# Patient Record
Sex: FEMALE | Race: WHITE | NOT HISPANIC OR LATINO | Employment: OTHER | URBAN - METROPOLITAN AREA
[De-identification: names, ages, dates, MRNs, and addresses within clinical notes are randomized per-mention and may not be internally consistent; named-entity substitution may affect disease eponyms.]

---

## 2017-01-25 DIAGNOSIS — I10 ESSENTIAL HYPERTENSION: ICD-10-CM

## 2017-01-27 ENCOUNTER — APPOINTMENT (OUTPATIENT)
Dept: VASCULAR LAB | Facility: MEDICAL CENTER | Age: 82
End: 2017-01-27
Payer: MEDICARE

## 2017-01-28 ENCOUNTER — PATIENT OUTREACH (OUTPATIENT)
Dept: HEALTH INFORMATION MANAGEMENT | Facility: OTHER | Age: 82
End: 2017-01-28

## 2017-01-28 NOTE — PROGRESS NOTES
1/28/17  -  Outcome: LVM  CONFIRMED WEB IZ.    Attempt # 1    Care Gap Scheduling (Attempt to Schedule EACH Overdue Care Gap!)  Health Maintenance Due   Topic Date Due   • IMM DTaP/Tdap/Td Vaccine (1 - Tdap) 02/01/1952   • IMM PNEUMOCOCCAL 65+ (ADULT) LOW/MEDIUM RISK SERIES (2 of 2 - PCV13) 06/05/2013   • IMM INFLUENZA (1) 09/01/2016         MyChart Activation:  Declined

## 2017-02-11 NOTE — PROGRESS NOTES
2/11/17 -    Annual Wellness Visit Scheduling  Scheduling Status:  Not Scheduled    If Not Scheduled, Choose Reason Why:  PT DECLINED AWV SCP, BECAUSE SHE SEES DR. AUSTIN VERY OFTEN.      Shareight Activation:  COMPLETED NEW MEMBER, DOESN'T HAVE AN E-MAIL.

## 2017-02-24 ENCOUNTER — APPOINTMENT (OUTPATIENT)
Dept: VASCULAR LAB | Facility: MEDICAL CENTER | Age: 82
End: 2017-02-24
Payer: MEDICARE

## 2017-04-13 ENCOUNTER — HOSPITAL ENCOUNTER (OUTPATIENT)
Facility: MEDICAL CENTER | Age: 82
End: 2017-04-13
Attending: OPHTHALMOLOGY | Admitting: OPHTHALMOLOGY
Payer: MEDICARE

## 2017-04-13 VITALS
BODY MASS INDEX: 25.39 KG/M2 | WEIGHT: 143.3 LBS | OXYGEN SATURATION: 95 % | HEIGHT: 63 IN | SYSTOLIC BLOOD PRESSURE: 152 MMHG | DIASTOLIC BLOOD PRESSURE: 71 MMHG | TEMPERATURE: 97.8 F | HEART RATE: 63 BPM | RESPIRATION RATE: 16 BRPM

## 2017-04-13 PROBLEM — H43.10 VITREOUS HEMORRHAGE (HCC): Status: ACTIVE | Noted: 2017-04-13

## 2017-04-13 LAB
ANION GAP SERPL CALC-SCNC: 7 MMOL/L (ref 0–11.9)
BUN SERPL-MCNC: 23 MG/DL (ref 8–22)
CALCIUM SERPL-MCNC: 9.9 MG/DL (ref 8.5–10.5)
CHLORIDE SERPL-SCNC: 110 MMOL/L (ref 96–112)
CO2 SERPL-SCNC: 26 MMOL/L (ref 20–33)
CREAT SERPL-MCNC: 1.39 MG/DL (ref 0.5–1.4)
EKG IMPRESSION: NORMAL
EKG IMPRESSION: NORMAL
GFR SERPL CREATININE-BSD FRML MDRD: 36 ML/MIN/1.73 M 2
GLUCOSE SERPL-MCNC: 105 MG/DL (ref 65–99)
POTASSIUM SERPL-SCNC: 4.4 MMOL/L (ref 3.6–5.5)
SODIUM SERPL-SCNC: 143 MMOL/L (ref 135–145)

## 2017-04-13 PROCEDURE — 110371 HCHG SHELL REV 272: Performed by: OPHTHALMOLOGY

## 2017-04-13 PROCEDURE — A9270 NON-COVERED ITEM OR SERVICE: HCPCS

## 2017-04-13 PROCEDURE — 700111 HCHG RX REV CODE 636 W/ 250 OVERRIDE (IP)

## 2017-04-13 PROCEDURE — 160048 HCHG OR STATISTICAL LEVEL 1-5: Performed by: OPHTHALMOLOGY

## 2017-04-13 PROCEDURE — 160009 HCHG ANES TIME/MIN: Performed by: OPHTHALMOLOGY

## 2017-04-13 PROCEDURE — 160035 HCHG PACU - 1ST 60 MINS PHASE I: Performed by: OPHTHALMOLOGY

## 2017-04-13 PROCEDURE — 502240 HCHG MISC OR SUPPLY RC 0272: Performed by: OPHTHALMOLOGY

## 2017-04-13 PROCEDURE — 501836 HCHG SUTURE EYE: Performed by: OPHTHALMOLOGY

## 2017-04-13 PROCEDURE — 501154 HCHG PROBE, ENDO OTO HGM LASER: Performed by: OPHTHALMOLOGY

## 2017-04-13 PROCEDURE — 93005 ELECTROCARDIOGRAM TRACING: CPT | Performed by: OPHTHALMOLOGY

## 2017-04-13 PROCEDURE — 503198 HCHG BACKFLUSH, SOFT TIP: Performed by: OPHTHALMOLOGY

## 2017-04-13 PROCEDURE — 80048 BASIC METABOLIC PNL TOTAL CA: CPT

## 2017-04-13 PROCEDURE — 160029 HCHG SURGERY MINUTES - 1ST 30 MINS LEVEL 4: Performed by: OPHTHALMOLOGY

## 2017-04-13 PROCEDURE — 160041 HCHG SURGERY MINUTES - EA ADDL 1 MIN LEVEL 4: Performed by: OPHTHALMOLOGY

## 2017-04-13 PROCEDURE — 93010 ELECTROCARDIOGRAM REPORT: CPT | Mod: 77 | Performed by: INTERNAL MEDICINE

## 2017-04-13 PROCEDURE — A9270 NON-COVERED ITEM OR SERVICE: HCPCS | Performed by: ANESTHESIOLOGY

## 2017-04-13 PROCEDURE — 160036 HCHG PACU - EA ADDL 30 MINS PHASE I: Performed by: OPHTHALMOLOGY

## 2017-04-13 PROCEDURE — 110382 HCHG SHELL REV 271: Performed by: OPHTHALMOLOGY

## 2017-04-13 PROCEDURE — 700102 HCHG RX REV CODE 250 W/ 637 OVERRIDE(OP): Performed by: ANESTHESIOLOGY

## 2017-04-13 PROCEDURE — 93005 ELECTROCARDIOGRAM TRACING: CPT | Performed by: ANESTHESIOLOGY

## 2017-04-13 PROCEDURE — A6410 STERILE EYE PAD: HCPCS | Performed by: OPHTHALMOLOGY

## 2017-04-13 PROCEDURE — 93010 ELECTROCARDIOGRAM REPORT: CPT | Mod: 76 | Performed by: INTERNAL MEDICINE

## 2017-04-13 PROCEDURE — 700101 HCHG RX REV CODE 250

## 2017-04-13 PROCEDURE — 160002 HCHG RECOVERY MINUTES (STAT): Performed by: OPHTHALMOLOGY

## 2017-04-13 PROCEDURE — 500558 HCHG EYE SHIELD W/GARTER (FOX): Performed by: OPHTHALMOLOGY

## 2017-04-13 PROCEDURE — A4606 OXYGEN PROBE USED W OXIMETER: HCPCS | Performed by: OPHTHALMOLOGY

## 2017-04-13 PROCEDURE — 700102 HCHG RX REV CODE 250 W/ 637 OVERRIDE(OP)

## 2017-04-13 RX ORDER — MIDAZOLAM HYDROCHLORIDE 1 MG/ML
INJECTION INTRAMUSCULAR; INTRAVENOUS
Status: DISCONTINUED
Start: 2017-04-13 | End: 2017-04-13 | Stop reason: HOSPADM

## 2017-04-13 RX ORDER — BALANCED SALT SOLUTION 6.4; .75; .48; .3; 3.9; 1.7 MG/ML; MG/ML; MG/ML; MG/ML; MG/ML; MG/ML
SOLUTION OPHTHALMIC
Status: DISCONTINUED | OUTPATIENT
Start: 2017-04-13 | End: 2017-04-13 | Stop reason: HOSPADM

## 2017-04-13 RX ORDER — TROPICAMIDE 10 MG/ML
SOLUTION/ DROPS OPHTHALMIC
Status: COMPLETED
Start: 2017-04-13 | End: 2017-04-13

## 2017-04-13 RX ORDER — BALANCED SALT SOLUTION ENRICHED WITH BICARBONATE, DEXTROSE, AND GLUTATHIONE
KIT INTRAOCULAR
Status: DISCONTINUED | OUTPATIENT
Start: 2017-04-13 | End: 2017-04-13 | Stop reason: HOSPADM

## 2017-04-13 RX ORDER — SODIUM CHLORIDE, SODIUM LACTATE, POTASSIUM CHLORIDE, CALCIUM CHLORIDE 600; 310; 30; 20 MG/100ML; MG/100ML; MG/100ML; MG/100ML
1000 INJECTION, SOLUTION INTRAVENOUS
Status: COMPLETED | OUTPATIENT
Start: 2017-04-13 | End: 2017-04-13

## 2017-04-13 RX ORDER — DEXAMETHASONE SODIUM PHOSPHATE 4 MG/ML
INJECTION, SOLUTION INTRA-ARTICULAR; INTRALESIONAL; INTRAMUSCULAR; INTRAVENOUS; SOFT TISSUE
Status: DISCONTINUED | OUTPATIENT
Start: 2017-04-13 | End: 2017-04-13 | Stop reason: HOSPADM

## 2017-04-13 RX ORDER — CEFAZOLIN SODIUM 1 G/3ML
INJECTION, POWDER, FOR SOLUTION INTRAMUSCULAR; INTRAVENOUS
Status: DISCONTINUED | OUTPATIENT
Start: 2017-04-13 | End: 2017-04-13 | Stop reason: HOSPADM

## 2017-04-13 RX ORDER — PHENYLEPHRINE HYDROCHLORIDE 25 MG/ML
SOLUTION/ DROPS OPHTHALMIC
Status: COMPLETED
Start: 2017-04-13 | End: 2017-04-13

## 2017-04-13 RX ORDER — NEOMYCIN SULFATE, POLYMYXIN B SULFATE, AND DEXAMETHASONE 3.5; 10000; 1 MG/G; [USP'U]/G; MG/G
OINTMENT OPHTHALMIC
Status: DISCONTINUED | OUTPATIENT
Start: 2017-04-13 | End: 2017-04-13 | Stop reason: HOSPADM

## 2017-04-13 RX ORDER — DIPHENHYDRAMINE HYDROCHLORIDE 50 MG/ML
INJECTION INTRAMUSCULAR; INTRAVENOUS
Status: COMPLETED
Start: 2017-04-13 | End: 2017-04-13

## 2017-04-13 RX ORDER — FLURBIPROFEN SODIUM 0.3 MG/ML
SOLUTION/ DROPS OPHTHALMIC
Status: COMPLETED
Start: 2017-04-13 | End: 2017-04-13

## 2017-04-13 RX ORDER — METOPROLOL TARTRATE 50 MG/1
25 TABLET, FILM COATED ORAL ONCE
Status: DISCONTINUED | OUTPATIENT
Start: 2017-04-13 | End: 2017-04-13 | Stop reason: HOSPADM

## 2017-04-13 RX ORDER — METOPROLOL TARTRATE 50 MG/1
25 TABLET, FILM COATED ORAL ONCE
Status: DISCONTINUED | OUTPATIENT
Start: 2017-04-13 | End: 2017-04-13

## 2017-04-13 RX ORDER — BALANCED SALT SOLUTION ENRICHED WITH BICARBONATE, DEXTROSE, AND GLUTATHIONE
KIT INTRAOCULAR
Status: DISCONTINUED
Start: 2017-04-13 | End: 2017-04-13 | Stop reason: HOSPADM

## 2017-04-13 RX ORDER — METOPROLOL TARTRATE 50 MG/1
TABLET, FILM COATED ORAL
Status: COMPLETED
Start: 2017-04-13 | End: 2017-04-13

## 2017-04-13 RX ORDER — CYCLOPENTOLATE HYDROCHLORIDE 10 MG/ML
SOLUTION/ DROPS OPHTHALMIC
Status: COMPLETED
Start: 2017-04-13 | End: 2017-04-13

## 2017-04-13 RX ORDER — MOXIFLOXACIN 5 MG/ML
SOLUTION/ DROPS OPHTHALMIC
Status: COMPLETED
Start: 2017-04-13 | End: 2017-04-13

## 2017-04-13 RX ORDER — HALOPERIDOL 5 MG/ML
INJECTION INTRAMUSCULAR
Status: COMPLETED
Start: 2017-04-13 | End: 2017-04-13

## 2017-04-13 RX ADMIN — METOPROLOL TARTRATE 25 MG: 50 TABLET, FILM COATED ORAL at 10:00

## 2017-04-13 RX ADMIN — MOXIFLOXACIN HYDROCHLORIDE 1 DROP: 5 SOLUTION/ DROPS OPHTHALMIC at 06:10

## 2017-04-13 RX ADMIN — SODIUM CHLORIDE, SODIUM LACTATE, POTASSIUM CHLORIDE, CALCIUM CHLORIDE 1000 ML: 600; 310; 30; 20 INJECTION, SOLUTION INTRAVENOUS at 06:15

## 2017-04-13 RX ADMIN — TROPICAMIDE 1 DROP: 10 SOLUTION/ DROPS OPHTHALMIC at 06:10

## 2017-04-13 RX ADMIN — PHENYLEPHRINE HYDROCHLORIDE 1 DROP: 2.5 SOLUTION/ DROPS OPHTHALMIC at 06:10

## 2017-04-13 RX ADMIN — HALOPERIDOL LACTATE 1 MG: 5 INJECTION, SOLUTION INTRAMUSCULAR at 08:01

## 2017-04-13 RX ADMIN — CYCLOPENTOLATE HYDROCHLORIDE 1 DROP: 10 SOLUTION/ DROPS OPHTHALMIC at 06:10

## 2017-04-13 RX ADMIN — DIPHENHYDRAMINE HYDROCHLORIDE 12.5 MG: 50 INJECTION INTRAMUSCULAR; INTRAVENOUS at 09:00

## 2017-04-13 RX ADMIN — FLURBIPROFEN SODIUM 1 DROP: 0.3 SOLUTION/ DROPS OPHTHALMIC at 06:10

## 2017-04-13 ASSESSMENT — PAIN SCALES - GENERAL
PAINLEVEL_OUTOF10: 0

## 2017-04-13 NOTE — OR NURSING
1151 Report from Karishma GRECO.    1225 Call placed to pt son in law to notify that pt will go home today.    1239 Pt assisted to change and up to recliner.  Tolerated well with assistance. Report back to Karishma GRECO.

## 2017-04-13 NOTE — OR NURSING
1230 Pt dressed sitting in recliner chair.  No c/o pain or n/v.  Is teaching completed with PT and strong effort noted. Pt was able to get IS to 1000.      1252 Pt is drinking coffee.  Pt declines food at this time    1315 Dc instructions completed with PT and her son.      1339 Dc to car via wheel chair.  Pt has all belongings.

## 2017-04-13 NOTE — OR NURSING
0747 Received from OR, report from Dr. Singletary.  Pt is having apneic breathing pattern, RN positioned PT with Jaw lift and eqaul breath sounds noted.  Right eye patch in place CDI.      0748 Pt heart rate rhythm is fast see charting.  Dr. Arita updated and EKG ordered.  Pt is very confused, and states that she is going to throwing up, see mar.  See charting BP hypotensive.      0811 Dr. Singletary at bedside.      0813 Esmolol given 10 mg Iv per Dr. Singletary.     0816 Esmolol given 20 mg per Dr. Singletary.      0820 Esmolol given 30 mg per Dr. Singletary.      0819 Metoprolol  1 mg given  per Dr. Singletary    0820 Paged on call cardiology.     0826 On call cardiologist talking to Dr. Singletary. Cardiologist to see patient in recovery.      0836 Metoprolol 1 mg given IV per Dr Singletary    0900 Pt states I am going to trow up, medication given see mar.      0904 Placed warm blanket on PT for comfort.     0905 Pt moaning, but cant state what is wrong.  When asked by RN she states that she doesn't now and that she just doesn't feel well.      0933 Pt daughter updated.     0934 Sign off to Cale GRECO

## 2017-04-13 NOTE — CONSULTS
DATE OF SERVICE:  04/13/2017    PREOPERATIVE DIAGNOSIS:  Nonclearing vitreous hemorrhage, right eye.    POSTOPERATIVE DIAGNOSES:  Nonclearing vitreous hemorrhage, right eye.  Lattice   degeneration, right eye.    PROCEDURE:  A 23-gauge pars plana vitrectomy, Endolaser, 20% SF6, right eye.    SURGEON:  Jessica Arita MD    ASSISTANT:  None.    ANESTHESIOLOGIST:  Guille Singletary MD    FLUIDS:  See anesthesia note.    ANESTHESIA:  General endotracheal.    COMPLICATIONS:  None.    INDICATIONS:  The patient with longstanding vitreous hemorrhage and   non-diabetic.  There was a concern for retinal tear.  Risks, benefits, and   alternatives of surgery were discussed with the patient.  Patient consented   for the procedure.    PROCEDURE IN DETAIL:  The correct eye was marked in the preop area.  Patient   was taken to the operating room.  General anesthesia induced by   anesthesiologist.  Patient was prepped and draped in the usual sterile   ophthalmic fashion.  Site was marked 3 mm from the limbus in the   inferotemporal quadrant.  A 23-gauge trocar was used in a beveled fashion to   enter the vitreous cavity.  Infusion was placed in the eye, visualized with   the light pipe and then turned on.  One site superonasally and another site   superotemporally were then marked.  A 23-gauge trocar was used in a beveled   fashion to enter the vitreous cavity.  Light pipe and vitrector were inserted   inside the eye.  We performed good core and peripheral vitrectomy to remove   the vitreous.  We noticed that the area of subretinal hemorrhage superior to   the optic nerve not involving the macula.  We also noticed severe lattice   degeneration.  Endolaser was performed around the retinal lattice   degeneration.  Scleral depression was performed.  There were no iatrogenic   breaks or tears.  Air-fluid exchange was then performed, 20% SF6 was placed in   the eye.  Trocars were then removed.  Postop Ancef and dexamethasone were   injected  subconjunctivally.  The drapes were then removed.  The patient's face   was cleaned, ointment applied to the eye.  Eye was patched and shielded.  The   patient was taken to recovery room in good condition.  There were no   complications.       ____________________________________     MD LEXI CROCKETT / ISAIAS    DD:  04/13/2017 08:05:05  DT:  04/13/2017 09:06:39    D#:  [id number]  Job#:  [id number]

## 2017-04-13 NOTE — DISCHARGE INSTRUCTIONS
ACTIVITY: Rest and take it easy for the first 24 hours.  A responsible adult is recommended to remain with you during that time.  It is normal to feel sleepy.  We encourage you to not do anything that requires balance, judgment or coordination.    MILD FLU-LIKE SYMPTOMS ARE NORMAL. YOU MAY EXPERIENCE GENERALIZED MUSCLE ACHES, THROAT IRRITATION, HEADACHE AND/OR SOME NAUSEA.    FOR 24 HOURS DO NOT:  Drive, operate machinery or run household appliances.  Drink beer or alcoholic beverages.   Make important decisions or sign legal documents.    SPECIAL INSTRUCTIONS: Keep Eye patch in place until follow up visit     DIET: To avoid nausea, slowly advance diet as tolerated, avoiding spicy or greasy foods for the first day.  Add more substantial food to your diet according to your physician's instructions.  Babies can be fed formula or breast milk as soon as they are hungry.  INCREASE FLUIDS AND FIBER TO AVOID CONSTIPATION.    SURGICAL DRESSING/BATHING: May shower tomorrow after follow up visit with Dr. Arita    FOLLOW-UP APPOINTMENT:  A follow-up appointment should be arranged with your doctor,  call to schedule.  Follow up with Dr. Arita tomorrow at 11:45 AM    You should CALL YOUR PHYSICIAN if you develop:  Fever greater than 101 degrees F.  Pain not relieved by medication, or persistent nausea or vomiting.  Excessive bleeding (blood soaking through dressing) or unexpected drainage from the wound.  Extreme redness or swelling around the incision site, drainage of pus or foul smelling drainage.  Inability to urinate or empty your bladder within 8 hours.  Problems with breathing or chest pain.    You should call 911 if you develop problems with breathing or chest pain.  If you are unable to contact your doctor or surgical center, you should go to the nearest emergency room or urgent care center.  Physician's telephone #: *Dr. Arita [phone]    If any questions arise, call your doctor.  If your doctor is not  available, please feel free to call the Surgical Center at [phone].  The Center is open Monday through Friday from 7AM to 7PM.  You can also call the HEALTH HOTLINE open 24 hours/day, 7 days/week and speak to a nurse at [phone], or toll free at [phone].    A registered nurse may call you a few days after your surgery to see how you are doing after your procedure.    MEDICATIONS: Resume taking daily medication.  Take prescribed pain medication with food.  If no medication is prescribed, you may take non-aspirin pain medication if needed.  PAIN MEDICATION CAN BE VERY CONSTIPATING.  Take a stool softener or laxative such as senokot, pericolace, or milk of magnesia if needed.    Prescription given for *NONE**.  Last pain medication given at *NONE**.    If your physician has prescribed pain medication that includes Acetaminophen (Tylenol), do not take additional Acetaminophen (Tylenol) while taking the prescribed medication.    Depression / Suicide Risk    As you are discharged from this [facility] facility, it is important to learn how to keep safe from harming yourself.    Recognize the warning signs:  · Abrupt changes in personality, positive or negative- including increase in energy   · Giving away possessions  · Change in eating patterns- significant weight changes-  positive or negative  · Change in sleeping patterns- unable to sleep or sleeping all the time   · Unwillingness or inability to communicate  · Depression  · Unusual sadness, discouragement and loneliness  · Talk of wanting to die  · Neglect of personal appearance   · Rebelliousness- reckless behavior  · Withdrawal from people/activities they love  · Confusion- inability to concentrate     If you or a loved one observes any of these behaviors or has concerns about self-harm, here's what you can do:  · Talk about it- your feelings and reasons for harming yourself  · Remove any means that you might use to hurt yourself (examples:  pills, rope, extension cords, firearm)  · Get professional help from the community (Mental Health, Substance Abuse, psychological counseling)  · Do not be alone:Call your Safe Contact- someone whom you trust who will be there for you.  · Call your local CRISIS HOTLINE 622-2579 or 961-011-6570  · Call your local Children's Mobile Crisis Response Team [place] (104) 451-3645 or www.uSamp  · Call the toll free National Suicide Prevention Hotlines   · National Suicide Prevention Lifeline 848-886-HOUT (8251)  · National Hope Line Network 800-SUICIDE (333-6076)

## 2017-04-13 NOTE — IP AVS SNAPSHOT
" Home Care Instructions                                                                                                                Name:Madeleine Zambrano  Medical Record Number:[id number]  CSN: [id number]    YOB: 1933   Age: 84 y.o.  Sex: female  HT:1.6 m (5' 3\") WT: 65 kg (143 lb 4.8 oz)          Admit Date: 4/13/2017     Discharge Date:   Today's Date: 4/13/2017  Attending Doctor:  Jessica Arita M.D.                  Allergies:  Demerol and Ultram                Discharge Instructions         ACTIVITY: Rest and take it easy for the first 24 hours.  A responsible adult is recommended to remain with you during that time.  It is normal to feel sleepy.  We encourage you to not do anything that requires balance, judgment or coordination.    MILD FLU-LIKE SYMPTOMS ARE NORMAL. YOU MAY EXPERIENCE GENERALIZED MUSCLE ACHES, THROAT IRRITATION, HEADACHE AND/OR SOME NAUSEA.    FOR 24 HOURS DO NOT:  Drive, operate machinery or run household appliances.  Drink beer or alcoholic beverages.   Make important decisions or sign legal documents.    SPECIAL INSTRUCTIONS: Keep Eye patch in place until follow up visit     DIET: To avoid nausea, slowly advance diet as tolerated, avoiding spicy or greasy foods for the first day.  Add more substantial food to your diet according to your physician's instructions.  Babies can be fed formula or breast milk as soon as they are hungry.  INCREASE FLUIDS AND FIBER TO AVOID CONSTIPATION.    SURGICAL DRESSING/BATHING: May shower tomorrow after follow up visit with Dr. Arita    FOLLOW-UP APPOINTMENT:  A follow-up appointment should be arranged with your doctor,  call to schedule.  Follow up with Dr. Arita tomorrow at 11:45 AM    You should CALL YOUR PHYSICIAN if you develop:  Fever greater than 101 degrees F.  Pain not relieved by medication, or persistent nausea or vomiting.  Excessive bleeding (blood soaking through dressing) or unexpected drainage from the wound.  Extreme " redness or swelling around the incision site, drainage of pus or foul smelling drainage.  Inability to urinate or empty your bladder within 8 hours.  Problems with breathing or chest pain.    You should call 911 if you develop problems with breathing or chest pain.  If you are unable to contact your doctor or surgical center, you should go to the nearest emergency room or urgent care center.  Physician's telephone #: *Dr. Arita [phone]    If any questions arise, call your doctor.  If your doctor is not available, please feel free to call the Surgical Center at [phone].  The Center is open Monday through Friday from 7AM to 7PM.  You can also call the HEALTH HOTLINE open 24 hours/day, 7 days/week and speak to a nurse at [phone], or toll free at [phone].    A registered nurse may call you a few days after your surgery to see how you are doing after your procedure.    MEDICATIONS: Resume taking daily medication.  Take prescribed pain medication with food.  If no medication is prescribed, you may take non-aspirin pain medication if needed.  PAIN MEDICATION CAN BE VERY CONSTIPATING.  Take a stool softener or laxative such as senokot, pericolace, or milk of magnesia if needed.    Prescription given for *NONE**.  Last pain medication given at *NONE**.    If your physician has prescribed pain medication that includes Acetaminophen (Tylenol), do not take additional Acetaminophen (Tylenol) while taking the prescribed medication.    Depression / Suicide Risk    As you are discharged from this [facility] facility, it is important to learn how to keep safe from harming yourself.    Recognize the warning signs:  · Abrupt changes in personality, positive or negative- including increase in energy   · Giving away possessions  · Change in eating patterns- significant weight changes-  positive or negative  · Change in sleeping patterns- unable to sleep or sleeping all the time   · Unwillingness or inability to  communicate  · Depression  · Unusual sadness, discouragement and loneliness  · Talk of wanting to die  · Neglect of personal appearance   · Rebelliousness- reckless behavior  · Withdrawal from people/activities they love  · Confusion- inability to concentrate     If you or a loved one observes any of these behaviors or has concerns about self-harm, here's what you can do:  · Talk about it- your feelings and reasons for harming yourself  · Remove any means that you might use to hurt yourself (examples: pills, rope, extension cords, firearm)  · Get professional help from the community (Mental Health, Substance Abuse, psychological counseling)  · Do not be alone:Call your Safe Contact- someone whom you trust who will be there for you.  · Call your local CRISIS HOTLINE 535-9054 or 787-915-5728  · Call your local Children's Mobile Crisis Response Team [place] (901) 536-5156 or www.Social 2 Step  · Call the toll free National Suicide Prevention Hotlines   · National Suicide Prevention Lifeline 762-935-SNTP (1240)  · [employer] Line Network 800-SUICIDE (965-2195)       Medication List      ASK your doctor about these medications        Instructions    Morning Afternoon Evening Bedtime    amlodipine 5 MG Tabs   Commonly known as:  NORVASC        Doctor's comments:  Pt needs to make follow up appt   TAKE 1 TABLET BY MOUTH DAILY                        aspirin EC 81 MG Tbec   Commonly known as:  ECOTRIN        Take 81 mg by mouth every day.   Dose:  81 mg                        clopidogrel 75 MG Tabs   Commonly known as:  PLAVIX        Doctor's comments:  [facility]   Take 1 Tab by mouth every day.   Dose:  75 mg                        diazepam 2 MG Tabs   Commonly known as:  VALIUM        Take 2 mg by mouth at bedtime as needed. Indications: Feeling Anxious, Trouble Sleeping   Dose:  2 mg                        lisinopril 10 MG Tabs   Commonly known as:  PRINIVIL        Take 1 Tab by mouth every day.      Dose:  10 mg                        metoprolol 25 MG Tabs   Last time this was given:  25 mg on 4/13/2017 10:00 AM   Commonly known as:  LOPRESSOR        TAKE 1 TABLET BY MOUTH TWICE A DAY                        nitroglycerin 0.4 MG Subl   Commonly known as:  NITROSTAT        Place 1 Tab under tongue 1 time daily as needed for Chest Pain.   Dose:  0.4 mg                        VITAMIN C PO        Take 1 Tab by mouth every day.   Dose:  1 Tab                                Medication Information     Above and/or attached are the medications your physician expects you to take upon discharge. Review all of your home medications and newly ordered medications with your doctor and/or pharmacist. Follow medication instructions as directed by your doctor and/or pharmacist. Please keep your medication list with you and share with your physician. Update the information when medications are discontinued, doses are changed, or new medications (including over-the-counter products) are added; and carry medication information at all times in the event of emergency situations.        Resources     Quit Smoking / Tobacco Use:    I understand the use of any tobacco products increases my chance of suffering from future heart disease or stroke and could cause other illnesses which may shorten my life. Quitting the use of tobacco products is the single most important thing I can do to improve my health. For further information on smoking / tobacco cessation call a Toll Free Quit Line at 1-729.568.8131 (*National Cancer [place]) or 1-494.408.2036 (American Lung Association) or you can access the web based program at www.lungusa.org.    [place] Tobacco Users Help Line:  (295) 382-6009       Toll Free: 1-371.988.9179  Quit Tobacco Program [facility] (329)766-9869    Crisis Hotline:    [place] Crisis Hotline:  0-000-MQLJIRG or 1-822.415.6424    [place] Crisis Hotline:    1-764.889.8533 or 669-656-0038    Discharge  Survey:   Thank you for choosing [facility]. We hope we did everything we could to make your hospital stay a pleasant one. You may be receiving a survey and we would appreciate your time and participation in answering the questions. Your input is very valuable to us in our efforts to improve our service to our patients and their families.            Signatures     My signature on this form indicates that:    1. I acknowledge receipt and understanding of these Home Care Instruction.  2. My questions regarding this information have been answered to my satisfaction.  3. I have formulated a plan with my discharge nurse to obtain my prescribed medications for home.    __________________________________      __________________________________                   Patient Signature                                 Guardian/Responsible Adult Signature      __________________________________                 __________       ________                       Nurse Signature                                               Date                 Time

## 2017-04-13 NOTE — IP AVS SNAPSHOT
4/13/2017    Madeleine Zambrano  [address]  [place] [address]    Dear Madeleine:    [facility] wants to ensure your discharge home is safe and you or your loved ones have had all of your questions answered regarding your care after you leave the hospital.    Below is a list of resources and contact information should you have any questions regarding your hospital stay, follow-up instructions, or active medical symptoms.    Questions or Concerns Regarding… Contact   Medical Questions Related to Your Discharge  (7 days a week, 8am-5pm) Contact a Nurse Care Coordinator   [phone]   Medical Questions Not Related to Your Discharge  (24 hours a day / 7 days a week)  Contact the Nurse Health Line   [phone]    Medications or Discharge Instructions Refer to your discharge packet   or contact your [facility] Primary Care Provider   [phone]   Follow-up Appointment(s) Schedule your appointment via [employer]   or contact Scheduling [phone]   Billing Review your statement via [employer]  or contact Billing [phone]   Medical Records Review your records via [employer]   or contact Medical Records [phone]     You may receive a telephone call within two days of discharge. This call is to make certain you understand your discharge instructions and have the opportunity to have any questions answered. You can also easily access your medical information, test results and upcoming appointments via the [employer] free online health management tool. You can learn more and sign up at [employer]High[employer]s. For assistance setting up your [employer] account, please call [phone].    Once again, we want to ensure your discharge home is safe and that you have a clear understanding of any next steps in your care. If you have any questions or concerns, please do not hesitate to contact us, we are here for you. Thank you for choosing [facility] for your healthcare needs.    Sincerely,    Your [facility] Healthcare Team

## 2017-04-13 NOTE — CONSULTS
Reason of Consult: AFRVR    Consulting Physician: Dr. RUSHING    HPI:  84F with CAD and HTN on clopidogrel and lopressor presents for outpatient eye surgery. In PACU was noted to be in AFRVR, was given lopressor and now in SR. No complaints.    Denies any other cardiovascular symptoms including chest pain, shortness of breath, dyspnea on exertion, lightheadedness, syncope or presyncope, lower extremity edema, PND, orthopnea or palpitations.      Past Medical History   Diagnosis Date   • Arthritis    • Hyperlipidemia    • Hypertension    • CATARACT    • Pain      sacrum 1/10   • Glaucoma    • Back pain 3/28/2012   • MEDICAL HOME 11/07/12   • Myocardial infarct (CMS-HCC)        Past Surgical History   Procedure Laterality Date   • Athroplasty       broken hip 1990   • Other orthopedic surgery  1980     pins in left hip   • Cataract phaco with iol  4/13/2011     Performed by EVERETTE COOPER at SURGERY SAME DAY [facility] ORS   • Cataract phaco with iol  4/27/2011     Performed by EVERETTE COOPER at SURGERY SAME DAY [facility] ORS   • Rigo by laparoscopy  10/9/2015     Procedure: RIGO BY LAPAROSCOPY;  Surgeon: Ric Zambrano M.D.;  Location: SURGERY [facility];  Service:        No current facility-administered medications on file prior to encounter.     Current Outpatient Prescriptions on File Prior to Encounter   Medication Sig Dispense Refill   • metoprolol (LOPRESSOR) 25 MG Tab TAKE 1 TABLET BY MOUTH TWICE A  Tab 3   • clopidogrel (PLAVIX) 75 MG Tab Take 1 Tab by mouth every day. 90 Tab 2   • amlodipine (NORVASC) 5 MG Tab TAKE 1 TABLET BY MOUTH DAILY 90 Tab 0   • lisinopril (PRINIVIL) 10 MG Tab Take 1 Tab by mouth every day. 90 Tab 3   • nitroglycerin (NITROSTAT) 0.4 MG SUBL Place 1 Tab under tongue 1 time daily as needed for Chest Pain. 25 Tab 11   • Ascorbic Acid (VITAMIN C PO) Take 1 Tab by mouth every day.     • diazepam (VALIUM) 2 MG TABS Take 2 mg by mouth at bedtime as needed. Indications: Feeling  "Anxious, Trouble Sleeping         Current Facility-Administered Medications   Medication Dose Frequency Provider Last Rate Last Dose   • BSS PLUS IO SOLN           • FENTANYL CITRATE 0.05 MG/ML INJ SOLN           • MIDAZOLAM HCL 2 MG/2ML INJ SOLN           • METOPROLOL TARTRATE 1 MG/ML IV SOLN           • metoprolol (LOPRESSOR) tablet 25 mg  25 mg Once Guille Singletary M.D.         Last reviewed on 4/13/2017  6:58 AM by ANTELMO Anton and Ultr[employer]    Family History   Problem Relation Age of Onset   • Heart Disease Mother    • Cancer Father        ROS: As HPI other reviewed and negative     Physical Exam   Blood pressure 152/71, pulse 65, temperature 36.6 °C (97.8 °F), resp. rate 16, height 1.6 m (5' 3\"), weight 65 kg (143 lb 4.8 oz), SpO2 91 %.    Constitutional: Appears well-developed.   HENT: Normocephalic and atraumatic. No scleral icterus.   Neck: No JVD present.   Cardiovascular: Normal rate. Exam reveals no gallop and no friction rub. No murmur heard.   Pulmonary/Chest: CTAB    Abdominal: S/NT/ND BS+   Musculoskeletal:  Pulses present. No atrophy. Strength normal.  Extremities: Exhibits no edema. No clubbing or cyanosis.   Skin: Skin is warm and dry.   Neuro: Non-focal, CN 2-12 intact grossly, R eye patch limiting exam.      Intake/Output Summary (Last 24 hours) at 04/13/17 1225  Last data filed at 04/13/17 1054   Gross per 24 hour   Intake   1150 ml   Output      0 ml   Net   1150 ml           Recent Labs      04/13/17   0619   SODIUM  143   POTASSIUM  4.4   CHLORIDE  110   CO2  26   GLUCOSE  105*   BUN  23*   CREATININE  1.39   CALCIUM  9.9                       EKG (4/12/2017):  I have personally reviewed the EKG this visit and discussed with the patient. It shows AFRVR, lateral ST depression. Repeat shows SR no ST changes.    Imaging reviewed    Impressions:  1. PAF now SR  2. CAD  3. Normal LV function    Recommendations:  OK to DC home with restoration of outpatient medical regimen  Will need " to be assessed for OAC need as outpatient, not a candidate at this time due to surgery POD#0  Close FU cardiology

## 2017-04-13 NOTE — OR NURSING
1000 Oral metoprolol given per MD orders.      1015 Dr. Ilana West was update that PT is going to be seen by cardiology and may possible be admitted.     1039 Pt converted to sinus rhythm with 72 heart rate. Pt placed on room air.  See VS'S charting.       1054 Pt more awake no c/o of pain or n/v.  Drinking sips of apple juice.  Waiting for cardiology to see PT at bedside.      1100 When patient sleeps her oxygen drops to 89% on 2 liters nasal cannula.     1146 Dr. Ballesteros with cardiology at bedside and ok with PT ok to dc home once recovered.      1150 Dr. Singletary updated    1151 Report given to Stephanie GRECO

## 2017-04-14 LAB — EKG IMPRESSION: NORMAL

## 2017-04-14 NOTE — OP REPORT
DATE OF SERVICE:  04/13/2017    PREOPERATIVE DIAGNOSIS:  Nonclearing vitreous hemorrhage, right eye.    POSTOPERATIVE DIAGNOSES:  Nonclearing vitreous hemorrhage, right eye.  Lattice   degeneration, right eye.    PROCEDURE:  A 23-gauge pars plana vitrectomy, Endolaser, 20% SF6, right eye.    SURGEON:  Jessica Arita MD    ASSISTANT:  None.    ANESTHESIOLOGIST:  Guille Singletary MD    FLUIDS:  See anesthesia note.    ANESTHESIA:  General endotracheal.    COMPLICATIONS:  None.    INDICATIONS:  The patient with longstanding vitreous hemorrhage and   non-diabetic.  There was a concern for retinal tear.  Risks, benefits, and   alternatives of surgery were discussed with the patient.  Patient consented   for the procedure.    PROCEDURE IN DETAIL:  The correct eye was marked in the preop area.  Patient   was taken to the operating room.  General anesthesia induced by   anesthesiologist.  Patient was prepped and draped in the usual sterile   ophthalmic fashion.  Site was marked 3 mm from the limbus in the   inferotemporal quadrant.  A 23-gauge trocar was used in a beveled fashion to   enter the vitreous cavity.  Infusion was placed in the eye, visualized with   the light pipe and then turned on.  One site superonasally and another site   superotemporally were then marked.  A 23-gauge trocar was used in a beveled   fashion to enter the vitreous cavity.  Light pipe and vitrector were inserted   inside the eye.  We performed good core and peripheral vitrectomy to remove   the vitreous.  We noticed that the area of subretinal hemorrhage superior to   the optic nerve not involving the macula.  We also noticed severe lattice   degeneration.  Endolaser was performed around the retinal lattice   degeneration.  Scleral depression was performed.  There were no iatrogenic   breaks or tears.  Air-fluid exchange was then performed, 20% SF6 was placed in   the eye.  Trocars were then removed.  Postop Ancef and dexamethasone were   injected  subconjunctivally.  The drapes were then removed.  The patient's face   was cleaned, ointment applied to the eye.  Eye was patched and shielded.  The   patient was taken to recovery room in good condition.  There were no   complications.       ____________________________________     MD LEXI CROCKETT / ISAIAS    DD:  04/13/2017 08:05:05  DT:  04/13/2017 09:06:39    D#:  [id number]  Job#:  [id number]

## 2017-05-16 ENCOUNTER — TELEPHONE (OUTPATIENT)
Dept: MEDICAL GROUP | Facility: MEDICAL CENTER | Age: 82
End: 2017-05-16

## 2017-05-16 DIAGNOSIS — H53.9 VISION CHANGES: ICD-10-CM

## 2017-05-16 NOTE — TELEPHONE ENCOUNTER
1. Caller Name: Madeleine Zambrano                                            Call Back Number: [phone]         Patient approves a detailed voicemail message: N\A    2. SPECIFIC Action To Be Taken: Referral for Optometrist, patient wanted to know if you would place a referral for her to see Dr. Jessica Arita, Patient stated that her eye began to bleed about a month ago? Please advise      3. Diagnosis/Clinical Reason for Request: Rght eye began to bleed     4. Specialty & Provider Name/Lab/Imaging Location: [facility]    5. Is appointment scheduled for requested order/referral: NO

## 2017-08-10 ENCOUNTER — TELEPHONE (OUTPATIENT)
Dept: VASCULAR LAB | Facility: MEDICAL CENTER | Age: 82
End: 2017-08-10

## 2017-08-10 DIAGNOSIS — I65.23 CAROTID ATHEROSCLEROSIS, BILATERAL: ICD-10-CM

## 2017-09-10 ENCOUNTER — RESOLUTE PROFESSIONAL BILLING HOSPITAL PROF FEE (OUTPATIENT)
Dept: HOSPITALIST | Facility: MEDICAL CENTER | Age: 82
End: 2017-09-10
Payer: MEDICARE

## 2017-09-10 ENCOUNTER — APPOINTMENT (OUTPATIENT)
Dept: RADIOLOGY | Facility: MEDICAL CENTER | Age: 82
End: 2017-09-10
Attending: EMERGENCY MEDICINE
Payer: MEDICARE

## 2017-09-10 ENCOUNTER — HOSPITAL ENCOUNTER (OUTPATIENT)
Facility: MEDICAL CENTER | Age: 82
End: 2017-09-13
Attending: EMERGENCY MEDICINE | Admitting: INTERNAL MEDICINE
Payer: MEDICARE

## 2017-09-10 DIAGNOSIS — I63.9 CEREBROVASCULAR ACCIDENT (CVA), UNSPECIFIED MECHANISM (HCC): ICD-10-CM

## 2017-09-10 PROBLEM — J18.9 COMMUNITY ACQUIRED PNEUMONIA: Status: ACTIVE | Noted: 2017-09-10

## 2017-09-10 PROBLEM — N18.30 STAGE 3 CHRONIC KIDNEY DISEASE (HCC): Status: ACTIVE | Noted: 2017-09-10

## 2017-09-10 PROBLEM — D69.6 THROMBOCYTOPENIA (HCC): Status: ACTIVE | Noted: 2017-09-10

## 2017-09-10 LAB
ALBUMIN SERPL BCP-MCNC: 3.6 G/DL (ref 3.2–4.9)
ALBUMIN/GLOB SERPL: 1.5 G/DL
ALP SERPL-CCNC: 76 U/L (ref 30–99)
ALT SERPL-CCNC: 9 U/L (ref 2–50)
ANION GAP SERPL CALC-SCNC: 10 MMOL/L (ref 0–11.9)
APTT PPP: 29.4 SEC (ref 24.7–36)
AST SERPL-CCNC: 15 U/L (ref 12–45)
BASOPHILS # BLD AUTO: 1.7 % (ref 0–1.8)
BASOPHILS # BLD: 0.07 K/UL (ref 0–0.12)
BILIRUB SERPL-MCNC: 0.4 MG/DL (ref 0.1–1.5)
BUN SERPL-MCNC: 24 MG/DL (ref 8–22)
CALCIUM SERPL-MCNC: 9.1 MG/DL (ref 8.5–10.5)
CHLORIDE SERPL-SCNC: 111 MMOL/L (ref 96–112)
CO2 SERPL-SCNC: 20 MMOL/L (ref 20–33)
CREAT SERPL-MCNC: 1.39 MG/DL (ref 0.5–1.4)
EKG IMPRESSION: NORMAL
EOSINOPHIL # BLD AUTO: 0.26 K/UL (ref 0–0.51)
EOSINOPHIL NFR BLD: 6.4 % (ref 0–6.9)
ERYTHROCYTE [DISTWIDTH] IN BLOOD BY AUTOMATED COUNT: 54.4 FL (ref 35.9–50)
ETHANOL BLD-MCNC: 0.05 G/DL
GFR SERPL CREATININE-BSD FRML MDRD: 36 ML/MIN/1.73 M 2
GLOBULIN SER CALC-MCNC: 2.4 G/DL (ref 1.9–3.5)
GLUCOSE BLD-MCNC: 94 MG/DL (ref 65–99)
GLUCOSE SERPL-MCNC: 73 MG/DL (ref 65–99)
HCT VFR BLD AUTO: 40.8 % (ref 37–47)
HGB BLD-MCNC: 13.6 G/DL (ref 12–16)
IMM GRANULOCYTES # BLD AUTO: 0.01 K/UL (ref 0–0.11)
IMM GRANULOCYTES NFR BLD AUTO: 0.2 % (ref 0–0.9)
INR PPP: 0.97 (ref 0.87–1.13)
LYMPHOCYTES # BLD AUTO: 1.66 K/UL (ref 1–4.8)
LYMPHOCYTES NFR BLD: 40.7 % (ref 22–41)
MCH RBC QN AUTO: 35.1 PG (ref 27–33)
MCHC RBC AUTO-ENTMCNC: 33.3 G/DL (ref 33.6–35)
MCV RBC AUTO: 105.4 FL (ref 81.4–97.8)
MONOCYTES # BLD AUTO: 0.5 K/UL (ref 0–0.85)
MONOCYTES NFR BLD AUTO: 12.3 % (ref 0–13.4)
NEUTROPHILS # BLD AUTO: 1.58 K/UL (ref 2–7.15)
NEUTROPHILS NFR BLD: 38.7 % (ref 44–72)
NRBC # BLD AUTO: 0 K/UL
NRBC BLD AUTO-RTO: 0 /100 WBC
PLATELET # BLD AUTO: 246 K/UL (ref 164–446)
PMV BLD AUTO: 10.8 FL (ref 9–12.9)
POTASSIUM SERPL-SCNC: 3.9 MMOL/L (ref 3.6–5.5)
PROT SERPL-MCNC: 6 G/DL (ref 6–8.2)
PROTHROMBIN TIME: 13.2 SEC (ref 12–14.6)
RBC # BLD AUTO: 3.87 M/UL (ref 4.2–5.4)
SODIUM SERPL-SCNC: 141 MMOL/L (ref 135–145)
TROPONIN I SERPL-MCNC: <0.01 NG/ML (ref 0–0.04)
TSH SERPL DL<=0.005 MIU/L-ACNC: 2.07 UIU/ML (ref 0.3–3.7)
WBC # BLD AUTO: 4.1 K/UL (ref 4.8–10.8)

## 2017-09-10 PROCEDURE — 93005 ELECTROCARDIOGRAM TRACING: CPT

## 2017-09-10 PROCEDURE — 71010 DX-CHEST-PORTABLE (1 VIEW): CPT

## 2017-09-10 PROCEDURE — 85025 COMPLETE CBC W/AUTO DIFF WBC: CPT

## 2017-09-10 PROCEDURE — 96360 HYDRATION IV INFUSION INIT: CPT

## 2017-09-10 PROCEDURE — 70498 CT ANGIOGRAPHY NECK: CPT

## 2017-09-10 PROCEDURE — G0378 HOSPITAL OBSERVATION PER HR: HCPCS

## 2017-09-10 PROCEDURE — 70496 CT ANGIOGRAPHY HEAD: CPT

## 2017-09-10 PROCEDURE — A9270 NON-COVERED ITEM OR SERVICE: HCPCS | Performed by: INTERNAL MEDICINE

## 2017-09-10 PROCEDURE — 700101 HCHG RX REV CODE 250: Performed by: INTERNAL MEDICINE

## 2017-09-10 PROCEDURE — 93005 ELECTROCARDIOGRAM TRACING: CPT | Performed by: EMERGENCY MEDICINE

## 2017-09-10 PROCEDURE — 85730 THROMBOPLASTIN TIME PARTIAL: CPT

## 2017-09-10 PROCEDURE — 99285 EMERGENCY DEPT VISIT HI MDM: CPT

## 2017-09-10 PROCEDURE — 700102 HCHG RX REV CODE 250 W/ 637 OVERRIDE(OP): Performed by: INTERNAL MEDICINE

## 2017-09-10 PROCEDURE — 80307 DRUG TEST PRSMV CHEM ANLYZR: CPT

## 2017-09-10 PROCEDURE — 82962 GLUCOSE BLOOD TEST: CPT

## 2017-09-10 PROCEDURE — 85610 PROTHROMBIN TIME: CPT

## 2017-09-10 PROCEDURE — 700105 HCHG RX REV CODE 258: Performed by: INTERNAL MEDICINE

## 2017-09-10 PROCEDURE — 80053 COMPREHEN METABOLIC PANEL: CPT

## 2017-09-10 PROCEDURE — 83036 HEMOGLOBIN GLYCOSYLATED A1C: CPT

## 2017-09-10 PROCEDURE — 94760 N-INVAS EAR/PLS OXIMETRY 1: CPT

## 2017-09-10 PROCEDURE — 70450 CT HEAD/BRAIN W/O DYE: CPT

## 2017-09-10 PROCEDURE — 99220 PR INITIAL OBSERVATION CARE,LEVL III: CPT | Performed by: INTERNAL MEDICINE

## 2017-09-10 PROCEDURE — 700105 HCHG RX REV CODE 258: Performed by: EMERGENCY MEDICINE

## 2017-09-10 PROCEDURE — 84443 ASSAY THYROID STIM HORMONE: CPT

## 2017-09-10 PROCEDURE — 700117 HCHG RX CONTRAST REV CODE 255: Performed by: EMERGENCY MEDICINE

## 2017-09-10 PROCEDURE — 96361 HYDRATE IV INFUSION ADD-ON: CPT

## 2017-09-10 PROCEDURE — 84484 ASSAY OF TROPONIN QUANT: CPT

## 2017-09-10 PROCEDURE — 96374 THER/PROPH/DIAG INJ IV PUSH: CPT | Mod: XU

## 2017-09-10 RX ORDER — HYDRALAZINE HYDROCHLORIDE 20 MG/ML
20 INJECTION INTRAMUSCULAR; INTRAVENOUS EVERY 6 HOURS PRN
Status: DISCONTINUED | OUTPATIENT
Start: 2017-09-10 | End: 2017-09-11

## 2017-09-10 RX ORDER — AMOXICILLIN 250 MG
2 CAPSULE ORAL 2 TIMES DAILY
Status: DISCONTINUED | OUTPATIENT
Start: 2017-09-10 | End: 2017-09-13 | Stop reason: HOSPADM

## 2017-09-10 RX ORDER — ATORVASTATIN CALCIUM 10 MG/1
10 TABLET, FILM COATED ORAL
Status: DISCONTINUED | OUTPATIENT
Start: 2017-09-10 | End: 2017-09-12

## 2017-09-10 RX ORDER — ACETAMINOPHEN 325 MG/1
650 TABLET ORAL EVERY 6 HOURS PRN
Status: DISCONTINUED | OUTPATIENT
Start: 2017-09-10 | End: 2017-09-13 | Stop reason: HOSPADM

## 2017-09-10 RX ORDER — CLOPIDOGREL BISULFATE 75 MG/1
75 TABLET ORAL
Status: DISCONTINUED | OUTPATIENT
Start: 2017-09-11 | End: 2017-09-13 | Stop reason: HOSPADM

## 2017-09-10 RX ORDER — SODIUM CHLORIDE 9 MG/ML
INJECTION, SOLUTION INTRAVENOUS CONTINUOUS
Status: DISCONTINUED | OUTPATIENT
Start: 2017-09-10 | End: 2017-09-10

## 2017-09-10 RX ORDER — ONDANSETRON 4 MG/1
4 TABLET, ORALLY DISINTEGRATING ORAL EVERY 4 HOURS PRN
Status: DISCONTINUED | OUTPATIENT
Start: 2017-09-10 | End: 2017-09-13 | Stop reason: HOSPADM

## 2017-09-10 RX ORDER — POLYETHYLENE GLYCOL 3350 17 G/17G
1 POWDER, FOR SOLUTION ORAL
Status: DISCONTINUED | OUTPATIENT
Start: 2017-09-10 | End: 2017-09-13 | Stop reason: HOSPADM

## 2017-09-10 RX ORDER — SODIUM CHLORIDE 9 MG/ML
INJECTION, SOLUTION INTRAVENOUS CONTINUOUS
Status: DISCONTINUED | OUTPATIENT
Start: 2017-09-10 | End: 2017-09-12

## 2017-09-10 RX ORDER — ONDANSETRON 2 MG/ML
4 INJECTION INTRAMUSCULAR; INTRAVENOUS EVERY 4 HOURS PRN
Status: DISCONTINUED | OUTPATIENT
Start: 2017-09-10 | End: 2017-09-13 | Stop reason: HOSPADM

## 2017-09-10 RX ORDER — BISACODYL 10 MG
10 SUPPOSITORY, RECTAL RECTAL
Status: DISCONTINUED | OUTPATIENT
Start: 2017-09-10 | End: 2017-09-13 | Stop reason: HOSPADM

## 2017-09-10 RX ADMIN — SODIUM CHLORIDE: 9 INJECTION, SOLUTION INTRAVENOUS at 23:57

## 2017-09-10 RX ADMIN — STANDARDIZED SENNA CONCENTRATE AND DOCUSATE SODIUM 2 TABLET: 8.6; 5 TABLET, FILM COATED ORAL at 23:55

## 2017-09-10 RX ADMIN — SODIUM CHLORIDE 125 ML: 9 INJECTION, SOLUTION INTRAVENOUS at 17:52

## 2017-09-10 RX ADMIN — IOHEXOL 80 ML: 350 INJECTION, SOLUTION INTRAVENOUS at 18:45

## 2017-09-10 RX ADMIN — ATORVASTATIN CALCIUM 10 MG: 10 TABLET, FILM COATED ORAL at 23:56

## 2017-09-10 RX ADMIN — DOXYCYCLINE 100 MG: 100 INJECTION, POWDER, LYOPHILIZED, FOR SOLUTION INTRAVENOUS at 23:57

## 2017-09-10 ASSESSMENT — ENCOUNTER SYMPTOMS
DOUBLE VISION: 0
FLANK PAIN: 0
COUGH: 0
CHILLS: 0
NAUSEA: 0
SHORTNESS OF BREATH: 0
BLURRED VISION: 0
VOMITING: 0
WEAKNESS: 1
HEADACHES: 0
BACK PAIN: 0
FEVER: 0
MYALGIAS: 1
FOCAL WEAKNESS: 1
DIAPHORESIS: 0
SENSORY CHANGE: 0
MEMORY LOSS: 1
DEPRESSION: 0
ABDOMINAL PAIN: 0
PALPITATIONS: 0
DIZZINESS: 0
SPEECH CHANGE: 0
HALLUCINATIONS: 0
DIARRHEA: 0
CONSTIPATION: 0
NERVOUS/ANXIOUS: 0
LOSS OF CONSCIOUSNESS: 0

## 2017-09-10 ASSESSMENT — COPD QUESTIONNAIRES
DURING THE PAST 4 WEEKS HOW MUCH DID YOU FEEL SHORT OF BREATH: SOME OF THE TIME
DO YOU EVER COUGH UP ANY MUCUS OR PHLEGM?: YES, A FEW DAYS A WEEK OR MONTH
HAVE YOU SMOKED AT LEAST 100 CIGARETTES IN YOUR ENTIRE LIFE: YES
COPD SCREENING SCORE: 6

## 2017-09-10 ASSESSMENT — PAIN SCALES - GENERAL: PAINLEVEL_OUTOF10: 0

## 2017-09-10 ASSESSMENT — LIFESTYLE VARIABLES: EVER_SMOKED: YES

## 2017-09-11 ENCOUNTER — APPOINTMENT (OUTPATIENT)
Dept: RADIOLOGY | Facility: MEDICAL CENTER | Age: 82
End: 2017-09-11
Attending: INTERNAL MEDICINE
Payer: MEDICARE

## 2017-09-11 PROBLEM — D69.6 THROMBOCYTOPENIA (HCC): Status: RESOLVED | Noted: 2017-09-10 | Resolved: 2017-09-11

## 2017-09-11 LAB
ANION GAP SERPL CALC-SCNC: 9 MMOL/L (ref 0–11.9)
BASOPHILS # BLD AUTO: 1.5 % (ref 0–1.8)
BASOPHILS # BLD: 0.09 K/UL (ref 0–0.12)
BUN SERPL-MCNC: 21 MG/DL (ref 8–22)
CALCIUM SERPL-MCNC: 8.8 MG/DL (ref 8.5–10.5)
CHLORIDE SERPL-SCNC: 112 MMOL/L (ref 96–112)
CHOLEST SERPL-MCNC: 162 MG/DL (ref 100–199)
CO2 SERPL-SCNC: 21 MMOL/L (ref 20–33)
CREAT SERPL-MCNC: 1.19 MG/DL (ref 0.5–1.4)
EOSINOPHIL # BLD AUTO: 0.34 K/UL (ref 0–0.51)
EOSINOPHIL NFR BLD: 5.8 % (ref 0–6.9)
ERYTHROCYTE [DISTWIDTH] IN BLOOD BY AUTOMATED COUNT: 54 FL (ref 35.9–50)
EST. AVERAGE GLUCOSE BLD GHB EST-MCNC: 126 MG/DL
GFR SERPL CREATININE-BSD FRML MDRD: 43 ML/MIN/1.73 M 2
GLUCOSE BLD-MCNC: 103 MG/DL (ref 65–99)
GLUCOSE SERPL-MCNC: 92 MG/DL (ref 65–99)
HBA1C MFR BLD: 6 % (ref 0–5.6)
HCT VFR BLD AUTO: 38.6 % (ref 37–47)
HDLC SERPL-MCNC: 58 MG/DL
HGB BLD-MCNC: 12.8 G/DL (ref 12–16)
IMM GRANULOCYTES # BLD AUTO: 0.02 K/UL (ref 0–0.11)
IMM GRANULOCYTES NFR BLD AUTO: 0.3 % (ref 0–0.9)
LDLC SERPL CALC-MCNC: 89 MG/DL
LV EJECT FRACT  99904: 60
LV EJECT FRACT MOD 2C 99903: 69.36
LV EJECT FRACT MOD 4C 99902: 61.86
LV EJECT FRACT MOD BP 99901: 65.99
LYMPHOCYTES # BLD AUTO: 1.8 K/UL (ref 1–4.8)
LYMPHOCYTES NFR BLD: 30.7 % (ref 22–41)
MCH RBC QN AUTO: 34.7 PG (ref 27–33)
MCHC RBC AUTO-ENTMCNC: 33.2 G/DL (ref 33.6–35)
MCV RBC AUTO: 104.6 FL (ref 81.4–97.8)
MONOCYTES # BLD AUTO: 0.62 K/UL (ref 0–0.85)
MONOCYTES NFR BLD AUTO: 10.6 % (ref 0–13.4)
NEUTROPHILS # BLD AUTO: 3 K/UL (ref 2–7.15)
NEUTROPHILS NFR BLD: 51.1 % (ref 44–72)
NRBC # BLD AUTO: 0 K/UL
NRBC BLD AUTO-RTO: 0 /100 WBC
PLATELET # BLD AUTO: 229 K/UL (ref 164–446)
PMV BLD AUTO: 11.1 FL (ref 9–12.9)
POTASSIUM SERPL-SCNC: 3.7 MMOL/L (ref 3.6–5.5)
PROCALCITONIN SERPL-MCNC: <0.05 NG/ML
RBC # BLD AUTO: 3.69 M/UL (ref 4.2–5.4)
SODIUM SERPL-SCNC: 142 MMOL/L (ref 135–145)
TRIGL SERPL-MCNC: 75 MG/DL (ref 0–149)
WBC # BLD AUTO: 5.9 K/UL (ref 4.8–10.8)

## 2017-09-11 PROCEDURE — G8987 SELF CARE CURRENT STATUS: HCPCS | Mod: CK

## 2017-09-11 PROCEDURE — 92610 EVALUATE SWALLOWING FUNCTION: CPT

## 2017-09-11 PROCEDURE — 700111 HCHG RX REV CODE 636 W/ 250 OVERRIDE (IP): Performed by: HOSPITALIST

## 2017-09-11 PROCEDURE — 93306 TTE W/DOPPLER COMPLETE: CPT

## 2017-09-11 PROCEDURE — 99226 PR SUBSEQUENT OBSERVATION CARE,LEVEL III: CPT | Performed by: HOSPITALIST

## 2017-09-11 PROCEDURE — 84145 PROCALCITONIN (PCT): CPT

## 2017-09-11 PROCEDURE — 700102 HCHG RX REV CODE 250 W/ 637 OVERRIDE(OP): Performed by: HOSPITALIST

## 2017-09-11 PROCEDURE — 80048 BASIC METABOLIC PNL TOTAL CA: CPT

## 2017-09-11 PROCEDURE — 96376 TX/PRO/DX INJ SAME DRUG ADON: CPT | Mod: XU

## 2017-09-11 PROCEDURE — G8988 SELF CARE GOAL STATUS: HCPCS | Mod: CJ

## 2017-09-11 PROCEDURE — 700105 HCHG RX REV CODE 258: Performed by: PSYCHIATRY & NEUROLOGY

## 2017-09-11 PROCEDURE — G0378 HOSPITAL OBSERVATION PER HR: HCPCS

## 2017-09-11 PROCEDURE — A9270 NON-COVERED ITEM OR SERVICE: HCPCS | Performed by: INTERNAL MEDICINE

## 2017-09-11 PROCEDURE — G8997 SWALLOW GOAL STATUS: HCPCS | Mod: CH

## 2017-09-11 PROCEDURE — 700102 HCHG RX REV CODE 250 W/ 637 OVERRIDE(OP): Performed by: INTERNAL MEDICINE

## 2017-09-11 PROCEDURE — 85025 COMPLETE CBC W/AUTO DIFF WBC: CPT

## 2017-09-11 PROCEDURE — G8996 SWALLOW CURRENT STATUS: HCPCS | Mod: CI

## 2017-09-11 PROCEDURE — 82962 GLUCOSE BLOOD TEST: CPT

## 2017-09-11 PROCEDURE — A9270 NON-COVERED ITEM OR SERVICE: HCPCS | Performed by: HOSPITALIST

## 2017-09-11 PROCEDURE — 97166 OT EVAL MOD COMPLEX 45 MIN: CPT

## 2017-09-11 PROCEDURE — 700105 HCHG RX REV CODE 258: Performed by: INTERNAL MEDICINE

## 2017-09-11 PROCEDURE — 93306 TTE W/DOPPLER COMPLETE: CPT | Mod: 26 | Performed by: INTERNAL MEDICINE

## 2017-09-11 PROCEDURE — 80061 LIPID PANEL: CPT

## 2017-09-11 PROCEDURE — 36415 COLL VENOUS BLD VENIPUNCTURE: CPT

## 2017-09-11 RX ORDER — LABETALOL HYDROCHLORIDE 5 MG/ML
10 INJECTION, SOLUTION INTRAVENOUS EVERY 4 HOURS PRN
Status: DISCONTINUED | OUTPATIENT
Start: 2017-09-11 | End: 2017-09-13 | Stop reason: HOSPADM

## 2017-09-11 RX ORDER — LISINOPRIL 10 MG/1
10 TABLET ORAL DAILY
Status: DISCONTINUED | OUTPATIENT
Start: 2017-09-11 | End: 2017-09-12

## 2017-09-11 RX ORDER — SODIUM CHLORIDE 9 MG/ML
500 INJECTION, SOLUTION INTRAVENOUS ONCE
Status: COMPLETED | OUTPATIENT
Start: 2017-09-11 | End: 2017-09-11

## 2017-09-11 RX ORDER — HEPARIN SODIUM 5000 [USP'U]/ML
5000 INJECTION, SOLUTION INTRAVENOUS; SUBCUTANEOUS EVERY 8 HOURS
Status: DISCONTINUED | OUTPATIENT
Start: 2017-09-11 | End: 2017-09-13 | Stop reason: HOSPADM

## 2017-09-11 RX ORDER — AMLODIPINE BESYLATE 5 MG/1
5 TABLET ORAL
Status: DISCONTINUED | OUTPATIENT
Start: 2017-09-11 | End: 2017-09-12

## 2017-09-11 RX ORDER — DIPHENHYDRAMINE HCL 25 MG
25 TABLET ORAL NIGHTLY PRN
Status: DISCONTINUED | OUTPATIENT
Start: 2017-09-11 | End: 2017-09-13 | Stop reason: HOSPADM

## 2017-09-11 RX ADMIN — SODIUM CHLORIDE: 9 INJECTION, SOLUTION INTRAVENOUS at 06:23

## 2017-09-11 RX ADMIN — ASPIRIN 81 MG: 81 TABLET ORAL at 09:32

## 2017-09-11 RX ADMIN — AMLODIPINE BESYLATE 5 MG: 5 TABLET ORAL at 09:32

## 2017-09-11 RX ADMIN — ACETAMINOPHEN 650 MG: 325 TABLET, FILM COATED ORAL at 13:39

## 2017-09-11 RX ADMIN — ATORVASTATIN CALCIUM 10 MG: 10 TABLET, FILM COATED ORAL at 21:05

## 2017-09-11 RX ADMIN — SODIUM CHLORIDE 500 ML: 9 INJECTION, SOLUTION INTRAVENOUS at 18:30

## 2017-09-11 RX ADMIN — METOPROLOL TARTRATE 25 MG: 25 TABLET, FILM COATED ORAL at 09:32

## 2017-09-11 RX ADMIN — HEPARIN SODIUM 5000 UNITS: 5000 INJECTION, SOLUTION INTRAVENOUS; SUBCUTANEOUS at 18:30

## 2017-09-11 RX ADMIN — CLOPIDOGREL 75 MG: 75 TABLET, FILM COATED ORAL at 09:32

## 2017-09-11 RX ADMIN — DIPHENHYDRAMINE HCL 25 MG: 25 TABLET ORAL at 21:06

## 2017-09-11 RX ADMIN — LISINOPRIL 10 MG: 10 TABLET ORAL at 09:32

## 2017-09-11 RX ADMIN — HEPARIN SODIUM 5000 UNITS: 5000 INJECTION, SOLUTION INTRAVENOUS; SUBCUTANEOUS at 21:05

## 2017-09-11 RX ADMIN — STANDARDIZED SENNA CONCENTRATE AND DOCUSATE SODIUM 2 TABLET: 8.6; 5 TABLET, FILM COATED ORAL at 21:05

## 2017-09-11 RX ADMIN — METOPROLOL TARTRATE 25 MG: 25 TABLET, FILM COATED ORAL at 21:05

## 2017-09-11 ASSESSMENT — ACTIVITIES OF DAILY LIVING (ADL): TOILETING: INDEPENDENT

## 2017-09-11 ASSESSMENT — ENCOUNTER SYMPTOMS
NAUSEA: 0
CONSTIPATION: 0
WEAKNESS: 0
SENSORY CHANGE: 1
DIARRHEA: 0
SPEECH CHANGE: 0
VOMITING: 0
SEIZURES: 0
BLURRED VISION: 0
ABDOMINAL PAIN: 0
CHILLS: 0
PALPITATIONS: 0
TREMORS: 0
FOCAL WEAKNESS: 1
MYALGIAS: 0
WEAKNESS: 1
DIZZINESS: 0
HEADACHES: 0
SHORTNESS OF BREATH: 0
TINGLING: 0
COUGH: 0
FEVER: 0

## 2017-09-11 ASSESSMENT — COGNITIVE AND FUNCTIONAL STATUS - GENERAL
DRESSING REGULAR UPPER BODY CLOTHING: A LITTLE
MOVING FROM LYING ON BACK TO SITTING ON SIDE OF FLAT BED: A LITTLE
TOILETING: A LITTLE
CLIMB 3 TO 5 STEPS WITH RAILING: A LOT
SUGGESTED CMS G CODE MODIFIER MOBILITY: CK
HELP NEEDED FOR BATHING: A LITTLE
MOBILITY SCORE: 17
PERSONAL GROOMING: A LITTLE
DRESSING REGULAR LOWER BODY CLOTHING: A LITTLE
SUGGESTED CMS G CODE MODIFIER DAILY ACTIVITY: CK
EATING MEALS: A LITTLE
DRESSING REGULAR LOWER BODY CLOTHING: A LITTLE
DRESSING REGULAR LOWER BODY CLOTHING: A LITTLE
EATING MEALS: A LITTLE
MOVING TO AND FROM BED TO CHAIR: A LITTLE
DAILY ACTIVITIY SCORE: 18
MOVING TO AND FROM BED TO CHAIR: A LITTLE
DRESSING REGULAR UPPER BODY CLOTHING: A LITTLE
TOILETING: A LITTLE
MOBILITY SCORE: 17
SUGGESTED CMS G CODE MODIFIER MOBILITY: CK
HELP NEEDED FOR BATHING: A LITTLE
PERSONAL GROOMING: A LITTLE
EATING MEALS: A LITTLE
TURNING FROM BACK TO SIDE WHILE IN FLAT BAD: A LITTLE
TURNING FROM BACK TO SIDE WHILE IN FLAT BAD: A LITTLE
SUGGESTED CMS G CODE MODIFIER DAILY ACTIVITY: CK
WALKING IN HOSPITAL ROOM: A LITTLE
STANDING UP FROM CHAIR USING ARMS: A LITTLE
DAILY ACTIVITIY SCORE: 18
HELP NEEDED FOR BATHING: A LITTLE
CLIMB 3 TO 5 STEPS WITH RAILING: A LOT
DRESSING REGULAR UPPER BODY CLOTHING: A LITTLE
TOILETING: A LITTLE
PERSONAL GROOMING: A LITTLE
SUGGESTED CMS G CODE MODIFIER DAILY ACTIVITY: CK
DAILY ACTIVITIY SCORE: 18
WALKING IN HOSPITAL ROOM: A LITTLE
STANDING UP FROM CHAIR USING ARMS: A LITTLE
MOVING FROM LYING ON BACK TO SITTING ON SIDE OF FLAT BED: A LITTLE

## 2017-09-11 ASSESSMENT — PATIENT HEALTH QUESTIONNAIRE - PHQ9
SUM OF ALL RESPONSES TO PHQ9 QUESTIONS 1 AND 2: 0
SUM OF ALL RESPONSES TO PHQ9 QUESTIONS 1 AND 2: 0
SUM OF ALL RESPONSES TO PHQ QUESTIONS 1-9: 0
1. LITTLE INTEREST OR PLEASURE IN DOING THINGS: NOT AT ALL
2. FEELING DOWN, DEPRESSED, IRRITABLE, OR HOPELESS: NOT AT ALL
SUM OF ALL RESPONSES TO PHQ QUESTIONS 1-9: 0
1. LITTLE INTEREST OR PLEASURE IN DOING THINGS: NOT AT ALL
2. FEELING DOWN, DEPRESSED, IRRITABLE, OR HOPELESS: NOT AT ALL

## 2017-09-11 ASSESSMENT — LIFESTYLE VARIABLES
ALCOHOL_USE: YES
TOTAL SCORE: 0
TOTAL SCORE: 0
EVER HAD A DRINK FIRST THING IN THE MORNING TO STEADY YOUR NERVES TO GET RID OF A HANGOVER: NO
TOTAL SCORE: 0
HAVE YOU EVER FELT YOU SHOULD CUT DOWN ON YOUR DRINKING: NO
CONSUMPTION TOTAL: NEGATIVE
HOW MANY TIMES IN THE PAST YEAR HAVE YOU HAD 5 OR MORE DRINKS IN A DAY: 0
HAVE PEOPLE ANNOYED YOU BY CRITICIZING YOUR DRINKING: NO
AVERAGE NUMBER OF DAYS PER WEEK YOU HAVE A DRINK CONTAINING ALCOHOL: 3
ON A TYPICAL DAY WHEN YOU DRINK ALCOHOL HOW MANY DRINKS DO YOU HAVE: 2
EVER_SMOKED: YES
EVER FELT BAD OR GUILTY ABOUT YOUR DRINKING: NO

## 2017-09-11 ASSESSMENT — PAIN SCALES - GENERAL
PAINLEVEL_OUTOF10: 0

## 2017-09-11 NOTE — DIETARY
Nutrition note  Careplan started for po intake   Current diet is CIC Dysphagia 2 think liquids   PO intake is poor   Patient is drinking some boost   Daughter is in the room and helps with intake   Current BMI is 26  Assist with meals as needed   Speech is following    Patient with cough   Monitor weight and intake  RD will continue to follow

## 2017-09-11 NOTE — THERAPY
consult received; attempted in AM with pt having US procedure; attempted in PM and found pt calling nsg via call bell with reports of worsening symptoms (unable to raise RUE, slurring, etc) and notified nsg of concerns/possible rapid called dependent on nsg assessment; will hold for now based on re-occurance of symptoms and possibly active CVA at this time

## 2017-09-11 NOTE — THERAPY
"Occupational Therapy Evaluation completed.   Functional Status:   Mod A with ADLs and Min A with FWW  Plan of Care: Will benefit from Occupational Therapy 3 times per week  Discharge Recommendations:  Equipment: Will Continue to Assess for Equipment Needs. Post-acute therapy Discharge to a transitional care facility for continued skilled therapy services.    Patient presents with CVA with r side weakness, aphasia, and fatigue.  Patient reports I with ADLs, IADLs, and mobility with FWW around the home PTA. Per chart, patient is a questionable historian. Patient, upon eval, presents with decreased balance, energy, endurance, tolerance, cognition, and ADL participation necessitating Mod A with ADLs and Min A with FWW. Patient would benefit from skilled OT in this setting followed by rehab prior to DC home alone. Patient verbalize preference to attend [facility] facility 2/2 prior positive experience.     See \"Rehab Therapy-Acute\" Patient Summary Report for complete documentation.    "

## 2017-09-11 NOTE — H&P
Hospital Medicine History and Physical    Date of Service  9/10/2017    Chief Complaint  Chief Complaint   Patient presents with   • Numbness       History of Presenting Illness  84 y.o. female who presented 9/10/2017 with History of hypertension, coronary artery disease/MI, dyslipidemia presents with complaints of not feeling well. Patient is oriented ×3, however slow to answer. Patient reports right sided weakness ×1 day with mild aphasia. Patient appears to be having word finding difficulty and mild confusion. Patient denies any pain, denies any recent illness. Patient is a poor historian.    On exam patient is noted to be significantly weak on the right side which has improved since initial emergency room evaluation. Patient denies any shortness of breath chest pain abdominal pain diarrhea or dysuria. Patient lives alone and uses a walker for ambulation.    In the emergency room, CT of the head was done which was negative for acute infarction.     Primary Care Physician  Ric Garcia M.D.    Consultants  None    Code Status  Full    Review of Systems  Review of Systems   Constitutional: Negative for chills, diaphoresis, fever and malaise/fatigue.   HENT: Positive for hearing loss. Negative for congestion.    Eyes: Negative for blurred vision and double vision.   Respiratory: Negative for cough and shortness of breath.    Cardiovascular: Negative for chest pain, palpitations and leg swelling.   Gastrointestinal: Negative for abdominal pain, constipation, diarrhea, nausea and vomiting.   Genitourinary: Negative for dysuria, flank pain and urgency.   Musculoskeletal: Positive for myalgias. Negative for back pain and joint pain.   Neurological: Positive for focal weakness and weakness. Negative for dizziness, sensory change, speech change, loss of consciousness and headaches.   Psychiatric/Behavioral: Positive for memory loss. Negative for depression and hallucinations. The patient is not nervous/anxious.          Past Medical History  Past Medical History:   Diagnosis Date   • MEDICAL HOME 11/07/12   • Back pain 3/28/2012   • Arthritis    • CATARACT    • Glaucoma    • Hyperlipidemia    • Hypertension    • Myocardial infarct (CMS-HCC)    • Pain     sacrum 1/10       Surgical History  Past Surgical History:   Procedure Laterality Date   • VITRECTOMY POSTERIOR Right 4/13/2017    Procedure: VITRECTOMY POSTERIOR-LASER, SF6 GAS;  Surgeon: Jessica Arita M.D.;  Location: SURGERY SAME DAY [facility] ORS;  Service:    • RIGO BY LAPAROSCOPY  10/9/2015    Procedure: RIGO BY LAPAROSCOPY;  Surgeon: Ric Zambrano M.D.;  Location: SURGERY [facility];  Service:    • CATARACT PHACO WITH IOL  4/27/2011    Performed by EVERETTE COOPER at SURGERY SAME DAY [facility] ORS   • CATARACT PHACO WITH IOL  4/13/2011    Performed by EVERETTE COOPER at SURGERY SAME DAY [facility] ORS   • OTHER ORTHOPEDIC SURGERY  1980    pins in left hip   • ATHROPLASTY      broken hip 1990       Medications  No current facility-administered medications on file prior to encounter.      Current Outpatient Prescriptions on File Prior to Encounter   Medication Sig Dispense Refill   • aspirin EC (ECOTRIN) 81 MG Tablet Delayed Response Take 81 mg by mouth every day.     • metoprolol (LOPRESSOR) 25 MG Tab TAKE 1 TABLET BY MOUTH TWICE A  Tab 3   • clopidogrel (PLAVIX) 75 MG Tab Take 1 Tab by mouth every day. 90 Tab 2   • amlodipine (NORVASC) 5 MG Tab TAKE 1 TABLET BY MOUTH DAILY 90 Tab 0   • lisinopril (PRINIVIL) 10 MG Tab Take 1 Tab by mouth every day. 90 Tab 3       Family History  Family History   Problem Relation Age of Onset   • Heart Disease Mother    • Cancer Father        Social History  Social History   Substance Use Topics   • Smoking status: Former Smoker     Years: 0.10     Types: Cigarettes     Quit date: 1/31/2015   • Smokeless tobacco: Never Used   • Alcohol use Yes      Comment: 1 per day       Allergies  Allergies   Allergen Reactions   •  Meperidine Vomiting and Nausea   • Tramadol Vomiting and Nausea        Physical Exam  Laboratory   Hemodynamics  Temp (24hrs), Av.9 °C (98.4 °F), Min:36.9 °C (98.4 °F), Max:36.9 °C (98.4 °F)   Temperature: 36.9 °C (98.4 °F)  Pulse  Av.5  Min: 61  Max: 65 Heart Rate (Monitored): 61  Blood Pressure : 106/49, NIBP: 156/59      Respiratory      Respiration: 18, Pulse Oximetry: 98 %             Physical Exam   Constitutional: She is oriented to person, place, and time. She appears well-nourished. No distress.   Elderly   HENT:   Head: Normocephalic and atraumatic.   Nose: Nose normal.   Mouth/Throat: No oropharyngeal exudate.   Eyes: EOM are normal. Pupils are equal, round, and reactive to light. Right eye exhibits no discharge. Left eye exhibits no discharge. No scleral icterus.   Neck: Neck supple. No thyromegaly present.   Cardiovascular: Normal rate and intact distal pulses.    No murmur heard.  Pulmonary/Chest: Effort normal and breath sounds normal. No respiratory distress. She has no wheezes. She has no rales.   Abdominal: Soft. Bowel sounds are normal. She exhibits no distension. There is no tenderness.   Musculoskeletal: She exhibits tenderness. She exhibits no edema.   Decreased range of motion secondary to weakness  Muscle strength on the right upper and lower extremity is +4/5  Is 5 over 5 on the left    Neurological: She is alert and oriented to person, place, and time. No cranial nerve deficit. She exhibits normal muscle tone. Coordination abnormal.   Skin: Skin is warm and dry. No rash noted. She is not diaphoretic. No erythema. There is pallor.   Psychiatric: Her behavior is normal. Thought content normal.   Nursing note and vitals reviewed.      Recent Labs      09/10/17   1740   WBC  4.1*   RBC  3.87*   HEMOGLOBIN  13.6   HEMATOCRIT  40.8   MCV  105.4*   MCH  35.1*   MCHC  33.3*   RDW  54.4*   PLATELETCT  246   MPV  10.8     Recent Labs      09/10/17   1740   SODIUM  141   POTASSIUM  3.9    CHLORIDE  111   CO2  20   GLUCOSE  73   BUN  24*   CREATININE  1.39   CALCIUM  9.1     Recent Labs      09/10/17   1740   ALTSGPT  9   ASTSGOT  15   ALKPHOSPHAT  76   TBILIRUBIN  0.4   GLUCOSE  73     Recent Labs      09/10/17   1740   APTT  29.4   INR  0.97             Lab Results   Component Value Date    TROPONINI <0.01 09/10/2017     Urinalysis:    Lab Results  Component Value Date/Time   SPECGRAVITY 1.009 03/06/2015 1310   GLUCOSEUR Negative 03/06/2015 1310   KETONES Negative 03/06/2015 1310   NITRITE Negative 03/06/2015 1310   WBCURINE 10-20 (A) 01/31/2015 2100   RBCURINE >150 (A) 01/31/2015 2100   BACTERIA Few (A) 01/31/2015 2100   EPITHELCELL Few 01/31/2015 2100        Imaging  DX-CHEST-PORTABLE (1 VIEW)   Final Result      1.  Cardiomegaly.      2.  Bilateral interstitial infiltrates.      CT-CTA NECK WITH & W/O-POST PROCESSING   Final Result      1.  Atherosclerotic plaque involving the right carotid bifurcation with extension into the internal carotid artery. This results in 60% diameter narrowing.      2.  Atherosclerotic plaque of the left carotid bifurcation. This extends into the internal carotid artery and results in less than 50% diameter narrowing.      3.  Atherosclerotic plaque involving the vertebral arteries without evidence of vascular occlusion.      CT-CTA HEAD WITH & W/O-POST PROCESS   Final Result      1.  No evidence of intracranial vascular occlusion or aneurysm.      2.  Atherosclerotic change of the vertebral and internal carotid arteries.      CT-HEAD W/O   Final Result      1.  No evidence of acute intracranial process.      2.  Cerebral atrophy as well as periventricular chronic small vessel ischemic change.      MR-BRAIN-W/O    (Results Pending)   ECHOCARDIOGRAM COMP W/O CONT    (Results Pending)   CAROTID DUPLEX    (Results Pending)        Assessment/Plan     I anticipate this patient is appropriate for observation status at this time.    * CVA (cerebral vascular accident)  (CMS-HCC)   Assessment & Plan    With right arm and leg weakness, slurred speech  Patient will be admitted to the neurology observation unit  CT of the head is negative  CTA reveals bilateral carotid atherosclerosis <60%  On stroke protocol  Consult neurology in the a.m.  Continue aspirin Plavix  Allow for permissive hypertension  Follow-up echocardiogram        CAD (coronary artery disease)- (present on admission)   Assessment & Plan    History of  Status post myocardial infarction in 2015  Continue all medications        Thrombocytopenia (CMS-HCC)   Assessment & Plan    History of  Continue to monitor        Community acquired pneumonia   Assessment & Plan    RT protocol  Oxygen supplementation as needed  Start patient on doxycycline        Stage 3 chronic kidney disease   Assessment & Plan    At baseline        Carotid atherosclerosis, bilateral- (present on admission)   Assessment & Plan    Plan as above  All of carotid ultrasound        DJD (degenerative joint disease)- (present on admission)   Assessment & Plan    History of        Anxiety- (present on admission)   Assessment & Plan    Continue to monitor        Dyslipidemia- (present on admission)   Assessment & Plan    Follow-up lipid profile  Start a statin        HTN (hypertension), benign- (present on admission)   Assessment & Plan    Controlled  Hold antihypertensives  Essential            VTE prophylaxis: scd.

## 2017-09-11 NOTE — ED PROVIDER NOTES
ED Provider Note    CHIEF COMPLAINT  Chief Complaint   Patient presents with   • Numbness       HPI  Madeleine Zambrano is a 84 y.o. female who presentsTo the emergency department brought in by ambulance with a complaint of right arm and right leg weakness and slurred speech. Symptoms began around 3 AM the patient does not recognize any exacerbating or alleviating factors this afternoon her son called 911. The patient is a very poor historian she does admit to drinking wine today but says she only had one glass.    REVIEW OF SYSTEMS the patient denies any trauma, no headache or neck pain. No chest pain or difficulty breathing. All other systems negative     PAST MEDICAL HISTORY  Past Medical History:   Diagnosis Date   • MEDICAL HOME 11/07/12   • Back pain 3/28/2012   • Arthritis    • CATARACT    • Glaucoma    • Hyperlipidemia    • Hypertension    • Myocardial infarct (CMS-HCC)    • Pain     sacrum 1/10       FAMILY HISTORY  Family History   Problem Relation Age of Onset   • Heart Disease Mother    • Cancer Father        SOCIAL HISTORY  Social History     Social History   • Marital status:      Spouse name: N/A   • Number of children: N/A   • Years of education: N/A     Social History Main Topics   • Smoking status: Former Smoker     Years: 0.10     Types: Cigarettes     Quit date: 1/31/2015   • Smokeless tobacco: Never Used   • Alcohol use Yes      Comment: 1 per day   • Drug use: No   • Sexual activity: Not on file     Other Topics Concern   • Not on file     Social History Narrative   • No narrative on file       SURGICAL HISTORY  Past Surgical History:   Procedure Laterality Date   • VITRECTOMY POSTERIOR Right 4/13/2017    Procedure: VITRECTOMY POSTERIOR-LASER, SF6 GAS;  Surgeon: Jessica Arita M.D.;  Location: SURGERY SAME DAY [facility];  Service:    • RIGO BY LAPAROSCOPY  10/9/2015    Procedure: RIGO BY LAPAROSCOPY;  Surgeon: Ric Zambrano M.D.;  Location: SURGERY [facility];   "Service:    • CATARACT PHACO WITH IOL  4/27/2011    Performed by EVERETTE COOPER at SURGERY SAME DAY [facility] ORS   • CATARACT PHACO WITH IOL  4/13/2011    Performed by EVERETTE COOPER at SURGERY SAME DAY [facility] ORS   • OTHER ORTHOPEDIC SURGERY  1980    pins in left hip   • ATHROPLASTY      broken hip 1990       CURRENT MEDICATIONS  Home Medications     Reviewed by Kimberly Gurrola (Pharmacy Tech) on 09/10/17 at 1923  Med List Status: Complete   Medication Last Dose Status   amlodipine (NORVASC) 5 MG Tab 9/10/2017 Active   aspirin EC (ECOTRIN) 81 MG Tablet Delayed Response 9/10/2017 Active   clopidogrel (PLAVIX) 75 MG Tab 9/10/2017 Active   lisinopril (PRINIVIL) 10 MG Tab 9/10/2017 Active   metoprolol (LOPRESSOR) 25 MG Tab 9/10/2017 Active                ALLERGIES  Allergies   Allergen Reactions   • Meperidine Vomiting and Nausea   • Tramadol Vomiting and Nausea       PHYSICAL EXAM  VITAL SIGNS: /49   Pulse 63   Temp 36.9 °C (98.4 °F)   Resp 18   Ht 1.6 m (5' 3\")   Wt 59 kg (130 lb)   SpO2 99%   BMI 23.03 kg/m²    Oxygen saturation is interpreted as Adequate  Constitutional: The patient is awake verbal she is nontoxic appearing  HENT: No sign of trauma to the head mucous membranes are dry  Eyes: Pupils round extraocular motion present  Neck: Trachea midline no JVD  Cardiovascular: Regular rate and rhythm  Lungs: Clear and equal bilaterally with no apparent difficulty breathing  Abdomen/Back: Soft nontender nondistended no peritoneal findings  Skin: Warm and dry  Musculoskeletal: No acute bony deformity  Neurologic: The patient is awake she is verbal she is a very poor historian she has some slight slurring of the speech but her face moves symmetrically. The patient appears to have weakness in the right arm however the exam is variable. She cannot lift the right leg off of the cisimple    Laboratory  A CBC shows white blood cell count of 4.1 hemoglobin is adequate 13.6, basic metabolic panel is " unremarkable LFTs are unremarkable blood alcohol level is 0.05 INR is 0.97    EKG interpretation  A 12-lead EKG shows sinus rhythm 59 bpm there is a left axis deviation there is no pathologic ST elevation or depression or ectopy the IN interval is 164 ms QTc interval is 4 and 36 ms    Radiology  DX-CHEST-PORTABLE (1 VIEW)   Final Result      1.  Cardiomegaly.      2.  Bilateral interstitial infiltrates.      CT-CTA NECK WITH & W/O-POST PROCESSING   Final Result      1.  Atherosclerotic plaque involving the right carotid bifurcation with extension into the internal carotid artery. This results in 60% diameter narrowing.      2.  Atherosclerotic plaque of the left carotid bifurcation. This extends into the internal carotid artery and results in less than 50% diameter narrowing.      3.  Atherosclerotic plaque involving the vertebral arteries without evidence of vascular occlusion.      CT-CTA HEAD WITH & W/O-POST PROCESS   Final Result      1.  No evidence of intracranial vascular occlusion or aneurysm.      2.  Atherosclerotic change of the vertebral and internal carotid arteries.      CT-HEAD W/O   Final Result      1.  No evidence of acute intracranial process.      2.  Cerebral atrophy as well as periventricular chronic small vessel ischemic change.      MR-BRAIN-W/O    (Results Pending)   ECHOCARDIOGRAM COMP W/O CONT    (Results Pending)   CAROTID DUPLEX    (Results Pending)     MEDICAL DECISION MAKING and DISPOSITION  In the emergency department an IV was established the patient was placed on a cardiac monitor. I reviewed the findings with the neurologist on-call who will provide consultation and I have notified the hospitalist and the patient is admitted for further evaluation and treatment. The patient is not a candidate for systemic thrombolytics therapy because the onset of symptoms was many hours ago.    IMPRESSION  1. Acute CVA  2. Alcohol use         Electronically signed by: Romel Mohr, 9/10/2017 8:58  PM

## 2017-09-11 NOTE — PROGRESS NOTES
Notified by telemetry technician patient had a 2.02 second pause, HR 27 unsustained.  Dr. Garcia notified.  Continue to monitor.

## 2017-09-11 NOTE — ED NOTES
Med rec updated and complete.  Allergies reviewed.  Pt denies antibiotic use in last 30 days.  All AM doses taken

## 2017-09-11 NOTE — PROGRESS NOTES
[facility] Hospitalist Progress Note    Date of Service: 2017    Chief Complaint  84 y.o. female admitted 9/10/2017 with symptoms of TIA vs CVA    Interval Problem Update   - seen and examined. No residual deficits. She is anxious but feels okay. She attempted her MRI but it was not able to be completed and now she does not want to re-try. Generally weak. Therapies ordered.     Consultants/Specialty  Neuro    Disposition  Depends on therapy evals        Review of Systems   Constitutional: Negative for chills and fever.   Respiratory: Negative for cough and shortness of breath.    Cardiovascular: Negative for chest pain and palpitations.   Gastrointestinal: Negative for abdominal pain, constipation, diarrhea, nausea and vomiting.   Genitourinary: Negative for dysuria.   Musculoskeletal: Negative for myalgias.   Skin: Negative for itching.   Neurological: Positive for focal weakness (resolved). Negative for dizziness, weakness and headaches.   All other systems reviewed and are negative.     Physical Exam  Laboratory/Imaging   Hemodynamics  Temp (24hrs), Av.4 °C (97.6 °F), Min:36.1 °C (97 °F), Max:36.9 °C (98.4 °F)   Temperature: 36.2 °C (97.1 °F)  Pulse  Av.1  Min: 57  Max: 70 Heart Rate (Monitored): 69  Blood Pressure : 154/68, NIBP: (!) 171/77      Respiratory      Respiration: 16, Pulse Oximetry: 94 %, O2 Daily Delivery Respiratory : Nasal Cannula        RUL Breath Sounds: Clear, RML Breath Sounds: Diminished, RLL Breath Sounds: Diminished, RYAN Breath Sounds: Clear, LLL Breath Sounds: Diminished    Fluids  No intake or output data in the 24 hours ending 17 1644    Nutrition  Orders Placed This Encounter   Procedures   • Diet Order     Standing Status:   Standing     Number of Occurrences:   1     Order Specific Question:   Diet:     Answer:   Cardiac [6]     Comments:   Float pills in applesauce or pudding please     Order Specific Question:   Texture/Fiber modifications:     Answer:    Dysphagia 2(Pureed/Chopped)specify fluid consistency(question 6) [2]     Order Specific Question:   Consistency/Fluid modifications:     Answer:   Thin Liquids [3]     Physical Exam   Constitutional: She is oriented to person, place, and time. She appears well-developed and well-nourished. No distress.   HENT:   Head: Normocephalic and atraumatic.   Eyes: Conjunctivae are normal. Pupils are equal, round, and reactive to light. No scleral icterus.   Neck: Normal range of motion. Neck supple.   Cardiovascular: Normal rate, regular rhythm, normal heart sounds and intact distal pulses.    No murmur heard.  Pulmonary/Chest: Effort normal and breath sounds normal. No respiratory distress. She has no rales.   Abdominal: Soft. Bowel sounds are normal. She exhibits no distension.   Musculoskeletal: Normal range of motion. She exhibits no edema.   Generally puny but 5/5 strength   Neurological: She is alert and oriented to person, place, and time.   Skin: Skin is warm and dry.   Vitals reviewed.      Recent Labs      09/10/17   1740  09/11/17   0224   WBC  4.1*  5.9   RBC  3.87*  3.69*   HEMOGLOBIN  13.6  12.8   HEMATOCRIT  40.8  38.6   MCV  105.4*  104.6*   MCH  35.1*  34.7*   MCHC  33.3*  33.2*   RDW  54.4*  54.0*   PLATELETCT  246  229   MPV  10.8  11.1     Recent Labs      09/10/17   1740  09/11/17   0224   SODIUM  141  142   POTASSIUM  3.9  3.7   CHLORIDE  111  112   CO2  20  21   GLUCOSE  73  92   BUN  24*  21   CREATININE  1.39  1.19   CALCIUM  9.1  8.8     Recent Labs      09/10/17   1740   APTT  29.4   INR  0.97         Recent Labs      09/11/17 0224   TRIGLYCERIDE  75   HDL  58   LDL  89          Assessment/Plan     * CVA (cerebral vascular accident) (CMS-HCC)- (present on admission)   Assessment & Plan    Mild sequelae - TIA?  Patient could not complete MRI and now is refusing to try again even with sedation  Resumed BP medications after 24 hours        CAD (coronary artery disease)- (present on admission)    Assessment & Plan    Medical management        Community acquired pneumonia- (present on admission)   Assessment & Plan    Ruled out  Stopped abx        Stage 3 chronic kidney disease- (present on admission)   Assessment & Plan    Lab Results   Component Value Date    BUN 21 09/11/2017    CREATININE 1.19 09/11/2017   Maintaining hydration  Avoiding nephrotoxics: NSAIDs etc  Following Cr closely; to keep near baseline as possible         Carotid atherosclerosis, bilateral- (present on admission)   Assessment & Plan    CTA noted - < 60% stenosis        DJD (degenerative joint disease)   Assessment & Plan    History of        Anxiety- (present on admission)   Assessment & Plan    Reassured, does not want anxiolytics for an MRI though        Dyslipidemia- (present on admission)   Assessment & Plan    Statin        HTN (hypertension), benign- (present on admission)   Assessment & Plan    SBP goal less than 140 mmHg  DBP goal less than 90 mmHg  PRN and antihypertensives to titrate by hospitalist towards goal  Most recent Blood Pressure : 154/68             DVT prophylaxis pharmacological::  Heparin  Ulcer Prophylaxis::  Not indicated

## 2017-09-11 NOTE — ED NOTES
Pt updated on poc and admit plans. Pt continues to request water to drink. Pt educated on aspiration precautions and importance of swallow evaluation. Pt informed swallow evaluation will be completed upon transfer to floor. Pt verbalized understanding.

## 2017-09-11 NOTE — PROGRESS NOTES
Pt arrived on unit at 0025 via gurney; IVF flowing to gravity, transferred to pump and reset to 75mL/hr per physician order; pt ambulated to bed with 2 person assist; 2 RN skin assessment conducted with ANGUS aRmos, skin unremarkable with some small bruises noted that are consistent with a pt on Plavix and 81mg ASA; no s/s of abuse noted; NIHSS scored 1; CAGE negative; pt slightly hypertensive, physician aware and parameters in place; swallow eval passed, diet order in place per physician; tele monitor and  in place; will monitor and assess.

## 2017-09-11 NOTE — NON-PROVIDER
Internal Medicine Medical Student Admitting History and Physical    Name Madeleine Zambrano     1933   Age/Sex 84 y.o. female   MRN [id number]   Code Status DNR     After 5PM or if no immediate response to page, please call for cross-coverage  Attending/Team: Dr. Martinez See Patient List for primary contact information  Call [phone] to page after hours   1st Call - Day Intern (R1):    2nd Call - Day Sr. Resident (R2/R3):          Chief Complaint:  Right arm weakness    HPI:  84 year-old right handed woman presents with 1 day history of right arm numbness and weakness that began suddenly  morning. She does not recall any precipitating event or recent trauma and has never had a similar episode. She is unsure whether her right leg was also affected because she did not try to walk and has very poor balance at baseline. She was worried about this event and called for her son-in-law to take her to the ED. She denies dysarthria, altered mental status, audiovisual changes, pain or dysphagia at that time but rather insists that dysarthria in the form of slurred speech began  evening after arrival to the ED. Currently, she sates that all her symptoms have improved significantly since admission but she struggles to determine if she is currently at her baseline function. PMH significant for HTN (167/80 now), DLD, CAD and an inferior wall MI in 2015. Head CT shows moderate carotid and vertebral artery atherosclerosis as well as age-related atrophic changes but no acute hemorrhage or historical infarct. She did not receive tPA given the questionable time of onset of her symptoms. She has smoked 4-5 cigarettes/day x 50 years and has 1 alcoholic beverage. She denies history of atrial fibrillation, DVT or PE.    Review of Systems   Constitutional: Negative for chills and fever.   HENT: Negative for hearing loss.    Eyes: Negative for blurred vision.   Respiratory: Negative for cough.   "  Cardiovascular: Negative for chest pain.   Gastrointestinal: Negative for nausea and vomiting.   Neurological: Positive for sensory change and weakness. Negative for dizziness, tingling, tremors, speech change, seizures and headaches.             Past Medical History  And current medications (link outpatient medications  with diagnosis)    Inferior wall infarct 2015  CAD  HTN  DLD      Past Surgical History:  Past Surgical History:   Procedure Laterality Date   • VITRECTOMY POSTERIOR Right 2017    Procedure: VITRECTOMY POSTERIOR-LASER, SF6 GAS;  Surgeon: Jessica Arita M.D.;  Location: SURGERY SAME DAY [facility];  Service:    • RIGO BY LAPAROSCOPY  10/9/2015    Procedure: RIGO BY LAPAROSCOPY;  Surgeon: Ric Zambrano M.D.;  Location: SURGERY [facility];  Service:    • CATARACT PHACO WITH IOL  2011    Performed by EVERETTE COOPER at SURGERY SAME DAY [facility]   • CATARACT PHACO WITH IOL  2011    Performed by EVERETTE COOPER at SURGERY SAME DAY [facility]   • OTHER ORTHOPEDIC SURGERY      pins in left hip   • ATHROPLASTY      broken hip          Medication Allergy/Sensitivities:  Allergies   Allergen Reactions   • Meperidine Vomiting and Nausea   • Tramadol Vomiting and Nausea         Family History:  Does not identify.    Social History:  Smokin-5 cig/day x 50 years  Alcohol: 1/day  Illictis: denies      Physical Exam     Vitals:    17 0000 17 0400 17 0800 17 1200   BP: (!) 190/67 157/74 (!) 167/80 (!) 196/67   Pulse: 66 63 66 (!) 57   Resp: 20 12 15 16   Temp: 36.1 °C (97 °F) 36.6 °C (97.8 °F) 36.3 °C (97.3 °F) 36.8 °C (98.2 °F)   SpO2: 97% 96% 96% 95%   Weight: 67.3 kg (148 lb 5.9 oz)      Height: 1.6 m (5' 2.99\")        Body mass index is 26.29 kg/m².  BP (!) 196/67 Comment: RN notified  Pulse (!) 57 Comment: RN notified  Temp 36.8 °C (98.2 °F)   Resp 16   Ht 1.6 m (5' 2.99\")   Wt 67.3 kg (148 lb 5.9 oz)   SpO2 95%   Breastfeeding? " No   BMI 26.29 kg/m²   O2 therapy: Pulse Oximetry: 95 %, O2 (LPM): 2, O2 Delivery: Nasal Cannula    Physical Exam   Constitutional: She is oriented to person, place, and time and well-developed, well-nourished, and in no distress.   HENT:   Head: Normocephalic and atraumatic.   Eyes: EOM are normal. Pupils are equal, round, and reactive to light.   Neurological: She is alert and oriented to person, place, and time. She has normal reflexes. She displays normal reflexes. No cranial nerve deficit. She exhibits normal muscle tone.   A&Ox4. 3/3 item recall after five minutes. CN II-XII grossly intact. Sensory exam normal in all extremities and face. Strength 5/5 in all four extremities. Biceps and patellar reflexes 2+ and 1+ respectively, could not elicit brachioradialis. Rapid alternating movement testing was slow, R slower than left. Down-going toes on Babinski reflex testing with retraction of entire leg. Did not assess gait and balance due to poor baseline. Patient speaks very slowly and takes several moments to respond to questions; this is also her baseline.   Skin: Skin is warm and dry.   Psychiatric: Affect normal.     Imaging:  CT head:   1.  No evidence of acute intracranial process.  2.  Cerebral atrophy as well as periventricular chronic small vessel ischemic change.    CTA neck:  1.  Atherosclerotic plaque involving the right carotid bifurcation with extension into the internal carotid artery. This results in 60% diameter narrowing.  2.  Atherosclerotic plaque of the left carotid bifurcation. This extends into the internal carotid artery and results in less than 50% diameter narrowing.  3.  Atherosclerotic plaque involving the vertebral arteries without evidence of vascular     MR head: cancelled    TTE: unremarkable      Assessment/Plan     1. TIA versus resolved ischemic CVA  Patient has risk factors for ischemic stroke including HTN (190/xx to 167/80 today), DLD, significant smoking history and confirmed  atherosclerotic changes both of the carotid and vertebral arteries as well as coronary arteries which contributed to a myocardial infarct 2.5 years ago. Given time frame <24 hours and complete resolution of her symptoms, this can be either TIA or a resolved ischemic stroke. CT brain imaging does not confirm ischemic changes and MR-Head was cancelled this morning, making the distinction challenging at present.  -Continue current medication regimen including labetalol prn for bp>180/105  -Emphasize importance of medication adherence and lifestyle modifications in the sprit of risk reduction, especially given multiple serious risk factors.  -Consider MR brain for TIA versus resolved stroke and small versus large vessel disease.  -Although symptoms have seemingly resolved, patient may benefit from home evaluation given unsteadiness walking.  -Follow up on swallow evaluation.  -Continue to monitor for neurologic decline or symptom recurrence.  - on smoking cessation.

## 2017-09-11 NOTE — ASSESSMENT & PLAN NOTE
SBP goal less than 140 mmHg  DBP goal less than 90 mmHg  PRN and antihypertensives to titrate by hospitalist towards goal  -will slowly added back her BP medications

## 2017-09-11 NOTE — ED NOTES
Pt arrived via ems, per ems pt stated experiencing rt arm/leg numbness and weakness  Since 0300 this am. Pt called son this afternoon and he called 911. U/a pt sitting up caox4 speaking in full sentences no sob , maintaining patent airway, +slurred speech, no cp, no abd pain, +rt arm weakness, +rt leg weakness..

## 2017-09-11 NOTE — ASSESSMENT & PLAN NOTE
Mild sequelae - TIA? Versus small resolving stroke  -continues to refuse MRI, will not  at this time  -continue ASA, statin, therapies, rehab eval is pending.

## 2017-09-11 NOTE — RESPIRATORY CARE
COPD EDUCATION by COPD CLINICAL EDUCATOR  9/11/2017 at 6:20 AM by Katherin Lauren     Patient reviewed by COPD education team. Patient does not qualify for COPD program.

## 2017-09-11 NOTE — DISCHARGE PLANNING
PMR referral from Jose HANDLEY      On set of stroke symptoms, CT negative MRI pending? No therapy evaluations available to review for consideration for post acute need. Per chart Madeleine has a son who may be able to assist in returning to community. Limited information to make decision for post acute needs. No physiatry consult ordered per protocol.  I will follow as more information is available.

## 2017-09-11 NOTE — ED NOTES
Pt's BP increased. BP cuff adjusted and VS repeated. BP remains increased. Dr Vela aware and notified this RN orders will be placed for HTN.   Pt to floor with transport now.

## 2017-09-11 NOTE — THERAPY
"Speech Language Therapy Clinical Swallow Evaluation completed.  Functional Status: Patient awake, but sleepy when this SLP entered room and reported being very tired, but agreeable to evaluation after education and encouragement. Patient appeared to have poor insight into deficits, as she had somewhat of an odd affect and laughed and shook her head at this SLP and neurologists when she was informed of current medical status and reason for evaluations, tests, and procedures. Patient had slow speech, but no overt word-finding deficits or dysarthria were noted. Patient consumed PO trials of single ice chips, purees, pudding, soft solids, mixed consistencies, and thin liquids via cup sip and straw. Patient had prolonged mastication on fibrous solids, coughing on mixed consistencies, and cough x1 on thins via cup sip out of several sips. Laryngeal elevation palpated as complete and initiation of swallow trigger was timely. Patient stated \"I don't normally do this\" when coughing on PO trials, but then later appeared to have poor insight into deficits when this SLP discussed with her and MARTINEFaisal, about diet downgrade to Dys2/thin liquid diet. Patient may need cognitive evaluation if deficits persist. RN aware. SLP to follow for diet tolerance and trial upgrades. Thank you for the consult.     Recommendations - Diet: Dysphagia II, Thin Liquid                          Strategies: Monitor during meals, Assistance needed for meal tray set-up and Head of Bed at 90 Degrees                          Medication Administration:  Float Whole with Puree  Plan of Care: Will benefit from Speech Therapy 3 times per week  Post-Acute Therapy: Discharge to home with outpatient or home health for additional skilled therapy services.    See \"Rehab Therapy-Acute\" Patient Summary Report for complete documentation.   "

## 2017-09-11 NOTE — ASSESSMENT & PLAN NOTE
Lab Results   Component Value Date    BUN 21 09/11/2017    CREATININE 1.19 09/11/2017   Maintaining hydration  Avoiding nephrotoxics: NSAIDs etc  Following Cr closely; to keep near baseline as possible

## 2017-09-11 NOTE — ASSESSMENT & PLAN NOTE
Carotid with 50-69% stenosis Of the ISAEL  -will need to be followed outpatient with repeat US carotid in 6 months, control lipids, and BP

## 2017-09-11 NOTE — PROGRESS NOTES
Nunu Zavaleta Fall Risk Assessment:     Last Known Fall: No falls  Mobility: Use of assistive device/requires assist of two people  Medications: No meds  Mental Status/LOC/Awareness: Awake, alert, and oriented to date, place, and person  Toileting Needs: No needs  Volume/Electrolyte Status: Use of IV fluids/tube feeds  Communication/Sensory: Visual (Glasses)/hearing deficit  Behavior: Appropriate behavior  Nunu Zavaleta Fall Risk Total: 9  Fall Risk Level: LOW RISK    Universal Fall Precautions:  call light/belongings in reach, bed in low position and locked, wheelchairs and assistive devices out of sight, siderails up x 2, use non-slip footwear, adequate lighting, clutter free and spill free environment, educate on level of risk, educate to call for assistance    Fall Risk Level Interventions:   TRIAL (TELE 8, NEURO, MED MELO 5) Low Fall Risk Interventions  Place yellow fall risk ID band on patient: completed  Provide patient/family education based on risk assessment: completed  Educate patient/family to call staff for assistance when getting out of bed: completed  Place fall precaution signage outside patient door: completed      Patient Specific Interventions:     Medication: review medications with patient and family, assess for medications that can be discontinued or dosage decreased and limit combination of prn medications  Mental Status/LOC/Awareness: reinforce falls education, utilize bed/chair fall alarm and reinforce the use of call light  Toileting: monitor intake and output/use of appropriate interventions and do not leave patient unattended in bathroom/refer to toileting scripting  Volume/Electrolyte Status: ensure patient remains hydrated and ensure IV fluids are appropriate  Communication/Sensory: update plan of care on whiteboard  Behavioral: encourage patient to voice feelings and instruct/reinforce fall program rationale  Mobility: utilize bed/chair fall alarm, ensure bed is locked and in lowest  position and provide appropriate assistive device

## 2017-09-12 ENCOUNTER — HOSPITAL ENCOUNTER (INPATIENT)
Facility: REHABILITATION | Age: 82
End: 2017-09-12
Attending: PHYSICAL MEDICINE & REHABILITATION | Admitting: PHYSICAL MEDICINE & REHABILITATION
Payer: MEDICARE

## 2017-09-12 PROCEDURE — 99225 PR SUBSEQUENT OBSERVATION CARE,LEVEL II: CPT | Performed by: INTERNAL MEDICINE

## 2017-09-12 PROCEDURE — 700102 HCHG RX REV CODE 250 W/ 637 OVERRIDE(OP): Performed by: INTERNAL MEDICINE

## 2017-09-12 PROCEDURE — G0378 HOSPITAL OBSERVATION PER HR: HCPCS

## 2017-09-12 PROCEDURE — 97162 PT EVAL MOD COMPLEX 30 MIN: CPT

## 2017-09-12 PROCEDURE — A9270 NON-COVERED ITEM OR SERVICE: HCPCS | Performed by: INTERNAL MEDICINE

## 2017-09-12 PROCEDURE — 700105 HCHG RX REV CODE 258: Performed by: INTERNAL MEDICINE

## 2017-09-12 PROCEDURE — G8979 MOBILITY GOAL STATUS: HCPCS | Mod: CI

## 2017-09-12 PROCEDURE — G8978 MOBILITY CURRENT STATUS: HCPCS | Mod: CJ

## 2017-09-12 PROCEDURE — 700102 HCHG RX REV CODE 250 W/ 637 OVERRIDE(OP): Performed by: HOSPITALIST

## 2017-09-12 PROCEDURE — A9270 NON-COVERED ITEM OR SERVICE: HCPCS | Performed by: HOSPITALIST

## 2017-09-12 PROCEDURE — 700111 HCHG RX REV CODE 636 W/ 250 OVERRIDE (IP): Performed by: HOSPITALIST

## 2017-09-12 RX ORDER — ATORVASTATIN CALCIUM 20 MG/1
20 TABLET, FILM COATED ORAL
Status: DISCONTINUED | OUTPATIENT
Start: 2017-09-12 | End: 2017-09-13 | Stop reason: HOSPADM

## 2017-09-12 RX ORDER — LISINOPRIL 20 MG/1
20 TABLET ORAL DAILY
Status: DISCONTINUED | OUTPATIENT
Start: 2017-09-13 | End: 2017-09-13 | Stop reason: HOSPADM

## 2017-09-12 RX ORDER — AMLODIPINE BESYLATE 5 MG/1
10 TABLET ORAL
Status: DISCONTINUED | OUTPATIENT
Start: 2017-09-13 | End: 2017-09-13 | Stop reason: HOSPADM

## 2017-09-12 RX ADMIN — ATORVASTATIN CALCIUM 20 MG: 20 TABLET, FILM COATED ORAL at 20:20

## 2017-09-12 RX ADMIN — LISINOPRIL 10 MG: 10 TABLET ORAL at 09:01

## 2017-09-12 RX ADMIN — METOPROLOL TARTRATE 25 MG: 25 TABLET, FILM COATED ORAL at 09:01

## 2017-09-12 RX ADMIN — HEPARIN SODIUM 5000 UNITS: 5000 INJECTION, SOLUTION INTRAVENOUS; SUBCUTANEOUS at 20:21

## 2017-09-12 RX ADMIN — ASPIRIN 81 MG: 81 TABLET ORAL at 09:02

## 2017-09-12 RX ADMIN — STANDARDIZED SENNA CONCENTRATE AND DOCUSATE SODIUM 2 TABLET: 8.6; 5 TABLET, FILM COATED ORAL at 20:20

## 2017-09-12 RX ADMIN — METOPROLOL TARTRATE 25 MG: 25 TABLET, FILM COATED ORAL at 20:20

## 2017-09-12 RX ADMIN — AMLODIPINE BESYLATE 5 MG: 5 TABLET ORAL at 09:02

## 2017-09-12 RX ADMIN — HEPARIN SODIUM 5000 UNITS: 5000 INJECTION, SOLUTION INTRAVENOUS; SUBCUTANEOUS at 14:00

## 2017-09-12 RX ADMIN — ACETAMINOPHEN 650 MG: 325 TABLET, FILM COATED ORAL at 11:49

## 2017-09-12 RX ADMIN — HEPARIN SODIUM 5000 UNITS: 5000 INJECTION, SOLUTION INTRAVENOUS; SUBCUTANEOUS at 05:50

## 2017-09-12 RX ADMIN — SODIUM CHLORIDE: 9 INJECTION, SOLUTION INTRAVENOUS at 02:13

## 2017-09-12 RX ADMIN — CLOPIDOGREL 75 MG: 75 TABLET, FILM COATED ORAL at 09:01

## 2017-09-12 ASSESSMENT — GAIT ASSESSMENTS
DEVIATION: OTHER (COMMENT)
DISTANCE (FEET): 100
ASSISTIVE DEVICE: FRONT WHEEL WALKER
GAIT LEVEL OF ASSIST: STAND BY ASSIST

## 2017-09-12 ASSESSMENT — PATIENT HEALTH QUESTIONNAIRE - PHQ9
2. FEELING DOWN, DEPRESSED, IRRITABLE, OR HOPELESS: NOT AT ALL
SUM OF ALL RESPONSES TO PHQ QUESTIONS 1-9: 0
1. LITTLE INTEREST OR PLEASURE IN DOING THINGS: NOT AT ALL
SUM OF ALL RESPONSES TO PHQ9 QUESTIONS 1 AND 2: 0

## 2017-09-12 ASSESSMENT — ENCOUNTER SYMPTOMS
MYALGIAS: 0
ABDOMINAL PAIN: 0
TINGLING: 0
SENSORY CHANGE: 0
SEIZURES: 0
VOMITING: 0
COUGH: 0
FOCAL WEAKNESS: 0
HEADACHES: 0
SHORTNESS OF BREATH: 0
FEVER: 0
NAUSEA: 0
FOCAL WEAKNESS: 1
SPEECH CHANGE: 0
TREMORS: 0
DIZZINESS: 0

## 2017-09-12 ASSESSMENT — COGNITIVE AND FUNCTIONAL STATUS - GENERAL
SUGGESTED CMS G CODE MODIFIER MOBILITY: CK
WALKING IN HOSPITAL ROOM: A LITTLE
MOVING TO AND FROM BED TO CHAIR: A LITTLE
TURNING FROM BACK TO SIDE WHILE IN FLAT BAD: A LITTLE
MOVING FROM LYING ON BACK TO SITTING ON SIDE OF FLAT BED: A LITTLE
MOBILITY SCORE: 18
STANDING UP FROM CHAIR USING ARMS: A LITTLE
CLIMB 3 TO 5 STEPS WITH RAILING: A LITTLE

## 2017-09-12 ASSESSMENT — PAIN SCALES - GENERAL
PAINLEVEL_OUTOF10: 0
PAINLEVEL_OUTOF10: 0

## 2017-09-12 NOTE — PROGRESS NOTES
Assumed care of patient at 0700.  Received report from night RN.  Pt alert and oriented x 4, has no complaints of pain. Pt resting comfortably in bed.

## 2017-09-12 NOTE — CARE PLAN
Problem: Safety  Goal: Will remain free from injury  Outcome: PROGRESSING AS EXPECTED      Problem: Bowel/Gastric:  Goal: Normal bowel function is maintained or improved  Outcome: PROGRESSING AS EXPECTED

## 2017-09-12 NOTE — THERAPY
"Physical Therapy Evaluation completed.   Bed Mobility:  Supine to Sit: Stand by Assist  Transfers: Sit to Stand: Stand by Assist  Gait: Level Of Assist: Stand by Assist with Front-Wheel Walker       Plan of Care: Will benefit from Physical Therapy 3 times per week  Discharge Recommendations: Equipment: Will Continue to Assess for Equipment Needs. See below    Pt presents to PT s/p possible CVA v TIA (pt unable to tolerate MRI and no formal diagnosis). Her prior reported symptoms appear resolved at this time (aphasia and RUE weakness). However, she does have a generally odd affect and some confusion though this may be baseline behavior (needs clarification from family if able). Functionally, pt is able to demonstrate short, hallway ambulation with FWW with no mikal LOB with SBA. PT's primary concerns are regarding pt's insight and ability to perform IADls and ADls safely given current cognitive/behavior (and would defer to OT recs in this regard). She will benefit from continued skilled acute PT while here and would recommend continued skilled PT prior to medical d/c to home for optimal functional progression (as pt lives alone) back to recent baseline. Noted pt reports she wants to go to [facility] if possible though has concerns re: finances and would benefit from consult with SW regarding concerns. Will continue to follow.     See \"Rehab Therapy-Acute\" Patient Summary Report for complete documentation.     "

## 2017-09-12 NOTE — PROGRESS NOTES
Nunu Zavaleta Fall Risk Assessment:     Last Known Fall: No falls  Mobility: Use of assistive device/requires assist of two people  Medications: Cardiovascular or central nervous system meds  Mental Status/LOC/Awareness: Awake, alert, and oriented to date, place, and person  Toileting Needs: No needs  Volume/Electrolyte Status: Use of IV fluids/tube feeds  Communication/Sensory: Visual (Glasses)/hearing deficit  Behavior: Appropriate behavior  Nunu Zavaleta Fall Risk Total: 10  Fall Risk Level: LOW RISK    Universal Fall Precautions:  call light/belongings in reach, bed in low position and locked, wheelchairs and assistive devices out of sight, siderails up x 2, use non-slip footwear, adequate lighting, clutter free and spill free environment, educate on level of risk, educate to call for assistance    Fall Risk Level Interventions:   TRIAL ([employer], NEURO, MED MELO 5) Low Fall Risk Interventions  Place yellow fall risk ID band on patient: verified  Provide patient/family education based on risk assessment: completed  Educate patient/family to call staff for assistance when getting out of bed: completed  Place fall precaution signage outside patient door: verified TRIAL ([employer] 8, NEURO, [employer] MELO 5) High Fall Risk Interventions  Place yellow fall risk ID band on patient: verified  Provide patient/family education based on risk assessment: completed  Educate patient/family to call staff for assistance when getting out of bed: completed  Place fall precaution signage outside patient door: completed  Place patient in room close to nursing station: verified  Utilize bed/chair fall alarm: completed  Notify charge of high risk for huddle: verified    Patient Specific Interventions:     Medication: review medications with patient and family, assess for medications that can be discontinued or dosage decreased and limit combination of prn medications  Mental Status/LOC/Awareness: reinforce falls education, utilize bed/chair  fall alarm and reinforce the use of call light  Toileting: monitor intake and output/use of appropriate interventions  Volume/Electrolyte Status: ensure patient remains hydrated, monitor abnormal lab values and ensure IV fluids are appropriate  Communication/Sensory: update plan of care on whiteboard and for visually impaired patients orient to their room surrounding and do not change their surroundings  Behavioral: encourage patient to voice feelings and instruct/reinforce fall program rationale  Mobility: utilize bed/chair fall alarm, ensure bed is locked and in lowest position and provide appropriate assistive device

## 2017-09-12 NOTE — PROGRESS NOTES
Monitor summary: SB-SR 56-69, MI 0.18, QRS 0.12, QT 0.46, with rare PVCs and frequent bigem per strip from monitor room.

## 2017-09-12 NOTE — DISCHARGE PLANNING
Thank you for the opportunity to assist with this patient as they transition to post acute services.  We are aware of the Rehab referral from Dr. Garcia.  Dr. Carrington to consult this referral. Our Transitional Case Coordinator will follow. At this time patient is showing to have SCP as their coverage.   Please do not hesitate to call us if you require additional assistance my phone number is [phone] Raffy.

## 2017-09-12 NOTE — CONSULTS
"Physical Medicine and Rehabilitation Consultation    Date of Consultation: 9/12/2017  Consulting provider: Sabas Garcia MD  Reason for consultation: assess for acute inpatient rehab appropriateness  Chief complaint: \"I had a stroke\"    HPI: The patient is a 84 y.o. female with a past medical history of hypertension, coronary artery disease/MI, and hyperlipidemia, admitted on 9/10/2017  5:07 PM with right arm/leg numbness and weakness. Head CT negative for acute findings, with evidence of cerebral atrophy and periventricular small vessel ischemic disease. CTA head/neck with 60% stenosis of right ICA, and less than 50% stenosis of left ICA, with plaques of the vertebral arteries without stenosis. Patient unable to complete MRI and refusing to try again, even with sedation. ECHO with normal ventricular function, EF 60%. Labs with elevated BUN/Cr and decreased GFR - patient apparently with stage 3 chronic kidney disease. Lipids with TGs 75, HDL 58, LDL 89. HgbA1c 6.0 +etoh 0.05    The patient currently reports that she had a stroke, and that she thinks the numbness she had on the right leg and arm are gone. She denies any pain. No trouble with bowel or bladder. We talked about how to stop smoking, as she's down to only 3 cigarettes per day and she hasn't craved them while hospitalized.    ROS:  no F/C, N/V, HA, vision changes/dizziness, CP/SOB, abd pain/constipation, diarrhea, dysuria/frequency/urgency, bowel/bladder incontinence, calf pain/swelling.    PMH:  Past Medical History:   Diagnosis Date   • MEDICAL HOME 11/07/12   • Back pain 3/28/2012   • Arthritis    • CATARACT    • Glaucoma    • Hyperlipidemia    • Hypertension    • Myocardial infarct (CMS-HCC)    • Pain     sacrum 1/10       PSH:  Past Surgical History:   Procedure Laterality Date   • VITRECTOMY POSTERIOR Right 4/13/2017    Procedure: VITRECTOMY POSTERIOR-LASER, SF6 GAS;  Surgeon: Jessica Arita M.D.;  Location: SURGERY SAME DAY [facility];  " Service:    • RIGO BY LAPAROSCOPY  10/9/2015    Procedure: RIGO BY LAPAROSCOPY;  Surgeon: Ric Zambrano M.D.;  Location: SURGERY [facility];  Service:    • CATARACT PHACO WITH IOL  4/27/2011    Performed by EVERETTE COOPER at SURGERY SAME DAY [facility]   • CATARACT PHACO WITH IOL  4/13/2011    Performed by EVERETTE COOPER at SURGERY SAME DAY [facility]   • OTHER ORTHOPEDIC SURGERY  1980    pins in left hip   • ATHROPLASTY      broken hip 1990       FHX:  Family History   Problem Relation Age of Onset   • Heart Disease Mother    • Cancer Father        Medications:  Current Facility-Administered Medications   Medication Dose   • [START ON 9/13/2017] amlodipine (NORVASC) tablet 10 mg  10 mg   • labetalol (NORMODYNE,TRANDATE) injection 10 mg  10 mg   • lisinopril (PRINIVIL) 10 MG tablet 10 mg  10 mg   • metoprolol (LOPRESSOR) tablet 25 mg  25 mg   • heparin injection 5,000 Units  5,000 Units   • diphenhydrAMINE (BENADRYL) tablet/capsule 25 mg  25 mg   • aspirin EC (ECOTRIN) tablet 81 mg  81 mg   • clopidogrel (PLAVIX) tablet 75 mg  75 mg   • senna-docusate (PERICOLACE or SENOKOT S) 8.6-50 MG per tablet 2 Tab  2 Tab    And   • polyethylene glycol/lytes (MIRALAX) PACKET 1 Packet  1 Packet    And   • magnesium hydroxide (MILK OF MAGNESIA) suspension 30 mL  30 mL    And   • bisacodyl (DULCOLAX) suppository 10 mg  10 mg   • Respiratory Care per Protocol     • NS infusion     • acetaminophen (TYLENOL) tablet 650 mg  650 mg   • ondansetron (ZOFRAN) syringe/vial injection 4 mg  4 mg   • ondansetron (ZOFRAN ODT) dispertab 4 mg  4 mg   • atorvastatin (LIPITOR) tablet 10 mg  10 mg       Allergies:  Allergies   Allergen Reactions   • Meperidine Vomiting and Nausea   • Tramadol Vomiting and Nausea       Social Hx:  Pre-morbidly, this patient lived in a single level home with no steps to enter, alone and able to care for self.  (cannot say for how long), was  for 30 years. One daughter lives locally and  "works during the day. One daughter in [place], and her son lives in [place].  Employment: did secretarial jobs  Tobacco: 3 cigarettes per day, stopped smoking when she was pregnant, then resumed  Alcohol: glass of wine with dinner  Drugs: none    Prior level of function:   Independent, recently stopped driving, does her own bills and medication management    Current level of function:  The patient was evaluated by acute care Occupational Therapy and Speech Language Pathology; currently requiring minimal assistance for ADLs, also with ongoing cognitive and swallowing deficits. She hasn't yet been seen by PT.    Physical Exam:  Vitals: Blood pressure (!) 177/77, pulse (!) 57, temperature 36.4 °C (97.5 °F), resp. rate 16, height 1.6 m (5' 2.99\"), weight 67.3 kg (148 lb 5.9 oz), SpO2 97 %, not currently breastfeeding.  Gen: NAD, elderly female  HEENT: NC/AT, PERRLA, moist mucous membranes  Cardio: RRR, no mumurs  Pulm: CTAB, with normal respiratory effort  Abd: Soft NTND, active bowel sounds  Ext: No peripheral edema. No calf tenderness.     Mental status: alert, oriented to self, reason for admission  Speech: fluent, no aphasia or dysarthria    CRANIAL NERVES:  2,3: visual acuity grossly intact, PERRL  3,4,6: EOMI bilaterally, no nystagmus or diplopia  5: sensation intact to light touch bilaterally and symmetric  7: no facial asymmetry  8: hearing grossly intact  9,10: symmetric palate elevation  11: SCM/Trapezius strength 5/5 bilaterally  12: tongue protrudes midline    Motor:  5/5 throughout UE and LE, pain limited with bilateral hip flexion and knee extension  Negative Pronator drift bilaterally    Sensory:   intact to light touch through out    DTRs: 2+ in bilateral biceps, triceps, brachioradialis, 0+ in bilateral patellar and achilles tendons  No clonus at bilateral ankles  Negative babinski b/l  Negative Conner b/l     Tone: no spasticity noted, no cogwheeling noted    Coordination:   Slightly delayed finger to " nose on the right    Labs:  Recent Labs      09/10/17   1740  09/11/17   0224   RBC  3.87*  3.69*   HEMOGLOBIN  13.6  12.8   HEMATOCRIT  40.8  38.6   PLATELETCT  246  229   PROTHROMBTM  13.2   --    APTT  29.4   --    INR  0.97   --      Recent Labs      09/10/17   1740  09/11/17   0224   SODIUM  141  142   POTASSIUM  3.9  3.7   CHLORIDE  111  112   CO2  20  21   GLUCOSE  73  92   BUN  24*  21   CREATININE  1.39  1.19   CALCIUM  9.1  8.8     Recent Results (from the past 24 hour(s))   ACCU-CHEK GLUCOSE    Collection Time: 09/11/17  4:48 PM   Result Value Ref Range    Glucose - Accu-Ck 103 (H) 65 - 99 mg/dL       ASSESSMENT:    Patient is a 84 y.o. Right hand dominant female admitted with right sided paresthesias, workup for stroke negative, though patient unable to tolerate MRI.    Patient with multiple co-morbidities(including but not limited to history of coronary artery disease, hypertension, dyslipidemia, tobacco smoker, chronic kidney disease); with cognitive deficits and functional deficits in mobility/self-care/swallowing, and Moderate de-conditioning.     Pre-morbidly, this patient lived in a single level home with no steps to enter, alone and able to care for self. The patient was evaluated by acute care Occupational Therapy and Speech Language Pathology; currently requiring minimal assistance for ADLs, also with ongoing cognitive and swallowing deficits. She hasn't yet been seen by PT.    Patient with no focal deficits on my exam. Unclear why she needed min - mod assist for mobility with OT yesterday, unless she had a TIA which has now resolved her impairments.    Will wait for PT evaluation to determine therapy needs. She would benefit from a cognitive consult as she lives alone, which I ordered. If she still has mobility limitations with PT, she would benefit from a short stay of inpatient rehabilitation.    Otherwise recommend discharge with home health therapies.    This recommendation is substantiated  by the patient's current medical condition with intervention and assessment of medical issues requiring an acute level of care for patient's safety and maximum outcome. A coordinated program of care will be provided by an interdisciplinary team including physical therapy, occupational therapy, speech language pathology, physiatry, rehab nursing and rehab psychology. Rehab goals include improved cognition and swallowing, mobility, self-care management, strength and conditioning/endurance, pain management, bowel and bladder management, mood and affect, and safety with independent home management including caregiver training.     Estimated length of stay is approximately 7-10 days. Rehab potential: Excellent. Disposition: to pre-morbid independent living setting with supportive care of patient's community resources. We will continue to follow with you in anticipation of discharge to acute inpatient rehabilitation when medically stable to do so at the discretion of her  attending physician. Thank you for allowing us to participate in her care. Please call with any questions regarding this recommendation.    Stephanie Carrington M.D.  Physical Medicine and Rehabilitation

## 2017-09-12 NOTE — PROGRESS NOTES
Nunu Zavaleta Fall Risk Assessment:     Last Known Fall: No falls  Mobility: Use of assistive device/requires assist of two people  Medications: Cardiovascular or central nervous system meds  Mental Status/LOC/Awareness: Awake, alert, and oriented to date, place, and person  Toileting Needs: No needs  Volume/Electrolyte Status: Use of IV fluids/tube feeds  Communication/Sensory: Visual (Glasses)/hearing deficit  Behavior: Appropriate behavior  Nunu Zavaleta Fall Risk Total: 10  Fall Risk Level: LOW RISK    Universal Fall Precautions:  call light/belongings in reach, use non-slip footwear    Fall Risk Level Interventions:   TRIAL ([employer], NEURO, MED MELO 5) Low Fall Risk Interventions  Place yellow fall risk ID band on patient: verified  Provide patient/family education based on risk assessment: completed  Educate patient/family to call staff for assistance when getting out of bed: completed  Place fall precaution signage outside patient door: verified TRIAL ([employer] 8, NEURO, MED MELO 5) High Fall Risk Interventions  Place yellow fall risk ID band on patient: verified  Provide patient/family education based on risk assessment: completed  Educate patient/family to call staff for assistance when getting out of bed: completed  Place fall precaution signage outside patient door: completed  Place patient in room close to nursing station: verified  Utilize bed/chair fall alarm: completed  Notify charge of high risk for huddle: verified    Patient Specific Interventions:     Medication: review medications with patient and family  Mental Status/LOC/Awareness: check on patient hourly and utilize bed/chair fall alarm  Toileting: provide frquent toileting  Volume/Electrolyte Status: ensure patient remains hydrated  Communication/Sensory: provide communication alternatives/  Behavioral: engage patient in daily activities  Mobility: utilize bed/chair fall alarm

## 2017-09-12 NOTE — STROKE NOTE
"Notified by CNA patient c/o headache, then c/o that she \"felt weird\", new onset numbness and heaviness to right arm.  Called RRT and Stroke code.    Dr. Garcia and Dr. Martinez neurologist notified.  Dr. Martinez came to bedside, assessed patient.  Patient inconsistent on exam.  No new scans ordered.  Order IV bolus for hydration.  Orders carried out.      "

## 2017-09-12 NOTE — NON-PROVIDER
Internal Medicine Medical Student Note    Name Madeleine Zambrano     1933   Age/Sex 84 y.o. female   MRN [id number]   Code Status DNR     After 5PM or if no immediate response to page, please call for cross-coverage  Attending/Team: Dr. Martinez See Patient List for primary contact information  Call [phone] to page after hours   1st Call - Day Intern (R1):    2nd Call - Day Sr. Resident (R2/R3):            Reason for interval visit  (Principal Problem)   CVA (cerebral vascular accident) (CMS-[facility])    Interval Problem Daily Status Update  (24 hours)   Patient is stable following a brief event yesterday evening in which she experienced recurrent right arm weakness. This resolved prior to evaluation and has had no recurrent symptoms overnight. She slept well and has been able to ambulate to the bathroom with walker and assistance. She endorses poor appetite but is making an effort to eat and is reinforced by nursing staff. This morning she complains only of arm bruising around the IV site and trouble sleeping 2/2 frequent visitation. OT and speech were both consulted yesterday and recommendations were made as indicated in their respective evaluations. MRI was not tolerated yesterday and she refuses to retry.    Review of Systems   HENT: Negative for hearing loss.    Respiratory: Negative for shortness of breath.    Cardiovascular: Negative for chest pain.   Neurological: Negative for dizziness, tingling, tremors, sensory change, speech change, focal weakness, seizures and headaches.       Physical Exam       Vitals:    17 2000 17 2359 17 0400 17 0700   BP: (!) 162/66 143/63 (!) 189/68 (!) 177/77   Pulse: 90 86 62 (!) 57   Resp: 18 18 18 16   Temp: 36.3 °C (97.3 °F) 36.4 °C (97.6 °F) 36.5 °C (97.7 °F) 36.4 °C (97.5 °F)   SpO2: 95% 96% 96% 97%   Weight:       Height:         Body mass index is 26.29 kg/m².    Oxygen Therapy:  Pulse Oximetry: 97 %, O2 (LPM): 0, O2 Delivery:  None (Room Air)    Physical Exam   Constitutional: She is oriented to person, place, and time and well-developed, well-nourished, and in no distress. No distress.   HENT:   Head: Normocephalic and atraumatic.   Eyes: EOM are normal. Pupils are equal, round, and reactive to light.   Pulmonary/Chest: Effort normal. No respiratory distress.   Neurological: She is alert and oriented to person, place, and time. She has normal reflexes. No cranial nerve deficit. She exhibits normal muscle tone.   A&Ox4. CN II-XII grossly intact. Sensory exam normal in all extremities and face. Strength 5/5 in all four extremities. Biceps and patellar reflexes 2+ and 1+ respectively, could not elicit brachioradialis. Rapid alternating movement testing was slow, R slower than left. Down-going toes on Babinski reflex testing with retraction of entire leg. She was observed walking with assistance between bathroom and bed. She moves slowly with noticeable effort. Did not fall. Patient speaks very slowly and takes several moments to respond to questions; this is also her baseline.   Skin: Skin is warm and dry. She is not diaphoretic.   Psychiatric: Affect normal.     Imaging:   Repeat MR: refused.  Echo: Shows LVH consistent with her HTN and EF~60%.    Labs: None.      Assessment/Plan      1. CVA vs TIA  84 y.o. female admitted 9/10/2017 with symptoms of TIA vs ischemic CVA that have resolved. She did not tolerate MRI yesterday and refuses to repeat, however a positive result will not alter management course. Co-morbidities including HTN, DLD, CAD with history of MI, and b/l carotid and vertebral artery stenosis are predisposing risk factors that should be managed by her medical team as outlined in Dr. Garcia's evaluation on 9/11.  -Continue medical management- ASA 81mg qd and Plavix 75mg qd  -Per OT, evaluate possibility of assisted living services through [facility].  -Awaiting PT; they made two attempts to see her yesterday but could not  evaluate given recurrent symptoms in the evening.  -Recommend carotid ultrasound f/u in 6 months.  - on smoking cessation and healthy living including diet and exercise.  -Fall precautions and  on alcohol abstinence given fall risk.

## 2017-09-13 VITALS
HEART RATE: 66 BPM | HEIGHT: 63 IN | BODY MASS INDEX: 26.29 KG/M2 | WEIGHT: 148.37 LBS | RESPIRATION RATE: 17 BRPM | TEMPERATURE: 97.3 F | OXYGEN SATURATION: 97 % | SYSTOLIC BLOOD PRESSURE: 176 MMHG | DIASTOLIC BLOOD PRESSURE: 80 MMHG

## 2017-09-13 PROBLEM — J18.9 COMMUNITY ACQUIRED PNEUMONIA: Status: RESOLVED | Noted: 2017-09-10 | Resolved: 2017-09-13

## 2017-09-13 PROCEDURE — 700102 HCHG RX REV CODE 250 W/ 637 OVERRIDE(OP): Performed by: INTERNAL MEDICINE

## 2017-09-13 PROCEDURE — A9270 NON-COVERED ITEM OR SERVICE: HCPCS | Performed by: INTERNAL MEDICINE

## 2017-09-13 PROCEDURE — 700102 HCHG RX REV CODE 250 W/ 637 OVERRIDE(OP): Performed by: HOSPITALIST

## 2017-09-13 PROCEDURE — 700101 HCHG RX REV CODE 250: Performed by: INTERNAL MEDICINE

## 2017-09-13 PROCEDURE — G0378 HOSPITAL OBSERVATION PER HR: HCPCS

## 2017-09-13 PROCEDURE — 99217 PR OBSERVATION CARE DISCHARGE: CPT | Performed by: INTERNAL MEDICINE

## 2017-09-13 PROCEDURE — A9270 NON-COVERED ITEM OR SERVICE: HCPCS | Performed by: HOSPITALIST

## 2017-09-13 PROCEDURE — 700111 HCHG RX REV CODE 636 W/ 250 OVERRIDE (IP): Performed by: HOSPITALIST

## 2017-09-13 RX ORDER — FLUCONAZOLE 100 MG/1
200 TABLET ORAL ONCE
Status: COMPLETED | OUTPATIENT
Start: 2017-09-13 | End: 2017-09-13

## 2017-09-13 RX ORDER — TIZANIDINE 4 MG/1
4 TABLET ORAL EVERY 6 HOURS PRN
Qty: 30 TAB | Refills: 3 | Status: ON HOLD
Start: 2017-09-13 | End: 2017-09-25

## 2017-09-13 RX ORDER — AMLODIPINE BESYLATE 10 MG/1
10 TABLET ORAL DAILY
Qty: 30 TAB | Status: ON HOLD
Start: 2017-09-13 | End: 2017-09-25

## 2017-09-13 RX ORDER — LIDOCAINE 50 MG/G
1 PATCH TOPICAL EVERY 24 HOURS
Status: DISCONTINUED | OUTPATIENT
Start: 2017-09-13 | End: 2017-09-13 | Stop reason: HOSPADM

## 2017-09-13 RX ORDER — LIDOCAINE 50 MG/G
1 PATCH TOPICAL EVERY 24 HOURS
Qty: 10 PATCH | Status: ON HOLD
Start: 2017-09-13 | End: 2017-09-25

## 2017-09-13 RX ORDER — ATORVASTATIN CALCIUM 20 MG/1
20 TABLET, FILM COATED ORAL
Qty: 30 TAB | Status: ON HOLD
Start: 2017-09-13 | End: 2017-09-25

## 2017-09-13 RX ORDER — TIZANIDINE 4 MG/1
4 TABLET ORAL EVERY 6 HOURS PRN
Status: DISCONTINUED | OUTPATIENT
Start: 2017-09-13 | End: 2017-09-13 | Stop reason: HOSPADM

## 2017-09-13 RX ADMIN — HEPARIN SODIUM 5000 UNITS: 5000 INJECTION, SOLUTION INTRAVENOUS; SUBCUTANEOUS at 06:08

## 2017-09-13 RX ADMIN — HEPARIN SODIUM 5000 UNITS: 5000 INJECTION, SOLUTION INTRAVENOUS; SUBCUTANEOUS at 12:40

## 2017-09-13 RX ADMIN — FLUCONAZOLE 200 MG: 100 TABLET ORAL at 10:14

## 2017-09-13 RX ADMIN — ASPIRIN 81 MG: 81 TABLET ORAL at 10:15

## 2017-09-13 RX ADMIN — CLOPIDOGREL 75 MG: 75 TABLET, FILM COATED ORAL at 10:15

## 2017-09-13 RX ADMIN — LIDOCAINE 1 PATCH: 50 PATCH TOPICAL at 12:41

## 2017-09-13 RX ADMIN — METOPROLOL TARTRATE 25 MG: 25 TABLET, FILM COATED ORAL at 10:14

## 2017-09-13 RX ADMIN — LISINOPRIL 20 MG: 20 TABLET ORAL at 10:14

## 2017-09-13 RX ADMIN — AMLODIPINE BESYLATE 10 MG: 5 TABLET ORAL at 10:15

## 2017-09-13 RX ADMIN — TIZANIDINE 4 MG: 4 TABLET ORAL at 12:40

## 2017-09-13 ASSESSMENT — ENCOUNTER SYMPTOMS
FOCAL WEAKNESS: 0
TINGLING: 0
SENSORY CHANGE: 0
DOUBLE VISION: 0
TREMORS: 0
EYE PAIN: 0
SPEECH CHANGE: 0
HEADACHES: 0
DIZZINESS: 0
SHORTNESS OF BREATH: 0
BLURRED VISION: 0

## 2017-09-13 NOTE — PROGRESS NOTES
[facility] Hospitalist Progress Note    Date of Service: 2017    Chief Complaint  84 y.o. female admitted 9/10/2017 with symptoms of TIA vs CVA    Interval Problem Update  No new deficits to report. Cannot tolerate MRI. No other acute events noted.    Consultants/Specialty  Neuro    Disposition  Depends on therapy evals        Review of Systems   Constitutional: Negative for fever.   Respiratory: Negative for cough and shortness of breath.    Cardiovascular: Negative for chest pain.   Gastrointestinal: Negative for abdominal pain, nausea and vomiting.   Genitourinary: Negative for frequency and urgency.   Musculoskeletal: Negative for myalgias.   Skin: Negative for rash.   Neurological: Positive for focal weakness (resolved). Negative for dizziness and headaches.        Almost resolved at this point   All other systems reviewed and are negative.     Physical Exam  Laboratory/Imaging   Hemodynamics  Temp (24hrs), Av.4 °C (97.6 °F), Min:36.1 °C (97 °F), Max:36.8 °C (98.2 °F)   Temperature: 36.8 °C (98.2 °F)  Pulse  Av.1  Min: 54  Max: 90    Blood Pressure : (!) 175/82      Respiratory      Respiration: 16, Pulse Oximetry: 96 %        RUL Breath Sounds: Clear, RML Breath Sounds: Diminished, RLL Breath Sounds: Diminished, RYAN Breath Sounds: Clear, LLL Breath Sounds: Diminished    Fluids  No intake or output data in the 24 hours ending 17 1848    Nutrition  Orders Placed This Encounter   Procedures   • Diet Order     Standing Status:   Standing     Number of Occurrences:   1     Order Specific Question:   Diet:     Answer:   Cardiac [6]     Comments:   Float pills in applesauce or pudding please     Order Specific Question:   Texture/Fiber modifications:     Answer:   Dysphagia 2(Pureed/Chopped)specify fluid consistency(question 6) [2]     Order Specific Question:   Consistency/Fluid modifications:     Answer:   Thin Liquids [3]     Physical Exam   Constitutional: She is oriented to person, place, and  time. She appears well-developed and well-nourished. No distress.   HENT:   Head: Normocephalic and atraumatic.   Eyes: Conjunctivae are normal. Pupils are equal, round, and reactive to light. Right eye exhibits no discharge. Left eye exhibits no discharge.   Neck: Normal range of motion. Neck supple.   Cardiovascular: Normal rate, regular rhythm, normal heart sounds and intact distal pulses.  Exam reveals no gallop.    Pulmonary/Chest: Effort normal and breath sounds normal. No stridor. She has no wheezes.   Abdominal: Soft. Bowel sounds are normal. There is no tenderness.   Musculoskeletal: Normal range of motion. She exhibits no edema.   5/5 strength throughout   Neurological: She is alert and oriented to person, place, and time.   Skin: Skin is warm and dry. No rash noted.   Vitals reviewed.      Recent Labs      09/10/17   1740  09/11/17   0224   WBC  4.1*  5.9   RBC  3.87*  3.69*   HEMOGLOBIN  13.6  12.8   HEMATOCRIT  40.8  38.6   MCV  105.4*  104.6*   MCH  35.1*  34.7*   MCHC  33.3*  33.2*   RDW  54.4*  54.0*   PLATELETCT  246  229   MPV  10.8  11.1     Recent Labs      09/10/17   1740  09/11/17   0224   SODIUM  141  142   POTASSIUM  3.9  3.7   CHLORIDE  111  112   CO2  20  21   GLUCOSE  73  92   BUN  24*  21   CREATININE  1.39  1.19   CALCIUM  9.1  8.8     Recent Labs      09/10/17   1740   APTT  29.4   INR  0.97         Recent Labs      09/11/17 0224   TRIGLYCERIDE  75   HDL  58   LDL  89          Assessment/Plan     * CVA (cerebral vascular accident) (CMS-HCC)- (present on admission)   Assessment & Plan    Mild sequelae - TIA? Versus small resolving stroke  -continues to refuse MRI, will not  at this time  -continue ASA, statin, therapies, rehab eval is pending.        CAD (coronary artery disease)- (present on admission)   Assessment & Plan    Medical management        Community acquired pneumonia- (present on admission)   Assessment & Plan    Ruled out  Stopped abx        Stage 3  chronic kidney disease- (present on admission)   Assessment & Plan    Lab Results   Component Value Date    BUN 21 09/11/2017    CREATININE 1.19 09/11/2017   Maintaining hydration  Avoiding nephrotoxics: NSAIDs etc  Following Cr closely; to keep near baseline as possible         Carotid atherosclerosis, bilateral- (present on admission)   Assessment & Plan    Carotid with 50-69% stenosis Of the ISAEL  -will need to be followed outpatient with repeat US carotid in 6 months, control lipids, and BP        DJD (degenerative joint disease)   Assessment & Plan    History of        Anxiety- (present on admission)   Assessment & Plan    Reassured, does not want anxiolytics for an MRI though, continues to refuse        Dyslipidemia- (present on admission)   Assessment & Plan    Statin        HTN (hypertension), benign- (present on admission)   Assessment & Plan    SBP goal less than 140 mmHg  DBP goal less than 90 mmHg  PRN and antihypertensives to titrate by hospitalist towards goal  -will slowly added back her BP medications            DVT prophylaxis pharmacological::  Heparin  Ulcer Prophylaxis::  Not indicated

## 2017-09-13 NOTE — DISCHARGE PLANNING
Medical Social Work    SW met with pt at bedside regarding choice for SNF - pt prefers [facility].  Choice sent to [facility].      PASRR [id number]

## 2017-09-13 NOTE — DISCHARGE PLANNING
[facility] has accepted patient. Received transport form from [facility] Arranged patient's transport to [facility] at [facility] via [facility]. [facility]() notified of transport time.

## 2017-09-13 NOTE — THERAPY
This SLP attempted to see patient for cog eval. Per chart, pt is d/c'ing to [facility] at 1500 today. SLP will hold cog eval and re-attempt later if patient does not d/c. Thank you.

## 2017-09-13 NOTE — DISCHARGE PLANNING
Medical Social Work    Pt medically clear to transfer.  Pt has been accepted to [facility].  Transport has been arranged for 1500 with [facility] van.  SW met with pt at bedside - pt was resistant to DC.  SW explained that pt did not meet acute LOC and [facility] would be appropriate for pt to get stronger to go home.  Pt became angry as evidenced by her throwing a water bottle at SW.  Charge nurse accompanied SW to pt's bedside to discuss no longer a need for acute setting and need for SNF.  Pt eventually signed cobra and completed cobra was placed on pt's chart.  Bedside nurse notified.

## 2017-09-13 NOTE — DISCHARGE SUMMARY
CHIEF COMPLAINT ON ADMISSION  Chief Complaint   Patient presents with   • Numbness       CODE STATUS  DNR    HPI & HOSPITAL COURSE  This is a 84 y.o. female here with Left numbness and other neurological issues. She was admitted to the neurology floor. She initially had right-sided weakness and had profound slowness all answering questions. Neurology was consulted. MRI of the brain was attempted but patient could not tolerate due to pain and claustrophobia. She refused further treatment. Her neurological symptoms did improve over time. Her CT head done in the emergency room was negative for any acute infarction. Carotid duplex showed the following: Moderate stenosis of the right internal carotid artery of 50-69%, this will need to be closely followed outpatient with probably a repeat carotid duplex prior primary care physician in 6 months. Her lipids were checked and the following was discovered: Total cholesterol 162, triglycerides 75, HDL 58, LDL 89. She was started on atorvastatin 20 mg at night which she tolerated well. She has had some point tenderness at the base of the right trapezius muscle which is muscle skeletal and was well-controlled with lidocaine patches and tizanidine as needed. She will be maintained on aspirin and Plavix for secondary stroke risk reduction. Even though her symptoms resolved and we cannot get an MRI of the brain she likely had a TIA or a very small stroke with essentially all symptoms resolved at this point. Physical therapy and occupational therapy consultation recommended the patient be transferred to a skilled dosing facility which she is medically stable for transfer now. Her echocardiogram was unremarkable and showed no interval change compared to 2015. There is no evidence of atrial fibrillation during this admission.    Therefore, she is discharged in fair and stable condition with close outpatient follow-up.    SPECIFIC OUTPATIENT FOLLOW-UP  Follow with neurology clinic in 6  weeks  -Follow for primary care physician in 2 weeks    DISCHARGE PROBLEM LIST  Principal Problem:    CVA (cerebral vascular accident) (CMS-HCC) POA: Yes  Active Problems:    CAD (coronary artery disease) POA: Yes    HTN (hypertension), benign POA: Yes    Dyslipidemia POA: Yes    Anxiety POA: Yes    Carotid atherosclerosis, bilateral POA: Yes    Stage 3 chronic kidney disease POA: Yes  Resolved Problems:    Community acquired pneumonia POA: Yes    Thrombocytopenia (CMS-HCC) POA: Yes      FOLLOW UP  No future appointments.  Ric Garcia M.D.  [address] [place], [place] [address]  [facility] [id number]  [phone]    Schedule an appointment as soon as possible for a visit in 2 weeks  Hospital follow-up appointment with PCP      MEDICATIONS ON DISCHARGE   Zambrano Basimcheo [place]   Home Medication Instructions ALAN:[id number]    Printed on:09/13/17 1234   Medication Information                      amlodipine (NORVASC) 10 MG Tab  Take 1 Tab by mouth every day.             aspirin EC (ECOTRIN) 81 MG Tablet Delayed Response  Take 81 mg by mouth every day.             atorvastatin (LIPITOR) 20 MG Tab  Take 1 Tab by mouth every bedtime.             clopidogrel (PLAVIX) 75 MG Tab  Take 1 Tab by mouth every day.             lidocaine (LIDODERM) 5 % Patch  Apply 1 Patch to skin as directed every 24 hours.             lisinopril (PRINIVIL) 10 MG Tab  Take 1 Tab by mouth every day.             metoprolol (LOPRESSOR) 25 MG Tab  TAKE 1 TABLET BY MOUTH TWICE A DAY             tizanidine (ZANAFLEX) 4 MG Tab  Take 1 Tab by mouth every 6 hours as needed (Muscle spasms of the neck).                 DIET  Orders Placed This Encounter   Procedures   • Diet Order     Standing Status:   Standing     Number of Occurrences:   1     Order Specific Question:   Diet:     Answer:   Cardiac [6]     Comments:   Float pills in applesauce or pudding please     Order Specific Question:   Texture/Fiber modifications:     Answer:   Dysphagia  2(Pureed/Chopped)specify fluid consistency(question 6) [2]     Order Specific Question:   Consistency/Fluid modifications:     Answer:   Thin Liquids [3]       ACTIVITY  As tolerated and directed by skilled nursing.  Weight bearing as tolerated      CONSULTATIONS  -Neurology    PROCEDURES  CT head-  1.  No evidence of acute intracranial process.    2.  Cerebral atrophy as well as periventricular chronic small vessel ischemic change.    CTA Head/Neck-  1.  No evidence of intracranial vascular occlusion or aneurysm.    2.  Atherosclerotic change of the vertebral and internal carotid arteries.  1.  Atherosclerotic plaque involving the right carotid bifurcation with extension into the internal carotid artery. This results in 60% diameter narrowing.    2.  Atherosclerotic plaque of the left carotid bifurcation. This extends into the internal carotid artery and results in less than 50% diameter narrowing.    3.  Atherosclerotic plaque involving the vertebral arteries without evidence of vascular occlusion.    Carotid Duplex-  Moderate stenosis of the right internal carotid (50-69%).    Mild stenosis of the left internal carotid (< 50%).    Flow within both subclavian arteries appears to be within normal limits.    Antegrade flow, bilateral vertebral arteries.     ECHO-  Normal left ventricular systolic function.   No evidence of valvular abnormality based on Doppler evaluation.   Right ventricular systolic pressure is estimated to be 45 mmHg.  Compared to the images of the prior study done in 02/2015 -  there has   been no significant change.     CXR-  1.  Cardiomegaly.    2.  Bilateral interstitial infiltrates.    LABORATORY  Lab Results   Component Value Date/Time    SODIUM 142 09/11/2017 02:24 AM    POTASSIUM 3.7 09/11/2017 02:24 AM    CHLORIDE 112 09/11/2017 02:24 AM    CO2 21 09/11/2017 02:24 AM    GLUCOSE 92 09/11/2017 02:24 AM    BUN 21 09/11/2017 02:24 AM    CREATININE 1.19 09/11/2017 02:24 AM        Lab Results    Component Value Date/Time    WBC 5.9 09/11/2017 02:24 AM    HEMOGLOBIN 12.8 09/11/2017 02:24 AM    HEMATOCRIT 38.6 09/11/2017 02:24 AM    PLATELETCT 229 09/11/2017 02:24 AM        Total time of the discharge process exceeds 40 minutes     This dictation was created using voice recognition software. The accuracy of the dictation is limited to the abilities of the software. I expect there may be some errors of grammar and possibly content.

## 2017-09-13 NOTE — PROGRESS NOTES
Nunu Zavaleta Fall Risk Assessment:     Last Known Fall: No falls  Mobility: Use of assistive device/requires assist of two people  Medications: Cardiovascular or central nervous system meds  Mental Status/LOC/Awareness: Awake, alert, and oriented to date, place, and person  Toileting Needs: No needs  Volume/Electrolyte Status: No problems  Communication/Sensory: Visual (Glasses)/hearing deficit  Behavior: Appropriate behavior  Nunu Zavaelta Fall Risk Total: 8  Fall Risk Level: LOW RISK    Universal Fall Precautions:  call light/belongings in reach, use non-slip footwear    Fall Risk Level Interventions:   TRIAL (TELE 8, NEURO, MED MELO 5) Low Fall Risk Interventions  Place yellow fall risk ID band on patient: verified  Provide patient/family education based on risk assessment: completed  Educate patient/family to call staff for assistance when getting out of bed: completed  Place fall precaution signage outside patient door: verified TRIAL (TELE 8, NEURO, [employer] MELO 5) High Fall Risk Interventions  Place yellow fall risk ID band on patient: verified  Provide patient/family education based on risk assessment: completed  Educate patient/family to call staff for assistance when getting out of bed: completed  Place fall precaution signage outside patient door: completed  Place patient in room close to nursing station: verified  Utilize bed/chair fall alarm: completed  Notify charge of high risk for huddle: verified    Patient Specific Interventions:     Medication: review medications with patient and family, assess for medications that can be discontinued or dosage decreased and limit combination of prn medications  Mental Status/LOC/Awareness: reorient patient, reinforce falls education, utilize bed/chair fall alarm and reinforce the use of call light  Toileting: instruct patient/family on the use of grab bars, instruct patient/family on the need to call for assistance when toileting and do not leave patient  unattended in bathroom/refer to toileting scripting  Volume/Electrolyte Status: ensure patient remains hydrated  Communication/Sensory: update plan of care on whiteboard  Behavioral: instruct/reinforce fall program rationale  Mobility: utilize bed/chair fall alarm, ensure bed is locked and in lowest position and provide appropriate assistive device

## 2017-09-13 NOTE — DISCHARGE INSTRUCTIONS
Discharge Instructions    Discharged to other by medical transportation with escort. Discharged via wheelchair, hospital escort: Yes.  Special equipment needed: Not Applicable    Be sure to schedule a follow-up appointment with your primary care doctor or any specialists as instructed.     Discharge Plan:   Smoking Cessation Offered: Patient Refused  Influenza Vaccine Indication: Patient Refuses    I understand that a diet low in cholesterol, fat, and sodium is recommended for good health. Unless I have been given specific instructions below for another diet, I accept this instruction as my diet prescription.   Other diet: soft diet, thin liquids    Special Instructions:     Stroke/CVA/TIA/Hemorrhagic Ischemia Discharge Instructions  You have had a stroke. Your risk factors have been identified as follows:  Age - Over 55  High blood pressure  Heart disease  It is important that you reduce your risk factors to avoid another stroke in the future. Here are some general guidelines to follow:  · Eat healthy - avoid food high in fat.  · Get regular exercise.  · Maintain a healthy weight.  · Avoid smoking.  · Avoid alcohol and illegal drug use.  · Take your medications as directed.  For more information regarding risk factors, refer to pages 17-19 in your Stroke Patient Education Guide. Stroke Education Guide was given to patient.    Warning signs of a stroke include (which can also be found on page 3 of your Stroke Patient Education Guide):  · Sudden numbness of weakness of the face, arm or leg (especially on one side of the body).  · Sudden confusion, trouble speaking or understanding.  · Sudden trouble seeing in one or both eyes.  · Sudden trouble walking, dizziness, loss of balance or coordination.  · Sudden severe headache with no known cause.  It is very important to get treatment quickly when a stroke occurs. If you experience any of the above warning signs, call 911 immediately.     Some patients who have had a  stroke will be going home on a blood thinner medication called Warfarin (Coumadin).  This medication requires very close monitoring and follow up.  This follow up can be provided by either your Primary Care Physician or by [facility]'s Outpatient Anticoagulation Service.  The Outpatient Anticoagulation Service is located at the [facility] at [facility] ([facility]).  If you do not know when your follow up appointment is scheduled, call [phone] to verify your appointment time.   and None    · Is patient discharged on Warfarin / Coumadin?   No     · Is patient Post Blood Transfusion?  No    Depression / Suicide Risk    As you are discharged from this [facility], it is important to learn how to keep safe from harming yourself.    Recognize the warning signs:  · Abrupt changes in personality, positive or negative- including increase in energy   · Giving away possessions  · Change in eating patterns- significant weight changes-  positive or negative  · Change in sleeping patterns- unable to sleep or sleeping all the time   · Unwillingness or inability to communicate  · Depression  · Unusual sadness, discouragement and loneliness  · Talk of wanting to die  · Neglect of personal appearance   · Rebelliousness- reckless behavior  · Withdrawal from people/activities they love  · Confusion- inability to concentrate     If you or a loved one observes any of these behaviors or has concerns about self-harm, here's what you can do:  · Talk about it- your feelings and reasons for harming yourself  · Remove any means that you might use to hurt yourself (examples: pills, rope, extension cords, firearm)  · Get professional help from the community (Mental Health, Substance Abuse, psychological counseling)  · Do not be alone:Call your Safe Contact- someone whom you trust who will be there for you.  · Call your local CRISIS HOTLINE 997-8031 or 303-532-2805  · Call your  local Children's Mobile Crisis Response Team [place] (092) 015-4222 or www.[employer]Zel  · Call the toll free National Suicide Prevention Hotlines   · National Suicide Prevention Lifeline 670-034-TWLU (6206)  · National Hope Line Network 800-SUICIDE (002-1270)

## 2017-09-13 NOTE — PROGRESS NOTES
Patient discharged to [facility].  Report given to ANGUS Lamb.   IV x2 removed.  Discharge instructions and packet sent with , Gregor Quan.  Daughter at bedside.  Pt left with belongings in a wheel chair.

## 2017-09-13 NOTE — PROGRESS NOTES
PT very anxious. Pt stated that her symptoms came back, slurred speech and weakness.   As I kept on speaking with patient asking if she was nervous about the discharge her speech returned back to baseline.  She stated she had to go to the bathroom but didn't feel she could wlak to the bathroom, offered bedpan. Patient said she couldn't even turn in bed.  Then went and discussed with Dr. Gordon of the situation,  no new orders.  Returned to patient's room and patient was able to get self up with standby assist and ambulated self with walker to bathroom while being watched by staff. Pt did state she was nervous about leaving.  Daughter Felicitas called and made aware, Felicitas stated she will come up and help relax patient.

## 2017-09-13 NOTE — PROGRESS NOTES
Monitor Summary: SR 55-65, CA .18, QRS .08, QT .40 with PVCs and BiG per strip from monitor room

## 2017-09-13 NOTE — NON-PROVIDER
Internal Medicine Medical Student Note    Name Madeleine Zambrano     1933   Age/Sex 84 y.o. female   MRN [id number]   Code Status DNR     After 5PM or if no immediate response to page, please call for cross-coverage  Attending/Team: Dr. Martinez See Patient List for primary contact information  Call [phone] to page after hours   1st Call - Day Intern (R1):    2nd Call - Day Sr. Resident (R2/R3):            Reason for interval visit  (Principal Problem)   CVA (cerebral vascular accident) (CMS-[facility])    Interval Problem Daily Status Update  (24 hours)   Patient is doing well this morning with no overnight events to report. She has been able to ambulate with walker and assistance to the bathroom. Last BM yesterday was normal. She has no complaints today including recurrent RUE weakness/numbness or slurred speech. She reports better appetite today and was able to finish all of her breakfast. Denies h/a, n/v, SOB, CP, or audiovisual disturbances.    Review of Systems   Eyes: Negative for blurred vision, double vision and pain.   Respiratory: Negative for shortness of breath.    Cardiovascular: Negative for chest pain.   Neurological: Negative for dizziness, tingling, tremors, sensory change, speech change, focal weakness and headaches.         Physical Exam       Vitals:    17 0000 17 0500 17 0700 17 1200   BP: (!) 174/65 (!) 165/64 (!) 188/59 (!) 176/80   Pulse: 89 66 80 66   Resp: 20 18 18 17   Temp: 36.7 °C (98 °F) 36.2 °C (97.1 °F) 36.2 °C (97.2 °F) 36.3 °C (97.3 °F)   SpO2: 97% 96% 93% 97%   Weight:       Height:         Body mass index is 26.29 kg/m².    Oxygen Therapy:  Pulse Oximetry: 97 %, O2 (LPM): 0, O2 Delivery: None (Room Air)    Physical Exam   Constitutional: She is oriented to person, place, and time and well-developed, well-nourished, and in no distress.   HENT:   Head: Normocephalic and atraumatic.   Eyes: EOM are normal. Pupils are equal, round, and reactive  to light.   Neurological: She is alert and oriented to person, place, and time. She has normal reflexes. No cranial nerve deficit. She exhibits normal muscle tone.   A&Ox4. CN II-XII grossly intact. Sensory exam normal in all extremities and face. Strength 5/5 in all four extremities. Biceps, brachioradialis and patellar reflexes 2+, 2+ and 1+ respectively. Rapid alternating movement testing was slow but equal bilaterally. Down-going Babinski reflex with retraction of entire leg. Did not assess gait and balance due to poor baseline. Patient speaks very slowly and takes several moments to respond to questions; this is also her baseline.   Skin: Skin is warm and dry.   Psychiatric: Affect normal.       Assessment/Plan     84 y.o. female admitted 9/10/2017 with symptoms of TIA vs ischemic CVA that have resolved. She did not tolerate MRI yesterday and refuses to repeat, however a positive result will not alter management course. Co-morbidities including HTN, DLD, CAD with history of MI, and b/l carotid and vertebral artery stenosis are predisposing risk factors that should be managed by her medical team as outlined by Dr. Centeno.  -Continue medical management- ASA 81mg qd and Plavix 75mg qd  -See PT recs   -Recommend carotid ultrasound f/u in 6 months.  - on smoking cessation and healthy living including diet and exercise.  -Fall precautions and  on alcohol abstinence given fall risk.  -No new recommendations at this point. Neurology signed off.

## 2017-09-13 NOTE — PROGRESS NOTES
Monitor summary: SB-SR 56-74, AK 0.16, QRS 0.10, QT 0.40, with occasional/rare PVCs and rare PACs per strip from monitor room.

## 2017-09-23 ENCOUNTER — RESOLUTE PROFESSIONAL BILLING HOSPITAL PROF FEE (OUTPATIENT)
Dept: HOSPITALIST | Facility: MEDICAL CENTER | Age: 82
End: 2017-09-23
Payer: MEDICARE

## 2017-09-23 ENCOUNTER — APPOINTMENT (OUTPATIENT)
Dept: RADIOLOGY | Facility: MEDICAL CENTER | Age: 82
DRG: 071 | End: 2017-09-23
Attending: EMERGENCY MEDICINE
Payer: MEDICARE

## 2017-09-23 ENCOUNTER — APPOINTMENT (OUTPATIENT)
Dept: RADIOLOGY | Facility: MEDICAL CENTER | Age: 82
DRG: 071 | End: 2017-09-23
Attending: INTERNAL MEDICINE
Payer: MEDICARE

## 2017-09-23 ENCOUNTER — HOSPITAL ENCOUNTER (INPATIENT)
Facility: MEDICAL CENTER | Age: 82
LOS: 3 days | DRG: 071 | End: 2017-09-26
Attending: EMERGENCY MEDICINE | Admitting: INTERNAL MEDICINE
Payer: MEDICARE

## 2017-09-23 DIAGNOSIS — I25.10 CORONARY ARTERY DISEASE DUE TO LIPID RICH PLAQUE: ICD-10-CM

## 2017-09-23 DIAGNOSIS — I25.83 CORONARY ARTERY DISEASE DUE TO LIPID RICH PLAQUE: ICD-10-CM

## 2017-09-23 DIAGNOSIS — R41.82 ALTERED MENTAL STATUS, UNSPECIFIED ALTERED MENTAL STATUS TYPE: ICD-10-CM

## 2017-09-23 DIAGNOSIS — I10 HTN (HYPERTENSION), BENIGN: ICD-10-CM

## 2017-09-23 DIAGNOSIS — Z91.81 RISK FOR FALLS: ICD-10-CM

## 2017-09-23 DIAGNOSIS — G93.40 ACUTE ENCEPHALOPATHY: ICD-10-CM

## 2017-09-23 DIAGNOSIS — I95.9 HYPOTENSION, UNSPECIFIED HYPOTENSION TYPE: ICD-10-CM

## 2017-09-23 DIAGNOSIS — Z86.73 HISTORY OF CVA (CEREBROVASCULAR ACCIDENT): ICD-10-CM

## 2017-09-23 PROBLEM — R47.81 SLURRED SPEECH: Status: ACTIVE | Noted: 2017-09-23

## 2017-09-23 PROBLEM — E87.1 HYPONATREMIA: Status: ACTIVE | Noted: 2017-09-23

## 2017-09-23 LAB
ANION GAP SERPL CALC-SCNC: 4 MMOL/L (ref 0–11.9)
APPEARANCE UR: CLEAR
APTT PPP: 28.9 SEC (ref 24.7–36)
BASOPHILS # BLD AUTO: 1.1 % (ref 0–1.8)
BASOPHILS # BLD: 0.05 K/UL (ref 0–0.12)
BILIRUB UR QL STRIP.AUTO: NEGATIVE
BUN SERPL-MCNC: 25 MG/DL (ref 8–22)
CALCIUM SERPL-MCNC: 7.7 MG/DL (ref 8.4–10.2)
CHLORIDE SERPL-SCNC: 106 MMOL/L (ref 96–112)
CO2 SERPL-SCNC: 23 MMOL/L (ref 20–33)
COLOR UR: YELLOW
CREAT SERPL-MCNC: 1.37 MG/DL (ref 0.5–1.4)
CULTURE IF INDICATED INDCX: NO UA CULTURE
EKG IMPRESSION: NORMAL
EOSINOPHIL # BLD AUTO: 0.32 K/UL (ref 0–0.51)
EOSINOPHIL NFR BLD: 6.9 % (ref 0–6.9)
ERYTHROCYTE [DISTWIDTH] IN BLOOD BY AUTOMATED COUNT: 53.6 FL (ref 35.9–50)
EST. AVERAGE GLUCOSE BLD GHB EST-MCNC: 120 MG/DL
GFR SERPL CREATININE-BSD FRML MDRD: 37 ML/MIN/1.73 M 2
GLUCOSE SERPL-MCNC: 163 MG/DL (ref 65–99)
GLUCOSE UR STRIP.AUTO-MCNC: NEGATIVE MG/DL
HBA1C MFR BLD: 5.8 % (ref 0–5.6)
HCT VFR BLD AUTO: 30.8 % (ref 37–47)
HGB BLD-MCNC: 10.3 G/DL (ref 12–16)
IMM GRANULOCYTES # BLD AUTO: 0.02 K/UL (ref 0–0.11)
IMM GRANULOCYTES NFR BLD AUTO: 0.4 % (ref 0–0.9)
INR PPP: 1.16 (ref 0.87–1.13)
KETONES UR STRIP.AUTO-MCNC: NEGATIVE MG/DL
LEUKOCYTE ESTERASE UR QL STRIP.AUTO: NEGATIVE
LYMPHOCYTES # BLD AUTO: 0.77 K/UL (ref 1–4.8)
LYMPHOCYTES NFR BLD: 16.7 % (ref 22–41)
MCH RBC QN AUTO: 34.9 PG (ref 27–33)
MCHC RBC AUTO-ENTMCNC: 33.3 G/DL (ref 33.6–35)
MCV RBC AUTO: 104.8 FL (ref 81.4–97.8)
MICRO URNS: NORMAL
MONOCYTES # BLD AUTO: 0.61 K/UL (ref 0–0.85)
MONOCYTES NFR BLD AUTO: 13.2 % (ref 0–13.4)
NEUTROPHILS # BLD AUTO: 2.85 K/UL (ref 2–7.15)
NEUTROPHILS NFR BLD: 61.7 % (ref 44–72)
NITRITE UR QL STRIP.AUTO: NEGATIVE
NRBC # BLD AUTO: 0 K/UL
NRBC BLD AUTO-RTO: 0 /100 WBC
PH UR STRIP.AUTO: 7.5 [PH]
PLATELET # BLD AUTO: 172 K/UL (ref 164–446)
PMV BLD AUTO: 11.2 FL (ref 9–12.9)
POTASSIUM SERPL-SCNC: 3.8 MMOL/L (ref 3.6–5.5)
PROT UR QL STRIP: NEGATIVE MG/DL
PROTHROMBIN TIME: 14.6 SEC (ref 12–14.6)
RBC # BLD AUTO: 2.92 M/UL (ref 4.2–5.4)
RBC UR QL AUTO: NEGATIVE
SODIUM SERPL-SCNC: 133 MMOL/L (ref 135–145)
SP GR UR STRIP.AUTO: <=1.005
TROPONIN I SERPL-MCNC: 0.02 NG/ML (ref 0–0.04)
WBC # BLD AUTO: 4.6 K/UL (ref 4.8–10.8)

## 2017-09-23 PROCEDURE — 700105 HCHG RX REV CODE 258: Performed by: INTERNAL MEDICINE

## 2017-09-23 PROCEDURE — 99223 1ST HOSP IP/OBS HIGH 75: CPT | Mod: AI | Performed by: INTERNAL MEDICINE

## 2017-09-23 PROCEDURE — 70498 CT ANGIOGRAPHY NECK: CPT

## 2017-09-23 PROCEDURE — 81003 URINALYSIS AUTO W/O SCOPE: CPT

## 2017-09-23 PROCEDURE — 700105 HCHG RX REV CODE 258: Performed by: EMERGENCY MEDICINE

## 2017-09-23 PROCEDURE — 70496 CT ANGIOGRAPHY HEAD: CPT

## 2017-09-23 PROCEDURE — 96360 HYDRATION IV INFUSION INIT: CPT

## 2017-09-23 PROCEDURE — A9270 NON-COVERED ITEM OR SERVICE: HCPCS | Performed by: INTERNAL MEDICINE

## 2017-09-23 PROCEDURE — 85730 THROMBOPLASTIN TIME PARTIAL: CPT

## 2017-09-23 PROCEDURE — 700111 HCHG RX REV CODE 636 W/ 250 OVERRIDE (IP): Performed by: INTERNAL MEDICINE

## 2017-09-23 PROCEDURE — 85610 PROTHROMBIN TIME: CPT

## 2017-09-23 PROCEDURE — 36415 COLL VENOUS BLD VENIPUNCTURE: CPT

## 2017-09-23 PROCEDURE — 85025 COMPLETE CBC W/AUTO DIFF WBC: CPT

## 2017-09-23 PROCEDURE — 700102 HCHG RX REV CODE 250 W/ 637 OVERRIDE(OP): Performed by: INTERNAL MEDICINE

## 2017-09-23 PROCEDURE — 83036 HEMOGLOBIN GLYCOSYLATED A1C: CPT

## 2017-09-23 PROCEDURE — 84484 ASSAY OF TROPONIN QUANT: CPT

## 2017-09-23 PROCEDURE — 93005 ELECTROCARDIOGRAM TRACING: CPT | Performed by: EMERGENCY MEDICINE

## 2017-09-23 PROCEDURE — 80048 BASIC METABOLIC PNL TOTAL CA: CPT

## 2017-09-23 PROCEDURE — 770020 HCHG ROOM/CARE - TELE (206)

## 2017-09-23 PROCEDURE — 70551 MRI BRAIN STEM W/O DYE: CPT

## 2017-09-23 PROCEDURE — 99285 EMERGENCY DEPT VISIT HI MDM: CPT

## 2017-09-23 PROCEDURE — 70450 CT HEAD/BRAIN W/O DYE: CPT

## 2017-09-23 PROCEDURE — 700117 HCHG RX CONTRAST REV CODE 255: Performed by: EMERGENCY MEDICINE

## 2017-09-23 RX ORDER — ONDANSETRON 2 MG/ML
4 INJECTION INTRAMUSCULAR; INTRAVENOUS EVERY 4 HOURS PRN
Status: DISCONTINUED | OUTPATIENT
Start: 2017-09-23 | End: 2017-09-26 | Stop reason: HOSPADM

## 2017-09-23 RX ORDER — POLYETHYLENE GLYCOL 3350 17 G/17G
1 POWDER, FOR SOLUTION ORAL
Status: DISCONTINUED | OUTPATIENT
Start: 2017-09-23 | End: 2017-09-26 | Stop reason: HOSPADM

## 2017-09-23 RX ORDER — ACETAMINOPHEN 325 MG/1
650 TABLET ORAL EVERY 6 HOURS PRN
Status: DISCONTINUED | OUTPATIENT
Start: 2017-09-23 | End: 2017-09-26 | Stop reason: HOSPADM

## 2017-09-23 RX ORDER — SODIUM CHLORIDE 9 MG/ML
INJECTION, SOLUTION INTRAVENOUS CONTINUOUS
Status: DISCONTINUED | OUTPATIENT
Start: 2017-09-23 | End: 2017-09-24

## 2017-09-23 RX ORDER — HEPARIN SODIUM 5000 [USP'U]/ML
5000 INJECTION, SOLUTION INTRAVENOUS; SUBCUTANEOUS EVERY 8 HOURS
Status: DISCONTINUED | OUTPATIENT
Start: 2017-09-23 | End: 2017-09-26 | Stop reason: HOSPADM

## 2017-09-23 RX ORDER — AMOXICILLIN 250 MG
2 CAPSULE ORAL 2 TIMES DAILY
Status: DISCONTINUED | OUTPATIENT
Start: 2017-09-23 | End: 2017-09-26 | Stop reason: HOSPADM

## 2017-09-23 RX ORDER — ATORVASTATIN CALCIUM 10 MG/1
20 TABLET, FILM COATED ORAL
Status: DISCONTINUED | OUTPATIENT
Start: 2017-09-23 | End: 2017-09-26 | Stop reason: HOSPADM

## 2017-09-23 RX ORDER — ONDANSETRON 4 MG/1
4 TABLET, ORALLY DISINTEGRATING ORAL EVERY 4 HOURS PRN
Status: DISCONTINUED | OUTPATIENT
Start: 2017-09-23 | End: 2017-09-26 | Stop reason: HOSPADM

## 2017-09-23 RX ORDER — SODIUM CHLORIDE 9 MG/ML
1000 INJECTION, SOLUTION INTRAVENOUS ONCE
Status: COMPLETED | OUTPATIENT
Start: 2017-09-23 | End: 2017-09-23

## 2017-09-23 RX ORDER — CLOPIDOGREL BISULFATE 75 MG/1
75 TABLET ORAL
Status: DISCONTINUED | OUTPATIENT
Start: 2017-09-23 | End: 2017-09-24

## 2017-09-23 RX ORDER — BISACODYL 10 MG
10 SUPPOSITORY, RECTAL RECTAL
Status: DISCONTINUED | OUTPATIENT
Start: 2017-09-23 | End: 2017-09-26 | Stop reason: HOSPADM

## 2017-09-23 RX ORDER — LORAZEPAM 2 MG/ML
1 INJECTION INTRAMUSCULAR ONCE
Status: COMPLETED | OUTPATIENT
Start: 2017-09-23 | End: 2017-09-23

## 2017-09-23 RX ADMIN — HEPARIN SODIUM 5000 UNITS: 5000 INJECTION, SOLUTION INTRAVENOUS; SUBCUTANEOUS at 21:08

## 2017-09-23 RX ADMIN — LORAZEPAM 1 MG: 2 INJECTION INTRAMUSCULAR; INTRAVENOUS at 16:48

## 2017-09-23 RX ADMIN — IOHEXOL 100 ML: 350 INJECTION, SOLUTION INTRAVENOUS at 10:30

## 2017-09-23 RX ADMIN — DOCUSATE SODIUM AND SENNOSIDES 2 TABLET: 8.6; 5 TABLET, FILM COATED ORAL at 21:08

## 2017-09-23 RX ADMIN — SODIUM CHLORIDE 1000 ML: 900 INJECTION, SOLUTION INTRAVENOUS at 10:02

## 2017-09-23 RX ADMIN — ATORVASTATIN CALCIUM 20 MG: 10 TABLET, FILM COATED ORAL at 21:08

## 2017-09-23 RX ADMIN — SODIUM CHLORIDE: 9 INJECTION, SOLUTION INTRAVENOUS at 14:30

## 2017-09-23 ASSESSMENT — LIFESTYLE VARIABLES
EVER_SMOKED: YES
ALCOHOL_USE: NO

## 2017-09-23 ASSESSMENT — ENCOUNTER SYMPTOMS
DIZZINESS: 0
EYE PAIN: 0
SEIZURES: 0
INSOMNIA: 0
COUGH: 0
NERVOUS/ANXIOUS: 0
EYE DISCHARGE: 0
NECK PAIN: 0
SPUTUM PRODUCTION: 0
BACK PAIN: 0
WEIGHT LOSS: 0
SHORTNESS OF BREATH: 0
FEVER: 0
VOMITING: 0
PALPITATIONS: 0
HEADACHES: 0
CHILLS: 0
DIARRHEA: 0
STRIDOR: 0
ORTHOPNEA: 0
FOCAL WEAKNESS: 0
EYE REDNESS: 0
BLURRED VISION: 0
NAUSEA: 0
MYALGIAS: 0
HEARTBURN: 0
DEPRESSION: 0
ABDOMINAL PAIN: 0

## 2017-09-23 ASSESSMENT — PAIN SCALES - GENERAL: PAINLEVEL_OUTOF10: 0

## 2017-09-23 NOTE — ED NOTES
Straight cath pt per ERP order. Urine sent to lab. Spoke with pt daughter and she is on her way here. Pt aware.

## 2017-09-23 NOTE — ED PROVIDER NOTES
ED Provider Note    CHIEF COMPLAINT  Chief Complaint   Patient presents with   • Blood Pressure Problem   • Dizziness   • Cough       HPI  Madeleine Zambrano is a 84 y.o. female who presents To the emergency department with altered mental status. The patient was recently admitted to the hospital with a likely cerebrovascular accident. She was at her care facility dated today and was noted to be altered. Therefore paramedics were called and the patient was brought to the emergency department for further evaluation. At the time my evaluation the patient is unable to cooperate with her history. She follows simple commands only. She does not have any apparent complaints although history and review of systems are limited.    REVIEW OF SYSTEMS  Review of systems is limited secondary to the patient's acute medical condition    PAST MEDICAL HISTORY  Past Medical History:   Diagnosis Date   • TIA (transient ischemic attack) 09/2017   • MEDICAL HOME 11/07/12   • Back pain 3/28/2012   • Arthritis    • CATARACT    • Coronary artery disease    • Glaucoma    • Hyperlipidemia    • Hypertension    • Myocardial infarct (CMS-HCC)    • Pain     sacrum 1/10       FAMILY HISTORY  Family History   Problem Relation Age of Onset   • Heart Disease Mother    • Cancer Father        SOCIAL HISTORY  Social History     Social History   • Marital status:      Spouse name: N/A   • Number of children: N/A   • Years of education: N/A     Social History Main Topics   • Smoking status: Former Smoker     Years: 0.10     Types: Cigarettes     Quit date: 1/31/2015   • Smokeless tobacco: Never Used   • Alcohol use Yes      Comment: 1 per day   • Drug use: No   • Sexual activity: Not on file     Other Topics Concern   • Not on file     Social History Narrative   • No narrative on file       SURGICAL HISTORY  Past Surgical History:   Procedure Laterality Date   • VITRECTOMY POSTERIOR Right 4/13/2017    Procedure: VITRECTOMY POSTERIOR-LASER, SF6  GAS;  Surgeon: Jessica Arita M.D.;  Location: SURGERY SAME DAY [facility];  Service:    • RIGO BY LAPAROSCOPY  10/9/2015    Procedure: RIGO BY LAPAROSCOPY;  Surgeon: Ric Zambrano M.D.;  Location: SURGERY [facility];  Service:    • CATARACT PHACO WITH IOL  4/27/2011    Performed by EVERETTE COOPER at SURGERY SAME DAY [facility]   • CATARACT PHACO WITH IOL  4/13/2011    Performed by EVERETTE COOPER at SURGERY SAME DAY [facility]   • OTHER ORTHOPEDIC SURGERY  1980    pins in left hip   • ATHROPLASTY      broken hip 1990       CURRENT MEDICATIONS  Home Medications     Reviewed by Kimberly Pardo (Pharmacy Tech) on 09/23/17 at 0937  Med List Status: Complete   Medication Last Dose Status   amlodipine (NORVASC) 10 MG Tab 9/23/2017 Active   aspirin EC (ECOTRIN) 81 MG Tablet Delayed Response 9/23/2017 Active   atorvastatin (LIPITOR) 20 MG Tab 9/22/2017 Active   clopidogrel (PLAVIX) 75 MG Tab 9/23/2017 Active   lidocaine (LIDODERM) 5 % Patch 9/23/2017 Active   lisinopril (PRINIVIL) 10 MG Tab 9/23/2017 Active   metoprolol (LOPRESSOR) 25 MG Tab 9/23/2017 Active   tizanidine (ZANAFLEX) 4 MG Tab 9/15/2017 Active                ALLERGIES  Allergies   Allergen Reactions   • Meperidine Vomiting and Nausea   • Tramadol Vomiting and Nausea       PHYSICAL EXAM  VITAL SIGNS: BP (!) 87/47   Pulse (!) 55   Resp 13   Wt 67.1 kg (148 lb)   SpO2 97%   BMI 26.22 kg/m²   Constitutional: Well developed, Well nourished, No acute distress, Awake, seems confused   HENT: Normocephalic, no external evidence of trauma.   Eyes: Pupils are equal round and reactive to light bilaterally.  Neck: Normal range of motion, No tenderness, Supple, No stridor.   Cardiovascular: Bradycardic, Normal rhythm, No murmurs, No rubs, No gallops.   Thorax & Lungs: Normal breath sounds, No respiratory distress, No wheezing, No chest tenderness.   Abdomen: Bowel sounds normal, Soft, No tenderness, No masses, No pulsatile masses.   Skin:  Warm, Dry, No erythema, No rash.   Back: No tenderness, No CVA tenderness.   Extremities: Intact distal pulses, No edema, No tenderness, No cyanosis, No clubbing.   Neurologic: The patient follows simple commands, she does not vocalize, she will hold her upper extremities up if placed in the elevated position. No apparent flaccid weakness. She moves bilateral lower extremities with stimulus but not voluntarily. No apparent motor deficit appreciated.  Psychiatric: Unable to assess    EKG  See below    RADIOLOGY/PROCEDURES  CT-CTA NECK WITH & W/O-POST PROCESSING   Final Result      1.  Moderate right internal carotid artery stenosis of 50-70%      2.  Mild left internal carotid artery stenosis of less than 50%      3.  Patent vertebral arteries      4.  Diffuse atherosclerotic plaque      5.  Mild stenosis at the aortic arch origins of the left common carotid artery and left subclavian artery      CT-CTA HEAD WITH & W/O-POST PROCESS   Final Result      1.  CT angiogram of the [place] of [place] within normal limits.      2.  Diffuse atherosclerotic plaque of the carotid and vertebral system      3.  Underlying brain white matter changes, old lacunar infarct, and volume loss      CT-HEAD W/O   Final Result      1.  No acute intracranial abnormality identified.      2.  White matter changes, cerebral volume loss, and extensive atherosclerotic plaque        Results for orders placed or performed during the hospital encounter of 09/23/17   CBC WITH DIFFERENTIAL   Result Value Ref Range    WBC 4.6 (L) 4.8 - 10.8 K/uL    RBC 2.92 (L) 4.20 - 5.40 M/uL    Hemoglobin 10.3 (L) 12.0 - 16.0 g/dL    Hematocrit 30.8 (L) 37.0 - 47.0 %    .8 (H) 81.4 - 97.8 fL    MCH 34.9 (H) 27.0 - 33.0 pg    MCHC 33.3 (L) 33.6 - 35.0 g/dL    RDW 53.6 (H) 35.9 - 50.0 fL    Platelet Count 172 164 - 446 K/uL    MPV 11.2 9.0 - 12.9 fL    Neutrophils-Polys 61.70 44.00 - 72.00 %    Lymphocytes 16.70 (L) 22.00 - 41.00 %    Monocytes 13.20 0.00 -  13.40 %    Eosinophils 6.90 0.00 - 6.90 %    Basophils 1.10 0.00 - 1.80 %    Immature Granulocytes 0.40 0.00 - 0.90 %    Nucleated RBC 0.00 /100 WBC    Neutrophils (Absolute) 2.85 2.00 - 7.15 K/uL    Lymphs (Absolute) 0.77 (L) 1.00 - 4.80 K/uL    Monos (Absolute) 0.61 0.00 - 0.85 K/uL    Eos (Absolute) 0.32 0.00 - 0.51 K/uL    Baso (Absolute) 0.05 0.00 - 0.12 K/uL    Immature Granulocytes (abs) 0.02 0.00 - 0.11 K/uL    NRBC (Absolute) 0.00 K/uL   BASIC METABOLIC PANEL   Result Value Ref Range    Sodium 133 (L) 135 - 145 mmol/L    Potassium 3.8 3.6 - 5.5 mmol/L    Chloride 106 96 - 112 mmol/L    Co2 23 20 - 33 mmol/L    Glucose 163 (H) 65 - 99 mg/dL    Bun 25 (H) 8 - 22 mg/dL    Creatinine 1.37 0.50 - 1.40 mg/dL    Calcium 7.7 (L) 8.4 - 10.2 mg/dL    Anion Gap 4.0 0.0 - 11.9   TROPONIN   Result Value Ref Range    Troponin I 0.02 0.00 - 0.04 ng/mL   PROTHROMBIN TIME   Result Value Ref Range    PT 14.6 12.0 - 14.6 sec    INR 1.16 (H) 0.87 - 1.13   APTT   Result Value Ref Range    APTT 28.9 24.7 - 36.0 sec   URINALYSIS,CULTURE IF INDICATED   Result Value Ref Range    Color Yellow     Character Clear     Specific Gravity <=1.005 <1.035    Ph 7.5 5.0 - 8.0    Glucose Negative Negative mg/dL    Ketones Negative Negative mg/dL    Protein Negative Negative mg/dL    Bilirubin Negative Negative    Nitrite Negative Negative    Leukocyte Esterase Negative Negative    Occult Blood Negative Negative    Micro Urine Req see below     Culture Indicated No UA Culture   ESTIMATED GFR   Result Value Ref Range    GFR If  44 (A) >60 mL/min/1.73 m 2    GFR If Non  37 (A) >60 mL/min/1.73 m 2   EKG (NOW)   Result Value Ref Range    Report       [facility]    Test Date:  2017  Pt Name:    ERNIE MOBLEY               Department: [facility]  MRN:        [id number]                      [facility]  Gender:     F                            Technician: LARRY  :         [id number]                   Requested By:HEMAL SURESH  Order #:    [id number]                    Reading MD: HEMAL SURESH MD    Measurements  Intervals                                Axis  Rate:       53                           P:          34  FL:         179                          QRS:        -20  QRSD:       95                           T:          0  QT:         459  QTc:        431    Interpretive Statements  Sinus rhythm  Borderline left axis deviation  Compared to ECG 09/10/2017 17:10:24  Sinus bradycardia    Electronically Signed On 9- 11:34:31 PDT by HEMAL SURESH MD           COURSE & MEDICAL DECISION MAKING  Pertinent Labs & Imaging studies reviewed. (See chart for details)    The patient presents today with altered mental status. She is a fairly nonspecific exam. She was recently admitted to the hospital after a likely stroke. Therefore the patient is not a thrombolytic candidate due to recent CVA.    97. Laboratory studies are obtained. CT scan was obtained. CT scan does not reveal any evidence of intracranial hemorrhage or CVA. Laboratory studies are obtained. Urinalysis is not consistent with infection. The patient does not have any significant light abnormality. She is hypotensive in the emergency department. She is treated with a normal saline bolus with good improvement of her blood pressure. The patient will be admitted to the hospital for further evaluation and treatment. I spoke with the on-call hospitalist for admission.    FINAL IMPRESSION  1. Altered mental status, unspecified altered mental status type    2. Hypotension, unspecified hypotension type    3. History of CVA (cerebrovascular accident)              Electronically signed by: Hemal Suresh, 9/23/2017 11:31 AM

## 2017-09-23 NOTE — ASSESSMENT & PLAN NOTE
Resolved.  Possible etiologies include Low bp vs other neurological event.  Likely due to low bp   [facility] who also thinks the low bp is a probable cause for the patient's altered mental status.  EEG done.  Awaiting for result.  Titrate up bp medication gradually to avoid hypotension.  Avoid narcotics, benzodiazepine.

## 2017-09-23 NOTE — ED NOTES
The Medication Reconciliation process has [facility].    Allergies have been reviewed

## 2017-09-23 NOTE — ASSESSMENT & PLAN NOTE
Resolved with IVF  Able to eat and drink now.  Discontinue iv fluid and monitor serum sodium level.

## 2017-09-23 NOTE — ASSESSMENT & PLAN NOTE
Recent history of CVA about 2 weeks ago  MRI brain done. No acute stroke.  CT angio head and neck no significant changes compared to the imaging done couple of weeks ago.  Continue on asa, plavix, statin.  CT head: no acute findings  Echo recently done  PT/OT evaluation and treatment  Speech consulted and continue dysphagia diet for now.

## 2017-09-23 NOTE — ED NOTES
Floor called and notified of pt transfer to floor. Alyson GRECO to call with any questions. Pt leaves this ER with no acute changes.

## 2017-09-23 NOTE — H&P
Hospital Medicine History and Physical      Date of Service  9/23/2017    Chief Complaint  Chief Complaint   Patient presents with   • Blood Pressure Problem   • Dizziness   • Cough       History of Presenting Illness  Juan Manuel is a 84 y.o. female PMH of CVA, CAD, HTN, who was BIB [facility] for altered mental status. She was just discharged to SNF about 10 days ago for rehab after she had CVA. But at that time MRI was not able to get due to claustrophobia. She was found down this time and had to rub her chest to wake her up. In the ER, she seems to be able to answer all the questions appropriately. CT head: no acute finding. She will be admitted to the floor for further management.    Primary Care Physician  Ric Garcia M.D.      Code Status  Full code    Review of Systems  Review of Systems   Constitutional: Positive for malaise/fatigue. Negative for chills, fever and weight loss.   HENT: Negative for congestion and nosebleeds.    Eyes: Negative for blurred vision, pain, discharge and redness.   Respiratory: Negative for cough, sputum production, shortness of breath and stridor.    Cardiovascular: Negative for chest pain, palpitations and orthopnea.   Gastrointestinal: Negative for abdominal pain, diarrhea, heartburn, nausea and vomiting.   Genitourinary: Negative for dysuria, frequency and urgency.   Musculoskeletal: Negative for back pain, myalgias and neck pain.   Skin: Negative for itching and rash.   Neurological: Negative for dizziness, focal weakness, seizures and headaches.   Psychiatric/Behavioral: Negative for depression. The patient is not nervous/anxious and does not have insomnia.      Please see HPI, all other systems were reviewed and are negative (AMA/CMS criteria)     Past Medical History  Past Medical History:   Diagnosis Date   • TIA (transient ischemic attack) 09/2017   • MEDICAL HOME 11/07/12   • Back pain 3/28/2012   • Arthritis    • CATARACT    • Coronary artery disease    • Glaucoma    •  Hyperlipidemia    • Hypertension    • Myocardial infarct (CMS-HCC)    • Pain     sacrum 1/10       Surgical History  Past Surgical History:   Procedure Laterality Date   • VITRECTOMY POSTERIOR Right 4/13/2017    Procedure: VITRECTOMY POSTERIOR-LASER, SF6 GAS;  Surgeon: Jessica Arita M.D.;  Location: SURGERY SAME DAY [facility];  Service:    • RIGO BY LAPAROSCOPY  10/9/2015    Procedure: RIGO BY LAPAROSCOPY;  Surgeon: Ric Zambrano M.D.;  Location: SURGERY [facility];  Service:    • CATARACT PHACO WITH IOL  4/27/2011    Performed by EVERETTE COOPER at SURGERY SAME DAY [facility]   • CATARACT PHACO WITH IOL  4/13/2011    Performed by EVERETTE COOPER at SURGERY SAME DAY [facility]   • OTHER ORTHOPEDIC SURGERY  1980    pins in left hip   • ATHROPLASTY      broken hip 1990       Medications  No current facility-administered medications on file prior to encounter.      Current Outpatient Prescriptions on File Prior to Encounter   Medication Sig Dispense Refill   • amlodipine (NORVASC) 10 MG Tab Take 1 Tab by mouth every day. 30 Tab    • atorvastatin (LIPITOR) 20 MG Tab Take 1 Tab by mouth every bedtime. 30 Tab    • lidocaine (LIDODERM) 5 % Patch Apply 1 Patch to skin as directed every 24 hours. 10 Patch    • tizanidine (ZANAFLEX) 4 MG Tab Take 1 Tab by mouth every 6 hours as needed (Muscle spasms of the neck). 30 Tab 3   • aspirin EC (ECOTRIN) 81 MG Tablet Delayed Response Take 81 mg by mouth every day.     • metoprolol (LOPRESSOR) 25 MG Tab TAKE 1 TABLET BY MOUTH TWICE A  Tab 3   • clopidogrel (PLAVIX) 75 MG Tab Take 1 Tab by mouth every day. 90 Tab 2   • lisinopril (PRINIVIL) 10 MG Tab Take 1 Tab by mouth every day. 90 Tab 3     Family History  Family History   Problem Relation Age of Onset   • Heart Disease Mother    • Cancer Father          Social History  Social History   Substance Use Topics   • Smoking status: Former Smoker     Years: 0.10     Types: Cigarettes     Quit date: 1/31/2015    • Smokeless tobacco: Never Used   • Alcohol use Yes      Comment: 1 per day       Allergies  Allergies   Allergen Reactions   • Meperidine Vomiting and Nausea   • Tramadol Vomiting and Nausea        Physical Exam  Laboratory   Hemodynamics  No data recorded.      Pulse  Av  Min: 52  Max: 59 Heart Rate (Monitored): (!) 55  Blood Pressure : (!) 87/47, NIBP: 111/56      Respiratory      Respiration: 13, Pulse Oximetry: 97 %             Physical Exam   Constitutional: She is oriented to person, place, and time. No distress.   HENT:   Head: Normocephalic and atraumatic.   Mouth/Throat: Oropharynx is clear and moist.   Eyes: Conjunctivae and EOM are normal. Pupils are equal, round, and reactive to light.   Neck: Normal range of motion. Neck supple. No tracheal deviation present. No thyromegaly present.   Cardiovascular: Normal rate and regular rhythm.    No murmur heard.  Pulmonary/Chest: Effort normal and breath sounds normal. No respiratory distress. She has no wheezes.   Abdominal: Soft. Bowel sounds are normal. She exhibits no distension. There is no tenderness.   Musculoskeletal: She exhibits no edema or tenderness.   Neurological: She is alert and oriented to person, place, and time. No cranial nerve deficit.   Skin: Skin is warm and dry. She is not diaphoretic. No erythema.   Bruises over her belly and leg   Psychiatric: She has a normal mood and affect. Her behavior is normal.       Recent Labs      17   0958   WBC  4.6*   RBC  2.92*   HEMOGLOBIN  10.3*   HEMATOCRIT  30.8*   MCV  104.8*   MCH  34.9*   MCHC  33.3*   RDW  53.6*   PLATELETCT  172   MPV  11.2     Recent Labs      17   0958   SODIUM  133*   POTASSIUM  3.8   CHLORIDE  106   CO2  23   GLUCOSE  163*   BUN  25*   CREATININE  1.37   CALCIUM  7.7*     Recent Labs      17   0958   GLUCOSE  163*     Recent Labs      17   0958   APTT  28.9   INR  1.16*             Lab Results   Component Value Date    TROPONINI 0.02 2017        Imaging  CT-CTA NECK WITH & W/O-POST PROCESSING   Final Result      1.  Moderate right internal carotid artery stenosis of 50-70%      2.  Mild left internal carotid artery stenosis of less than 50%      3.  Patent vertebral arteries      4.  Diffuse atherosclerotic plaque      5.  Mild stenosis at the aortic arch origins of the left common carotid artery and left subclavian artery      CT-CTA HEAD WITH & W/O-POST PROCESS   Final Result      1.  CT angiogram of the [place] within normal limits.      2.  Diffuse atherosclerotic plaque of the carotid and vertebral system      3.  Underlying brain white matter changes, old lacunar infarct, and volume loss      CT-HEAD W/O   Final Result      1.  No acute intracranial abnormality identified.      2.  White matter changes, cerebral volume loss, and extensive atherosclerotic plaque             Assessment/Plan     I anticipate this patient will require at least two midnights for appropriate medical management, necessitating inpatient admission.    Acute encephalopathy- (present on admission)   Assessment & Plan    Likely related to CVA vs medications  Avoid narcotics, benzos  Plan as above  No sign of infection        Slurred speech- (present on admission)   Assessment & Plan    Recent history of CVA about 2 weeks ago  Not a candidate for TPA  Couldn't do MRI last admission due to claustrophobic  On asa, plavix, statin  CT head: no acute findings  MRI: the patient refused  Echo/carotid duplex: recently done  PT/OT  Speech consulted        Hyponatremia- (present on admission)   Assessment & Plan    On IVF  cmp in am        Stage 3 chronic kidney disease- (present on admission)   Assessment & Plan    Stable  Avoid nephrotoxic drugs          HTN (hypertension), benign- (present on admission)   Assessment & Plan    Holding BP meds to allow permissive hypertension for 24 hours            Prophylaxis:  sc heparin

## 2017-09-23 NOTE — ED NOTES
Pt BIB [facility] from [facility] with c/o dizziness, weakness, HOTN and slurred speech. Per [facility] PTA pt was unresponsive and was awakened with firm sternal rub. Once awakened pt speech was slurred and BP was 85/50. [facility] started IV PTA and gave pt fluids. Pt is A&O. Sat pt up to change gown. Pt now with slurred speech. ERP aware

## 2017-09-24 PROBLEM — E87.6 HYPOKALEMIA: Status: ACTIVE | Noted: 2017-09-24

## 2017-09-24 LAB
AMMONIA PLAS-SCNC: 18 UMOL/L (ref 11–45)
ANION GAP SERPL CALC-SCNC: 7 MMOL/L (ref 0–11.9)
BUN SERPL-MCNC: 15 MG/DL (ref 8–22)
CALCIUM SERPL-MCNC: 8 MG/DL (ref 8.4–10.2)
CHLORIDE SERPL-SCNC: 111 MMOL/L (ref 96–112)
CHOLEST SERPL-MCNC: 121 MG/DL (ref 100–199)
CO2 SERPL-SCNC: 20 MMOL/L (ref 20–33)
CREAT SERPL-MCNC: 0.97 MG/DL (ref 0.5–1.4)
ERYTHROCYTE [DISTWIDTH] IN BLOOD BY AUTOMATED COUNT: 52.5 FL (ref 35.9–50)
FOLATE SERPL-MCNC: 14.5 NG/ML
GFR SERPL CREATININE-BSD FRML MDRD: 55 ML/MIN/1.73 M 2
GLUCOSE SERPL-MCNC: 88 MG/DL (ref 65–99)
HCT VFR BLD AUTO: 37.6 % (ref 37–47)
HDLC SERPL-MCNC: 61 MG/DL
HGB BLD-MCNC: 12.6 G/DL (ref 12–16)
LDLC SERPL CALC-MCNC: 52 MG/DL
MAGNESIUM SERPL-MCNC: 1.9 MG/DL (ref 1.5–2.5)
MCH RBC QN AUTO: 35.2 PG (ref 27–33)
MCHC RBC AUTO-ENTMCNC: 33.5 G/DL (ref 33.6–35)
MCV RBC AUTO: 105 FL (ref 81.4–97.8)
PLATELET # BLD AUTO: 204 K/UL (ref 164–446)
PMV BLD AUTO: 11.3 FL (ref 9–12.9)
POTASSIUM SERPL-SCNC: 3.5 MMOL/L (ref 3.6–5.5)
RBC # BLD AUTO: 3.58 M/UL (ref 4.2–5.4)
SODIUM SERPL-SCNC: 138 MMOL/L (ref 135–145)
TRIGL SERPL-MCNC: 42 MG/DL (ref 0–149)
TROPONIN I SERPL-MCNC: 0.02 NG/ML (ref 0–0.04)
TROPONIN I SERPL-MCNC: <0.02 NG/ML (ref 0–0.04)
VIT B12 SERPL-MCNC: 792 PG/ML (ref 211–911)
WBC # BLD AUTO: 4.7 K/UL (ref 4.8–10.8)

## 2017-09-24 PROCEDURE — 82607 VITAMIN B-12: CPT

## 2017-09-24 PROCEDURE — 700102 HCHG RX REV CODE 250 W/ 637 OVERRIDE(OP): Performed by: INTERNAL MEDICINE

## 2017-09-24 PROCEDURE — 770020 HCHG ROOM/CARE - TELE (206)

## 2017-09-24 PROCEDURE — 83735 ASSAY OF MAGNESIUM: CPT

## 2017-09-24 PROCEDURE — 99233 SBSQ HOSP IP/OBS HIGH 50: CPT | Performed by: INTERNAL MEDICINE

## 2017-09-24 PROCEDURE — A9270 NON-COVERED ITEM OR SERVICE: HCPCS | Performed by: INTERNAL MEDICINE

## 2017-09-24 PROCEDURE — 84484 ASSAY OF TROPONIN QUANT: CPT

## 2017-09-24 PROCEDURE — 82746 ASSAY OF FOLIC ACID SERUM: CPT

## 2017-09-24 PROCEDURE — 97161 PT EVAL LOW COMPLEX 20 MIN: CPT

## 2017-09-24 PROCEDURE — 80061 LIPID PANEL: CPT

## 2017-09-24 PROCEDURE — 700111 HCHG RX REV CODE 636 W/ 250 OVERRIDE (IP): Performed by: INTERNAL MEDICINE

## 2017-09-24 PROCEDURE — G8978 MOBILITY CURRENT STATUS: HCPCS | Mod: CJ

## 2017-09-24 PROCEDURE — 82140 ASSAY OF AMMONIA: CPT

## 2017-09-24 PROCEDURE — 85027 COMPLETE CBC AUTOMATED: CPT

## 2017-09-24 PROCEDURE — 36415 COLL VENOUS BLD VENIPUNCTURE: CPT

## 2017-09-24 PROCEDURE — G8979 MOBILITY GOAL STATUS: HCPCS | Mod: CI

## 2017-09-24 PROCEDURE — 80048 BASIC METABOLIC PNL TOTAL CA: CPT

## 2017-09-24 RX ORDER — CLOPIDOGREL BISULFATE 75 MG/1
75 TABLET ORAL DAILY
Status: DISCONTINUED | OUTPATIENT
Start: 2017-09-24 | End: 2017-09-26 | Stop reason: HOSPADM

## 2017-09-24 RX ORDER — LISINOPRIL 5 MG/1
10 TABLET ORAL
Status: DISCONTINUED | OUTPATIENT
Start: 2017-09-25 | End: 2017-09-26 | Stop reason: HOSPADM

## 2017-09-24 RX ORDER — POTASSIUM CHLORIDE 20 MEQ/1
20 TABLET, EXTENDED RELEASE ORAL DAILY
Status: DISCONTINUED | OUTPATIENT
Start: 2017-09-24 | End: 2017-09-26 | Stop reason: HOSPADM

## 2017-09-24 RX ORDER — AMLODIPINE BESYLATE 5 MG/1
5 TABLET ORAL
Status: DISCONTINUED | OUTPATIENT
Start: 2017-09-25 | End: 2017-09-26 | Stop reason: HOSPADM

## 2017-09-24 RX ORDER — LISINOPRIL 5 MG/1
5 TABLET ORAL
Status: DISCONTINUED | OUTPATIENT
Start: 2017-09-24 | End: 2017-09-24

## 2017-09-24 RX ORDER — AMLODIPINE BESYLATE 5 MG/1
10 TABLET ORAL
Status: DISCONTINUED | OUTPATIENT
Start: 2017-09-24 | End: 2017-09-24

## 2017-09-24 RX ADMIN — HEPARIN SODIUM 5000 UNITS: 5000 INJECTION, SOLUTION INTRAVENOUS; SUBCUTANEOUS at 21:10

## 2017-09-24 RX ADMIN — DOCUSATE SODIUM AND SENNOSIDES 2 TABLET: 8.6; 5 TABLET, FILM COATED ORAL at 10:23

## 2017-09-24 RX ADMIN — AMLODIPINE BESYLATE 5 MG: 5 TABLET ORAL at 13:13

## 2017-09-24 RX ADMIN — METOPROLOL TARTRATE 12.5 MG: 25 TABLET ORAL at 21:09

## 2017-09-24 RX ADMIN — CLOPIDOGREL BISULFATE 75 MG: 75 TABLET, FILM COATED ORAL at 10:23

## 2017-09-24 RX ADMIN — HEPARIN SODIUM 5000 UNITS: 5000 INJECTION, SOLUTION INTRAVENOUS; SUBCUTANEOUS at 13:14

## 2017-09-24 RX ADMIN — ACETAMINOPHEN 650 MG: 325 TABLET, FILM COATED ORAL at 17:25

## 2017-09-24 RX ADMIN — LISINOPRIL 5 MG: 5 TABLET ORAL at 13:13

## 2017-09-24 RX ADMIN — ATORVASTATIN CALCIUM 20 MG: 10 TABLET, FILM COATED ORAL at 21:09

## 2017-09-24 RX ADMIN — HEPARIN SODIUM 5000 UNITS: 5000 INJECTION, SOLUTION INTRAVENOUS; SUBCUTANEOUS at 06:20

## 2017-09-24 RX ADMIN — POTASSIUM CHLORIDE 20 MEQ: 1500 TABLET, EXTENDED RELEASE ORAL at 21:08

## 2017-09-24 RX ADMIN — ASPIRIN 81 MG: 81 TABLET, COATED ORAL at 10:23

## 2017-09-24 ASSESSMENT — ENCOUNTER SYMPTOMS
BLURRED VISION: 0
COUGH: 0
SHORTNESS OF BREATH: 0
DIZZINESS: 0
HEARTBURN: 0
ABDOMINAL PAIN: 0
HEADACHES: 0
FEVER: 0

## 2017-09-24 ASSESSMENT — GAIT ASSESSMENTS
DISTANCE (FEET): 50
ASSISTIVE DEVICE: FRONT WHEEL WALKER
GAIT LEVEL OF ASSIST: STAND BY ASSIST

## 2017-09-24 ASSESSMENT — PAIN SCALES - GENERAL: PAINLEVEL_OUTOF10: 0

## 2017-09-24 NOTE — DISCHARGE PLANNING
Medical Social Work    Referral: Discharge Planning     Intervention: Pt from [facility]. This writer spoke to pt's daughter Felicitas who requested the pt be transferred back to [facility] when pt is medically cleared.     Plan: SNF choice form left at bedside table as requested by pt's daughter.

## 2017-09-24 NOTE — DISCHARGE PLANNING
Medical Social Work    Referral: SNF referral     Intervention: Pt's daughter signed SNF choice form for pt who was present at bedside.     Plan: Wait for [facility] to accept the pt .

## 2017-09-24 NOTE — PROGRESS NOTES
Notified Dr. Das that patient is being difficult and wants something to eat or drink. New orders received. Dr. Das said to do a bedside swallow eval.  If pt passes, then put her on a diet.

## 2017-09-24 NOTE — PROGRESS NOTES
Received from er via [facility].  Pt awake and alert.  faamily feels pt's speech is still a little slurred.  Fails swallow eval d/t slurrred speech.  Call placed to SLP to do swallow eval.left message.  Iv fluids set-up.bed alarm on.instructed pt not to get oob w/o assist.  Family at bedside

## 2017-09-24 NOTE — THERAPY
SPEECH THERAPY CONTACT NOTE:     This SLP spoke to RN both this AM and this afternoon regarding orders for clinical swallow evaluation. RN reported patient's slurred speech has resolved, patient passed RN dysphagia screen last night, and was tolerating full liquid diet. RN advanced patient to dysphagia 2, as that was last recommended by SLP during recent admit. RN reported CSE can be held today, but MD still wants CSE on Monday. SLP to follow as able and appropriate. Thank you.

## 2017-09-24 NOTE — THERAPY
Occupational Therapy-  OT order rec'd.  OT eval attempted, RN asked OT to hold at this time as she just got pt back to bed from bathroom and pt is tired.  Per RN, pt came here from [facility], and it is anticipated that she should likely return there when cleared medically.  Pt was evaluated by PT earlier today, and that may be enough information to qualify her for return to SNF.  Will hold OT eval at this time per RN request, and follow up later in pm or tomorrow as able/appropriate.  If pt is accepted to return to SNF without OT eval, OT will likely defer in order to prevent duplication of services as pt will resume OT and PT at SNF.

## 2017-09-24 NOTE — THERAPY
"85 y/o female adm from [facility] for slurred speech, was at [facility] after hospitalization for previous CVA. Pt amenable to MRI this time and ischemic changes found. pt with decreased tolerance to activity at this time with generalized weakness BLE altering balnce and gait pattern. Acute PT to address these aforementioned problems for pt to achieve higher level of function and mitigate effects of prolonged bedrest.    Physical Therapy Evaluation completed.   Bed Mobility:  Supine to Sit: Supervised  Transfers: Sit to Stand: Stand by Assist  Gait: Level Of Assist: Stand by Assist with Front-Wheel Walker       Plan of Care: Will benefit from Physical Therapy 5 times per week  Discharge Recommendations: Equipment: Will Continue to Assess for Equipment Needs. Post-acute therapy Discharge to a transitional care facility for continued skilled therapy services.    See \"Rehab Therapy-Acute\" Patient Summary Report for complete documentation.     "

## 2017-09-24 NOTE — PROGRESS NOTES
Pt willing to do mri.call placed to  for andrés order.  Pt states she can only do it if she has medication.  Order received    1700 pt went for mri.was able to complete it.

## 2017-09-24 NOTE — PROGRESS NOTES
2 rn skin assessmenyt complete.  No skin breakdown.  Only bruising to ble and bue.  Also has very large bruise to abdomen

## 2017-09-24 NOTE — DISCHARGE PLANNING
Received choice form from DEYSI Argueta for SNF. Referral sent to [facility] at 1611. Notified DEYSI Argueta that we will need a SNF order.

## 2017-09-25 PROBLEM — G93.40 ACUTE ENCEPHALOPATHY: Status: RESOLVED | Noted: 2017-09-23 | Resolved: 2017-09-25

## 2017-09-25 PROBLEM — E87.1 HYPONATREMIA: Status: RESOLVED | Noted: 2017-09-23 | Resolved: 2017-09-25

## 2017-09-25 PROBLEM — R47.81 SLURRED SPEECH: Status: RESOLVED | Noted: 2017-09-23 | Resolved: 2017-09-25

## 2017-09-25 PROBLEM — E87.6 HYPOKALEMIA: Status: RESOLVED | Noted: 2017-09-24 | Resolved: 2017-09-25

## 2017-09-25 LAB
ANION GAP SERPL CALC-SCNC: 6 MMOL/L (ref 0–11.9)
BASOPHILS # BLD AUTO: 0.8 % (ref 0–1.8)
BASOPHILS # BLD: 0.05 K/UL (ref 0–0.12)
BUN SERPL-MCNC: 12 MG/DL (ref 8–22)
CALCIUM SERPL-MCNC: 8.6 MG/DL (ref 8.4–10.2)
CHLORIDE SERPL-SCNC: 108 MMOL/L (ref 96–112)
CO2 SERPL-SCNC: 23 MMOL/L (ref 20–33)
CREAT SERPL-MCNC: 1.17 MG/DL (ref 0.5–1.4)
EOSINOPHIL # BLD AUTO: 0.54 K/UL (ref 0–0.51)
EOSINOPHIL NFR BLD: 8.4 % (ref 0–6.9)
ERYTHROCYTE [DISTWIDTH] IN BLOOD BY AUTOMATED COUNT: 52.4 FL (ref 35.9–50)
GFR SERPL CREATININE-BSD FRML MDRD: 44 ML/MIN/1.73 M 2
GLUCOSE SERPL-MCNC: 102 MG/DL (ref 65–99)
HCT VFR BLD AUTO: 36.9 % (ref 37–47)
HGB BLD-MCNC: 12.3 G/DL (ref 12–16)
IMM GRANULOCYTES # BLD AUTO: 0.03 K/UL (ref 0–0.11)
IMM GRANULOCYTES NFR BLD AUTO: 0.5 % (ref 0–0.9)
LYMPHOCYTES # BLD AUTO: 1.35 K/UL (ref 1–4.8)
LYMPHOCYTES NFR BLD: 21.1 % (ref 22–41)
MCH RBC QN AUTO: 34.8 PG (ref 27–33)
MCHC RBC AUTO-ENTMCNC: 33.3 G/DL (ref 33.6–35)
MCV RBC AUTO: 104.5 FL (ref 81.4–97.8)
MONOCYTES # BLD AUTO: 0.71 K/UL (ref 0–0.85)
MONOCYTES NFR BLD AUTO: 11.1 % (ref 0–13.4)
NEUTROPHILS # BLD AUTO: 3.73 K/UL (ref 2–7.15)
NEUTROPHILS NFR BLD: 58.1 % (ref 44–72)
NRBC # BLD AUTO: 0 K/UL
NRBC BLD AUTO-RTO: 0 /100 WBC
PLATELET # BLD AUTO: 215 K/UL (ref 164–446)
PMV BLD AUTO: 11.4 FL (ref 9–12.9)
POTASSIUM SERPL-SCNC: 4 MMOL/L (ref 3.6–5.5)
RBC # BLD AUTO: 3.53 M/UL (ref 4.2–5.4)
SODIUM SERPL-SCNC: 137 MMOL/L (ref 135–145)
WBC # BLD AUTO: 6.4 K/UL (ref 4.8–10.8)

## 2017-09-25 PROCEDURE — 80048 BASIC METABOLIC PNL TOTAL CA: CPT

## 2017-09-25 PROCEDURE — A9270 NON-COVERED ITEM OR SERVICE: HCPCS | Performed by: INTERNAL MEDICINE

## 2017-09-25 PROCEDURE — 770006 HCHG ROOM/CARE - MED/SURG/GYN SEMI*

## 2017-09-25 PROCEDURE — 700111 HCHG RX REV CODE 636 W/ 250 OVERRIDE (IP): Performed by: INTERNAL MEDICINE

## 2017-09-25 PROCEDURE — 700102 HCHG RX REV CODE 250 W/ 637 OVERRIDE(OP): Performed by: INTERNAL MEDICINE

## 2017-09-25 PROCEDURE — 99232 SBSQ HOSP IP/OBS MODERATE 35: CPT | Performed by: INTERNAL MEDICINE

## 2017-09-25 PROCEDURE — 36415 COLL VENOUS BLD VENIPUNCTURE: CPT

## 2017-09-25 PROCEDURE — 85025 COMPLETE CBC W/AUTO DIFF WBC: CPT

## 2017-09-25 PROCEDURE — 95951 HCHG EEG-VIDEO-24HR: CPT | Mod: 52

## 2017-09-25 RX ORDER — ONDANSETRON 4 MG/1
4 TABLET, ORALLY DISINTEGRATING ORAL EVERY 4 HOURS PRN
Qty: 10 TAB | Refills: 0
Start: 2017-09-25 | End: 2018-01-08

## 2017-09-25 RX ORDER — TIZANIDINE 4 MG/1
4 TABLET ORAL EVERY 6 HOURS PRN
Qty: 30 TAB | Refills: 3
Start: 2017-09-25 | End: 2018-01-08

## 2017-09-25 RX ORDER — HEPARIN SODIUM 5000 [USP'U]/ML
5000 INJECTION, SOLUTION INTRAVENOUS; SUBCUTANEOUS EVERY 8 HOURS
Refills: 0
Start: 2017-09-25 | End: 2018-01-08

## 2017-09-25 RX ORDER — LISINOPRIL 10 MG/1
10 TABLET ORAL DAILY
Qty: 90 TAB | Refills: 3
Start: 2017-09-25 | End: 2017-11-20 | Stop reason: SDUPTHER

## 2017-09-25 RX ORDER — ATORVASTATIN CALCIUM 20 MG/1
20 TABLET, FILM COATED ORAL
Qty: 30 TAB
Start: 2017-09-25 | End: 2018-01-08

## 2017-09-25 RX ORDER — CLOPIDOGREL BISULFATE 75 MG/1
75 TABLET ORAL
Qty: 90 TAB | Refills: 2
Start: 2017-09-25 | End: 2018-02-14 | Stop reason: SDUPTHER

## 2017-09-25 RX ORDER — AMLODIPINE BESYLATE 5 MG/1
5 TABLET ORAL DAILY
Qty: 30 TAB | Status: ON HOLD
Start: 2017-09-25 | End: 2018-01-08

## 2017-09-25 RX ADMIN — METOPROLOL TARTRATE 12.5 MG: 25 TABLET ORAL at 09:52

## 2017-09-25 RX ADMIN — ATORVASTATIN CALCIUM 20 MG: 10 TABLET, FILM COATED ORAL at 21:28

## 2017-09-25 RX ADMIN — DOCUSATE SODIUM AND SENNOSIDES 2 TABLET: 8.6; 5 TABLET, FILM COATED ORAL at 21:29

## 2017-09-25 RX ADMIN — HEPARIN SODIUM 5000 UNITS: 5000 INJECTION, SOLUTION INTRAVENOUS; SUBCUTANEOUS at 14:44

## 2017-09-25 RX ADMIN — CLOPIDOGREL BISULFATE 75 MG: 75 TABLET, FILM COATED ORAL at 09:52

## 2017-09-25 RX ADMIN — LISINOPRIL 10 MG: 5 TABLET ORAL at 09:53

## 2017-09-25 RX ADMIN — ASPIRIN 81 MG: 81 TABLET, COATED ORAL at 09:52

## 2017-09-25 RX ADMIN — DOCUSATE SODIUM AND SENNOSIDES 2 TABLET: 8.6; 5 TABLET, FILM COATED ORAL at 09:52

## 2017-09-25 RX ADMIN — HEPARIN SODIUM 5000 UNITS: 5000 INJECTION, SOLUTION INTRAVENOUS; SUBCUTANEOUS at 05:21

## 2017-09-25 RX ADMIN — METOPROLOL TARTRATE 12.5 MG: 25 TABLET ORAL at 21:28

## 2017-09-25 RX ADMIN — POTASSIUM CHLORIDE 20 MEQ: 1500 TABLET, EXTENDED RELEASE ORAL at 09:52

## 2017-09-25 RX ADMIN — AMLODIPINE BESYLATE 5 MG: 5 TABLET ORAL at 09:52

## 2017-09-25 RX ADMIN — HEPARIN SODIUM 5000 UNITS: 5000 INJECTION, SOLUTION INTRAVENOUS; SUBCUTANEOUS at 21:30

## 2017-09-25 ASSESSMENT — ENCOUNTER SYMPTOMS
SHORTNESS OF BREATH: 0
FEVER: 0
ABDOMINAL PAIN: 0
DIZZINESS: 0
BLURRED VISION: 0
COUGH: 0
HEADACHES: 0
HEARTBURN: 0

## 2017-09-25 ASSESSMENT — PAIN SCALES - GENERAL: PAINLEVEL_OUTOF10: 0

## 2017-09-25 NOTE — PROGRESS NOTES
"No change from morning assessment except Neuro assessment unchanged. Reports frustration with how \"things get all messed up sometimes\"  Support offered.  Daughter Felicitas here earlier to discuss results of testing with MD.  Plan of care reviewed again and questions answered as needed.  "

## 2017-09-25 NOTE — DISCHARGE PLANNING
"Care Transition Team Assessment    IHD met with patient bedside. She stated she lives alone but her daughter lives in town. She uses cabs to get to appointments, and we discussed RTC access for her. She uses a walker, but no O2 or HHC. She thinks at this time she will go home with HHC or back to SNF.     Information Source  Orientation : Oriented x 4  Information Given By: Patient  Informant's Name: Madeleine  Who is responsible for making decisions for patient? : Patient    Readmission Evaluation  Is this a readmission?: Yes - unplanned readmission  Why do you think you were readmitted?: \"Not sure\"  Was an appointment arranged for you prior to discharge?: Yes, attended appointment  Were there new prescriptions you were supposed to fill after you were discharged?: Yes, prescriptions filled  Did you understand your discharge instructions?: Yes  Did you have enough support after your last discharge?: Yes    Elopement Risk  Legal Hold: No  Ambulatory or Self Mobile in Wheelchair: Yes  Disoriented: No  Psychiatric Symptoms: None  History of Wandering: No  Elopement this Admit: No  Vocalizing Wanting to Leave: No  Displays Behaviors, Body Language Wanting to Leave: No-Not at Risk for Elopement  Elopement Risk: Not at Risk for Elopement    Interdisciplinary Discharge Planning  Does Admitting Nurse Feel This Could be a Complex Discharge?: No  Primary Care Physician: Dr. Ric Garcia  Lives with - Patient's Self Care Capacity: Alone and Able to Care For Self  Patient or legal guardian wants to designate a caregiver (see row info): No  Support Systems: Children  Housing / Facility: 3 Story Apartment / Condo (1st floor [address])  Do You Take your Prescribed Medications Regularly: Yes  Able to Return to Previous ADL's: Future Time w/Therapy  Mobility Issues: Yes  Prior Services:  ([facility])  Durable Medical Equipment: Walker    Discharge Preparedness  What is your plan after discharge?: Home health care  What are your " discharge supports?: Child  Prior Functional Level: Ambulatory, Independent with Activities of Daily Living, Independent with Medication Management, Uses Walker  Difficulity with ADLs: Walking  Difficulty with ADLs Comment: Uses walker  Difficulity with IADLs: Driving  Difficulity with IADL Comments: Uses cabs    Functional Assesment  Prior Functional Level: Ambulatory, Independent with Activities of Daily Living, Independent with Medication Management, Uses Walker    Finances  Financial Barriers to Discharge: No  Prescription Coverage: Yes ([facility] on [address])    Vision / Hearing Impairment  Vision Impairment : Yes  Right Eye Vision: Wears Glasses  Left Eye Vision: Wears Glasses  Hearing Impairment : No    Values / Beliefs / Concerns  Values / Beliefs Concerns : No    Advance Directive  Advance Directive?: DPOA for Health Care  Durable Power of  Name and Contact : Felicitas Teague [phone]    Domestic Abuse  Have you ever been the victim of abuse or violence?: No    Psychological Assessment  History of Substance Abuse: None  History of Psychiatric Problems: No  Non-compliant with Treatment: No  Newly Diagnosed Illness: No    Discharge Risks or Barriers  Discharge risks or barriers?: No  Patient risk factors: Readmission    Anticipated Discharge Information  Anticipated discharge disposition: Discharge needs currently unknown  Discharge Address: [address] [place] [address] C, [address], [place] [id number]  Discharge Contact Phone Number: [phone]

## 2017-09-25 NOTE — CARE PLAN
Problem: Mobility  Goal: Risk for activity intolerance will decrease  Outcome: PROGRESSING SLOWER THAN EXPECTED  Weak during most interactions but mostly concerned about incontinence and ambulation.    Problem: Psychosocial Needs:  Goal: Level of anxiety will decrease  Outcome: PROGRESSING AS EXPECTED  As long as explanation of care and testing is done, anxiety level is low.

## 2017-09-25 NOTE — PROGRESS NOTES
Telemetry Summary    Rhythm SR  HR 60s-90s  Ectopy Frequent PVC's, couplets, trigem  WY 0.16  QRS 0.08  QT 0.36

## 2017-09-25 NOTE — THERAPY
Occupational Therapy-  OT eval still pending, however per SW, pt has been accepted to SNF without OT notes.  At this time will defer OT eval to avoid duplication of services as pt will be re-evaluated by OT upon re-admit to SNF.

## 2017-09-25 NOTE — PROGRESS NOTES
No change from morning assessment except swallowing and eating with no difficulty. Up to commode frequently due to stress incontinence. EEG completed today. No changes in neuro assessment.

## 2017-09-25 NOTE — PROGRESS NOTES
Assumed care of patient at 1900.  Patient is sleeping quietly in bed and appears to be in no distress.  Bed alarm remains on.  Hourly rounding continues.

## 2017-09-25 NOTE — CARE PLAN
Problem: Knowledge Deficit  Goal: Knowledge of disease process/condition, treatment plan, diagnostic tests, and medications will improve  Outcome: PROGRESSING SLOWER THAN EXPECTED  Needs frequent explanations of plan and what happened to her.    Problem: Mobility  Goal: Risk for activity intolerance will decrease  Outcome: PROGRESSING SLOWER THAN EXPECTED  Weak and unsteady later in the day.

## 2017-09-25 NOTE — PROGRESS NOTES
Patient awake and alert this evening.  Patient denies pain.  Speech is clear and patient oriented.  Neuros intact.  Patient up to the BS this evening for stress incontinence.  NIH stroke scale continues.  Patient does not call for assistance to ambulate and bed alarm remains on.  Hourly rounding continues.

## 2017-09-25 NOTE — CARE PLAN
Problem: Safety  Goal: Will remain free from injury  Outcome: PROGRESSING AS EXPECTED  Bed in low and locked position.  Side rails up X2.  Call light in reach but patient does not use.  Bed alarm remains on.  Hourly rounding continues.    Problem: Pain Management  Goal: Pain level will decrease to patient's comfort goal  Outcome: PROGRESSING AS EXPECTED   09/24/17 2110   OTHER   Nurse Pain Scale 0 - 10  0   Non Verbal Scale  Calm;Unlabored Breathing   Patient continues to deny pain.

## 2017-09-25 NOTE — PROGRESS NOTES
[facility] Hospitalist Progress Note    Date of Service: 2017    Chief Complaint  84 y.o. female admitted 2017 with low blood pressure and reportedly the patient had altered mental status at the post-acute facility.  The patient awaken after rigorous sternal rub but she did not remember what happened.    Interval Problem Update  :  The patient is much more alter today.   Blood pressure was high this morning.    Consultants/Specialty  none    Disposition  SNF        Review of Systems   Constitutional: Negative for fever.   Eyes: Negative for blurred vision.   Respiratory: Negative for cough and shortness of breath.    Cardiovascular: Negative for chest pain.   Gastrointestinal: Negative for abdominal pain and heartburn.   Genitourinary: Negative for dysuria.   Neurological: Negative for dizziness and headaches.      Physical Exam  Laboratory/Imaging   Hemodynamics  Temp (24hrs), Av.7 °C (98 °F), Min:36.2 °C (97.2 °F), Max:37 °C (98.6 °F)   Temperature: 37 °C (98.6 °F)  Pulse  Av.9  Min: 52  Max: 96    Blood Pressure : 118/49      Respiratory      Respiration: 18, Pulse Oximetry: 95 %             Fluids    Intake/Output Summary (Last 24 hours) at 17 1940  Last data filed at 17 1600   Gross per 24 hour   Intake             1110 ml   Output              600 ml   Net              510 ml       Nutrition  Orders Placed This Encounter   Procedures   • DIET ORDER     Standing Status:   Standing     Number of Occurrences:   1     Order Specific Question:   Diet:     Answer:   Regular [1]     Order Specific Question:   Texture/Fiber modifications:     Answer:   Dysphagia 2(Pureed/Chopped)specify fluid consistency(question 6) [2]     Physical Exam   Constitutional: She is oriented to person, place, and time.   HENT:   Head: Normocephalic.   Eyes: Pupils are equal, round, and reactive to light. Right eye exhibits no discharge. Left eye exhibits no discharge.   Neck: Neck supple. No JVD present.    Cardiovascular: Normal rate and regular rhythm.    Pulmonary/Chest: Effort normal and breath sounds normal. No respiratory distress.   Abdominal: Soft. Bowel sounds are normal. She exhibits no distension. There is no tenderness.   Musculoskeletal: She exhibits no edema.   Moves bilateral upper extremities freely.   Neurological: She is alert and oriented to person, place, and time. No cranial nerve deficit.   Finger to nose within normal limit.  Babinski testing is negative.   Skin: Skin is warm and dry.   Psychiatric: She has a normal mood and affect.       Recent Labs      09/23/17 0958 09/24/17   0513   WBC  4.6*  4.7*   RBC  2.92*  3.58*   HEMOGLOBIN  10.3*  12.6   HEMATOCRIT  30.8*  37.6   MCV  104.8*  105.0*   MCH  34.9*  35.2*   MCHC  33.3*  33.5*   RDW  53.6*  52.5*   PLATELETCT  172  204   MPV  11.2  11.3     Recent Labs      09/23/17 0958  09/24/17 0513   SODIUM  133*  138   POTASSIUM  3.8  3.5*   CHLORIDE  106  111   CO2  23  20   GLUCOSE  163*  88   BUN  25*  15   CREATININE  1.37  0.97   CALCIUM  7.7*  8.0*     Recent Labs      09/23/17 0958   APTT  28.9   INR  1.16*         Recent Labs      09/24/17   0513   TRIGLYCERIDE  42   HDL  61   LDL  52          Assessment/Plan     Acute encephalopathy- (present on admission)   Assessment & Plan    Lowe bp vs other neurological event vs other medication side effect.  Check EEG.  Titrate up bp medication gradually to avoid hypotension.  Avoid narcotics, benzos  Plan as above  No sign of infection        Slurred speech- (present on admission)   Assessment & Plan    Recent history of CVA about 2 weeks ago  Not a candidate for TPA  MRI brain done. No acute stroke.  CT angio head and neck no significant changes compared to the imaging done couple of weeks ago.  Continue on asa, plavix, statin  CT head: no acute findings  MRI: the patient refused  Echo recently done  PT/OT evaluation and treatment  Speech consulted  The patient passed bedside swallow  evaluation. Start diet.        Hypokalemia   Assessment & Plan    Magnesium adequate.  Replace potassium and monitor.        Hyponatremia- (present on admission)   Assessment & Plan    Resolved with IVF  Able to eat and drink now.  Discontinue iv fluid and monitor serum sodium level.        Stage 3 chronic kidney disease- (present on admission)   Assessment & Plan    Improve with hydration.  Avoid nephrotoxic drugs          HTN (hypertension), benign- (present on admission)   Assessment & Plan    Resume bp medication at lower dose and titrate up as needed.             Reviewed items::  Labs reviewed and Medications reviewed  DVT prophylaxis pharmacological::  Heparin

## 2017-09-25 NOTE — THERAPY
"SPEECH THERAPY NOTE  Unable to complete bedside swallow evaluation today as patient refused during 1st attempt and was busy in procedure with RN upon 2nd attempt. Patient told SLP \"Please go away. I just did some test and I'm tired. I eat fine.\" RN reports patient tolerated sandwich and pills with thin liquid without difficulty. Patient was seen by a speech therapist 2 weeks ago at [facility] following a possible CVA. At that time, patient presented with mild oral pharyngeal dysphagia and Dysphagia 2/thin liquid diet was recommended. At this time, recommend continue D2/thin liquid diet. SLP will attempt bedside swallow evaluation 9/26/17. If patient is accepted to Skilled Nursing facility, recommend SLP at facility follow up and complete bedside swallow evaluation upon admission. Thank you.  "

## 2017-09-25 NOTE — DISCHARGE PLANNING
Received notification via fax from [facility], referral has been accepted. DEYSI Sevilla notified via e-mail.

## 2017-09-25 NOTE — PROGRESS NOTES
EKG Report:    Rhythm: Sinus rhythm.    Ectopy: Frequent-PVCs/ bigeminy and trigeminy.     0.18/0.10/0.36

## 2017-09-26 ENCOUNTER — TELEPHONE (OUTPATIENT)
Dept: VASCULAR LAB | Facility: MEDICAL CENTER | Age: 82
End: 2017-09-26

## 2017-09-26 VITALS
RESPIRATION RATE: 18 BRPM | OXYGEN SATURATION: 95 % | HEIGHT: 62 IN | DIASTOLIC BLOOD PRESSURE: 64 MMHG | HEART RATE: 78 BPM | BODY MASS INDEX: 27.23 KG/M2 | TEMPERATURE: 98.6 F | SYSTOLIC BLOOD PRESSURE: 140 MMHG | WEIGHT: 148 LBS

## 2017-09-26 PROBLEM — G93.40 ACUTE ENCEPHALOPATHY: Status: RESOLVED | Noted: 2017-09-23 | Resolved: 2017-09-26

## 2017-09-26 PROBLEM — R47.81 SLURRED SPEECH: Status: RESOLVED | Noted: 2017-09-23 | Resolved: 2017-09-26

## 2017-09-26 PROBLEM — E87.1 HYPONATREMIA: Status: RESOLVED | Noted: 2017-09-23 | Resolved: 2017-09-26

## 2017-09-26 PROBLEM — E87.6 HYPOKALEMIA: Status: RESOLVED | Noted: 2017-09-24 | Resolved: 2017-09-26

## 2017-09-26 LAB
ANION GAP SERPL CALC-SCNC: 8 MMOL/L (ref 0–11.9)
BASOPHILS # BLD AUTO: 0.6 % (ref 0–1.8)
BASOPHILS # BLD: 0.04 K/UL (ref 0–0.12)
BUN SERPL-MCNC: 17 MG/DL (ref 8–22)
CALCIUM SERPL-MCNC: 8.7 MG/DL (ref 8.4–10.2)
CHLORIDE SERPL-SCNC: 108 MMOL/L (ref 96–112)
CO2 SERPL-SCNC: 19 MMOL/L (ref 20–33)
CREAT SERPL-MCNC: 1.3 MG/DL (ref 0.5–1.4)
EOSINOPHIL # BLD AUTO: 0.59 K/UL (ref 0–0.51)
EOSINOPHIL NFR BLD: 9.5 % (ref 0–6.9)
ERYTHROCYTE [DISTWIDTH] IN BLOOD BY AUTOMATED COUNT: 53.4 FL (ref 35.9–50)
GFR SERPL CREATININE-BSD FRML MDRD: 39 ML/MIN/1.73 M 2
GLUCOSE SERPL-MCNC: 94 MG/DL (ref 65–99)
HCT VFR BLD AUTO: 37.4 % (ref 37–47)
HGB BLD-MCNC: 12.4 G/DL (ref 12–16)
IMM GRANULOCYTES # BLD AUTO: 0.03 K/UL (ref 0–0.11)
IMM GRANULOCYTES NFR BLD AUTO: 0.5 % (ref 0–0.9)
LYMPHOCYTES # BLD AUTO: 1.46 K/UL (ref 1–4.8)
LYMPHOCYTES NFR BLD: 23.5 % (ref 22–41)
MCH RBC QN AUTO: 34.9 PG (ref 27–33)
MCHC RBC AUTO-ENTMCNC: 33.2 G/DL (ref 33.6–35)
MCV RBC AUTO: 105.4 FL (ref 81.4–97.8)
MONOCYTES # BLD AUTO: 0.6 K/UL (ref 0–0.85)
MONOCYTES NFR BLD AUTO: 9.7 % (ref 0–13.4)
NEUTROPHILS # BLD AUTO: 3.48 K/UL (ref 2–7.15)
NEUTROPHILS NFR BLD: 56.2 % (ref 44–72)
NRBC # BLD AUTO: 0 K/UL
NRBC BLD AUTO-RTO: 0 /100 WBC
PLATELET # BLD AUTO: 223 K/UL (ref 164–446)
PMV BLD AUTO: 11.3 FL (ref 9–12.9)
POTASSIUM SERPL-SCNC: 3.9 MMOL/L (ref 3.6–5.5)
RBC # BLD AUTO: 3.55 M/UL (ref 4.2–5.4)
SODIUM SERPL-SCNC: 135 MMOL/L (ref 135–145)
WBC # BLD AUTO: 6.2 K/UL (ref 4.8–10.8)

## 2017-09-26 PROCEDURE — A9270 NON-COVERED ITEM OR SERVICE: HCPCS | Performed by: INTERNAL MEDICINE

## 2017-09-26 PROCEDURE — 99239 HOSP IP/OBS DSCHRG MGMT >30: CPT | Performed by: INTERNAL MEDICINE

## 2017-09-26 PROCEDURE — 85025 COMPLETE CBC W/AUTO DIFF WBC: CPT

## 2017-09-26 PROCEDURE — 700102 HCHG RX REV CODE 250 W/ 637 OVERRIDE(OP): Performed by: INTERNAL MEDICINE

## 2017-09-26 PROCEDURE — 80048 BASIC METABOLIC PNL TOTAL CA: CPT

## 2017-09-26 PROCEDURE — 95951 HCHG EEG-VIDEO-24HR: CPT | Mod: 52

## 2017-09-26 PROCEDURE — 36415 COLL VENOUS BLD VENIPUNCTURE: CPT

## 2017-09-26 PROCEDURE — 700111 HCHG RX REV CODE 636 W/ 250 OVERRIDE (IP): Performed by: INTERNAL MEDICINE

## 2017-09-26 RX ADMIN — METOPROLOL TARTRATE 12.5 MG: 25 TABLET ORAL at 09:05

## 2017-09-26 RX ADMIN — CLOPIDOGREL BISULFATE 75 MG: 75 TABLET, FILM COATED ORAL at 09:06

## 2017-09-26 RX ADMIN — ASPIRIN 81 MG: 81 TABLET, COATED ORAL at 09:05

## 2017-09-26 RX ADMIN — AMLODIPINE BESYLATE 5 MG: 5 TABLET ORAL at 09:05

## 2017-09-26 RX ADMIN — HEPARIN SODIUM 5000 UNITS: 5000 INJECTION, SOLUTION INTRAVENOUS; SUBCUTANEOUS at 06:33

## 2017-09-26 RX ADMIN — LISINOPRIL 10 MG: 5 TABLET ORAL at 09:07

## 2017-09-26 RX ADMIN — DOCUSATE SODIUM AND SENNOSIDES 2 TABLET: 8.6; 5 TABLET, FILM COATED ORAL at 09:07

## 2017-09-26 RX ADMIN — POTASSIUM CHLORIDE 20 MEQ: 1500 TABLET, EXTENDED RELEASE ORAL at 09:07

## 2017-09-26 ASSESSMENT — PAIN SCALES - GENERAL: PAINLEVEL_OUTOF10: 0

## 2017-09-26 NOTE — CARE PLAN
Problem: Communication  Goal: The ability to communicate needs accurately and effectively will improve  Outcome: PROGRESSING AS EXPECTED  Pt updated on plan of care, including neuro checks, monitoring vital signs, following EEG results, and likely transfer back to [facility] if EEG results unremarkable. Pt agrees with the plan and all questions addressed.     Problem: Safety  Goal: Will remain free from injury  Outcome: PROGRESSING AS EXPECTED  Call light and personal belongings within reach, bed in low locked position, treaded slipper socks in place, room free of clutter, bed alarm activated. Pt asked to call for assistance prior to getting up, but is impulsive and does not always call prior to getting out of bed. Hourly rounding in place to ensure pt safety and needs are met.

## 2017-09-26 NOTE — RESPIRATORY CARE
COPD EDUCATION by COPD CLINICAL EDUCATOR  9/25/2017 at 1:50 PM by Sherly Marquez     Patient reviewed by COPD education team. Patient does not qualify for COPD program.  SNF.

## 2017-09-26 NOTE — PROGRESS NOTES
[facility] transport. Transfer packet faxed and given to transporter.

## 2017-09-26 NOTE — EEG PROGRESS NOTE
ROUTINE ELECTROENCEPHALOGRAM REPORT      NAME: Madeleine Zambrano    REFERRING Dr:    INDICATION:     altered mental status    TECHNIQUE: 30 channel routine electroencephalogram (EEG) was performed in accordance with the international 10-20 system. The study was reviewed in bipolar and referential montages. The recording examined the patient during wakeful and drowsy state(s).     DESCRIPTION OF THE RECORD:      Background rhythm during awake stage shows well-organized, well-developed, average voltage 8 to 9 hertz alpha activity in the posterior regions.  It blocks with eye opening and it is bilaterally synchronous and symmetrical.  No spike-and-wave discharges or any lateralizing abnormalities are seen.  Photic stimulation did not produce any abnormalities. Hyperventilation did not produce any abnormalities.  No abnormalities were found during the procedure. Stage I sleep was achieved.      ACTIVATION PROCEDURES:      Hyperventilation and Photic Stimulation were not done    ICTAL AND/OR INTERICTAL FINDINGS:      No focal or generalized epileptiform activity noted. No regional slowing was seen during this routine study.  No clinical events or seizures were reported or recorded during the study.      EKG: sampling of the EKG recording demonstrated sinus rhythm.        INTERPRETATION:    ________________________________________________________________________    This Scalp EEG is not remarkable    Of note, unremarkable EEG does not completely exclude the diagnosis  of seizures since seizure is an episodic phenomena.  Clinical correlation may help     If clinical suspicion of seizure remains high.  Prolonged outpatient EEG   monitoring may be of help.    ________________________________________________________________________

## 2017-09-26 NOTE — DISCHARGE SUMMARY
DATE OF ADMISSION:  2017    DATE OF TRANSFER:  2017    DIAGNOSES:  Current diagnoses including the followin.  Acute encephalopathy, which resolved, likely secondary to transient   Hypotension    2.  Hypokalemia, resolved.  3.  Hyponatremia, resolved.  4.  Chronic kidney disease, stage III.  5.  Hypertension.  6.   macrocytosis.  7.   history of cerebrovascular accident   including both subacute subcortical stroke in the right deep frontal white   matter and remote left basal ganglia cerebrovascular   accident.  8.  moderate right internal carotid stenosis   and mild left internal carotid stenosis.  9. slurred speech that has resolved.    SUMMARY OF HOSPITAL COURSE:  The patient is an 84-year-old  female,   who was brought in from [facility] on 2017 because the patient was   noted to have altered mental status, specifically the patient was difficult to   awake and was not responsive at all.  Hence, the patient was brought here to   the ER and the patient did eventually woke up with a sternal rub in the ER,   and of note, on arrival, the patient was noted to have low blood pressures and   specifically the patient's blood pressure bumped and was 87/47 on arrival   with heart rate of 55 and respiratory rate 16.  So with this finding, all of   the patient's blood pressure medication was held and the patient was started   on IV fluids and eventually the patient's blood pressure normalized, in fact,   the patient actually went to the hypertensive side.  Please note that the   patient recently was at the [facility] and had diagnosis of CVA   there and the patient was transferred to SNF after that.  Of note, during the   patient's hospital stay, the patient did not have an MRI of the brain done due   to patient had claustrophobia.  At this time, the patient did have an MRI   done and the MRI here was done without contrast and the MRI of brain showed   the patient has focal  signal abnormality in the right deep frontal white   matter, likely area of subacute ischemia rather than area of acute ischemia.    The patient also has atrophy, white matter changes, motion degraded study, and   remote left basal ganglia infarct.  The patient also had CT angio of the head   and neck done.  Please note that this is the second time these tests were   done.  The first time that was done was back in 09/10/2017.  At this time, the   CT angio of the [place] of [place] showed within normal limits and there is   diffuse atherosclerotic plaque and carotid and vertebrals system.  Also, CT   angio of the neck showed the patient had moderate internal carotid artery   stenosis with 50%-70% and mild left internal carotid artery stenosis less than   50% and patent vertebral arteries and there is a diffuse atherosclerotic   plaque and mild stenosis of the aortic arch origin of the left common carotid   artery and left subclavian artery.  On arrival, the patient was also noted to   have macrocytosis.  B12 and folate level checked, both were within normal   range.  Please note that, the patient had no slurred speech by the time I see   her and the patient had passed swallow evaluation and started on diet.  The patient   tolerated diet well and of note, the patient did have trouble controlling her   blood pressure in the past.  Given the patient's recent hypotension, I did   resume the patient's half of what she usually takes at post acute facility.    The patient resumed on both lisinopril, amlodipine as well as metoprolol, and   the patient's blood pressure was brought under control.  The patient states   she does not remember what happened over the post acute facility and prompted   concern for possible seizures over the post acute facility and so, EEG was   ordered, currently pending.  Of note, I did speak with Dr. Cooley of   Neurology Department and he did review the patient's imaging.  He does not see   any  new acute stroke on the MRI of the brain.  According to location of   previous infarctions, he believed that the patient has low probability for   having seizures given the patient's strokes that were in the subcortical area.    Furthermore, he does suspect the patient's encephalopathy may be secondary   to the patient's hypotension and the patient has returned to baseline.  The   patient will likely be discharged back to post acute facility once EEG results   available.  The patient does have an echocardiogram done back on 11/11/2017,   which shows normal left ventricular systolic functions and no evidence of a   Doppler abnormality, and right ventricular systolic pressure is estimated to   be 45 mmHg.  Furthermore, neurologist, Dr. Cooley also recommended medical   therapy for this patient.    DISCHARGE CONDITION:  Stable.    DISPOSITION:  Post acute facility.    ACTIVITY:  As tolerated with fall precautions and PT/OT evaluation and   treatment.  Please note that during the patient's hospital stay, speech   therapy was trying to attempt to evaluate the patient, was unable to do so.    DIET:  Currently, the patient is on dysphagia 2 diet with thin liquids and   dysphagia 2 diet with pureed diet with chops.    DISCHARGE MEDICATIONS:  Including the following, including Tylenol 650 q. 6   p.r.n. for pain, aspirin 81 mg 1 tablet p.o. daily, heparin 5000 units   subcutaneous injection every 8 hours for DVT prophylaxis, Zofran 4 mg 1 tablet   by mouth q. 4 hours p.r.n. for nausea and vomiting, ____ 1 tablet p.o. at   bedtime, lisinopril 10 mg 1 tablet p.o. daily, metoprolol 25 mg  take half a tablet by mouth twice a day, amlodipine 5 mg 1 tablet by mouth   daily, Plavix 75 mg 1 tablet by mouth daily, tizanidine 4 mg 1 tablet by mouth   q. 6 hours as needed for muscle spasms.    FOLLOWUP INSTRUCTIONS:  The patient is to follow up with primary care provider   within 1-2 weeks and to follow up with outpatient neurology,  specifically Dr. Srini Martinez, who has seen the patient previously.    Total discharge time was 32 minutes.       ____________________________________     DO LEDY Hope / ISAIAS    DD:  09/25/2017 16:16:20  DT:  09/25/2017 17:56:48    D#:  [id number]  Job#:  [id number]

## 2017-09-26 NOTE — PROGRESS NOTES
[facility] Hospitalist Progress Note    Date of Service: 2017    Chief Complaint  84 y.o. female admitted 2017 with low blood pressure and reportedly the patient had altered mental status at the post-acute facility.  The patient awaken after rigorous sternal rub but she did not remember what happened.    Interval Problem Update  :  The patient is much more alter today.   Blood pressure was high this morning.  :  The patient is in a happy mood today.   Blood pressure better control today.    Consultants/Specialty  none    Disposition  SNF        Review of Systems   Constitutional: Negative for fever.   Eyes: Negative for blurred vision.   Respiratory: Negative for cough and shortness of breath.    Cardiovascular: Negative for chest pain.   Gastrointestinal: Negative for abdominal pain and heartburn.   Genitourinary: Negative for dysuria.   Neurological: Negative for dizziness and headaches.      Physical Exam  Laboratory/Imaging   Hemodynamics  Temp (24hrs), Av.6 °C (97.9 °F), Min:36.4 °C (97.5 °F), Max:37 °C (98.6 °F)   Temperature: 36.7 °C (98 °F)  Pulse  Av.7  Min: 52  Max: 96    Blood Pressure : 135/54      Respiratory      Respiration: 16, Pulse Oximetry: 93 %        RUL Breath Sounds: Clear, RML Breath Sounds: Clear, RLL Breath Sounds: Diminished, RYAN Breath Sounds: Clear, LLL Breath Sounds: Diminished    Fluids    Intake/Output Summary (Last 24 hours) at 17 1843  Last data filed at 17 1645   Gross per 24 hour   Intake              960 ml   Output             1800 ml   Net             -840 ml       Nutrition  Orders Placed This Encounter   Procedures   • DIET ORDER     Standing Status:   Standing     Number of Occurrences:   1     Order Specific Question:   Diet:     Answer:   Regular [1]     Order Specific Question:   Texture/Fiber modifications:     Answer:   Dysphagia 2(Pureed/Chopped)specify fluid consistency(question 6) [2]     Order Specific Question:   Consistency/Fluid  modifications:     Answer:   Thin Liquids [3]     Physical Exam   Constitutional: She is oriented to person, place, and time.   HENT:   Head: Normocephalic.   Eyes: Pupils are equal, round, and reactive to light. Right eye exhibits no discharge. Left eye exhibits no discharge.   Neck: Neck supple. No JVD present.   Cardiovascular: Normal rate and regular rhythm.    Pulmonary/Chest: Effort normal and breath sounds normal. No respiratory distress.   Abdominal: Soft. Bowel sounds are normal. She exhibits no distension. There is no tenderness.   Musculoskeletal: She exhibits no edema.   Moves bilateral upper extremities freely.   Neurological: She is alert and oriented to person, place, and time. No cranial nerve deficit.   Finger to nose within normal limit.  Babinski testing is negative.   Skin: Skin is warm and dry.   Psychiatric: She has a normal mood and affect.       Recent Labs      09/23/17 0958 09/24/17   0513  09/25/17   0509   WBC  4.6*  4.7*  6.4   RBC  2.92*  3.58*  3.53*   HEMOGLOBIN  10.3*  12.6  12.3   HEMATOCRIT  30.8*  37.6  36.9*   MCV  104.8*  105.0*  104.5*   MCH  34.9*  35.2*  34.8*   MCHC  33.3*  33.5*  33.3*   RDW  53.6*  52.5*  52.4*   PLATELETCT  172  204  215   MPV  11.2  11.3  11.4     Recent Labs      09/23/17 0958 09/24/17   0513  09/25/17   0509   SODIUM  133*  138  137   POTASSIUM  3.8  3.5*  4.0   CHLORIDE  106  111  108   CO2  23  20  23   GLUCOSE  163*  88  102*   BUN  25*  15  12   CREATININE  1.37  0.97  1.17   CALCIUM  7.7*  8.0*  8.6     Recent Labs      09/23/17 0958   APTT  28.9   INR  1.16*         Recent Labs      09/24/17   0513   TRIGLYCERIDE  42   HDL  61   LDL  52          Assessment/Plan     Acute encephalopathy- (present on admission)   Assessment & Plan    Resolved.  Possible etiologies include Low bp vs other neurological event.  Likely due to low bp   Discussed the patient's case with oncall neurologist, who also thinks the low bp is a probable cause for the  patient's altered mental status.  EEG done.  Awaiting for result.  Titrate up bp medication gradually to avoid hypotension.  Avoid narcotics, benzodiazepine.        Slurred speech- (present on admission)   Assessment & Plan    Recent history of CVA about 2 weeks ago  MRI brain done. No acute stroke.  CT angio head and neck no significant changes compared to the imaging done couple of weeks ago.  Continue on asa, plavix, statin.  CT head: no acute findings  Echo recently done  PT/OT evaluation and treatment  Speech consulted and continue dysphagia diet for now.          Hypokalemia   Assessment & Plan    Magnesium adequate.  Replaced, resolved, and monitor.        Hyponatremia- (present on admission)   Assessment & Plan    Resolved with IVF  Able to eat and drink now.  Discontinue iv fluid and monitor serum sodium level.        Stage 3 chronic kidney disease- (present on admission)   Assessment & Plan    Improved with hydration.  Avoid nephrotoxic drugs          HTN (hypertension), benign- (present on admission)   Assessment & Plan    Continue current bp medication as the patient's bp is under good control.            Reviewed items::  Labs reviewed and Medications reviewed  DVT prophylaxis pharmacological::  Heparin

## 2017-09-26 NOTE — TELEPHONE ENCOUNTER
LM on pt's VM that she is overdue for her yearly appt.  Provided her with the phone number to call to make an appt for the next available.  LUISA Shankar.

## 2017-09-26 NOTE — DISCHARGE PLANNING
Medical Social Work    Referral: DEYSI reviewed the chart this AM.      Intervention: [facility] accepted.       Plan: DEYSI Jennifer faxed transport form to [facility] requesting transportation be set up for [facility].  DEYSI preparing COBRA and will notify medical staff once  time is confirmed.

## 2017-09-26 NOTE — DISCHARGE PLANNING
Transportation has been arranged with Sandra at [facility]. Pt's transport is scheduled for 2:00 today via [facility]. CTTM Jennifer notified.

## 2017-09-26 NOTE — DISCHARGE PLANNING
DEYSI Rodriguez received a call from [facility] Denise that [facility] will  pt at 1400.  DEYSI will notify bs ANGUS Mayorga, pt's daughter, Felicitas and ptClaudine VELEZ prepared and left in chart.

## 2017-09-26 NOTE — DISCHARGE PLANNING
Received Transport form from [facility] Jennifer, call was placed to [facility] to arrange transport if available. Awaiting call back.

## 2017-09-26 NOTE — PROCEDURES
DATE OF SERVICE:  09/25/2017    The EEG was done inpatient on 9/25/2017.    ROUTINE ELECTROENCEPHALOGRAM REPORT        NAME: Madeleine Zambrano     REFERRING Dr:     INDICATION:      altered mental status     TECHNIQUE: 30 channel routine electroencephalogram (EEG) was performed in accordance with the international 10-20 system. The study was reviewed in bipolar and referential montages. The recording examined the patient during wakeful and drowsy state(s).      DESCRIPTION OF THE RECORD:        Background rhythm during awake stage shows well-organized, well-developed, average voltage 8 to 9 hertz alpha activity in the posterior regions.  It blocks with eye opening and it is bilaterally synchronous and symmetrical.  No spike-and-wave discharges or any lateralizing abnormalities are seen.  Photic stimulation did not produce any abnormalities. Hyperventilation did not produce any abnormalities.  No abnormalities were found during the procedure. Stage I sleep was achieved.        ACTIVATION PROCEDURES:       Hyperventilation and Photic Stimulation were not done     ICTAL AND/OR INTERICTAL FINDINGS:      No focal or generalized epileptiform activity noted. No regional slowing was seen during this routine study.  No clinical events or seizures were reported or recorded during the study.       EKG: sampling of the EKG recording demonstrated sinus rhythm.          INTERPRETATION:     ________________________________________________________________________     This Scalp EEG is not remarkable     Of note, unremarkable EEG does not completely exclude the diagnosis  of seizures since seizure is an episodic phenomena.  Clinical correlation may help     If clinical suspicion of seizure remains high.  Prolonged outpatient EEG   monitoring may be of help.     ________________________________________________________________________                            ____________________________________     DEANDRE ALEMAN MD    YP /  ISAIAS    DD:  09/26/2017 00:24:34  DT:  09/26/2017 00:29:32    D#:  [id number]  Job#:  [id number]

## 2017-09-26 NOTE — DISCHARGE SUMMARY
DISCHARGE SUMMARY     ADMIT DATE:  9/23/2017         DISCHARGE DATE:  9/26/2017    PATIENT ID:  Name: Madeleine Zambrano   YOB: 1933  Age: 84 y.o. female   MRN: [id number]  Address: [address] [place] [address]  [place] [address]  Phone: [phone] (home)    DISCHARGE DIAGNOSIS:  Acute Encephalopathy  Possible Orthostatic Hypotension  Hx of CVA  Hypokalemia  Hyponatremia  CKD III  Hypertension    CONSULTANTS:  Neurology: Dr. Albarran    CONDITION:Stable    DISPOSITION:SNF    DIET: Cardiac    ACTIVITY: As tolerated     HPI/HOSPITAL COURSE:  Please see H&P for details regarding initial hospital presentation.  In summary, 83yo F with PMHx of HTN, CVA, CKD III was admitted for acute encephalopathy.  Neurological workup including CT head and MRI brain, CT neck was unremarkable for acute findings.  Neurology was consulted to evaluate and performed EEG which was unremarkable.  Patient was found to have low blood pressure on admission and was started on IVFs.  With IVFs, symptoms resolved.  Patient's electrolytes improved with oral and IV replacement via IVFs and potassium replacement and have remained stable.  All other chronic conditions remained stable.  Patient will be discharged back to SNF to continue rehab on meds listed below.       Physical exam:  Vitals:    09/25/17 1455 09/25/17 2000 09/26/17 0134 09/26/17 0800   BP: 135/54 117/49 134/78 140/64   Pulse: 76 77 91 78   Resp: 16 18 18 18   Temp: 36.7 °C (98 °F) 36.9 °C (98.5 °F) 36.2 °C (97.2 °F) 37 °C (98.6 °F)   SpO2: 93% 94% 91% 95%   Weight:       Height:         Weight/BMI: Body mass index is 27.07 kg/m².  Pulse Oximetry: 95 %, O2 (LPM): 0, O2 Delivery: None (Room Air)     Constitutional: She is oriented to person, place, and time.   HENT:   Head: Normocephalic.   Eyes: Pupils are equal, round, and reactive to light. Right eye exhibits no discharge. Left eye exhibits no discharge.   Neck: Neck supple. No JVD present.   Cardiovascular: Normal rate  and regular rhythm.    Pulmonary/Chest: Effort normal and breath sounds normal. No respiratory distress.   Abdominal: Soft. Bowel sounds are normal. She exhibits no distension. There is no tenderness.   Musculoskeletal: She exhibits no edema.   Moves bilateral upper extremities freely.   Neurological: She is alert and oriented to person, place, and time. No cranial nerve deficit.   Finger to nose within normal limit.  Babinski testing is negative.   Skin: Skin is warm and dry.     PROCEDURES:  NONE    RADIOLOGY:  MR-BRAIN-W/O   Final Result      1.  Focal signal abnormality in the RIGHT deep frontal white matter is likely an area of subacute ischemia rather than an area of acute ischemia or demyelination   2.  Atrophy   3.  White matter changes   4.  Motion degraded study   5.  Remote LEFT basal ganglia CVA      CT-CTA NECK WITH & W/O-POST PROCESSING   Final Result      1.  Moderate right internal carotid artery stenosis of 50-70%      2.  Mild left internal carotid artery stenosis of less than 50%      3.  Patent vertebral arteries      4.  Diffuse atherosclerotic plaque      5.  Mild stenosis at the aortic arch origins of the left common carotid artery and left subclavian artery      CT-CTA HEAD WITH & W/O-POST PROCESS   Final Result      1.  CT angiogram of the [place] within normal limits.      2.  Diffuse atherosclerotic plaque of the carotid and vertebral system      3.  Underlying brain white matter changes, old lacunar infarct, and volume loss      CT-HEAD W/O   Final Result      1.  No acute intracranial abnormality identified.      2.  White matter changes, cerebral volume loss, and extensive atherosclerotic plaque          DISCHARGE LABS:    Recent Labs      09/24/17   0513  09/25/17   0509  09/26/17   0508   WBC  4.7*  6.4  6.2   RBC  3.58*  3.53*  3.55*   HEMOGLOBIN  12.6  12.3  12.4   HEMATOCRIT  37.6  36.9*  37.4   MCV  105.0*  104.5*  105.4*   MCH  35.2*  34.8*  34.9*   RDW  52.5*  52.4*   53.4*   PLATELETCT  204  215  223   MPV  11.3  11.4  11.3   NEUTSPOLYS   --   58.10  56.20   LYMPHOCYTES   --   21.10*  23.50   MONOCYTES   --   11.10  9.70   EOSINOPHILS   --   8.40*  9.50*   BASOPHILS   --   0.80  0.60     Lab Results   Component Value Date    OELUZZKN41 792 09/24/2017    PCKWYGTN23 563 10/11/2015    FOLATE 14.5 09/24/2017    FOLATE 10.3 10/11/2015       Estimated GFR/CRCL = Estimated Creatinine Clearance: 28.9 mL/min (by C-G formula based on SCr of 1.3 mg/dL).  Recent Labs      09/24/17   0513  09/24/17   0924  09/25/17   0509  09/26/17   0507   SODIUM  138   --   137  135   POTASSIUM  3.5*   --   4.0  3.9   CHLORIDE  111   --   108  108   CO2  20   --   23  19*   BUN  15   --   12  17   CREATININE  0.97   --   1.17  1.30   CALCIUM  8.0*   --   8.6  8.7   MAGNESIUM   --   1.9   --    --        No results for input(s): ALTSGPT, ASTSGOT, ALKPHOSPHAT, TBILIRUBIN, DBILIRUBIN, GAMMAGT, LIPASE, ALBUMIN, GLOBULIN, PREALBUMIN, INR, MACROCYTOSIS in the last 72 hours.    @PNLABRCNT(GLUCOSE:3,POCGLUCOSE:3)  )  Lab Results   Component Value Date    HBA1C 5.8 (H) 09/23/2017    HBA1C 6.0 (H) 09/10/2017    HBA1C 5.7 (H) 01/31/2015    FREET4 0.87 03/27/2015       DISCHARGE MEDICATIONS:  (X)  Medication Reconciliation Completed   Madeleine Zambrano   Home Medication Instructions ALAN:[id number]    Printed on:09/26/17 4767   Medication Information                      amlodipine (NORVASC) 5 MG Tab  Take 1 Tab by mouth every day.             aspirin EC (ECOTRIN) 81 MG Tablet Delayed Response  Take 1 Tab by mouth every day.             atorvastatin (LIPITOR) 20 MG Tab  Take 1 Tab by mouth every bedtime.             clopidogrel (PLAVIX) 75 MG Tab  Take 1 Tab by mouth every day.             heparin 5000 UNIT/ML Solution  Inject 1 mL as instructed every 8 hours.             lisinopril (PRINIVIL) 10 MG Tab  Take 1 Tab by mouth every day.             metoprolol (LOPRESSOR) 25 MG Tab  Take 0.5 Tabs by mouth 2 Times a  Day.             ondansetron (ZOFRAN ODT) 4 MG TABLET DISPERSIBLE  Take 1 Tab by mouth every four hours as needed for Nausea/Vomiting (give PO if IV route is unavailable. May give per feeding tube.).             tizanidine (ZANAFLEX) 4 MG Tab  Take 1 Tab by mouth every 6 hours as needed (Muscle spasms of the neck).                 INSTRUCTIONS:   The patient was instructed to return to the ER in the event of worsening symptoms. I have counseled the patient on the importance of compliance and the patient has agreed to proceed with all medical recommendations and follow up plan indicated above.   The patient understands that all medications come with benefits and risks. Risks may include permanent injury or death and these risks can be minimized with close reassessment and monitoring.        Follow up appointment details :     F/U with PCP in one week  F/U with SNF attending upon arrival    PENDING STUDIES:  NONE    Primary Care Provider: Ric Garcia MD      Time spent on discharge day patient visit, preparing discharge paperwork and arranging for patient follow up 46 mins.

## 2017-10-17 ENCOUNTER — TELEPHONE (OUTPATIENT)
Dept: MEDICAL GROUP | Facility: MEDICAL CENTER | Age: 82
End: 2017-10-17

## 2017-10-17 DIAGNOSIS — M54.5 LOW BACK PAIN, UNSPECIFIED BACK PAIN LATERALITY, UNSPECIFIED CHRONICITY, WITH SCIATICA PRESENCE UNSPECIFIED: ICD-10-CM

## 2017-10-17 NOTE — TELEPHONE ENCOUNTER
1. Caller Name: Madeleine                                         Call Back Number: [phone]      Patient approves a detailed voicemail message: N\A    2. SPECIFIC Action To Be Taken: Referral pending, please sign.    3. Diagnosis/Clinical Reason for Request: Low Back Pain    4. Specialty & Provider Name/Lab/Imaging Location: Pain Management    5. Is appointment scheduled for requested order/referral: no    Patient informed they will receive a return phone call from the office ONLY if there are any questions before processing their request. Advised to call back if they haven't received a call from the referral department in 5 days.

## 2017-11-07 ENCOUNTER — TELEPHONE (OUTPATIENT)
Dept: VASCULAR LAB | Facility: MEDICAL CENTER | Age: 82
End: 2017-11-07

## 2017-11-07 NOTE — TELEPHONE ENCOUNTER
Patient missed appointment today for followup.  Will ask MA to call and reschedule.  Await further patient contact.    Michael J. Bloch, MD  [facility]

## 2017-11-15 ENCOUNTER — PATIENT OUTREACH (OUTPATIENT)
Dept: HEALTH INFORMATION MANAGEMENT | Facility: OTHER | Age: 82
End: 2017-11-15

## 2017-11-16 NOTE — PROGRESS NOTES
Outcome: Left Message    Please transfer to Patient Outreach Team at [phone] when patient returns call.    Attempt # 2

## 2017-11-17 NOTE — PROGRESS NOTES
Attempt #:1    WebIZ Checked & Epic Updated: Yes  · WebIZ Recommendations: FLU, PNEUMOVAX (PPSV23) and TDAP  · Is patient due for Tdap? YES. Patient was not notified of copay/out of pocket cost.  · Is patient due for Shingles? NO  [facility] Verified: yes  Verify PCP: yes    Communication Preference Obtained: yes     Review Care Team: yes    Annual Wellness Visit Scheduling  1. Scheduling Status:Scheduled     Care Gap Scheduling (Attempt to Schedule EACH Overdue Care Gap!)     Health Maintenance Due   Topic Date Due   • IMM DTaP/Tdap/Td Vaccine (1 - Tdap) 02/01/1952   • IMM PNEUMOCOCCAL 65+ (ADULT) LOW/MEDIUM RISK SERIES (2 of 2 - PCV13) 06/05/2013   • BONE DENSITY  04/12/2017   • Annual Wellness Visit  08/30/2017   • IMM INFLUENZA (1) 09/01/2017        Scheduled patient for Annual Wellness VisitPatient prefers to discuss Immunizations: FLU, PNEUMOVAX (PPSV23) and TDAP with PCP.      Finco Activation: declined  Finco Kwabena: no  Virtual Visits: no  Opt In to Text Messages: no

## 2017-11-20 DIAGNOSIS — I10 HTN (HYPERTENSION), BENIGN: ICD-10-CM

## 2017-11-20 RX ORDER — LISINOPRIL 10 MG/1
10 TABLET ORAL DAILY
Qty: 90 TAB | Refills: 0 | Status: SHIPPED | OUTPATIENT
Start: 2017-11-20 | End: 2018-03-28

## 2018-01-07 ENCOUNTER — HOSPITAL ENCOUNTER (OUTPATIENT)
Facility: MEDICAL CENTER | Age: 83
End: 2018-01-08
Attending: EMERGENCY MEDICINE | Admitting: HOSPITALIST
Payer: MEDICARE

## 2018-01-07 ENCOUNTER — RESOLUTE PROFESSIONAL BILLING HOSPITAL PROF FEE (OUTPATIENT)
Dept: HOSPITALIST | Facility: MEDICAL CENTER | Age: 83
End: 2018-01-07
Payer: MEDICARE

## 2018-01-07 ENCOUNTER — APPOINTMENT (OUTPATIENT)
Dept: RADIOLOGY | Facility: MEDICAL CENTER | Age: 83
End: 2018-01-07
Attending: EMERGENCY MEDICINE
Payer: MEDICARE

## 2018-01-07 DIAGNOSIS — I48.0 PAROXYSMAL ATRIAL FIBRILLATION (HCC): ICD-10-CM

## 2018-01-07 DIAGNOSIS — R07.9 CHEST PAIN, UNSPECIFIED TYPE: ICD-10-CM

## 2018-01-07 LAB
ANION GAP SERPL CALC-SCNC: 13 MMOL/L (ref 0–11.9)
BASOPHILS # BLD AUTO: 1.5 % (ref 0–1.8)
BASOPHILS # BLD: 0.07 K/UL (ref 0–0.12)
BUN SERPL-MCNC: 16 MG/DL (ref 8–22)
CALCIUM SERPL-MCNC: 8.9 MG/DL (ref 8.5–10.5)
CHLORIDE SERPL-SCNC: 111 MMOL/L (ref 96–112)
CO2 SERPL-SCNC: 20 MMOL/L (ref 20–33)
CREAT SERPL-MCNC: 1.11 MG/DL (ref 0.5–1.4)
EOSINOPHIL # BLD AUTO: 0.28 K/UL (ref 0–0.51)
EOSINOPHIL NFR BLD: 6 % (ref 0–6.9)
ERYTHROCYTE [DISTWIDTH] IN BLOOD BY AUTOMATED COUNT: 52.8 FL (ref 35.9–50)
GFR SERPL CREATININE-BSD FRML MDRD: 47 ML/MIN/1.73 M 2
GLUCOSE SERPL-MCNC: 143 MG/DL (ref 65–99)
HCT VFR BLD AUTO: 41.5 % (ref 37–47)
HGB BLD-MCNC: 13.9 G/DL (ref 12–16)
IMM GRANULOCYTES # BLD AUTO: 0.02 K/UL (ref 0–0.11)
IMM GRANULOCYTES NFR BLD AUTO: 0.4 % (ref 0–0.9)
LYMPHOCYTES # BLD AUTO: 1.73 K/UL (ref 1–4.8)
LYMPHOCYTES NFR BLD: 36.9 % (ref 22–41)
MAGNESIUM SERPL-MCNC: 1.9 MG/DL (ref 1.5–2.5)
MCH RBC QN AUTO: 34.2 PG (ref 27–33)
MCHC RBC AUTO-ENTMCNC: 33.5 G/DL (ref 33.6–35)
MCV RBC AUTO: 102 FL (ref 81.4–97.8)
MONOCYTES # BLD AUTO: 0.48 K/UL (ref 0–0.85)
MONOCYTES NFR BLD AUTO: 10.2 % (ref 0–13.4)
NEUTROPHILS # BLD AUTO: 2.11 K/UL (ref 2–7.15)
NEUTROPHILS NFR BLD: 45 % (ref 44–72)
NRBC # BLD AUTO: 0 K/UL
NRBC BLD-RTO: 0 /100 WBC
PLATELET # BLD AUTO: 240 K/UL (ref 164–446)
PMV BLD AUTO: 10.8 FL (ref 9–12.9)
POTASSIUM SERPL-SCNC: 3 MMOL/L (ref 3.6–5.5)
RBC # BLD AUTO: 4.07 M/UL (ref 4.2–5.4)
SODIUM SERPL-SCNC: 144 MMOL/L (ref 135–145)
TROPONIN I SERPL-MCNC: 0.01 NG/ML (ref 0–0.04)
WBC # BLD AUTO: 4.7 K/UL (ref 4.8–10.8)

## 2018-01-07 PROCEDURE — 99285 EMERGENCY DEPT VISIT HI MDM: CPT

## 2018-01-07 PROCEDURE — 83735 ASSAY OF MAGNESIUM: CPT

## 2018-01-07 PROCEDURE — 80048 BASIC METABOLIC PNL TOTAL CA: CPT

## 2018-01-07 PROCEDURE — 85379 FIBRIN DEGRADATION QUANT: CPT

## 2018-01-07 PROCEDURE — 85025 COMPLETE CBC W/AUTO DIFF WBC: CPT

## 2018-01-07 PROCEDURE — G0378 HOSPITAL OBSERVATION PER HR: HCPCS

## 2018-01-07 PROCEDURE — 84484 ASSAY OF TROPONIN QUANT: CPT

## 2018-01-07 PROCEDURE — 84443 ASSAY THYROID STIM HORMONE: CPT

## 2018-01-07 PROCEDURE — 83880 ASSAY OF NATRIURETIC PEPTIDE: CPT

## 2018-01-07 PROCEDURE — 93005 ELECTROCARDIOGRAM TRACING: CPT | Performed by: EMERGENCY MEDICINE

## 2018-01-07 PROCEDURE — 71045 X-RAY EXAM CHEST 1 VIEW: CPT

## 2018-01-07 PROCEDURE — 94760 N-INVAS EAR/PLS OXIMETRY 1: CPT

## 2018-01-07 RX ORDER — ONDANSETRON 2 MG/ML
4 INJECTION INTRAMUSCULAR; INTRAVENOUS EVERY 4 HOURS PRN
Status: DISCONTINUED | OUTPATIENT
Start: 2018-01-07 | End: 2018-01-08 | Stop reason: HOSPADM

## 2018-01-07 RX ORDER — ATORVASTATIN CALCIUM 20 MG/1
40 TABLET, FILM COATED ORAL
Status: DISCONTINUED | OUTPATIENT
Start: 2018-01-08 | End: 2018-01-08

## 2018-01-07 RX ORDER — HEPARIN SODIUM 5000 [USP'U]/ML
5000 INJECTION, SOLUTION INTRAVENOUS; SUBCUTANEOUS EVERY 8 HOURS
Status: DISCONTINUED | OUTPATIENT
Start: 2018-01-08 | End: 2018-01-08

## 2018-01-07 RX ORDER — ENALAPRILAT 1.25 MG/ML
1.25 INJECTION INTRAVENOUS EVERY 6 HOURS PRN
Status: DISCONTINUED | OUTPATIENT
Start: 2018-01-07 | End: 2018-01-08 | Stop reason: HOSPADM

## 2018-01-07 RX ORDER — CLOPIDOGREL BISULFATE 75 MG/1
75 TABLET ORAL
Status: DISCONTINUED | OUTPATIENT
Start: 2018-01-08 | End: 2018-01-08

## 2018-01-07 RX ORDER — ACETAMINOPHEN 325 MG/1
650 TABLET ORAL EVERY 6 HOURS PRN
Status: DISCONTINUED | OUTPATIENT
Start: 2018-01-07 | End: 2018-01-08 | Stop reason: HOSPADM

## 2018-01-07 RX ORDER — LISINOPRIL 10 MG/1
10 TABLET ORAL DAILY
Status: DISCONTINUED | OUTPATIENT
Start: 2018-01-08 | End: 2018-01-08 | Stop reason: HOSPADM

## 2018-01-07 RX ORDER — POTASSIUM CHLORIDE 20 MEQ/1
40 TABLET, EXTENDED RELEASE ORAL 3 TIMES DAILY
Status: COMPLETED | OUTPATIENT
Start: 2018-01-08 | End: 2018-01-08

## 2018-01-07 RX ORDER — ONDANSETRON 4 MG/1
4 TABLET, ORALLY DISINTEGRATING ORAL EVERY 4 HOURS PRN
Status: DISCONTINUED | OUTPATIENT
Start: 2018-01-07 | End: 2018-01-08 | Stop reason: HOSPADM

## 2018-01-07 RX ORDER — AMLODIPINE BESYLATE 5 MG/1
5 TABLET ORAL DAILY
Status: DISCONTINUED | OUTPATIENT
Start: 2018-01-08 | End: 2018-01-08 | Stop reason: HOSPADM

## 2018-01-07 RX ORDER — TIZANIDINE 4 MG/1
4 TABLET ORAL EVERY 6 HOURS PRN
Status: DISCONTINUED | OUTPATIENT
Start: 2018-01-07 | End: 2018-01-08

## 2018-01-08 VITALS
SYSTOLIC BLOOD PRESSURE: 141 MMHG | HEIGHT: 63 IN | HEART RATE: 73 BPM | WEIGHT: 139.33 LBS | BODY MASS INDEX: 24.69 KG/M2 | RESPIRATION RATE: 16 BRPM | DIASTOLIC BLOOD PRESSURE: 57 MMHG | TEMPERATURE: 97.6 F | OXYGEN SATURATION: 95 %

## 2018-01-08 PROBLEM — R07.9 CHEST PAIN: Status: RESOLVED | Noted: 2018-01-07 | Resolved: 2018-01-08

## 2018-01-08 LAB
BNP SERPL-MCNC: 201 PG/ML (ref 0–100)
CHOLEST SERPL-MCNC: 156 MG/DL (ref 100–199)
DEPRECATED D DIMER PPP IA-ACNC: 396 NG/ML(D-DU)
EKG IMPRESSION: NORMAL
HDLC SERPL-MCNC: 62 MG/DL
LDLC SERPL CALC-MCNC: 82 MG/DL
MAGNESIUM SERPL-MCNC: 1.9 MG/DL (ref 1.5–2.5)
TRIGL SERPL-MCNC: 62 MG/DL (ref 0–149)
TROPONIN I SERPL-MCNC: 0.03 NG/ML (ref 0–0.04)
TSH SERPL DL<=0.005 MIU/L-ACNC: 2.73 UIU/ML (ref 0.38–5.33)

## 2018-01-08 PROCEDURE — G0378 HOSPITAL OBSERVATION PER HR: HCPCS

## 2018-01-08 PROCEDURE — 93005 ELECTROCARDIOGRAM TRACING: CPT | Performed by: HOSPITALIST

## 2018-01-08 PROCEDURE — 93010 ELECTROCARDIOGRAM REPORT: CPT | Performed by: INTERNAL MEDICINE

## 2018-01-08 PROCEDURE — 80061 LIPID PANEL: CPT

## 2018-01-08 PROCEDURE — A9270 NON-COVERED ITEM OR SERVICE: HCPCS | Performed by: HOSPITALIST

## 2018-01-08 PROCEDURE — 99236 HOSP IP/OBS SAME DATE HI 85: CPT | Performed by: HOSPITALIST

## 2018-01-08 PROCEDURE — 700102 HCHG RX REV CODE 250 W/ 637 OVERRIDE(OP): Performed by: HOSPITALIST

## 2018-01-08 PROCEDURE — 84484 ASSAY OF TROPONIN QUANT: CPT

## 2018-01-08 PROCEDURE — 36415 COLL VENOUS BLD VENIPUNCTURE: CPT

## 2018-01-08 RX ORDER — POTASSIUM CHLORIDE 20 MEQ/1
40 TABLET, EXTENDED RELEASE ORAL ONCE
Status: DISCONTINUED | OUTPATIENT
Start: 2018-01-08 | End: 2018-01-08 | Stop reason: HOSPADM

## 2018-01-08 RX ORDER — CLOPIDOGREL BISULFATE 75 MG/1
75 TABLET ORAL DAILY
Status: DISCONTINUED | OUTPATIENT
Start: 2018-01-08 | End: 2018-01-08 | Stop reason: HOSPADM

## 2018-01-08 RX ADMIN — POTASSIUM CHLORIDE 40 MEQ: 1500 TABLET, EXTENDED RELEASE ORAL at 01:39

## 2018-01-08 RX ADMIN — LISINOPRIL 10 MG: 10 TABLET ORAL at 10:17

## 2018-01-08 RX ADMIN — POTASSIUM CHLORIDE 40 MEQ: 1500 TABLET, EXTENDED RELEASE ORAL at 10:17

## 2018-01-08 RX ADMIN — AMLODIPINE BESYLATE 5 MG: 5 TABLET ORAL at 10:17

## 2018-01-08 RX ADMIN — APIXABAN 5 MG: 5 TABLET, FILM COATED ORAL at 10:17

## 2018-01-08 RX ADMIN — APIXABAN 5 MG: 5 TABLET, FILM COATED ORAL at 01:39

## 2018-01-08 ASSESSMENT — ENCOUNTER SYMPTOMS
CHILLS: 0
SHORTNESS OF BREATH: 1
VOMITING: 0
ABDOMINAL PAIN: 0
COUGH: 0
DEPRESSION: 0
DIARRHEA: 0
PND: 0
HEADACHES: 0
NAUSEA: 0
SHORTNESS OF BREATH: 0
SENSORY CHANGE: 0
SPEECH CHANGE: 0
NECK PAIN: 1
DIZZINESS: 0
BLURRED VISION: 1
CLAUDICATION: 0
PALPITATIONS: 0
FEVER: 0
ORTHOPNEA: 0

## 2018-01-08 ASSESSMENT — LIFESTYLE VARIABLES
TOTAL SCORE: 0
HAVE YOU EVER FELT YOU SHOULD CUT DOWN ON YOUR DRINKING: NO
EVER FELT BAD OR GUILTY ABOUT YOUR DRINKING: NO
HOW MANY TIMES IN THE PAST YEAR HAVE YOU HAD 5 OR MORE DRINKS IN A DAY: 0
EVER_SMOKED: YES
HAVE PEOPLE ANNOYED YOU BY CRITICIZING YOUR DRINKING: NO
CONSUMPTION TOTAL: NEGATIVE
EVER HAD A DRINK FIRST THING IN THE MORNING TO STEADY YOUR NERVES TO GET RID OF A HANGOVER: NO
AVERAGE NUMBER OF DAYS PER WEEK YOU HAVE A DRINK CONTAINING ALCOHOL: 7
TOTAL SCORE: 0
DO YOU DRINK ALCOHOL: YES
TOTAL SCORE: 0
ON A TYPICAL DAY WHEN YOU DRINK ALCOHOL HOW MANY DRINKS DO YOU HAVE: 1

## 2018-01-08 ASSESSMENT — CHA2DS2 SCORE
VASCULAR DISEASE: YES
CHF OR LEFT VENTRICULAR DYSFUNCTION: NO
CHA2DS2 VASC SCORE: 5
PRIOR STROKE OR TIA OR THROMBOEMBOLISM: NO
DIABETES: NO
AGE 65 TO 74: NO
SEX: FEMALE
HYPERTENSION: YES
AGE 75 OR GREATER: YES

## 2018-01-08 ASSESSMENT — COPD QUESTIONNAIRES
HAVE YOU SMOKED AT LEAST 100 CIGARETTES IN YOUR ENTIRE LIFE: YES
DO YOU EVER COUGH UP ANY MUCUS OR PHLEGM?: NO/ONLY WITH OCCASIONAL COLDS OR INFECTIONS
DURING THE PAST 4 WEEKS HOW MUCH DID YOU FEEL SHORT OF BREATH: NONE/LITTLE OF THE TIME
COPD SCREENING SCORE: 4

## 2018-01-08 ASSESSMENT — PATIENT HEALTH QUESTIONNAIRE - PHQ9
2. FEELING DOWN, DEPRESSED, IRRITABLE, OR HOPELESS: NOT AT ALL
SUM OF ALL RESPONSES TO PHQ9 QUESTIONS 1 AND 2: 0
1. LITTLE INTEREST OR PLEASURE IN DOING THINGS: NOT AT ALL
SUM OF ALL RESPONSES TO PHQ QUESTIONS 1-9: 0

## 2018-01-08 ASSESSMENT — PAIN SCALES - GENERAL
PAINLEVEL_OUTOF10: 0
PAINLEVEL_OUTOF10: 0

## 2018-01-08 NOTE — ED NOTES
Pt BIB EMS for for bilateral arm pain. When EMS arrived pt was HTN with SBP in the 200s and in afib rate 120s. Pt was given 324 mg of asa. Pt stated the pain felt the same as her previous heart attacks. In route pt converted to SR and BP improved and stated that her resolved. HX of HTN, angina, possible MI, and a stroke in sept of 2017. FSBG 165.

## 2018-01-08 NOTE — DISCHARGE INSTRUCTIONS
Discharge Instructions    Discharged to home by car with relative. Discharged via wheelchair, hospital escort: Yes.  Special equipment needed: Not Applicable    Be sure to schedule a follow-up appointment with your primary care doctor or any specialists as instructed.     Discharge Plan:   Diet Plan: Discussed  Activity Level: Discussed  Smoking Cessation Offered: Patient Refused  Confirmed Follow up Appointment: Appointment Scheduled  Confirmed Symptoms Management: Discussed  Medication Reconciliation Updated: Yes  Influenza Vaccine Indication: Patient Refuses    I understand that a diet low in cholesterol, fat, and sodium is recommended for good health. Unless I have been given specific instructions below for another diet, I accept this instruction as my diet prescription.   Other diet: Heart Healthy     Special Instructions: None    · Is patient discharged on Warfarin / Coumadin?   No     · Is patient Post Blood Transfusion?  No    Depression / Suicide Risk    As you are discharged from this [facility] facility, it is important to learn how to keep safe from harming yourself.    Recognize the warning signs:  · Abrupt changes in personality, positive or negative- including increase in energy   · Giving away possessions  · Change in eating patterns- significant weight changes-  positive or negative  · Change in sleeping patterns- unable to sleep or sleeping all the time   · Unwillingness or inability to communicate  · Depression  · Unusual sadness, discouragement and loneliness  · Talk of wanting to die  · Neglect of personal appearance   · Rebelliousness- reckless behavior  · Withdrawal from people/activities they love  · Confusion- inability to concentrate     If you or a loved one observes any of these behaviors or has concerns about self-harm, here's what you can do:  · Talk about it- your feelings and reasons for harming yourself  · Remove any means that you might use to hurt yourself (examples: pills,  rope, extension cords, firearm)  · Get professional help from the community (Mental Health, Substance Abuse, psychological counseling)  · Do not be alone:Call your Safe Contact- someone whom you trust who will be there for you.  · Call your local CRISIS HOTLINE 566-3148 or 220-252-7982  · Call your local Children's Mobile Crisis Response Team [place] (385) 849-8526 or www.2theloo  · Call the toll free National Suicide Prevention Hotlines   · National Suicide Prevention Lifeline 101-823-CXRR (4524)  · National Hope Line Network 800-SUICIDE (942-1762)        Chest Wall Pain  Chest wall pain is pain felt in or around the chest bones and muscles. It may take up to 6 weeks to get better. It may take longer if you are active. Chest wall pain can happen on its own. Other times, things like germs, injury, coughing, or exercise can cause the pain.  HOME CARE   · Avoid activities that make you tired or cause pain. Try not to use your chest, belly (abdominal), or side muscles. Do not use heavy weights.  · Put ice on the sore area.  ¨ Put ice in a plastic bag.  ¨ Place a towel between your skin and the bag.  ¨ Leave the ice on for 15-20 minutes for the first 2 days.  · Only take medicine as told by your doctor.  GET HELP RIGHT AWAY IF:   · You have more pain or are very uncomfortable.  · You have a fever.  · Your chest pain gets worse.  · You have new problems.  · You feel sick to your stomach (nauseous) or throw up (vomit).  · You start to sweat or feel lightheaded.  · You have a cough with mucus (phlegm).  · You cough up blood.  MAKE SURE YOU:   · Understand these instructions.  · Will watch your condition.  · Will get help right away if you are not doing well or get worse.     This information is not intended to replace advice given to you by your health care provider. Make sure you discuss any questions you have with your health care provider.     Document Released: 06/05/2009 Document Revised: 03/11/2013  Document Reviewed: 03/14/2016  [employer] Interactive Patient Education ©2016 Elsevier Inc.      Clopidogrel tablets  What is this medicine?  CLOPIDOGREL (kloh PID oh grel) helps to prevent blood clots. This medicine is used to prevent heart attack, stroke, or other vascular events in people who are at high risk.  This medicine may be used for other purposes; ask your health care provider or pharmacist if you have questions.  COMMON BRAND NAME(S): Plavix  What should I tell my health care provider before I take this medicine?  They need to know if you have any of the following conditions:  -bleeding disorder  -bleeding in the brain  -planned surgery  -stomach or intestinal ulcers  -stroke or transient ischemic attack  -an unusual or allergic reaction to clopidogrel, other medicines, foods, dyes, or preservatives  -pregnant or trying to get pregnant  -breast-feeding  How should I use this medicine?  Take this medicine by mouth with a drink of water. Follow the directions on the prescription label. You may take this medicine with or without food. Take your medicine at regular intervals. Do not take your medicine more often than directed.  Talk to your pediatrician regarding the use of this medicine in children. Special care may be needed.  Overdosage: If you think you have taken too much of this medicine contact a poison control center or emergency room at once.  NOTE: This medicine is only for you. Do not share this medicine with others.  What if I miss a dose?  If you miss a dose, take it as soon as you can. If it is almost time for your next dose, take only that dose. Do not take double or extra doses.  What may interact with this medicine?  -aspirin  -blood thinners like cilostazol, enoxaparin, ticlopidine, and warfarin  -certain medicines for depression like citalopram, fluoxetine, and fluvoxamine  -certain medicines for fungal infections like ketoconazole, fluconazole, and voriconazole  -certain medicines for HIV  infection like delavirdine, efavirenz, and etravirine  -certain medicines for seizures like felbamate, oxcarbazepine, and phenytoin  -chloramphenicol  -fluvastatin  -isoniazid, INH  -medicines for inflammation like ibuprofen and naproxen  -modafinil  -nicardipine  -over-the counter supplements like echinacea, feverfew, fish oil, garlic, chaya, ginkgo, green tea, horse chestnut  -quinine  -stomach acid blockers like cimetidine, omeprazole, and esomeprazole  -tamoxifen  -tolbutamide  -topiramate  -torsemide  This list may not describe all possible interactions. Give your health care provider a list of all the medicines, herbs, non-prescription drugs, or dietary supplements you use. Also tell them if you smoke, drink alcohol, or use illegal drugs. Some items may interact with your medicine.  What should I watch for while using this medicine?  Visit your doctor or health care professional for regular check ups. Do not stop taking your medicine unless your doctor tells you to.  If you are going to have surgery or dental work, tell your doctor or health care professional that you are taking this medicine.  Certain genetic factors may reduce the effect of this medicine. Your doctor may use genetic tests to determine treatment.  What side effects may I notice from receiving this medicine?  Side effects that you should report to your doctor or health care professional as soon as possible:  -allergic reactions like skin rash, itching or hives, swelling of the face, lips, or tongue  -black, tarry stools  -blood in urine or vomit  -breathing problems  -changes in vision  -fever  -sudden weakness  -unusual bleeding or bruising  Side effects that usually do not require medical attention (report to your doctor or health care professional if they continue or are bothersome):  -constipation or diarrhea  -headache  -pain in back or joints  -stomach upset  This list may not describe all possible side effects. Call your doctor for  medical advice about side effects. You may report side effects to FDA at 2-047-HYA-8913.  Where should I keep my medicine?  Keep out of the reach of children.  Store at room temperature of 59 to 86 degrees F (15 to 30 degrees C). Throw away any unused medicine after the expiration date.  NOTE: This sheet is a summary. It may not cover all possible information. If you have questions about this medicine, talk to your doctor, pharmacist, or health care provider.  © 2014, Elsevier/Gold Standard. (6/8/2010 9:41:49 AM)    Apixaban oral tablets  What is this medicine?  APIXABAN is an anticoagulant (blood thinner). It is used to lower the chance of stroke in people with a medical condition called atrial fibrillation. It is also used after knee or hip surgeries to prevent blood clots.  This medicine may be used for other purposes; ask your health care provider or pharmacist if you have questions.  COMMON BRAND NAME(S): Nildafemi  What should I tell my health care provider before I take this medicine?  They need to know if you have any of these conditions:  -bleeding disorders  -bleeding in the brain  -blood in your stools (black or tarry stools) or if you have blood in your vomit  -history of stomach bleeding  -kidney disease  -liver disease  -mechanical heart valve  -an unusual or allergic reaction to apixaban, other medicines, foods, dyes, or preservatives  -pregnant or trying to get pregnant  -breast-feeding  How should I use this medicine?  Take this medicine by mouth with a glass of water. Follow the directions on the prescription label. You can take it with or without food. If it upsets your stomach, take it with food. Take your medicine at regular intervals. Do not take it more often than directed. Do not stop taking except on your doctor's advice.  Talk to your pediatrician regarding the use of this medicine in children. Special care may be needed.  Overdosage: If you think you've taken too much of this medicine  contact a poison control center or emergency room at once.  Overdosage: If you think you have taken too much of this medicine contact a poison control center or emergency room at once.  NOTE: This medicine is only for you. Do not share this medicine with others.  What if I miss a dose?  If you miss a dose, take it as soon as you can. If it is almost time for your next dose, take only that dose. Do not take double or extra doses.  What may interact with this medicine?  This medicine may interact with the following:  -aspirin and aspirin-like medicines  -certain medicines for fungal infections like ketoconazole and itraconazole  -certain medicines for seizures like carbamazepine and phenytoin  -certain medicines that treat or prevent blood clots like warfarin, enoxaparin, and dalteparin  -clarithromycin  -NSAIDs, medicines for pain and inflammation, like ibuprofen or naproxen  -rifampin  -ritonavir  -[place]'s wort  This list may not describe all possible interactions. Give your health care provider a list of all the medicines, herbs, non-prescription drugs, or dietary supplements you use. Also tell them if you smoke, drink alcohol, or use illegal drugs. Some items may interact with your medicine.  What should I watch for while using this medicine?  Do not stop taking this medicine without first talking to your doctor. Stopping this medicine may increase your risk of having a stroke. Be sure to refill your prescription before you run out of medicine.  This medicine may increase your risk to bruise or bleed. Call your doctor or health care professional if you notice any unusual bleeding.  Be careful brushing and flossing your teeth or using a toothpick because you may bleed more easily. If you have any dental work done, tell your dentist you are receiving this medicine.  What side effects may I notice from receiving this medicine?  Side effects that you should report to your doctor or health care professional as soon  as possible:  -allergic reactions like skin rash, itching or hives, swelling of the face, lips, or tongue  -bloody or black, tarry stools  -changes in vision  -confusion, trouble speaking or understanding  -red or dark-brown urine  -red spots on the skin  -severe headaches  -spitting up blood or brown material that looks like coffee grounds  -sudden numbness or weakness of the face, arm or leg  -trouble walking, dizziness, loss of balance or coordination  -unusual bruising or bleeding from the eye, gums, or nose  This list may not describe all possible side effects. Call your doctor for medical advice about side effects. You may report side effects to FDA at 9-393-IKR-3271.  Where should I keep my medicine?  Keep out of the reach of children.  Store at room temperature between 20 and 25 degrees C (68 and 77 degrees F). Throw away any unused medicine after the expiration date.  NOTE: This sheet is a summary. It may not cover all possible information. If you have questions about this medicine, talk to your doctor, pharmacist, or health care provider.  © 2014, Elsevier/Gold Standard. (3/17/2014 3:30:42 PM)

## 2018-01-08 NOTE — CONSULTS
Cardiology New Consult Note    Date of note:    1/8/2018      Consulting Physician: Sabas Martin MD    Patient ID:    Name:   Madeleine Zambrano   YOB: 1933  Age:   84 y.o.  female   MRN:   [id number]      Consult question: Evaluation of arm pain    HPI:  Madeleine Zambrano is an 84-year-old woman with a history of paroxysmal atrial fibrillation, coronary artery disease, mild carotid stenosis, recurrent strokes, hypertension, and dyslipidemia who presents with bilateral arm pain. She strictly denies that she has chest pain. The episode was nonexertional happening just after dinner. She characterizes it as mild in severity, seemingly provoked by dinner. She can identify no other palliating or provoking factors on the history that I took. She tells me that this pain is specifically different in character than the pain that she had in January 2015 at which time she had an inferior myocardial infarction. She has no other cardiovascular complaints. She seemed quite surprised at the medical history of hers, which I summarized.     She was felt by emergency medical services to have been in atrial fibrillation though marching her QRSs her rhythm seems quite regular, and there is quite a bit of artifact. Serial rhythm strips and EKGs in the aftermath all clearly demonstrates sinus rhythm.    ROS: All others reviewed and negative.    PMH (problem list reviewed, pertinent to this consultation):     1. Recurrent strokes: As evidenced by an MRI scan from September, 2017  2. Paroxysmal atrial fibrillation: Atrial fibrillation was clearly demonstrated by her EKG on 4/13/2017 at 08:09 at which time she had fairly marked diffuse ST changes  3. History of inferior MI: Diagnosed January 2015 at which time she was found to have occluded right coronary artery. She had balloon angioplasty for that occlusion, with restoration of flow, but a stent was not able to be placed related to the complexity of the  "lesion. She apart from that had held, nonobstructive coronary artery disease (10-20% lesions in her LAD and circumflex)  4. Mild bilateral carotid disease  5. Chart history of renal artery stenosis  6. Former smoker: Quit January 2015 after her MI  7. Hypertension  8. Dyslipidemia    Outpatient Medications:     Amlodipine 5 mg by mouth daily  Aspirin 81 mg by mouth daily  Atorvastatin 20 mg by mouth daily at bedtime  Plavix 75 mg by mouth daily  Lisinopril 10 mg by mouth daily  Metoprolol tartrate 12.5 mg by mouth twice a day  Zofran  Zanaflex    Allergies: Meperidine and Tramadol    Family History: family history includes Cancer in her father; Heart Disease in her mother.    Social History:  reports that she quit smoking about 2 years ago. Her smoking use included Cigarettes. She quit after 0.10 years of use. She has never used smokeless tobacco. She reports that she drinks alcohol. She reports that she does not use drugs.    Physical Exam    Body mass index is 24.45 kg/m².  Pulse 62, temperature 37.7 °C (99.9 °F), resp. rate 16, height 1.6 m (5' 3\"), weight 62.6 kg (138 lb), SpO2 97 %.  Vitals:    01/07/18 2215 01/07/18 2300 01/07/18 2330 01/08/18 0001   Pulse:  64 (!) 54 62   Resp:  19 18 16   Temp:       SpO2:  94% 96% 97%   Weight: 62.6 kg (138 lb)      Height: 1.6 m (5' 3\")        Oxygen Therapy:  Pulse Oximetry: 97 %, O2 (LPM): 0, O2 Delivery: None (Room Air)    General:Pleasant, mildly confused, thin, frail, elderly woman in no distress  HEENT: No jugular venous distension sitting upright, pupils equal, round and reactive to light, extraocular movements intact. Hirsute facies noted.  Heart: Normal rate, regular rhythm, no murmurs, no rubs or gallops  Lungs: Clear to auscultation bilaterally  Abdomen: Bowel sounds positive, non tender, non distended, no masses   Extremities: 1+ bilateral lower extremity edema, no clubbing, no cyanosis  Neurological: Alert and oriented x 3, full exam deferred  Skin: no " "rashes    Labs (reviewed and notable for):     CBC, 2018 is mostly unremarkable with a mild leukopenia  Chemistry panel, 2018 remarkable for creatinine of 1.11, potassium of 3.0, magnesium of 1.9  Cardiac markers: Initial troponin is 0.01 ng/mL, B-type natriuretic peptide is 201 pg/mL    EKG: Her admission EKG shows sinus rhythm, rate 68 bpm, inferior Q waves, mild nonspecific ST and T-wave changes    Her rhythm strip is detailed above with pictures in a separate note from today    Echocardiogram, 2017:  \"CONCLUSIONS  Normal left ventricular systolic function.   No evidence of valvular abnormality based on Doppler evaluation.   Right ventricular systolic pressure is estimated to be 45 mmHg.  Compared to the images of the prior study done in 2015 -  there has   been no significant change.\"    MRI, 2017:  \"1.  Focal signal abnormality in the RIGHT deep frontal white matter is likely an area of subacute ischemia rather than an area of acute ischemia or demyelination  2.  Atrophy  3.  White matter changes  4.  Motion degraded study  5.  Remote LEFT basal ganglia CVA\"    Impression and Medical Decision Makin. Paroxysmal atrial fibrillation: In light of her recurrent strokes and multiple risk factors for future stroke my recommendation is to put her on Elquis (apixaban) dosed at 2.5 mg by mouth twice a day for her age and weight. I do not see any evidence that she was in atrial fibrillation recently, but clearly has this. I would continue her on metoprolol tartrate at her outpatient dose for the same reason. I would control her blood pressure with her current outpatient antihypertensive regimen.    2. 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 Her pain does not sound to be consistent with angina, and I do not think additional workup is necessary after 3 sets of biomarkers. I would not favor a trail of investigation that would potentially lead to " percutaneous coronary intervention, and therefore require dual antiplatelet for therapy for a year on top of her definite need for an oral anticoagulant for stroke prevention with her paroxysmal atrial fibrillation as above.    3. Coronary artery disease: Continue statin, ACE inhibitor, and beta blocker    4. Hypertension: As above continue antihypertensive therapy    5. Dyslipidemia: Continue atorvastatin    Disposition: Again, I would advocate for 3 sets of biomarkers and discharge thereafter if she does not revert into atrial fibrillation, and tolerates anticoagulation well. I did give her my card and recommended strongly follow-up in cardiology clinic including with her children who [place] in the [place].    This note was dictated using Dragon speech recognition software.    Thank you for allowing me to participate in the care of this patient.  Please call if any clarification will be helpful.      Madi Chu MD  Cardiologist, [facility] [place]

## 2018-01-08 NOTE — CARE PLAN
Problem: Safety  Goal: Will remain free from falls    Intervention: Assess risk factors for falls   01/08/18 1233   OTHER   Fall Risk High Risk to Fall - 2 or more points    Mobility Status Assessment 1-1 Healthcare Provider Required for Assistance with Ambulation & Transfer   History of fall 0   Pt Calls for Assistance Yes     Bed alarm on, bed locked in low position, treaded socks on, call light and items within reach. Hourly rounding in place, near nurses station. Assessed mobility and communicated with CNA and patient regarding risk of fall. Assisted to and from the restroom with a walker. Pt able to stand, ambulate, sit back down, get back up, very easily by herself, with walker for balance assistance.         Problem: Infection  Goal: Will remain free from infection    Intervention: Assess signs and symptoms of infection  Assessing closely for s/s of infection. Vitals q4, and additionally as needed. Educated on hand hygiene, and family.  Used scrupulous hand hygiene, and sterile technique when administering IV medications.   Provided lots of eduction on vaccines, especially because pt is refusing these.

## 2018-01-08 NOTE — PROGRESS NOTES
Cardiology Progress Note               Author: Farzad Call Date & Time created: 2018  9:46 AM       Consultation for bilateral arm pain , resolved, trop negative       Admitted with bilateral arm pain, after dinner, nonexertional       History of PAF, CAD (inferior MI in  with occluded RCA status post successful PTCA, not amenable to PCI ), mild carotid stenosis, recurrent CVA (MRI 2017), hypertension, dyslipidemia, former smoker quit after her MI in , renal artery stenosis      Labs reviewed    No BMP today  Potassium on admission 3, repleted  Troponin negative      BP = 142/63  HR = 64 nsr      Echocardiogram 17, LVEF 60, RVSP 40 mmHg    Review of Systems   Respiratory: Negative for cough and shortness of breath.    Cardiovascular: Negative for chest pain, palpitations, orthopnea, claudication, leg swelling and PND.       Physical Exam   Constitutional: She is oriented to person, place, and time.   HENT:   Head: Normocephalic.   Eyes: Conjunctivae are normal.   Neck: No thyromegaly present.   Cardiovascular: Normal rate and regular rhythm.    Pulses:       Carotid pulses are 2+ on the right side, and 2+ on the left side.       Radial pulses are 2+ on the right side, and 2+ on the left side.   Pulmonary/Chest: She has no wheezes.   Abdominal: Soft.   Musculoskeletal: She exhibits no edema.   Neurological: She is alert and oriented to person, place, and time.   Skin: Skin is warm and dry.       Hemodynamics:  Temp (24hrs), Av.8 °C (98.3 °F), Min:36.2 °C (97.1 °F), Max:37.7 °C (99.9 °F)  Temperature: 36.2 °C (97.1 °F)  Pulse  Av.1  Min: 54  Max: 70Heart Rate (Monitored): 61  Blood Pressure : 142/63, NIBP: 154/60     Respiratory:    Respiration: 16, Pulse Oximetry: 96 %           Fluids:     Weight: 63.2 kg (139 lb 5.3 oz)  GI/Nutrition:  Orders Placed This Encounter   Procedures   • DIET NPO     Standing Status:   Standing     Number of Occurrences:   1     Order Specific  Question:   Restrict to:     Answer:   Sips with Medications [3]     Lab Results:  Recent Labs      01/07/18 2228   WBC  4.7*   RBC  4.07*   HEMOGLOBIN  13.9   HEMATOCRIT  41.5   MCV  102.0*   MCH  34.2*   MCHC  33.5*   RDW  52.8*   PLATELETCT  240   MPV  10.8     Recent Labs      01/07/18 2228   SODIUM  144   POTASSIUM  3.0*   CHLORIDE  111   CO2  20   GLUCOSE  143*   BUN  16   CREATININE  1.11   CALCIUM  8.9         Recent Labs      01/07/18 2228   BNPBTYPENAT  201*     Recent Labs      01/07/18 2228  01/08/18   0325   TROPONINI  0.01  0.03   BNPBTYPENAT  201*   --      Recent Labs      01/08/18   0325   TRIGLYCERIDE  62   HDL  62   LDL  82         Medical Decision Making, by Problem:  Active Hospital Problems    Diagnosis   • CAD (coronary artery disease) [I25.10]   • Dyslipidemia [E78.5]   • HTN (hypertension), benign [I10]   • Chest pain [R07.9]   • Stage 3 chronic kidney disease [N18.3]       Assessment/Plan:    Bilateral arm pain, resolved    Troponin negative    EKG on admission normal sinus rhythm, No acute changes    HX of PAF, continue with ELiquis 5 mg BID, will add low dose metoprolol 12.5 mg BID    HX of CAD s/p PTCA RCA, resume Plavix    Will schedule follow up appt with cardiology office in 2 weeks       No further cardiac work up    Well preserved  EF on 9/11/17     Stable for DC             Reviewed items::  Medications reviewed and Labs reviewed

## 2018-01-08 NOTE — DISCHARGE PLANNING
Medical Social Work    SW received call from [facility] and was informed pt's script for Eliquis will be $47 co-pay and will be filled and ready.

## 2018-01-08 NOTE — PROGRESS NOTES
Here is an image of the strip from the field (EMS).        Madi Chu MD  Cardiologist, [facility] [place]

## 2018-01-08 NOTE — CARE PLAN
Problem: Safety  Goal: Will remain free from injury  Outcome: PROGRESSING AS EXPECTED    Intervention: Provide assistance with mobility   01/08/18 0100   OTHER   Assistance / Tolerance Assistance of One     Intervention: Collaborate with Interdisciplinary Team for safe transfer and mobilization techniques   01/08/18 0100   OTHER   Assistive Devices Walker - front wheel       Goal: Will remain free from falls  Outcome: PROGRESSING AS EXPECTED    Intervention: Assess risk factors for falls   01/08/18 0100 01/08/18 0116   OTHER   Fall Risk High Risk to Fall - 2 or more points  --    Risk for Injury-Any positive answers results in the pt being at high risk for fall related injury Not Applicable --    Mobility Status Assessment 1-1 Healthcare Provider Required for Assistance with Ambulation & Transfer --    History of fall --  0   Pt Calls for Assistance Yes --      Intervention: Implement fall precautions   01/08/18 0255   OTHER   Environmental Precautions Treaded Slipper Socks on Patient;Personal Belongings, Wastebasket, Call Bell etc. in Easy Reach;Bed in Low Position;Report Given to Other Health Care Providers Regarding Fall Risk   Bedrails Alternative to Bedrails Used   Chair/Bed Strip Alarm Yes - Alarm On

## 2018-01-08 NOTE — ED PROVIDER NOTES
ED Provider Note    ED Provider Note      Primary care provider: Ric Garcia M.D.    CHIEF COMPLAINT  No chief complaint on file.      HPI  Madeleine Zambrano is a 84 y.o. female who presents to the Emergency Department by EMS for reports of bilateral upper extremity pain. Upon EMS arrival 12-lead EKG showed A. fib with RVR with some elevation in aVR as well as some depression throughout the precordial leads. Patient spontaneously converted to normal sinus rhythm in route to the hospital and reported complete resolution of pain with this. The changes on her 12-lead EKG also resolved with the conversion to sinus rhythm. At present she reports having no pain. She did state when she was having the pain she had some nausea and some diaphoresis. She stated a heavy sensation in her chest denies pain in her chest however was having severe pain that radiated down both of her arms. Denies any neck pain or headache. No altered mental status she does have previous history of hypertension and coronary artery disease she denied history of atrial fibrillation however upon chart review she did have one episode of paroxysmal A. fib while in the hospital here. Patient takes metoprolol and Plavix for previous history of coronary artery disease as well as previous history of TIA. Patient is somewhat slow to respond otherwise appropriate at this time no recent fevers or chills no recent cough no other complaints currently feels somewhat tired but is otherwise asymptomatic.     REVIEW OF SYSTEMS  10 systems reviewed and otherwise negative, pertinent positives and negatives listed in the history of present illness.    PAST MEDICAL HISTORY   has a past medical history of Arthritis; Back pain (3/28/2012); CATARACT; Coronary artery disease; Glaucoma; Hyperlipidemia; Hypertension; MEDICAL HOME (11/07/12); Myocardial infarct; Pain; and TIA (transient ischemic attack) (09/2017).    SURGICAL HISTORY   has a past surgical history that  "includes athroplasty; other orthopedic surgery (1980); cataract phaco with iol (4/13/2011); cataract phaco with iol (4/27/2011); shante by laparoscopy (10/9/2015); and vitrectomy posterior (Right, 4/13/2017).    SOCIAL HISTORY  Social History   Substance Use Topics   • Smoking status: Former Smoker     Years: 0.10     Types: Cigarettes     Quit date: 1/31/2015   • Smokeless tobacco: Never Used   • Alcohol use Yes      Comment: 1 per day      History   Drug Use No       FAMILY HISTORY  Non-Contributory    CURRENT MEDICATIONS  Home Medications    **Home medications have not yet been reviewed for this encounter**         ALLERGIES  Allergies   Allergen Reactions   • Meperidine Vomiting and Nausea   • Tramadol Vomiting and Nausea       PHYSICAL EXAM  VITAL SIGNS: Pulse 70   Temp 37.7 °C (99.9 °F)   Resp 20   Ht 1.6 m (5' 3\")   Wt 62.6 kg (138 lb)   SpO2 98%   BMI 24.45 kg/m²   Pulse ox interpretation: I interpret this pulse ox as normal.  Constitutional: Alert and oriented x 3, no acute Distress  HEENT: Atraumatic normocephalic, pupils are equal round reactive to light extraocular movements are intact. The nares is clear, external ears are normal, mouth shows moist mucous membranes  Neck: Supple, no JVD no tracheal deviation  Cardiovascular: Regular rate and rhythm,   2+ pulses peripherally x4  Thorax & Lungs: No respiratory distress, no wheezes rales or rhonchi, No chest tenderness.   GI: Soft nontender nondistended positive bowel sounds, no peritoneal signs  Skin: Warm dry no acute rash or lesion  Musculoskeletal: Moving all extremities with full range and 5 of 5 strength, no acute  deformity  Neurologic: Cranial nerves III through XII are grossly intact, no sensory deficit, no cerebellar dysfunction   Psychiatric: Appropriate affect for situation at this time      DIAGNOSTIC STUDIES / PROCEDURES  LABS  Results for orders placed or performed during the hospital encounter of 01/07/18   CBC w/ Differential   Result " Value Ref Range    WBC 4.7 (L) 4.8 - 10.8 K/uL    RBC 4.07 (L) 4.20 - 5.40 M/uL    Hemoglobin 13.9 12.0 - 16.0 g/dL    Hematocrit 41.5 37.0 - 47.0 %    .0 (H) 81.4 - 97.8 fL    MCH 34.2 (H) 27.0 - 33.0 pg    MCHC 33.5 (L) 33.6 - 35.0 g/dL    RDW 52.8 (H) 35.9 - 50.0 fL    Platelet Count 240 164 - 446 K/uL    MPV 10.8 9.0 - 12.9 fL    Neutrophils-Polys 45.00 44.00 - 72.00 %    Lymphocytes 36.90 22.00 - 41.00 %    Monocytes 10.20 0.00 - 13.40 %    Eosinophils 6.00 0.00 - 6.90 %    Basophils 1.50 0.00 - 1.80 %    Immature Granulocytes 0.40 0.00 - 0.90 %    Nucleated RBC 0.00 /100 WBC    Neutrophils (Absolute) 2.11 2.00 - 7.15 K/uL    Lymphs (Absolute) 1.73 1.00 - 4.80 K/uL    Monos (Absolute) 0.48 0.00 - 0.85 K/uL    Eos (Absolute) 0.28 0.00 - 0.51 K/uL    Baso (Absolute) 0.07 0.00 - 0.12 K/uL    Immature Granulocytes (abs) 0.02 0.00 - 0.11 K/uL    NRBC (Absolute) 0.00 K/uL   Basic Metabolic Panel (BMP)   Result Value Ref Range    Sodium 144 135 - 145 mmol/L    Potassium 3.0 (L) 3.6 - 5.5 mmol/L    Chloride 111 96 - 112 mmol/L    Co2 20 20 - 33 mmol/L    Glucose 143 (H) 65 - 99 mg/dL    Bun 16 8 - 22 mg/dL    Creatinine 1.11 0.50 - 1.40 mg/dL    Calcium 8.9 8.5 - 10.5 mg/dL    Anion Gap 13.0 (H) 0.0 - 11.9   Btype Natriuretic Peptide   Result Value Ref Range    B Natriuretic Peptide 201 (H) 0 - 100 pg/mL   Troponin STAT   Result Value Ref Range    Troponin I 0.01 0.00 - 0.04 ng/mL   Magnesium   Result Value Ref Range    Magnesium 1.9 1.5 - 2.5 mg/dL   ESTIMATED GFR   Result Value Ref Range    GFR If  57 (A) >60 mL/min/1.73 m 2    GFR If Non  47 (A) >60 mL/min/1.73 m 2   TSH (Thyroid Stimulating Hormone)   Result Value Ref Range    TSH 2.730 0.380 - 5.330 uIU/mL   D-Dimer   Result Value Ref Range    D-Dimer Screen 396 (H) <250 ng/mL(D-DU)   MAGNESIUM   Result Value Ref Range    Magnesium 1.9 1.5 - 2.5 mg/dL   EKG (NOW)   Result Value Ref Range    Report       [facility] Emergency Dept.    Test Date:  2018  Pt Name:    ERNIE MOBLEY               Department: ER  MRN:        [id number]                      Room:       BL 15  Gender:     Female                       Technician: [id number]  :        1933                   Requested By:JACKI MEHTA  Order #:    [id number]                    Reading MD:    Measurements  Intervals                                Axis  Rate:       68                           P:          91  ID:         280                          QRS:        -23  QRSD:       94                           T:          34  QT:         420  QTc:        447    Interpretive Statements  SINUS RHYTHM  FIRST DEGREE AV BLOCK  BORDERLINE LEFT AXIS DEVIATION  Compared to ECG 2017 09:28:13  First degree AV block now present     EKG (NOW)   Result Value Ref Range    Report       [facility] Emergency Dept.    Test Date:  2018  Pt Name:    ERNIE MOBLEY               Department: ER  MRN:        [id number]                      Room:       BL 15  Gender:     Female                       Technician: [id number]  :        1933                   Requested By:JACKI MEHTA  Order #:    [id number]                    Reading MD:    Measurements  Intervals                                Axis  Rate:       64                           P:          78  ID:         160                          QRS:        -25  QRSD:       90                           T:          17  QT:         412  QTc:        425    Interpretive Statements  SINUS RHYTHM  BORDERLINE LEFT AXIS DEVIATION  Compared to ECG 2018 22:38:24  First degree AV block no longer present         All labs reviewed by me.    EKG Interpretation  Interpreted by me    EMS 12 leads reviewed initially showed A. fib with RVR with some elevation in aVR and depression throughout the precordial leads. 2nd done by EMS shows normal sinus rhythm with resolution of above changes.     12-lead here  "shows sinus rhythm at a rate of 68. Possible minimal amount of elevation in lead 3, possible small amount of elevation V1 and V2 however limited by artifact. Otherwise no ST elevation or ST depression noted T-wave inversion left axis deviation    RADIOLOGY  DX-CHEST-PORTABLE (1 VIEW)    (Results Pending)     The radiologist's interpretation of all radiological studies have been reviewed by me.    COURSE & MEDICAL DECISION MAKING  Pertinent Labs & Imaging studies reviewed. (See chart for details)    10:08 PM - Patient seen and examined at bedside.  Rhythm strips from EMS as above. Labs as above are reassuring at this time however 9 patient does have a concerning presentation discuss with cardiologist Dr. Chu and hospitalist Dr. Zheng. There are help with this patient's greatly appreciated she is admitted to the hospital for further evaluation and treatment.   /52   Pulse 60   Temp 36.6 °C (97.8 °F)   Resp 16   Ht 1.6 m (5' 3\")   Wt 63.2 kg (139 lb 5.3 oz)   SpO2 94%   Breastfeeding? No   BMI 24.68 kg/m²           FINAL IMPRESSION  1. Chest pain, unspecified type    2. Paroxysmal atrial fibrillation (CMS-HCC)            This dictation has been created using voice recognition software and/or scribes. The accuracy of the dictation is limited by the abilities of the software and the expertise of the scribes. I expect there may be some errors of grammar and possibly content. I made every attempt to manually correct the errors within my dictation. However, errors related to voice recognition software and/or scribes may still exist and should be interpreted within the appropriate context.            "

## 2018-01-08 NOTE — RESPIRATORY CARE
COPD EDUCATION by COPD CLINICAL EDUCATOR  1/8/2018 at 7:01 AM by Cassie Morrison     Patient reviewed by COPD education team. Patient does not qualify for COPD program.

## 2018-01-08 NOTE — PROGRESS NOTES
Bedside report done outside of the room, per pt request to noc RN lastnight. RN did lay eyes on the pt, she is sleeping, lying on her side, unlabored breathing, bed alarm turned on, bed locked in low position. Call light on bed, within reach.  Reviewed POC with noc RN.

## 2018-01-08 NOTE — DISCHARGE PLANNING
Medical Social Work    SW received pt's script for Eliquis and request to run for prior auth. Pt's pharmacy is [facility] (ph# [phone]; fax# [phone]). SW sent copy of pt's script and facesheet to pharmacy to check on co-pay and auth.

## 2018-01-08 NOTE — PROGRESS NOTES
Updated Faisal, son-in-law who is picking her up. Explained she will be ready for pick-up no later than 3. Working on prior auth for medications. He agreed he would be there to pick her up at 3. Updated the Patient at bedside.

## 2018-01-08 NOTE — ASSESSMENT & PLAN NOTE
Discussed with cardiology. Would like us to hold off on stress testing patient at this time.  Monitor on telemetry and follow serial troponins  Initiate on Eliquis with for concern of paroxysmal atrial fibrillation

## 2018-01-08 NOTE — H&P
Hospital Medicine History and Physical    Date of Service  1/8/2018    Chief Complaint  Chief Complaint   Patient presents with   • Chest Pain   • Arm Pain       History of Presenting Illness  84 y.o. female who presented 1/7/2018 with Episode of chest pain while at home with radiation to upper extremities pain was a pressure across her chest. She denied any diaphoresis or nausea. She did have some shortness of breath associated with this. She called EMS who reportedly had questionable irregular leads. Dr. Madi Chu cardiologist states he had reviewed telemetry findings and felt that she was in sinus rhythm this ha has had a history of paroxysmal atrial fibrillation. She is normally followed by Dr. Pedro Luis Springer. Chest pain with near resolve in emergency room. I have discussed care with Dr. Chu.    Primary Care Physician  Ric Garcia M.D.    Consultants  Dr. Madi Chu, cardiologist    Code Status  Full code    Review of Systems  Review of Systems   Constitutional: Negative for chills and fever.   HENT: Negative for congestion.    Eyes: Positive for blurred vision.   Respiratory: Positive for shortness of breath.    Cardiovascular: Positive for chest pain. Negative for palpitations and leg swelling.   Gastrointestinal: Negative for abdominal pain, diarrhea, nausea and vomiting.   Genitourinary: Negative for dysuria.   Musculoskeletal: Positive for neck pain. Negative for joint pain.   Neurological: Negative for dizziness, sensory change, speech change and headaches.   Psychiatric/Behavioral: Negative for depression.        Past Medical History  Past Medical History:   Diagnosis Date   • TIA (transient ischemic attack) 09/2017   • MEDICAL HOME 11/07/12   • Back pain 3/28/2012   • Anginal syndrome (CMS-HCC)    • Arrhythmia    • Arthritis    • CATARACT    • Coronary artery disease    • Disorder of thyroid    • Glaucoma    • Hyperlipidemia    • Hypertension    • Myocardial infarct    • Pain     sacrum  1/10   • Stroke (CMS-HCC)    • Urinary incontinence        Surgical History  Past Surgical History:   Procedure Laterality Date   • VITRECTOMY POSTERIOR Right 2017    Procedure: VITRECTOMY POSTERIOR-LASER, SF6 GAS;  Surgeon: Jessica Arita M.D.;  Location: SURGERY SAME DAY [facility];  Service:    • RIGO BY LAPAROSCOPY  10/9/2015    Procedure: RIGO BY LAPAROSCOPY;  Surgeon: Ric Zambrano M.D.;  Location: SURGERY [facility];  Service:    • CATARACT PHACO WITH IOL  2011    Performed by EVERETTE COOPER at SURGERY SAME DAY [facility] ORS   • CATARACT PHACO WITH IOL  2011    Performed by EVERETTE COPOER at SURGERY SAME DAY [facility] ORS   • OTHER ORTHOPEDIC SURGERY      pins in left hip   • ATHROPLASTY      broken hip    • OTHER CARDIAC SURGERY         Medications  No current facility-administered medications on file prior to encounter.      Current Outpatient Prescriptions on File Prior to Encounter   Medication Sig Dispense Refill   • lisinopril (PRINIVIL) 10 MG Tab Take 1 Tab by mouth every day. 90 Tab 0   • amlodipine (NORVASC) 5 MG Tab Take 1 Tab by mouth every day. 30 Tab    • clopidogrel (PLAVIX) 75 MG Tab Take 1 Tab by mouth every day. 90 Tab 2       Family History  Family History   Problem Relation Age of Onset   • Heart Disease Mother    • Cancer Father        Social History  Social History   Substance Use Topics   • Smoking status: Current Every Day Smoker     Packs/day: 0.50     Years: 0.10     Types: Cigarettes   • Smokeless tobacco: Never Used   • Alcohol use Yes      Comment: 1 per day       Allergies  Allergies   Allergen Reactions   • Meperidine Vomiting and Nausea   • Tramadol Vomiting and Nausea        Physical Exam  Laboratory   Hemodynamics  Temp (24hrs), Av.8 °C (98.3 °F), Min:36.2 °C (97.1 °F), Max:37.7 °C (99.9 °F)   Temperature: 36.2 °C (97.1 °F)  Pulse  Av.1  Min: 54  Max: 70 Heart Rate (Monitored): 61  Blood Pressure : 142/63, NIBP: 154/60       Respiratory      Respiration: 16, Pulse Oximetry: 96 %             Physical Exam   Constitutional: She is oriented to person, place, and time. She appears well-developed and well-nourished. No distress.   HENT:   Head: Normocephalic and atraumatic.   Nose: Nose normal.   Mouth/Throat: Oropharynx is clear and moist.   Eyes: Conjunctivae and EOM are normal. Right eye exhibits no discharge. Left eye exhibits no discharge. No scleral icterus.   Neck: Normal range of motion. No tracheal deviation present.   Cardiovascular: Normal rate, regular rhythm, normal heart sounds and intact distal pulses.    No murmur heard.  Pulses:       Radial pulses are 2+ on the right side, and 2+ on the left side.        Dorsalis pedis pulses are 2+ on the right side, and 2+ on the left side.   Pulmonary/Chest: Effort normal and breath sounds normal. No respiratory distress. She has no wheezes.   Abdominal: Soft. Bowel sounds are normal. She exhibits no distension. There is no tenderness.   Musculoskeletal: She exhibits no edema.   Neurological: She is alert and oriented to person, place, and time. No cranial nerve deficit.   Skin: Skin is warm. She is not diaphoretic.   Psychiatric: She has a normal mood and affect. Her behavior is normal. Thought content normal.   Vitals reviewed.      Recent Labs      01/07/18   2228   WBC  4.7*   RBC  4.07*   HEMOGLOBIN  13.9   HEMATOCRIT  41.5   MCV  102.0*   MCH  34.2*   MCHC  33.5*   RDW  52.8*   PLATELETCT  240   MPV  10.8     Recent Labs      01/07/18   2228   SODIUM  144   POTASSIUM  3.0*   CHLORIDE  111   CO2  20   GLUCOSE  143*   BUN  16   CREATININE  1.11   CALCIUM  8.9     Recent Labs      01/07/18   2228   GLUCOSE  143*         Recent Labs      01/07/18   2228   BNPBTYPENAT  201*     Recent Labs      01/08/18   0325   TRIGLYCERIDE  62   HDL  62   LDL  82     Lab Results   Component Value Date    TROPONINI 0.03 01/08/2018     Urinalysis:    Lab Results  Component Value Date/Time    SPECGRAVITY <=1.005 09/23/2017 1035   GLUCOSEUR Negative 09/23/2017 1035   KETONES Negative 09/23/2017 1035   NITRITE Negative 09/23/2017 1035   WBCURINE 10-20 (A) 01/31/2015 2100   RBCURINE >150 (A) 01/31/2015 2100   BACTERIA Few (A) 01/31/2015 2100   EPITHELCELL Few 01/31/2015 2100        Imaging  Chest x-ray shows no acute cardiopulmonary abnormality    Assessment/Plan     I anticipate this patient is appropriate for observation status at this time.    CAD (coronary artery disease)- (present on admission)   Assessment & Plan    Followed by [facility]ology        Chest pain   Assessment & Plan    Discussed with cardiology. Would like us to hold off on stress testing patient at this time.  Monitor on telemetry and follow serial troponins  Initiate on Eliquis with for concern of paroxysmal atrial fibrillation        Dyslipidemia- (present on admission)   Assessment & Plan    Statin therapy        HTN (hypertension), benign- (present on admission)   Assessment & Plan    Monitor vitals            VTE prophylaxis: Eliquis

## 2018-01-08 NOTE — PROGRESS NOTES
Pt admitted to [place] [address] from ER at 1245 per gurney.  Pt able to ambulate with standby assistance from cart to bed.  Pt has a mix of emotions, will laugh then cry in the next minute.  Bed alarm in use, upper side rails up, hourly rounding.  Discussed Prevnar and influenza vaccinations with patient, pt is refusing.

## 2018-01-08 NOTE — ED NOTES
Med rec updated and complete.  Allergies reviewed.  Met with pt at bedside.  Pt stated that she only takes 3 medications.  Pt spelled her medications.  And stated she took them today.

## 2018-01-09 NOTE — DISCHARGE SUMMARY
CHIEF COMPLAINT ON ADMISSION  Chief Complaint   Patient presents with   • Chest Pain   • Arm Pain       CODE STATUS  Prior    HPI & HOSPITAL COURSE  84 y.o. female who presented 1/7/2018 with Episode of chest pain while at home with radiation to upper extremities pain was a pressure across her chest. She denied any diaphoresis or nausea. She did have some shortness of breath associated with this. She called EMS who reportedly had questionable irregular leads. Dr. Madi Chu cardiologist states he had reviewed telemetry findings and felt that she was in sinus rhythm this ha has had a history of paroxysmal atrial fibrillation. She is normally followed by Dr. Pedro Luis Springer. Chest pain with near resolve in emergency room. I have discussed care with Dr. Chu.    Patient made an unexpected remarkable recovery. Cardiology cleared patient for discharge with the below medications    Therefore, she is discharged in fair and stable condition with close outpatient follow-up.    SPECIFIC OUTPATIENT FOLLOW-UP  pcp 1 week    DISCHARGE PROBLEM LIST  Active Problems:    CAD (coronary artery disease) POA: Yes    HTN (hypertension), benign POA: Yes    Dyslipidemia POA: Yes    Stage 3 chronic kidney disease POA: Yes  Resolved Problems:    Chest pain POA: Unknown      FOLLOW UP  Future Appointments  Date Time Provider Department Center   1/19/2018 1:00 PM Shari Ayoub M.D. [facility] None     Ric Garcia M.D.  [address] [place] [id number]  [phone]            MEDICATIONS ON DISCHARGE   [facility] Medication Instructions ALAN:[id number]    Printed on:01/09/18 0651   Medication Information                      apixaban (ELIQUIS) 5mg Tab  Take 1 Tab by mouth 2 Times a Day.             clopidogrel (PLAVIX) 75 MG Tab  Take 1 Tab by mouth every day.             lisinopril (PRINIVIL) 10 MG Tab  Take 1 Tab by mouth every day.             metoprolol (LOPRESSOR) 25 MG Tab  Take 0.5 Tabs by mouth 2  Times a Day.                 DIET  No orders of the defined types were placed in this encounter.      ACTIVITY  As tolerated.  Weight bearing as tolerated      CONSULTATIONS  Dr adiel shoemaker cardiology    PROCEDURES  none    LABORATORY  Lab Results   Component Value Date/Time    SODIUM 144 01/07/2018 10:28 PM    POTASSIUM 3.0 (L) 01/07/2018 10:28 PM    CHLORIDE 111 01/07/2018 10:28 PM    CO2 20 01/07/2018 10:28 PM    GLUCOSE 143 (H) 01/07/2018 10:28 PM    BUN 16 01/07/2018 10:28 PM    CREATININE 1.11 01/07/2018 10:28 PM        Lab Results   Component Value Date/Time    WBC 4.7 (L) 01/07/2018 10:28 PM    HEMOGLOBIN 13.9 01/07/2018 10:28 PM    HEMATOCRIT 41.5 01/07/2018 10:28 PM    PLATELETCT 240 01/07/2018 10:28 PM        Total time of the discharge process exceeds 38 minutes

## 2018-01-09 NOTE — PROGRESS NOTES
Pt discharged via wheelchair with son in Faisal jarrett,  To home. All PIVs removed, tele box taken off. Went over discharge instructions thoroughly including diet, activity, signs and symptoms of infection, heart attack and stroke symptoms. Paperwork signed and all questions answered. Follow up appointments set for Cardiology and pt verbally understands that following through with the appointments is necessary. All personal belongings with patient.  Prescriptions e-prescribed to patient and a copy of AVS discharge instructions given to the patient. Son in  at bedside, all questions answered.

## 2018-02-13 DIAGNOSIS — I25.83 CORONARY ARTERY DISEASE DUE TO LIPID RICH PLAQUE: ICD-10-CM

## 2018-02-13 DIAGNOSIS — I10 HTN (HYPERTENSION), BENIGN: ICD-10-CM

## 2018-02-13 DIAGNOSIS — I25.10 CORONARY ARTERY DISEASE DUE TO LIPID RICH PLAQUE: ICD-10-CM

## 2018-02-13 RX ORDER — CLOPIDOGREL BISULFATE 75 MG/1
75 TABLET ORAL
Qty: 30 TAB | Refills: 0 | OUTPATIENT
Start: 2018-02-13

## 2018-02-14 DIAGNOSIS — I25.10 CORONARY ARTERY DISEASE DUE TO LIPID RICH PLAQUE: ICD-10-CM

## 2018-02-14 DIAGNOSIS — I25.83 CORONARY ARTERY DISEASE DUE TO LIPID RICH PLAQUE: ICD-10-CM

## 2018-02-14 DIAGNOSIS — I10 HTN (HYPERTENSION), BENIGN: ICD-10-CM

## 2018-02-14 RX ORDER — CLOPIDOGREL BISULFATE 75 MG/1
75 TABLET ORAL
Qty: 90 TAB | Refills: 0 | Status: SHIPPED | OUTPATIENT
Start: 2018-02-14 | End: 2018-05-14 | Stop reason: SDUPTHER

## 2018-02-25 DIAGNOSIS — I10 ESSENTIAL HYPERTENSION: ICD-10-CM

## 2018-02-26 RX ORDER — AMLODIPINE BESYLATE 5 MG/1
TABLET ORAL
Qty: 90 TAB | Refills: 3 | Status: SHIPPED | OUTPATIENT
Start: 2018-02-26 | End: 2018-02-27

## 2018-02-27 ENCOUNTER — APPOINTMENT (OUTPATIENT)
Dept: RADIOLOGY | Facility: MEDICAL CENTER | Age: 83
DRG: 282 | End: 2018-02-27
Attending: EMERGENCY MEDICINE
Payer: MEDICARE

## 2018-02-27 ENCOUNTER — HOSPITAL ENCOUNTER (INPATIENT)
Facility: MEDICAL CENTER | Age: 83
LOS: 2 days | DRG: 282 | End: 2018-03-02
Attending: EMERGENCY MEDICINE | Admitting: HOSPITALIST
Payer: MEDICARE

## 2018-02-27 ENCOUNTER — RESOLUTE PROFESSIONAL BILLING HOSPITAL PROF FEE (OUTPATIENT)
Dept: HOSPITALIST | Facility: MEDICAL CENTER | Age: 83
End: 2018-02-27
Payer: MEDICARE

## 2018-02-27 DIAGNOSIS — I25.10 CORONARY ARTERY DISEASE INVOLVING NATIVE CORONARY ARTERY OF NATIVE HEART WITHOUT ANGINA PECTORIS: ICD-10-CM

## 2018-02-27 DIAGNOSIS — R07.9 CHEST PAIN IN ADULT: ICD-10-CM

## 2018-02-27 LAB
ALBUMIN SERPL BCP-MCNC: 3.9 G/DL (ref 3.2–4.9)
ALBUMIN/GLOB SERPL: 1.6 G/DL
ALP SERPL-CCNC: 76 U/L (ref 30–99)
ALT SERPL-CCNC: 7 U/L (ref 2–50)
ANION GAP SERPL CALC-SCNC: 12 MMOL/L (ref 0–11.9)
AST SERPL-CCNC: 15 U/L (ref 12–45)
BASOPHILS # BLD AUTO: 1.1 % (ref 0–1.8)
BASOPHILS # BLD: 0.09 K/UL (ref 0–0.12)
BILIRUB SERPL-MCNC: 0.5 MG/DL (ref 0.1–1.5)
BNP SERPL-MCNC: 122 PG/ML (ref 0–100)
BUN SERPL-MCNC: 19 MG/DL (ref 8–22)
CALCIUM SERPL-MCNC: 9.3 MG/DL (ref 8.5–10.5)
CHLORIDE SERPL-SCNC: 109 MMOL/L (ref 96–112)
CO2 SERPL-SCNC: 20 MMOL/L (ref 20–33)
CREAT SERPL-MCNC: 1.21 MG/DL (ref 0.5–1.4)
EKG IMPRESSION: NORMAL
EOSINOPHIL # BLD AUTO: 0.36 K/UL (ref 0–0.51)
EOSINOPHIL NFR BLD: 4.4 % (ref 0–6.9)
ERYTHROCYTE [DISTWIDTH] IN BLOOD BY AUTOMATED COUNT: 54.3 FL (ref 35.9–50)
EST. AVERAGE GLUCOSE BLD GHB EST-MCNC: 114 MG/DL
GLOBULIN SER CALC-MCNC: 2.4 G/DL (ref 1.9–3.5)
GLUCOSE SERPL-MCNC: 128 MG/DL (ref 65–99)
HBA1C MFR BLD: 5.6 % (ref 0–5.6)
HCT VFR BLD AUTO: 45.2 % (ref 37–47)
HGB BLD-MCNC: 14.7 G/DL (ref 12–16)
IMM GRANULOCYTES # BLD AUTO: 0.03 K/UL (ref 0–0.11)
IMM GRANULOCYTES NFR BLD AUTO: 0.4 % (ref 0–0.9)
LIPASE SERPL-CCNC: 34 U/L (ref 11–82)
LYMPHOCYTES # BLD AUTO: 2.31 K/UL (ref 1–4.8)
LYMPHOCYTES NFR BLD: 28.1 % (ref 22–41)
MAGNESIUM SERPL-MCNC: 1.6 MG/DL (ref 1.5–2.5)
MCH RBC QN AUTO: 33.9 PG (ref 27–33)
MCHC RBC AUTO-ENTMCNC: 32.5 G/DL (ref 33.6–35)
MCV RBC AUTO: 104.4 FL (ref 81.4–97.8)
MONOCYTES # BLD AUTO: 0.73 K/UL (ref 0–0.85)
MONOCYTES NFR BLD AUTO: 8.9 % (ref 0–13.4)
NEUTROPHILS # BLD AUTO: 4.71 K/UL (ref 2–7.15)
NEUTROPHILS NFR BLD: 57.1 % (ref 44–72)
NRBC # BLD AUTO: 0 K/UL
NRBC BLD-RTO: 0 /100 WBC
PLATELET # BLD AUTO: 283 K/UL (ref 164–446)
PMV BLD AUTO: 10.8 FL (ref 9–12.9)
POTASSIUM SERPL-SCNC: 3.9 MMOL/L (ref 3.6–5.5)
PROT SERPL-MCNC: 6.3 G/DL (ref 6–8.2)
RBC # BLD AUTO: 4.33 M/UL (ref 4.2–5.4)
SODIUM SERPL-SCNC: 141 MMOL/L (ref 135–145)
TROPONIN I SERPL-MCNC: 0.1 NG/ML (ref 0–0.04)
TROPONIN I SERPL-MCNC: 0.3 NG/ML (ref 0–0.04)
TROPONIN I SERPL-MCNC: 0.46 NG/ML (ref 0–0.04)
TROPONIN I SERPL-MCNC: 0.59 NG/ML (ref 0–0.04)
TSH SERPL DL<=0.005 MIU/L-ACNC: 1.88 UIU/ML (ref 0.38–5.33)
WBC # BLD AUTO: 8.2 K/UL (ref 4.8–10.8)

## 2018-02-27 PROCEDURE — 700105 HCHG RX REV CODE 258: Performed by: HOSPITALIST

## 2018-02-27 PROCEDURE — 85025 COMPLETE CBC W/AUTO DIFF WBC: CPT

## 2018-02-27 PROCEDURE — 71045 X-RAY EXAM CHEST 1 VIEW: CPT

## 2018-02-27 PROCEDURE — A9270 NON-COVERED ITEM OR SERVICE: HCPCS | Performed by: INTERNAL MEDICINE

## 2018-02-27 PROCEDURE — 99285 EMERGENCY DEPT VISIT HI MDM: CPT

## 2018-02-27 PROCEDURE — 93005 ELECTROCARDIOGRAM TRACING: CPT | Performed by: EMERGENCY MEDICINE

## 2018-02-27 PROCEDURE — 83690 ASSAY OF LIPASE: CPT

## 2018-02-27 PROCEDURE — 36415 COLL VENOUS BLD VENIPUNCTURE: CPT

## 2018-02-27 PROCEDURE — 93010 ELECTROCARDIOGRAM REPORT: CPT | Mod: 76 | Performed by: INTERNAL MEDICINE

## 2018-02-27 PROCEDURE — G0378 HOSPITAL OBSERVATION PER HR: HCPCS

## 2018-02-27 PROCEDURE — 84443 ASSAY THYROID STIM HORMONE: CPT

## 2018-02-27 PROCEDURE — 80053 COMPREHEN METABOLIC PANEL: CPT

## 2018-02-27 PROCEDURE — 94760 N-INVAS EAR/PLS OXIMETRY 1: CPT

## 2018-02-27 PROCEDURE — A9270 NON-COVERED ITEM OR SERVICE: HCPCS | Performed by: HOSPITALIST

## 2018-02-27 PROCEDURE — 700102 HCHG RX REV CODE 250 W/ 637 OVERRIDE(OP): Performed by: INTERNAL MEDICINE

## 2018-02-27 PROCEDURE — 700102 HCHG RX REV CODE 250 W/ 637 OVERRIDE(OP): Performed by: HOSPITALIST

## 2018-02-27 PROCEDURE — 83036 HEMOGLOBIN GLYCOSYLATED A1C: CPT

## 2018-02-27 PROCEDURE — 83735 ASSAY OF MAGNESIUM: CPT

## 2018-02-27 PROCEDURE — 84484 ASSAY OF TROPONIN QUANT: CPT

## 2018-02-27 PROCEDURE — 83880 ASSAY OF NATRIURETIC PEPTIDE: CPT

## 2018-02-27 PROCEDURE — 93005 ELECTROCARDIOGRAM TRACING: CPT | Performed by: HOSPITALIST

## 2018-02-27 PROCEDURE — 99220 PR INITIAL OBSERVATION CARE,LEVL III: CPT | Performed by: HOSPITALIST

## 2018-02-27 RX ORDER — HYDROMORPHONE HYDROCHLORIDE 2 MG/ML
0.5 INJECTION, SOLUTION INTRAMUSCULAR; INTRAVENOUS; SUBCUTANEOUS
Status: DISCONTINUED | OUTPATIENT
Start: 2018-02-27 | End: 2018-03-02 | Stop reason: HOSPADM

## 2018-02-27 RX ORDER — AMOXICILLIN 250 MG
2 CAPSULE ORAL 2 TIMES DAILY
Status: DISCONTINUED | OUTPATIENT
Start: 2018-02-27 | End: 2018-03-02 | Stop reason: HOSPADM

## 2018-02-27 RX ORDER — OXYCODONE HYDROCHLORIDE 10 MG/1
10 TABLET ORAL
Status: DISCONTINUED | OUTPATIENT
Start: 2018-02-27 | End: 2018-03-02 | Stop reason: HOSPADM

## 2018-02-27 RX ORDER — LISINOPRIL 10 MG/1
10 TABLET ORAL DAILY
Status: DISCONTINUED | OUTPATIENT
Start: 2018-02-27 | End: 2018-03-01

## 2018-02-27 RX ORDER — LABETALOL HYDROCHLORIDE 5 MG/ML
10 INJECTION, SOLUTION INTRAVENOUS EVERY 6 HOURS PRN
Status: DISCONTINUED | OUTPATIENT
Start: 2018-02-27 | End: 2018-03-02 | Stop reason: HOSPADM

## 2018-02-27 RX ORDER — CLOPIDOGREL BISULFATE 75 MG/1
75 TABLET ORAL
Status: DISCONTINUED | OUTPATIENT
Start: 2018-02-27 | End: 2018-03-02 | Stop reason: HOSPADM

## 2018-02-27 RX ORDER — OXYCODONE HYDROCHLORIDE 5 MG/1
5 TABLET ORAL
Status: DISCONTINUED | OUTPATIENT
Start: 2018-02-27 | End: 2018-03-02 | Stop reason: HOSPADM

## 2018-02-27 RX ORDER — ATORVASTATIN CALCIUM 40 MG/1
40 TABLET, FILM COATED ORAL
Status: DISCONTINUED | OUTPATIENT
Start: 2018-02-27 | End: 2018-03-02 | Stop reason: HOSPADM

## 2018-02-27 RX ORDER — SODIUM CHLORIDE 9 MG/ML
INJECTION, SOLUTION INTRAVENOUS CONTINUOUS
Status: DISCONTINUED | OUTPATIENT
Start: 2018-02-27 | End: 2018-02-28

## 2018-02-27 RX ORDER — MULTIVIT WITH MINERALS/LUTEIN
1 TABLET ORAL EVERY MORNING
COMMUNITY

## 2018-02-27 RX ORDER — POLYETHYLENE GLYCOL 3350 17 G/17G
1 POWDER, FOR SOLUTION ORAL
Status: DISCONTINUED | OUTPATIENT
Start: 2018-02-27 | End: 2018-03-02 | Stop reason: HOSPADM

## 2018-02-27 RX ORDER — AMLODIPINE BESYLATE 5 MG/1
5 TABLET ORAL
Status: DISCONTINUED | OUTPATIENT
Start: 2018-02-27 | End: 2018-02-28

## 2018-02-27 RX ORDER — AMLODIPINE BESYLATE 5 MG/1
2.5 TABLET ORAL EVERY MORNING
Status: ON HOLD | COMMUNITY
End: 2018-06-10

## 2018-02-27 RX ORDER — BISACODYL 10 MG
10 SUPPOSITORY, RECTAL RECTAL
Status: DISCONTINUED | OUTPATIENT
Start: 2018-02-27 | End: 2018-03-02 | Stop reason: HOSPADM

## 2018-02-27 RX ADMIN — SODIUM CHLORIDE: 9 INJECTION, SOLUTION INTRAVENOUS at 23:43

## 2018-02-27 RX ADMIN — ATORVASTATIN CALCIUM 40 MG: 40 TABLET, FILM COATED ORAL at 20:09

## 2018-02-27 RX ADMIN — NITROGLYCERIN 0.5 INCH: 20 OINTMENT TOPICAL at 17:39

## 2018-02-27 RX ADMIN — CLOPIDOGREL 75 MG: 75 TABLET, FILM COATED ORAL at 09:48

## 2018-02-27 RX ADMIN — METOPROLOL TARTRATE 12.5 MG: 25 TABLET, FILM COATED ORAL at 09:47

## 2018-02-27 RX ADMIN — LISINOPRIL 10 MG: 10 TABLET ORAL at 09:47

## 2018-02-27 RX ADMIN — AMLODIPINE BESYLATE 5 MG: 5 TABLET ORAL at 09:47

## 2018-02-27 RX ADMIN — METOPROLOL TARTRATE 12.5 MG: 25 TABLET, FILM COATED ORAL at 20:08

## 2018-02-27 RX ADMIN — APIXABAN 5 MG: 5 TABLET, FILM COATED ORAL at 20:09

## 2018-02-27 RX ADMIN — NITROGLYCERIN 0.5 INCH: 20 OINTMENT TOPICAL at 23:35

## 2018-02-27 ASSESSMENT — ENCOUNTER SYMPTOMS
SENSORY CHANGE: 0
DIZZINESS: 0
HEARTBURN: 0
CHILLS: 0
FEVER: 0
EYE DISCHARGE: 0
BRUISES/BLEEDS EASILY: 0
BLURRED VISION: 0
HALLUCINATIONS: 0
DEPRESSION: 0
HEMOPTYSIS: 0
SHORTNESS OF BREATH: 1
FOCAL WEAKNESS: 0
DOUBLE VISION: 0
NAUSEA: 0
MYALGIAS: 0
COUGH: 0
WEAKNESS: 0
SPEECH CHANGE: 0
PALPITATIONS: 1
FLANK PAIN: 0
ABDOMINAL PAIN: 0
VOMITING: 0

## 2018-02-27 ASSESSMENT — PAIN SCALES - GENERAL
PAINLEVEL_OUTOF10: 0

## 2018-02-27 ASSESSMENT — CHA2DS2 SCORE
HYPERTENSION: YES
AGE 75 OR GREATER: YES
CHA2DS2 VASC SCORE: 7
CHF OR LEFT VENTRICULAR DYSFUNCTION: NO
PRIOR STROKE OR TIA OR THROMBOEMBOLISM: YES
VASCULAR DISEASE: YES
AGE 65 TO 74: NO
SEX: FEMALE
DIABETES: NO

## 2018-02-27 ASSESSMENT — LIFESTYLE VARIABLES
CONSUMPTION TOTAL: NEGATIVE
SUBSTANCE_ABUSE: 0
EVER FELT BAD OR GUILTY ABOUT YOUR DRINKING: NO
HOW MANY TIMES IN THE PAST YEAR HAVE YOU HAD 5 OR MORE DRINKS IN A DAY: 0
HAVE PEOPLE ANNOYED YOU BY CRITICIZING YOUR DRINKING: NO
TOTAL SCORE: 0
ON A TYPICAL DAY WHEN YOU DRINK ALCOHOL HOW MANY DRINKS DO YOU HAVE: 1
EVER_SMOKED: YES
TOTAL SCORE: 0
EVER HAD A DRINK FIRST THING IN THE MORNING TO STEADY YOUR NERVES TO GET RID OF A HANGOVER: NO
HAVE YOU EVER FELT YOU SHOULD CUT DOWN ON YOUR DRINKING: NO
TOTAL SCORE: 0
ALCOHOL_USE: YES
AVERAGE NUMBER OF DAYS PER WEEK YOU HAVE A DRINK CONTAINING ALCOHOL: 1

## 2018-02-27 ASSESSMENT — COPD QUESTIONNAIRES
DURING THE PAST 4 WEEKS HOW MUCH DID YOU FEEL SHORT OF BREATH: NONE/LITTLE OF THE TIME
COPD SCREENING SCORE: 4
HAVE YOU SMOKED AT LEAST 100 CIGARETTES IN YOUR ENTIRE LIFE: YES
DO YOU EVER COUGH UP ANY MUCUS OR PHLEGM?: NO/ONLY WITH OCCASIONAL COLDS OR INFECTIONS

## 2018-02-27 ASSESSMENT — PATIENT HEALTH QUESTIONNAIRE - PHQ9
1. LITTLE INTEREST OR PLEASURE IN DOING THINGS: NOT AT ALL
2. FEELING DOWN, DEPRESSED, IRRITABLE, OR HOPELESS: NOT AT ALL
SUM OF ALL RESPONSES TO PHQ QUESTIONS 1-9: 0
SUM OF ALL RESPONSES TO PHQ9 QUESTIONS 1 AND 2: 0

## 2018-02-27 ASSESSMENT — PAIN SCALES - WONG BAKER
WONGBAKER_NUMERICALRESPONSE: DOESN'T HURT AT ALL

## 2018-02-27 NOTE — H&P
Hospital Medicine History and Physical    Date of Service  2/27/2018    Chief Complaint  Chief Complaint   Patient presents with   • Chest Pain       History of Presenting Illness  85 y.o. female who presented 2/27/2018 with chest pain. Patient presented via EMS complaining of chest pain radiation both arms she noticed upon waking up around midnight the bathroom. He describes the pain as sharp discomfort also associated difficulty with breathing. She has noticed palpitations as well. Denies any diaphoresis dizziness or nausea. Nothing makes it better or makes it worse. She took a nitro pill without any change in her symptoms. She was given aspirin and nitro by EMS without any changes in her pain. Currently she denies any chest pain or symptoms her symptoms have completely resolved at the time my examination.    Primary Care Physician  Ric Garcia M.D.    Consultants  None    Code Status  Full    Review of Systems  Review of Systems   Constitutional: Negative for chills and fever.   HENT: Negative for congestion, hearing loss and tinnitus.    Eyes: Negative for blurred vision, double vision and discharge.   Respiratory: Positive for shortness of breath. Negative for cough and hemoptysis.    Cardiovascular: Positive for chest pain and palpitations. Negative for leg swelling.   Gastrointestinal: Negative for abdominal pain, heartburn, nausea and vomiting.   Genitourinary: Negative for dysuria and flank pain.   Musculoskeletal: Negative for joint pain and myalgias.   Skin: Negative for rash.   Neurological: Negative for dizziness, sensory change, speech change, focal weakness and weakness.   Endo/Heme/Allergies: Negative for environmental allergies. Does not bruise/bleed easily.   Psychiatric/Behavioral: Negative for depression, hallucinations and substance abuse.        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[facility])    • Arrhythmia    • Arthritis    • CATARACT    • Coronary artery disease    • Disorder of thyroid    • Glaucoma    • Hyperlipidemia    • Hypertension    • Myocardial infarct    • Pain     sacrum 1/10   • Stroke (CMS-[facility])    • Urinary incontinence        Surgical History  Past Surgical History:   Procedure Laterality Date   • VITRECTOMY POSTERIOR Right 4/13/2017    Procedure: VITRECTOMY POSTERIOR-LASER, SF6 GAS;  Surgeon: Jessica Arita M.D.;  Location: SURGERY SAME DAY [facility];  Service:    • RIGO BY LAPAROSCOPY  10/9/2015    Procedure: RIGO BY LAPAROSCOPY;  Surgeon: Ric Zambrano M.D.;  Location: SURGERY [facility];  Service:    • CATARACT PHACO WITH IOL  4/27/2011    Performed by EVERETTE COOPER at SURGERY SAME DAY [facility]   • CATARACT PHACO WITH IOL  4/13/2011    Performed by EVERETTE COOPER at SURGERY SAME DAY [facility]   • OTHER ORTHOPEDIC SURGERY  1980    pins in left hip   • ATHROPLASTY      broken hip 1990   • OTHER CARDIAC SURGERY         Medications  No current facility-administered medications on file prior to encounter.      Current Outpatient Prescriptions on File Prior to Encounter   Medication Sig Dispense Refill   • amLODIPine (NORVASC) 5 MG Tab TAKE 1 TABLET BY MOUTH DAILY 90 Tab 3   • clopidogrel (PLAVIX) 75 MG Tab Take 1 Tab by mouth every day. 90 Tab 0   • metoprolol (LOPRESSOR) 25 MG Tab Take 0.5 Tabs by mouth 2 Times a Day. 60 Tab 3   • apixaban (ELIQUIS) 5mg Tab Take 1 Tab by mouth 2 Times a Day. 60 Tab 3   • lisinopril (PRINIVIL) 10 MG Tab Take 1 Tab by mouth every day. 90 Tab 0       Family History  Family History   Problem Relation Age of Onset   • Heart Disease Mother    • Cancer Father        Social History  Social History   Substance Use Topics   • Smoking status: Current Every Day Smoker     Packs/day: 0.50     Years: 0.10     Types: Cigarettes   • Smokeless tobacco: Never Used   • Alcohol use Yes      Comment: 1 per  day       Allergies  Allergies   Allergen Reactions   • Meperidine Vomiting and Nausea   • Tramadol Vomiting and Nausea        Physical Exam  Laboratory   Hemodynamics  Temp (24hrs), Av °C (98.6 °F), Min:37 °C (98.6 °F), Max:37 °C (98.6 °F)   Temperature: 37 °C (98.6 °F)  Pulse  Av.3  Min: 69  Max: 121    Blood Pressure : 131/78      Respiratory      Respiration: 18, Pulse Oximetry: 99 %             Physical Exam   Constitutional: She is oriented to person, place, and time. She appears well-developed and well-nourished. No distress.   HENT:   Head: Normocephalic and atraumatic.   Eyes: Conjunctivae are normal. Pupils are equal, round, and reactive to light.   Neck: Normal range of motion. Neck supple. No JVD present. No thyromegaly present.   Cardiovascular: Normal rate, regular rhythm, normal heart sounds and intact distal pulses.    Pulmonary/Chest: Effort normal and breath sounds normal. No respiratory distress. She exhibits no tenderness.   Abdominal: Soft. Bowel sounds are normal. She exhibits no distension. There is no tenderness.   Musculoskeletal: Normal range of motion. She exhibits no edema.   Neurological: She is alert and oriented to person, place, and time. She exhibits normal muscle tone.   Skin: Skin is warm and dry.   Psychiatric: She has a normal mood and affect. Her behavior is normal. Judgment and thought content normal.   Nursing note and vitals reviewed.      Recent Labs      18   WBC  8.2   RBC  4.33   HEMOGLOBIN  14.7   HEMATOCRIT  45.2   MCV  104.4*   MCH  33.9*   MCHC  32.5*   RDW  54.3*   PLATELETCT  283   MPV  10.8     Recent Labs      18   SODIUM  141   POTASSIUM  3.9   CHLORIDE  109   CO2  20   GLUCOSE  128*   BUN  19   CREATININE  1.21   CALCIUM  9.3     Recent Labs      18   ALTSGPT  7   ASTSGOT  15   ALKPHOSPHAT  76   TBILIRUBIN  0.5   LIPASE  34   GLUCOSE  128*         Recent Labs      18   BNPBTYPENAT  122*         Lab  Results   Component Value Date    TROPONINI 0.10 (H) 02/27/2018     Urinalysis:    Lab Results  Component Value Date/Time   SPECGRAVITY <=1.005 09/23/2017 1035   GLUCOSEUR Negative 09/23/2017 1035   KETONES Negative 09/23/2017 1035   NITRITE Negative 09/23/2017 1035   WBCURINE 10-20 (A) 01/31/2015 2100   RBCURINE >150 (A) 01/31/2015 2100   BACTERIA Few (A) 01/31/2015 2100   EPITHELCELL Few 01/31/2015 2100        Imaging  reviewed   Assessment/Plan     I anticipate this patient is appropriate for observation status at this time.    * Chest pain   Assessment & Plan    CP with slight increase in troponin   CP has subsided at my examination   Will continue troponin trend   Already on OAC apixiban and plavix will continue for now   Cont BB and ace, start on statin  Stress test ordered  Consider cards eval as indicated         CAD (coronary artery disease)- (present on admission)   Assessment & Plan    Cont bb, plavix         Carotid atherosclerosis, bilateral- (present on admission)   Assessment & Plan    With hx of CVA  Cont risk factor modifications   Statin added to regmine  Plavix, OAC continue         Anxiety- (present on admission)   Assessment & Plan    stable        Dyslipidemia- (present on admission)   Assessment & Plan    Recheck lipid panel   Added statin given hx and risk factors        HTN (hypertension), benign- (present on admission)   Assessment & Plan    Cont home anti hypertensives             VTE prophylaxis: OAC

## 2018-02-27 NOTE — ASSESSMENT & PLAN NOTE
Continue with aggressive blood pressure management including lisinopril, calcium channel blocker and beta blocker.

## 2018-02-27 NOTE — PROGRESS NOTES
Assessment completed.  Pt A&Ox4.  Bedside swallow evaluation completed, patient passed.  Respirations even, unlabored on room air.  Pt denies any pain at this time.  Pt denies any CP, SOB, nausea, palpitations at this time.  Monitors applied, sinus rhythm noted.     Bed in locked, lowest position.  Call light and belongings within reach.  Pt updated on POC, updated communication board.  Needs met, will continue to monitor.  Hot meal provided

## 2018-02-27 NOTE — PROGRESS NOTES
Patient to transfer to T709.  Telephone report given to receiving nurse, questions answered.  Pt updated

## 2018-02-27 NOTE — ED TRIAGE NOTES
PT IS AAOX4, PT IS IN NAD, PT CAME TO THE ER C/O C/P. PT CAME WITH EMS.   EMS GAVE NITRO, ASA.   PT HAS NO PAIN AT THIS TIME.

## 2018-02-27 NOTE — ED NOTES
"Med rec updated and complete  Allergies reviewed  Pt states \"I only take half of my AMLODIPINE 5MG, because it gives me diarrhea\".  \"I'm to take a ASPIRIN 81MG, but I have not been taking it\".  \"I take my METOPROLOL 25MG 1 tablet twice a day, not half a tablet twice a day\".  Pt reports not taking her ELIQUIS 5MG for over a week, because she does not like taking it, she is taking her PLAVIX 75MG.  Pt states \"No antibiotics in the last 30 days\".    "

## 2018-02-27 NOTE — PROGRESS NOTES
Received pt to unit. VSS, A&Ox4, no reports of pain. Tele box placed, SR at 63 noted. Bed locked in low position, call light within reach.

## 2018-02-27 NOTE — PROGRESS NOTES
Patient arrived to unit via gurney from main ER with ER nurse and ER tech.  Pt ambulated from gurney to bed x2 assist, gait unsteady.   Pt educated on unit policy, call light.

## 2018-02-27 NOTE — ED PROVIDER NOTES
ED Provider Note    CHIEF COMPLAINT  Chief Complaint   Patient presents with   • Chest Pain        [facility]    Primary care provider: Ric Garcia M.D.   History obtained from: Patient  History limited by: None     Madeleine Zambrano is a 85 y.o. female who presents to the ED by EMS complaining of chest pain with radiation to both arms that she noticed upon waking around midnight to go to the bathroom. She denies radiation of this pain anywhere else. She describes the pain as sharp discomfort. She also reports associated difficulty with breathing. She denies diaphoresis/dizziness/nausea. She has not noticed anything that makes her symptoms better or worse. She took a nitro pill without any change and she thought that the pill probably had . She was given aspirin and nitro by EMS with no change. She denies fever or significant cough. She denies diarrhea or dysuria.      REVIEW OF SYSTEMS  Please see HPI for pertinent positives/negatives.  All other systems reviewed and are negative.     PAST MEDICAL HISTORY  Past Medical History:   Diagnosis Date   • TIA (transient ischemic attack) 2017   • MEDICAL HOME 12   • Back pain 3/28/2012   • Anginal syndrome (CMS-HCC)    • Arrhythmia    • Arthritis    • CATARACT    • Coronary artery disease    • Disorder of thyroid    • Glaucoma    • Hyperlipidemia    • Hypertension    • Myocardial infarct    • Pain     sacrum 1/10   • Stroke (CMS-HCC)    • Urinary incontinence         SURGICAL HISTORY  Past Surgical History:   Procedure Laterality Date   • VITRECTOMY POSTERIOR Right 2017    Procedure: VITRECTOMY POSTERIOR-LASER, SF6 GAS;  Surgeon: Jessica Arita M.D.;  Location: SURGERY SAME DAY [facility];  Service:    • RIGO BY LAPAROSCOPY  10/9/2015    Procedure: RIGO BY LAPAROSCOPY;  Surgeon: Ric Zambrano M.D.;  Location: SURGERY [facility];  Service:    • CATARACT PHACO WITH IOL  2011    Performed by EVERETTE COOPER at SURGERY SAME DAY  "[facility]   • CATARACT PHACO WITH IOL  4/13/2011    Performed by EVERETTE COOPER at SURGERY SAME DAY [facility]   • OTHER ORTHOPEDIC SURGERY  1980    pins in left hip   • ATHROPLASTY      broken hip 1990   • OTHER CARDIAC SURGERY          SOCIAL HISTORY  Social History     Social History Main Topics   • Smoking status: Current Every Day Smoker     Packs/day: 0.50     Years: 0.10     Types: Cigarettes   • Smokeless tobacco: Never Used   • Alcohol use Yes      Comment: 1 per day   • Drug use: No   • Sexual activity: Not on file        FAMILY HISTORY  Family History   Problem Relation Age of Onset   • Heart Disease Mother    • Cancer Father         CURRENT MEDICATIONS  Home Medications     Reviewed by Raisa Almaraz R.N. (Registered Nurse) on 02/27/18 at 0418  Med List Status: Not Addressed   Medication Last Dose Status   amLODIPine (NORVASC) 5 MG Tab  Active   apixaban (ELIQUIS) 5mg Tab  Active   clopidogrel (PLAVIX) 75 MG Tab  Active   lisinopril (PRINIVIL) 10 MG Tab 1/8/2018 Active   metoprolol (LOPRESSOR) 25 MG Tab  Active                 ALLERGIES  Allergies   Allergen Reactions   • Meperidine Vomiting and Nausea   • Tramadol Vomiting and Nausea        PHYSICAL EXAM  VITAL SIGNS: /78   Pulse 69   Temp 37 °C (98.6 °F)   Resp 18   Ht 1.651 m (5' 5\")   Wt 68 kg (150 lb)   SpO2 99%   BMI 24.96 kg/m²  @EDMOND[200176::@     Pulse ox interpretation: 97% I interpret this pulse ox as normal     Constitutional: Well developed, well nourished, alert in no apparent distress, nontoxic appearance    HENT: No external signs of trauma, normocephalic, oropharynx moist and clear, nose normal   Eyes: PERRL, conjunctiva without erythema, no discharge, no icterus    Neck: Soft and supple, trachea midline, no stridor, no tenderness, no LAD, no JVD, good ROM    Cardiovascular: Regular rate and rhythm, no murmurs/rubs/gallops, strong distal pulses and good perfusion    Thorax & Lungs: No respiratory distress, CTAB, no " chest tenderness    Abdomen: Soft, nontender, nondistended, no guarding, no rebound, normal BS    Back: No CVAT    Extremities: No cyanosis, trace bilateral lower extremity edema, no gross deformity, good ROM, no tenderness, intact distal pulses with brisk cap refill    Skin: Warm, dry, no pallor/cyanosis, no rash noted except chronic appearing skin changes bilateral lower extremities   Lymphatic: No lymphadenopathy noted    Neuro: A/O times 3, no focal deficits noted    Psychiatric: Cooperative         DIAGNOSTIC STUDIES / PROCEDURES    EKG  12 Lead EKG obtained at 0415 and interpreted by me:   Rate: 76   Rhythm: Sinus rhythm   Ectopy: None  Intervals: Normal   Axis: LAD  Q Waves: None   QRS: Late precordial R-wave transition  ST segments: Normal  T Waves: Normal    Clinical Impression: Sinus rhythm without acute ST-T wave changes  Compared to January 8, 2018      LABS  All labs reviewed by me.     Results for orders placed or performed during the hospital encounter of 02/27/18   CBC WITH DIFFERENTIAL   Result Value Ref Range    WBC 8.2 4.8 - 10.8 K/uL    RBC 4.33 4.20 - 5.40 M/uL    Hemoglobin 14.7 12.0 - 16.0 g/dL    Hematocrit 45.2 37.0 - 47.0 %    .4 (H) 81.4 - 97.8 fL    MCH 33.9 (H) 27.0 - 33.0 pg    MCHC 32.5 (L) 33.6 - 35.0 g/dL    RDW 54.3 (H) 35.9 - 50.0 fL    Platelet Count 283 164 - 446 K/uL    MPV 10.8 9.0 - 12.9 fL    Neutrophils-Polys 57.10 44.00 - 72.00 %    Lymphocytes 28.10 22.00 - 41.00 %    Monocytes 8.90 0.00 - 13.40 %    Eosinophils 4.40 0.00 - 6.90 %    Basophils 1.10 0.00 - 1.80 %    Immature Granulocytes 0.40 0.00 - 0.90 %    Nucleated RBC 0.00 /100 WBC    Neutrophils (Absolute) 4.71 2.00 - 7.15 K/uL    Lymphs (Absolute) 2.31 1.00 - 4.80 K/uL    Monos (Absolute) 0.73 0.00 - 0.85 K/uL    Eos (Absolute) 0.36 0.00 - 0.51 K/uL    Baso (Absolute) 0.09 0.00 - 0.12 K/uL    Immature Granulocytes (abs) 0.03 0.00 - 0.11 K/uL    NRBC (Absolute) 0.00 K/uL   COMP METABOLIC PANEL   Result Value  Ref Range    Sodium 141 135 - 145 mmol/L    Potassium 3.9 3.6 - 5.5 mmol/L    Chloride 109 96 - 112 mmol/L    Co2 20 20 - 33 mmol/L    Anion Gap 12.0 (H) 0.0 - 11.9    Glucose 128 (H) 65 - 99 mg/dL    Bun 19 8 - 22 mg/dL    Creatinine 1.21 0.50 - 1.40 mg/dL    Calcium 9.3 8.5 - 10.5 mg/dL    AST(SGOT) 15 12 - 45 U/L    ALT(SGPT) 7 2 - 50 U/L    Alkaline Phosphatase 76 30 - 99 U/L    Total Bilirubin 0.5 0.1 - 1.5 mg/dL    Albumin 3.9 3.2 - 4.9 g/dL    Total Protein 6.3 6.0 - 8.2 g/dL    Globulin 2.4 1.9 - 3.5 g/dL    A-G Ratio 1.6 g/dL   LIPASE   Result Value Ref Range    Lipase 34 11 - 82 U/L   TROPONIN   Result Value Ref Range    Troponin I 0.10 (H) 0.00 - 0.04 ng/mL   BTYPE NATRIURETIC PEPTIDE   Result Value Ref Range    B Natriuretic Peptide 122 (H) 0 - 100 pg/mL   MAGNESIUM   Result Value Ref Range    Magnesium 1.6 1.5 - 2.5 mg/dL   ESTIMATED GFR   Result Value Ref Range    GFR If  51 (A) >60 mL/min/1.73 m 2    GFR If Non  42 (A) >60 mL/min/1.73 m 2   EKG (ER)   Result Value Ref Range    Report       [facility] Emergency Dept.    Test Date:  2018  Pt Name:    ERNIE MOBLEY               Department: ER  MRN:        [id number]                      Room:       RD 02  Gender:     Female                       Technician: [id number]  :        [id number]                   Requested By:ER TRIAGE PROTOCOL  Order #:    [id number]                    Reading MD:    Measurements  Intervals                                Axis  Rate:       76                           P:          88  ME:         156                          QRS:        -12  QRSD:       92                           T:          37  QT:         384  QTc:        432    Interpretive Statements  SINUS RHYTHM  Compared to ECG 2018 05:51:27  No significant changes          RADIOLOGY  The radiologist's interpretation of all radiological studies have been reviewed by me.     DX-CHEST-LIMITED (1 VIEW)   Final  Result      1.  No pneumonia or pulmonary edema.   2.  Stable mild cardiomegaly.      NM-CARDIAC STRESS TEST    (Results Pending)          COURSE & MEDICAL DECISION MAKING  Nursing notes, VS, PMSFHx reviewed in chart.     Review of past medical records shows the patient was admitted January 2018 in this hospital for chest pain.    Differential diagnoses considered include but are not limited to: AMI, dissection, PE, pneumothorax, pneumomediastinum, CHF/pulm edema, pericardial effusion/tamponade, pericarditis, pneumonia, pleural effusion, pleurisy, costochondritis, mediastinitis, esophageal rupture, esophageal spasm, GERD, gastritis, PUD, hiatal hernia, pancreatitis, cholecystitis/ascending cholangitis, gallstone/biliary colic, herpes zoster, muscle strain, neuropathy       Pt risk-stratified as moderate risk for MACE in the next 6 weeks by HEART Score: 5    HISTORY  Highly suspicious +2  Moderately suspicious +1  Slightly suspicious 0    EKG  Significant ST depression +2  Nonspecific repolarization disturbance +1  Normal 0    AGE  ? 65 +2  45-65 +1  < 45 0    RISK FACTORS  Hypercholesterolemia, HTN, DM, Cigarette smoking, positive family history, obesity  ? 3 risk factors or history of atherosclerotic disease +2  1-2 risk factors +1  No risk factors known 0    TROPONIN  ? 3× normal limit +2  1-3× normal limit +1  ? normal limit 0      0540: D/W Dr. Rivera, hospitalist, who will admit the patient      Patient brought by EMS to the ED with above complaint. EKG without evidence of acute ischemic changes. Chest x-ray without evidence of acute process. Labs showed mild elevation of her troponin. Findings discussed with the patient and she is agreeable to admission. Discussed with Dr. Rivera who graciously agreed to admit patient for further care.        FINAL IMPRESSION  1. Chest pain in adult           DISPOSITION  Patient will be admitted by hospitalist for further care        Electronically signed by: Mt Dominguez,  2/27/2018 4:05 AM      Portions of this record were made with voice recognition software.  Despite my review, spelling/grammar/context errors may still remain.  Interpretation of this chart should be taken in this context.

## 2018-02-27 NOTE — PROGRESS NOTES
Telephone report received from ER nurseGianna .  Troponin 0.10.  Informed ER nurse the need for 2nd troponin draw and result prior to transferring per protocol.  ER nurse verbalized understanding

## 2018-02-27 NOTE — DISCHARGE PLANNING
Patient sleepy but pleasant during assessment. Patient's daughter and son-in-law live locally and provide assistance as needed including rides. Anticipate uncomplicated discharge.      Care Transition Team Assessment    Information Source  Orientation : Oriented x 4  Information Given By: Patient  Informant's Name: Madeleine Zambrano  Who is responsible for making decisions for patient? : Patient    Readmission Evaluation  Is this a readmission?: No    Elopement Risk  Legal Hold: No  Ambulatory or Self Mobile in Wheelchair: No-Not an Elopement Risk  Elopement Risk: Not at Risk for Elopement    Interdisciplinary Discharge Planning  Does Admitting Nurse Feel This Could be a Complex Discharge?: No  Primary Care Physician: Ric Garcia M.D.  Lives with - Patient's Self Care Capacity: Alone and Able to Care For Self  Support Systems: Family Member(s)  Housing / Facility: 1 Story Apartment / Condo  Do You Take your Prescribed Medications Regularly: Yes (Uses [facility] on [address].)  Able to Return to Previous ADL's: Yes  Mobility Issues: No  Prior Services: None  Patient Expects to be Discharged to:: Home.  Assistance Needed: No (Daughter and son-in-law provide help.)  Durable Medical Equipment: Not Applicable    Discharge Preparedness  What is your plan after discharge?: Home with help  What are your discharge supports?: Child  Prior Functional Level: Ambulatory, Independent with Activities of Daily Living  Difficulity with ADLs: None  Difficulity with IADLs: Driving (Daughter and son-in-law provide rides.)    Functional Assesment  Prior Functional Level: Ambulatory, Independent with Activities of Daily Living    Finances  Financial Barriers to Discharge: No (Social Security.)  Prescription Coverage: Yes    Vision / Hearing Impairment  Vision Impairment : Yes  Right Eye Vision: Wears Glasses  Left Eye Vision: Wears Glasses  Hearing Impairment : No    Values / Beliefs / Concerns  Values / Beliefs Concerns :  No    Advance Directive  Advance Directive?: None  Advance Directive offered?: AD Booklet refused    Domestic Abuse  Have you ever been the victim of abuse or violence?: No  Physical Abuse or Sexual Abuse: No  Verbal Abuse or Emotional Abuse: No  Possible Abuse Reported to:: Not Applicable    Psychological Assessment  History of Substance Abuse: None  History of Psychiatric Problems: No  Non-compliant with Treatment: No  Newly Diagnosed Illness: No    Discharge Risks or Barriers  Discharge risks or barriers?: Transportation (Daughter and son-in-law provide rides.)    Anticipated Discharge Information  Anticipated discharge disposition: Home  Discharge Address: [address] [place] [address] [address], [place] [address]  Discharge Contact Phone Number: [phone] (home), [phone] (mobile)

## 2018-02-27 NOTE — PROGRESS NOTES
2nd troponin resulted.  Physician informed.  Tranfer order placed by physician, charge nurse aware

## 2018-02-27 NOTE — PROGRESS NOTES
Transferred from CDU, she has a nonstemi, is on Eliquis, cards consulted, pending determination into angiography vs medical management.

## 2018-02-28 PROBLEM — I21.4 NON-ST ELEVATED MYOCARDIAL INFARCTION (NON-STEMI) (HCC): Status: ACTIVE | Noted: 2018-02-28

## 2018-02-28 PROBLEM — Z86.73 HISTORY OF CVA (CEREBROVASCULAR ACCIDENT): Status: ACTIVE | Noted: 2018-02-28

## 2018-02-28 LAB
ALBUMIN SERPL BCP-MCNC: 3.1 G/DL (ref 3.2–4.9)
ALBUMIN/GLOB SERPL: 1.5 G/DL
ALP SERPL-CCNC: 69 U/L (ref 30–99)
ALT SERPL-CCNC: 7 U/L (ref 2–50)
ANION GAP SERPL CALC-SCNC: 4 MMOL/L (ref 0–11.9)
AST SERPL-CCNC: 15 U/L (ref 12–45)
BASOPHILS # BLD AUTO: 1 % (ref 0–1.8)
BASOPHILS # BLD: 0.06 K/UL (ref 0–0.12)
BILIRUB SERPL-MCNC: 0.5 MG/DL (ref 0.1–1.5)
BUN SERPL-MCNC: 22 MG/DL (ref 8–22)
CALCIUM SERPL-MCNC: 8.3 MG/DL (ref 8.5–10.5)
CHLORIDE SERPL-SCNC: 112 MMOL/L (ref 96–112)
CHOLEST SERPL-MCNC: 146 MG/DL (ref 100–199)
CO2 SERPL-SCNC: 22 MMOL/L (ref 20–33)
CREAT SERPL-MCNC: 1.38 MG/DL (ref 0.5–1.4)
EOSINOPHIL # BLD AUTO: 0.31 K/UL (ref 0–0.51)
EOSINOPHIL NFR BLD: 5.1 % (ref 0–6.9)
ERYTHROCYTE [DISTWIDTH] IN BLOOD BY AUTOMATED COUNT: 53.3 FL (ref 35.9–50)
GLOBULIN SER CALC-MCNC: 2.1 G/DL (ref 1.9–3.5)
GLUCOSE SERPL-MCNC: 93 MG/DL (ref 65–99)
HCT VFR BLD AUTO: 36.2 % (ref 37–47)
HDLC SERPL-MCNC: 54 MG/DL
HGB BLD-MCNC: 11.9 G/DL (ref 12–16)
IMM GRANULOCYTES # BLD AUTO: 0.02 K/UL (ref 0–0.11)
IMM GRANULOCYTES NFR BLD AUTO: 0.3 % (ref 0–0.9)
LDLC SERPL CALC-MCNC: 81 MG/DL
LV EJECT FRACT  99904: 65
LV EJECT FRACT MOD 2C 99903: 61.85
LV EJECT FRACT MOD 4C 99902: 74.79
LV EJECT FRACT MOD BP 99901: 64.99
LYMPHOCYTES # BLD AUTO: 1.82 K/UL (ref 1–4.8)
LYMPHOCYTES NFR BLD: 29.9 % (ref 22–41)
MCH RBC QN AUTO: 34.3 PG (ref 27–33)
MCHC RBC AUTO-ENTMCNC: 32.9 G/DL (ref 33.6–35)
MCV RBC AUTO: 104.3 FL (ref 81.4–97.8)
MONOCYTES # BLD AUTO: 0.69 K/UL (ref 0–0.85)
MONOCYTES NFR BLD AUTO: 11.3 % (ref 0–13.4)
NEUTROPHILS # BLD AUTO: 3.19 K/UL (ref 2–7.15)
NEUTROPHILS NFR BLD: 52.4 % (ref 44–72)
NRBC # BLD AUTO: 0 K/UL
NRBC BLD-RTO: 0 /100 WBC
PLATELET # BLD AUTO: 211 K/UL (ref 164–446)
PMV BLD AUTO: 10.9 FL (ref 9–12.9)
POTASSIUM SERPL-SCNC: 3.6 MMOL/L (ref 3.6–5.5)
PROT SERPL-MCNC: 5.2 G/DL (ref 6–8.2)
RBC # BLD AUTO: 3.47 M/UL (ref 4.2–5.4)
SODIUM SERPL-SCNC: 138 MMOL/L (ref 135–145)
TRIGL SERPL-MCNC: 53 MG/DL (ref 0–149)
WBC # BLD AUTO: 6.1 K/UL (ref 4.8–10.8)

## 2018-02-28 PROCEDURE — 80053 COMPREHEN METABOLIC PANEL: CPT

## 2018-02-28 PROCEDURE — 700102 HCHG RX REV CODE 250 W/ 637 OVERRIDE(OP): Performed by: HOSPITALIST

## 2018-02-28 PROCEDURE — 36415 COLL VENOUS BLD VENIPUNCTURE: CPT

## 2018-02-28 PROCEDURE — 99232 SBSQ HOSP IP/OBS MODERATE 35: CPT | Performed by: HOSPITALIST

## 2018-02-28 PROCEDURE — A9270 NON-COVERED ITEM OR SERVICE: HCPCS | Performed by: INTERNAL MEDICINE

## 2018-02-28 PROCEDURE — 700102 HCHG RX REV CODE 250 W/ 637 OVERRIDE(OP): Performed by: INTERNAL MEDICINE

## 2018-02-28 PROCEDURE — 80061 LIPID PANEL: CPT

## 2018-02-28 PROCEDURE — A9270 NON-COVERED ITEM OR SERVICE: HCPCS | Performed by: HOSPITALIST

## 2018-02-28 PROCEDURE — 93308 TTE F-UP OR LMTD: CPT

## 2018-02-28 PROCEDURE — 85025 COMPLETE CBC W/AUTO DIFF WBC: CPT

## 2018-02-28 PROCEDURE — 770020 HCHG ROOM/CARE - TELE (206)

## 2018-02-28 PROCEDURE — 93308 TTE F-UP OR LMTD: CPT | Mod: 26 | Performed by: INTERNAL MEDICINE

## 2018-02-28 RX ORDER — AMLODIPINE BESYLATE 5 MG/1
2.5 TABLET ORAL
Status: DISCONTINUED | OUTPATIENT
Start: 2018-02-28 | End: 2018-03-02 | Stop reason: HOSPADM

## 2018-02-28 RX ADMIN — ATORVASTATIN CALCIUM 40 MG: 40 TABLET, FILM COATED ORAL at 20:43

## 2018-02-28 RX ADMIN — METOPROLOL TARTRATE 12.5 MG: 25 TABLET, FILM COATED ORAL at 09:56

## 2018-02-28 RX ADMIN — LISINOPRIL 10 MG: 10 TABLET ORAL at 09:56

## 2018-02-28 RX ADMIN — NITROGLYCERIN 0.5 INCH: 20 OINTMENT TOPICAL at 05:48

## 2018-02-28 RX ADMIN — AMLODIPINE BESYLATE 2.5 MG: 5 TABLET ORAL at 09:54

## 2018-02-28 RX ADMIN — METOPROLOL TARTRATE 12.5 MG: 25 TABLET, FILM COATED ORAL at 20:43

## 2018-02-28 RX ADMIN — APIXABAN 5 MG: 5 TABLET, FILM COATED ORAL at 09:54

## 2018-02-28 RX ADMIN — CLOPIDOGREL 75 MG: 75 TABLET, FILM COATED ORAL at 09:55

## 2018-02-28 RX ADMIN — APIXABAN 5 MG: 5 TABLET, FILM COATED ORAL at 20:43

## 2018-02-28 ASSESSMENT — ENCOUNTER SYMPTOMS
CARDIOVASCULAR NEGATIVE: 1
POLYDIPSIA: 0
DIAPHORESIS: 0
CONSTIPATION: 0
VOMITING: 0
ABDOMINAL PAIN: 0
DOUBLE VISION: 0
GASTROINTESTINAL NEGATIVE: 1
PSYCHIATRIC NEGATIVE: 1
HEMOPTYSIS: 0
EYES NEGATIVE: 1
LOSS OF CONSCIOUSNESS: 0
WEAKNESS: 1
NAUSEA: 0
DIZZINESS: 0
BRUISES/BLEEDS EASILY: 0
RESPIRATORY NEGATIVE: 1
COUGH: 0
SEIZURES: 0
CHILLS: 0
PALPITATIONS: 1
FOCAL WEAKNESS: 0
PALPITATIONS: 0
HEARTBURN: 0
DEPRESSION: 1
SPEECH CHANGE: 0
SENSORY CHANGE: 1
MEMORY LOSS: 1
FEVER: 0
CLAUDICATION: 0
DIARRHEA: 0
WHEEZING: 0
NERVOUS/ANXIOUS: 0
HEADACHES: 0
TINGLING: 1
BLOOD IN STOOL: 0
ORTHOPNEA: 0

## 2018-02-28 ASSESSMENT — PAIN SCALES - GENERAL
PAINLEVEL_OUTOF10: 0

## 2018-02-28 NOTE — PROGRESS NOTES
Monitor summary:  Sinus rhythm  R PAC, PVC, couplets  60-65  Down to 40 non-sustaining   .16/.08/.38

## 2018-02-28 NOTE — CONSULTS
Consults     Admission Date / Service Date: 02/27/18    Patient's PCP: Ric Garcia M.D.    CC: Arm numbness and tingling      HPI: Mrs. Jurado is a very pleasant 85-year-old female with chronic hypertension, coronary artery disease status post inferior MI, paroxysmal atrial fibrillation, hyperlipidemia who presented to the emergency room with complaints of upper extremity numbness and tingling. She had an inferior ST elevation MI in 2015 and underwent balloon angioplasty of the RCA but the stent could not be deployed. She's been treated with medical management was lost to follow-up a few years ago. She was hospitalized about a month ago with complaints of atypical chest pain. She was found to have paroxysmal atrial fibrillation and a history of CVA so she was placed on Ann Arnulfo. She's also been on clopidogrel and aspirin was avoided due to bleeding risk. She lives by herself at home and has a son-in-law who calls her and helps with groceries but she is mostly on her own. She ambulates with a rolling walker and is able to perform her basic ADLs. She gets short of breath doing laundry but otherwise does pretty well. She denies actual chest pain with activity. She expresses palpitations or heart takes off and races. She does not have dizziness or light headedness and has not passed out. Here in the emergency room she had a mildly abnormal troponin in the indeterminate range. Her EKG appears normal. Upon further questioning she was able to tell me some of her medications and doesn't realize apixaban was recently prescribed by a physician she did not recognize and was in fact Dr. Pederson the hospitalist to discharge her last time. She was not actually taking apixaban was at home. She has been taking clopidogrel and not a baby aspirin. She tells me she was taking her metoprolol and amlodipine that she cut the amlodipine in half because it causes diarrhea and she has run out of lisinopril.    Medications / Drug list  prior to admission:  No current facility-administered medications on file prior to encounter.      Current Outpatient Prescriptions on File Prior to Encounter   Medication Sig Dispense Refill   • clopidogrel (PLAVIX) 75 MG Tab Take 1 Tab by mouth every day. 90 Tab 0   • apixaban (ELIQUIS) 5mg Tab Take 1 Tab by mouth 2 Times a Day. 60 Tab 3   • lisinopril (PRINIVIL) 10 MG Tab Take 1 Tab by mouth every day. 90 Tab 0       Current list of administered Medications:    Current Facility-Administered Medications:   •  apixaban (ELIQUIS) tablet 5 mg, 5 mg, Oral, BID, Kevin Rivera M.D.  •  clopidogrel (PLAVIX) tablet 75 mg, 75 mg, Oral, QDAY, Kevin Rivera M.D., 75 mg at 02/27/18 0948  •  lisinopril (PRINIVIL) 10 MG tablet 10 mg, 10 mg, Oral, DAILY, Kevin Rivera M.D., 10 mg at 02/27/18 0947  •  metoprolol (LOPRESSOR) tablet 12.5 mg, 12.5 mg, Oral, BID, Kevin Rivera M.D., 12.5 mg at 02/27/18 0947  •  amLODIPine (NORVASC) tablet 5 mg, 5 mg, Oral, Q DAY, Kevin Rivera M.D., 5 mg at 02/27/18 0947  •  hyoscyamine-maalox plus-lidocaine viscous (GI COCKTAIL) oral susp 15 mL, 15 mL, Oral, Once, Kevin Rivera M.D., Stopped at 02/27/18 0600  •  senna-docusate (PERICOLACE or SENOKOT S) 8.6-50 MG per tablet 2 Tab, 2 Tab, Oral, BID **AND** polyethylene glycol/lytes (MIRALAX) PACKET 1 Packet, 1 Packet, Oral, QDAY PRN **AND** magnesium hydroxide (MILK OF MAGNESIA) suspension 30 mL, 30 mL, Oral, QDAY PRN **AND** bisacodyl (DULCOLAX) suppository 10 mg, 10 mg, Rectal, QDAY PRN, Kevin Rivera M.D.  •  NS infusion, , Intravenous, Continuous, Kevin Rivera M.D., Stopped at 02/27/18 0615  •  Notify provider if pain remains uncontrolled, , , CONTINUOUS **AND** Use the numeric rating scale (NRS-11) on regular floors and Critical-Care Pain Observation Tool (CPOT) on ICUs/Trauma to assess pain, , , CONTINUOUS **AND** Pulse Ox (Oximetry), , , CONTINUOUS **AND** Pharmacy Consult Request ...Pain Management Review, ,  Other, PRN **AND** If patient difficult to arouse and/or has respiratory depression, stop any opiates that are currently infusing and call a Rapid Response., , , CONTINUOUS **AND** oxyCODONE immediate-release (ROXICODONE) tablet 5 mg, 5 mg, Oral, Q3HRS PRN **AND** oxyCODONE immediate release (ROXICODONE) tablet 10 mg, 10 mg, Oral, Q3HRS PRN **AND** HYDROmorphone (DILAUDID) injection 0.5 mg, 0.5 mg, Intravenous, Q3HRS PRN, Kevin Rivera M.D.  •  atorvastatin (LIPITOR) tablet 40 mg, 40 mg, Oral, QHS, Kevin Rivera M.D.  •  labetalol (NORMODYNE,TRANDATE) injection 10 mg, 10 mg, Intravenous, Q6HRS PRN, Josefina Quinonez M.D.  •  nitroglycerin (NITRO-BID) 2 % ointment 0.5 Inch, 0.5 Inch, Topical, Q6HRS, Peri Castañeda M.D.    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[facility])    • Arrhythmia    • Arthritis    • Back pain 3/28/2012   • CATARACT    • Coronary artery disease    • Disorder of thyroid    • Glaucoma    • Hyperlipidemia    • Hypertension    • MEDICAL HOME 11/07/12   • Myocardial infarct    • Pain     sacrum 1/10   • Stroke (CMS-[facility])    • TIA (transient ischemic attack) 09/2017   • Urinary incontinence        Past Surgical History:   Procedure Laterality Date   • VITRECTOMY POSTERIOR Right 4/13/2017    Procedure: VITRECTOMY POSTERIOR-LASER, SF6 GAS;  Surgeon: Jessica Arita M.D.;  Location: SURGERY SAME DAY [facility];  Service:    • RIGO BY LAPAROSCOPY  10/9/2015    Procedure: RIGO BY LAPAROSCOPY;  Surgeon: Ric Zambrano M.D.;  Location: SURGERY [facility];  Service:    • CATARACT PHACO WITH IOL  4/27/2011    Performed by EVERETTE COOPER at SURGERY SAME DAY [facility]   • CATARACT PHACO WITH IOL  4/13/2011    Performed by EVERETTE COOPER at SURGERY SAME DAY [facility]   • OTHER ORTHOPEDIC SURGERY  1980    pins in left hip   • ATHROPLASTY      broken hip 1990   • OTHER CARDIAC SURGERY         Family History   Problem Relation Age of Onset   • Heart Disease Mother   "  • Cancer Father        Social History     Social History   • Marital status:      Spouse name: N/A   • Number of children: N/A   • Years of education: N/A     Occupational History   • Not on file.     Social History Main Topics   • Smoking status: Current Every Day Smoker     Packs/day: 0.50     Years: 0.10     Types: Cigarettes   • Smokeless tobacco: Never Used   • Alcohol use Yes      Comment: 1 per day   • Drug use: No   • Sexual activity: Not on file     Other Topics Concern   • Not on file     Social History Narrative   • No narrative on file       Allergies   Allergen Reactions   • Meperidine Vomiting and Nausea   • Tramadol Vomiting and Nausea       Review of systems:  She is having some loose stools on higher doses of amlodipine, she appears to have some mild cognitive impairment. She endorses depressed mood.  All others systems reviewed and negative.    Physical exam:  Patient Vitals for the past 24 hrs:   BP Temp Pulse Resp SpO2 Height Weight   02/27/18 1336 119/81 36.3 °C (97.3 °F) 65 17 94 % - -   02/27/18 1148 (!) 165/73 36.3 °C (97.4 °F) 62 16 95 % - -   02/27/18 0806 (!) 161/70 - - - - - -   02/27/18 0804 (!) 198/83 36.2 °C (97.1 °F) 67 16 96 % - -   02/27/18 0657 150/58 - 74 20 97 % - -   02/27/18 0559 131/78 37 °C (98.6 °F) 69 18 99 % - -   02/27/18 0417 122/82 - 78 18 97 % - -   02/27/18 0402 (!) 167/74 37 °C (98.6 °F) (!) 121 20 97 % - -   02/27/18 0358 - - - - - 1.651 m (5' 5\") 68 kg (150 lb)       General: No acute distress. Well nourished.  HEENT: EOM grossly intact, no scleral icterus, no pharyngeal erythema.   Neck:  No JVD, no bruits, trachea midline  CVS: RRR. Normal S1, split S2.1/6 TOMY LUSB. No LE edema.  2+ radial pulses, 1+ DT pulses  Resp: CTAB. No wheezing or crackles/rhonchi. Normal respiratory effort.  Abdomen: Soft, NT, no mikal hepatomegaly.  MSK/Ext: No clubbing or cyanosis.  Skin: Warm and dry, no rashes.  Neurological: CN III-XII grossly intact. No focal deficits. "   Psych: A&O x 3, appropriate affect, adequate judgement, impaired STM, LTM    Data:  Laboratory studies:  Lab Results   Component Value Date/Time    WBC 8.2 02/27/2018 04:18 AM    RBC 4.33 02/27/2018 04:18 AM    HEMOGLOBIN 14.7 02/27/2018 04:18 AM    HEMATOCRIT 45.2 02/27/2018 04:18 AM    .4 (H) 02/27/2018 04:18 AM    MCH 33.9 (H) 02/27/2018 04:18 AM    MCHC 32.5 (L) 02/27/2018 04:18 AM    MPV 10.8 02/27/2018 04:18 AM    NEUTSPOLYS 57.10 02/27/2018 04:18 AM    LYMPHOCYTES 28.10 02/27/2018 04:18 AM    MONOCYTES 8.90 02/27/2018 04:18 AM    EOSINOPHILS 4.40 02/27/2018 04:18 AM    BASOPHILS 1.10 02/27/2018 04:18 AM        Lab Results   Component Value Date/Time    SODIUM 141 02/27/2018 04:18 AM    POTASSIUM 3.9 02/27/2018 04:18 AM    CHLORIDE 109 02/27/2018 04:18 AM    CO2 20 02/27/2018 04:18 AM    GLUCOSE 128 (H) 02/27/2018 04:18 AM    BUN 19 02/27/2018 04:18 AM    CREATININE 1.21 02/27/2018 04:18 AM        Recent Labs      02/27/18   0418   ASTSGOT  15   ALTSGPT  7   TBILIRUBIN  0.5   ALKPHOSPHAT  76   GLOBULIN  2.4       Lab Results   Component Value Date/Time    PROTHROMBTM 14.6 09/23/2017 09:58 AM    INR 1.16 (H) 09/23/2017 09:58 AM        Imaging:  DX-CHEST-LIMITED (1 VIEW)   Final Result      1.  No pneumonia or pulmonary edema.   2.  Stable mild cardiomegaly.      NM-CARDIAC STRESS TEST    (Results Pending)   ECHOCARDIOGRAM LTD W/O CONT    (Results Pending)     I personally reviewed her last echocardiogram which showed normal LV systolic function. I personally reviewed her EKG which showed sinus rhythm and no acute changes.    Assessment / Plan:  1. Pressure mean numbness and tingling, not likely to be a chest pain equivalent.  2. Abnormal troponin of undetermined significance.  3. Prior coronary disease with inferior ST elevation MI in 2015, status post balloon angioplasty, stent could not be deployed. Medical management only.  4. Paroxysmal atrial fibrillation, supposed to be on our request.  5.  Cognitive impairment, suspect early dementia, limited social support   6. Hyperlipidemia  7. Anxiety  8. Chronic kidney disease stage III  9. Prior CVA    At this point I do not think it is in her best interest to pursue heart catheterization. She may have some small vessel disease and I'm trialing nitroglycerin. I'm not certain the nitro shanda actually help with her numbness and tingling in her hands as I suspected this likely not cardiac. I've also order a limited repeat echocardiogram to reassess LV function and wall motion. We will restart her apixaban and and encourage her to take this at home.    It is my pleasure to participate in the care of Ms. Zambrano.  Please do not hesitate to contact me with questions or concerns.    Peri Castañeda MD, [facility]  Cardiologist [facility]

## 2018-02-28 NOTE — PROGRESS NOTES
"Pt is resting in bed, RN applied new cardiac leads and educated pt about cardiac monitoring, pt denies pain stating \"this is the best I've felt since I've been here\", no needs at this time, call light and personal belongings in reach, bed alarm and hourly rounding in place.   "

## 2018-02-28 NOTE — CARE PLAN
Problem: Safety  Goal: Will remain free from falls  Outcome: PROGRESSING AS EXPECTED  RN educated pt on fall risk and fall prevention, bed locked and in lowest position, call light and personal belongings in reach, bed alarm and hourly rounding in place, mobility assessed and proper communication signs placed.       Problem: Infection  Goal: Will remain free from infection  Outcome: PROGRESSING AS EXPECTED  RN educated pt on infection prevention, RN used thorough hand hygiene before and after interactions with pt, encouraged pt and family to use proper hand hygiene.

## 2018-02-28 NOTE — RESPIRATORY CARE
COPD EDUCATION by COPD CLINICAL EDUCATOR  2/28/2018 at 7:50 AM by Cassie Morrison     Patient reviewed by COPD education team. Patient does not qualify for COPD program.

## 2018-02-28 NOTE — DISCHARGE PLANNING
Transitional Care Navigator:    Chart reviewed for post acute needs.  Pt with LACE score of 73, recent admission in 1/18.   Please consider a home health referral for follow up.

## 2018-02-28 NOTE — PROGRESS NOTES
[facility] Hospitalist Progress Note    Date of Service: 2018    Chief Complaint  85 y.o. female admitted 2018 with chest pain    Interval Problem Update  Patient is found to have a non-ST elevation myocardial infarction after patient was complaining of chest pain. The patient at this point does have generalized physical debility and will need PT OT evaluation and and possible skilled placement. The patient in meantime will be optimized on her medical management 40 non-ST elevation myocardial infarction. The patient does have a history of stroke with residual weakness the patient at this point we'll be optimizing medical management and we will monitor her neurological status.    Consultants/Specialty  Cardiology    Disposition  To be determined        Review of Systems   Constitutional: Negative for chills, diaphoresis and fever.   HENT: Negative.    Eyes: Negative.  Negative for double vision.   Respiratory: Negative.  Negative for cough, hemoptysis and wheezing.    Cardiovascular: Negative.  Negative for chest pain, palpitations and leg swelling.   Gastrointestinal: Negative.  Negative for abdominal pain, blood in stool, constipation, diarrhea, heartburn, nausea and vomiting.   Genitourinary: Negative.  Negative for frequency, hematuria and urgency.   Musculoskeletal: Positive for joint pain (right hip).   Skin: Negative.  Negative for itching and rash.   Neurological: Positive for weakness. Negative for dizziness, focal weakness, seizures, loss of consciousness and headaches.   Endo/Heme/Allergies: Negative.  Does not bruise/bleed easily.   Psychiatric/Behavioral: Negative.  Negative for suicidal ideas. The patient is not nervous/anxious.       Physical Exam  Laboratory/Imaging   Hemodynamics  Temp (24hrs), Av.5 °C (97.7 °F), Min:36.1 °C (96.9 °F), Max:37.1 °C (98.7 °F)   Temperature: 36.1 °C (96.9 °F)  Pulse  Av  Min: 62  Max: 121    Blood Pressure : 138/56      Respiratory      Respiration: 17,  Pulse Oximetry: 94 %        RUL Breath Sounds: Clear, RML Breath Sounds: Clear, RLL Breath Sounds: Clear, RYAN Breath Sounds: Clear, LLL Breath Sounds: Clear    Fluids    Intake/Output Summary (Last 24 hours) at 02/28/18 1553  Last data filed at 02/27/18 1700   Gross per 24 hour   Intake              240 ml   Output                0 ml   Net              240 ml       Nutrition  Orders Placed This Encounter   Procedures   • Diet Order     Standing Status:   Standing     Number of Occurrences:   1     Order Specific Question:   Diet:     Answer:   Cardiac [6]     Physical Exam   Constitutional: She is oriented to person, place, and time. She appears well-developed and well-nourished.   HENT:   Head: Normocephalic and atraumatic.   Right Ear: External ear normal.   Left Ear: External ear normal.   Nose: Nose normal.   Mouth/Throat: Oropharynx is clear and moist.   Eyes: Conjunctivae and EOM are normal. Pupils are equal, round, and reactive to light.   Neck: Normal range of motion. Neck supple. No JVD present. No thyromegaly present.   Cardiovascular: Normal rate and regular rhythm.    No murmur heard.  Pulmonary/Chest: Effort normal and breath sounds normal. No respiratory distress. She has no wheezes. She has no rales. She exhibits no tenderness.   Abdominal: Soft. Bowel sounds are normal. She exhibits no distension and no mass. There is no tenderness. There is no rebound and no guarding.   Musculoskeletal: Normal range of motion. She exhibits tenderness (joint). She exhibits no edema.   Lymphadenopathy:     She has no cervical adenopathy.   Neurological: She is alert and oriented to person, place, and time. She has normal reflexes. No cranial nerve deficit.   Skin: Skin is warm and dry. No rash noted. No erythema.   Psychiatric: She has a normal mood and affect. Her behavior is normal. Judgment and thought content normal.   Nursing note and vitals reviewed.      Recent Labs      02/27/18   0418  02/28/18   0353   WBC   8.2  6.1   RBC  4.33  3.47*   HEMOGLOBIN  14.7  11.9*   HEMATOCRIT  45.2  36.2*   MCV  104.4*  104.3*   MCH  33.9*  34.3*   MCHC  32.5*  32.9*   RDW  54.3*  53.3*   PLATELETCT  283  211   MPV  10.8  10.9     Recent Labs      02/27/18   0418  02/28/18   0353   SODIUM  141  138   POTASSIUM  3.9  3.6   CHLORIDE  109  112   CO2  20  22   GLUCOSE  128*  93   BUN  19  22   CREATININE  1.21  1.38   CALCIUM  9.3  8.3*         Recent Labs      02/27/18   0418   BNPBTYPENAT  122*     Recent Labs      02/28/18   0353   TRIGLYCERIDE  53   HDL  54   LDL  81          Assessment/Plan     * Non-ST elevated myocardial infarction (non-STEMI) (CMS-HCC)   Assessment & Plan    Continue at this point with 10 A inhibitor for anticoagulation.  Continue with Lipitor for dyslipidemia management  Continue on metoprolol 12.5 mg twice daily for rate control  Continue with lisinopril 10 mg daily.  Continue with low-fat low-cholesterol diet.        Chest pain   Assessment & Plan    Patient now reports that she never had chest pain. The patient says she has some discomfort in the epigastric region. Troponin level was elevated and at this point the patient remains on 10 inhibitor and beta blocker        Carotid atherosclerosis, bilateral- (present on admission)   Assessment & Plan    Patient has history of CVA with bilateral atherosclerosis continue at this point with anticoagulation and statin.        CAD (coronary artery disease)- (present on admission)   Assessment & Plan    Patient remains on beta blocker and ACE inhibitor and anticoagulation        History of CVA (cerebrovascular accident)   Assessment & Plan    Continue at this point with statin and anticoagulation        Anxiety- (present on admission)   Assessment & Plan    Currently patient's anxiety is well controlled        Dyslipidemia- (present on admission)   Assessment & Plan    Continue with ACE inhibitor low-fat low-cholesterol diet        HTN (hypertension), benign- (present on  admission)   Assessment & Plan    Optimize medical management to keep systolic blood pressure under 140 diastolic under 90          Quality-Core Measures   Reviewed items::  EKG reviewed, Labs reviewed, Medications reviewed and Radiology images reviewed  Henning catheter::  No Henning  DVT: Eliquis.  Ulcer Prophylaxis::  Not indicated  Assessed for rehabilitation services:  Patient was assess for and/or received rehabilitation services during this hospitalization

## 2018-02-28 NOTE — PROGRESS NOTES
Dr. Hess notified of pt's elevated troponins, pt is asymptomatic and no reports of chest pain. Dr. Hess in to see pt. EKG ordered.

## 2018-02-28 NOTE — PROGRESS NOTES
Aaox4, denies any numbness on BUE, denies any pain, up sba to br with fww, POc discussed, daughter updated as well, ECHO ordered.

## 2018-02-28 NOTE — CARE PLAN
Problem: Knowledge Deficit  Goal: Knowledge of disease process/condition, treatment plan, diagnostic tests, and medications will improve    Intervention: Explain information regarding disease process/condition, treatment plan, diagnostic tests, and medications and document in education  ECHO, may need PT/OT      Problem: Mobility  Goal: Risk for activity intolerance will decrease    Intervention: Assess and monitor signs of activity intolerance  gen weakness, sba with fww

## 2018-02-28 NOTE — ASSESSMENT & PLAN NOTE
Continue at this point with 10 A inhibitor for anticoagulation.  Continue with Lipitor for dyslipidemia management  Continue on metoprolol 12.5 mg twice daily for rate control  Continue with lisinopril 10 mg daily.  Continue with low-fat low-cholesterol diet.

## 2018-02-28 NOTE — PROGRESS NOTES
Cardiology Progress Note               Author: Peri Castañeda Date & Time created: 2/28/2018  1:27 PM     Interval History:  Mrs. Jurado is a very pleasant 85-year-old female with chronic hypertension, coronary artery disease status post inferior MI, paroxysmal atrial fibrillation, hyperlipidemia who presented to the emergency room with yesterday complaints of upper extremity numbness and tingling. She had an inferior ST elevation MI in 2015 and underwent balloon angioplasty of the RCA but the stent could not be deployed. Her symptoms were very atypical for cardiac. I did place nitroglycerin paste on her chest and she has not noticed any significant improvement. She appears very depressed. He still reports some intermittent numbness and tingling in both hands. She had a mildly abnormal to troponin which was admitted indeterminate range. I discover that she was not taking her Apixaban as was previously described.      Chief Complaint:  Tingling in the hands    Review of Systems   Cardiovascular: Positive for palpitations. Negative for chest pain, orthopnea, claudication and leg swelling.   Neurological: Positive for tingling and sensory change. Negative for speech change.   Endo/Heme/Allergies: Negative for polydipsia. Does not bruise/bleed easily.   Psychiatric/Behavioral: Positive for depression and memory loss.       Physical Exam   General: No acute distress. Well nourished.  HEENT: EOM grossly intact, no scleral icterus, no pharyngeal erythema.   Neck:  No JVD, no bruits, trachea midline  CVS: RRR. Normal S1, split S2.1/6 TOMY LUSB. No LE edema.  2+ radial pulses, 1+ DT pulses  Resp: CTAB. No wheezing or crackles/rhonchi. Normal respiratory effort.  Abdomen: Soft, NT, no mikal hepatomegaly.  MSK/Ext: No clubbing or cyanosis.  Skin: Warm and dry, no rashes.  Neurological: CN III-XII grossly intact. No focal deficits.   Psych: A&O x 3, appropriate affect, adequate judgement, impaired STM,  LTM    Hemodynamics:  Temp (24hrs), Av.5 °C (97.7 °F), Min:36.1 °C (96.9 °F), Max:37.1 °C (98.7 °F)  Temperature: 36.1 °C (96.9 °F)  Pulse  Av  Min: 62  Max: 121   Blood Pressure : 138/56     Respiratory:    Respiration: 17, Pulse Oximetry: 94 %     FH/SH reviewed, unchanged.  Fluids:     Weight: 64 kg (141 lb)  GI/Nutrition:  Orders Placed This Encounter   Procedures   • Diet Order     Standing Status:   Standing     Number of Occurrences:   1     Order Specific Question:   Diet:     Answer:   Cardiac [6]     Lab Results:  Recent Labs      18   0418  18   0353   WBC  8.2  6.1   RBC  4.33  3.47*   HEMOGLOBIN  14.7  11.9*   HEMATOCRIT  45.2  36.2*   MCV  104.4*  104.3*   MCH  33.9*  34.3*   MCHC  32.5*  32.9*   RDW  54.3*  53.3*   PLATELETCT  283  211   MPV  10.8  10.9     Recent Labs      18   0418  18   0353   SODIUM  141  138   POTASSIUM  3.9  3.6   CHLORIDE  109  112   CO2  20  22   GLUCOSE  128*  93   BUN  19  22   CREATININE  1.21  1.38   CALCIUM  9.3  8.3*         Recent Labs      18   0418   BNPBTYPENAT  122*     Recent Labs      18   0418  18   0612  18   0747  18   1225   TROPONINI  0.10*  0.30*  0.46*  0.59*   BNPBTYPENAT  122*   --    --    --      Recent Labs      18   0353   TRIGLYCERIDE  53   HDL  54   LDL  81         Medical Decision Making, by Problem:  Active Hospital Problems    Diagnosis   • *Chest pain [R07.9]   • CAD (coronary artery disease) [I25.10]   • Anxiety [F41.9]   • Dyslipidemia [E78.5]   • HTN (hypertension), benign [I10]   • Carotid atherosclerosis, bilateral [I65.23]       A/Plan:  1. Pressure mean numbness and tingling, not likely to be a chest pain equivalent.  2. Abnormal troponin of undetermined significance.  3. Prior coronary disease with inferior ST elevation MI in , status post balloon angioplasty, stent could not be deployed. Medical management only.  4. Paroxysmal atrial fibrillation, supposed to be on  our request.  5. Cognitive impairment, suspect early dementia, limited social support   6. Hyperlipidemia  7. Anxiety  8. Chronic kidney disease stage III  9. Prior CVA    I will be following up on her echocardiogram. She has had no significant issues on telemetry. She does not feel any significant improvement with Nitropaste. At this point I do not have any plans to take her to the catheter lab. Continue current medical regimen. I did decrease her amlodipine down to 2.5 at that time she takes at home.       Quality-Core Measures   Reviewed items::  EKG reviewed, Labs reviewed and Medications reviewed

## 2018-03-01 PROCEDURE — A9270 NON-COVERED ITEM OR SERVICE: HCPCS | Performed by: HOSPITALIST

## 2018-03-01 PROCEDURE — G8988 SELF CARE GOAL STATUS: HCPCS | Mod: CI

## 2018-03-01 PROCEDURE — 700102 HCHG RX REV CODE 250 W/ 637 OVERRIDE(OP): Performed by: INTERNAL MEDICINE

## 2018-03-01 PROCEDURE — G8979 MOBILITY GOAL STATUS: HCPCS | Mod: CI

## 2018-03-01 PROCEDURE — A9270 NON-COVERED ITEM OR SERVICE: HCPCS | Performed by: INTERNAL MEDICINE

## 2018-03-01 PROCEDURE — 99232 SBSQ HOSP IP/OBS MODERATE 35: CPT | Performed by: HOSPITALIST

## 2018-03-01 PROCEDURE — 97165 OT EVAL LOW COMPLEX 30 MIN: CPT

## 2018-03-01 PROCEDURE — 770020 HCHG ROOM/CARE - TELE (206)

## 2018-03-01 PROCEDURE — 700102 HCHG RX REV CODE 250 W/ 637 OVERRIDE(OP): Performed by: HOSPITALIST

## 2018-03-01 PROCEDURE — 93880 EXTRACRANIAL BILAT STUDY: CPT

## 2018-03-01 PROCEDURE — 97162 PT EVAL MOD COMPLEX 30 MIN: CPT

## 2018-03-01 PROCEDURE — G8987 SELF CARE CURRENT STATUS: HCPCS | Mod: CJ

## 2018-03-01 PROCEDURE — 93880 EXTRACRANIAL BILAT STUDY: CPT | Mod: 26 | Performed by: SURGERY

## 2018-03-01 PROCEDURE — G8978 MOBILITY CURRENT STATUS: HCPCS | Mod: CJ

## 2018-03-01 RX ORDER — ISOSORBIDE MONONITRATE 30 MG/1
30 TABLET, EXTENDED RELEASE ORAL
Status: DISCONTINUED | OUTPATIENT
Start: 2018-03-01 | End: 2018-03-01

## 2018-03-01 RX ORDER — LISINOPRIL 5 MG/1
5 TABLET ORAL DAILY
Status: DISCONTINUED | OUTPATIENT
Start: 2018-03-02 | End: 2018-03-02 | Stop reason: HOSPADM

## 2018-03-01 RX ORDER — ISOSORBIDE MONONITRATE 30 MG/1
15 TABLET, EXTENDED RELEASE ORAL
Status: DISCONTINUED | OUTPATIENT
Start: 2018-03-02 | End: 2018-03-02 | Stop reason: HOSPADM

## 2018-03-01 RX ADMIN — LISINOPRIL 10 MG: 10 TABLET ORAL at 08:47

## 2018-03-01 RX ADMIN — METOPROLOL TARTRATE 12.5 MG: 25 TABLET, FILM COATED ORAL at 08:45

## 2018-03-01 RX ADMIN — ISOSORBIDE MONONITRATE 30 MG: 30 TABLET ORAL at 08:44

## 2018-03-01 RX ADMIN — APIXABAN 5 MG: 5 TABLET, FILM COATED ORAL at 08:46

## 2018-03-01 RX ADMIN — CLOPIDOGREL 75 MG: 75 TABLET, FILM COATED ORAL at 08:46

## 2018-03-01 RX ADMIN — APIXABAN 5 MG: 5 TABLET, FILM COATED ORAL at 20:18

## 2018-03-01 RX ADMIN — METOPROLOL TARTRATE 25 MG: 25 TABLET, FILM COATED ORAL at 20:18

## 2018-03-01 RX ADMIN — ATORVASTATIN CALCIUM 40 MG: 40 TABLET, FILM COATED ORAL at 20:18

## 2018-03-01 RX ADMIN — AMLODIPINE BESYLATE 2.5 MG: 5 TABLET ORAL at 08:52

## 2018-03-01 ASSESSMENT — ENCOUNTER SYMPTOMS
FLANK PAIN: 0
WEIGHT LOSS: 0
MEMORY LOSS: 1
DIARRHEA: 0
PHOTOPHOBIA: 0
WEAKNESS: 1
PND: 0
SENSORY CHANGE: 1
DIZZINESS: 0
INSOMNIA: 0
SPUTUM PRODUCTION: 0
RESPIRATORY NEGATIVE: 1
TREMORS: 0
SENSORY CHANGE: 0
PSYCHIATRIC NEGATIVE: 1
POLYDIPSIA: 0
DEPRESSION: 1
DEPRESSION: 0
MYALGIAS: 0
NECK PAIN: 0
PALPITATIONS: 0
CLAUDICATION: 0
CARDIOVASCULAR NEGATIVE: 1
COUGH: 0
STRIDOR: 0
GASTROINTESTINAL NEGATIVE: 1
EYE PAIN: 0
FEVER: 0
SPEECH CHANGE: 0
TINGLING: 1
VOMITING: 0
EYES NEGATIVE: 1
ABDOMINAL PAIN: 0
SHORTNESS OF BREATH: 0
BRUISES/BLEEDS EASILY: 0
PALPITATIONS: 1
ORTHOPNEA: 0

## 2018-03-01 ASSESSMENT — COGNITIVE AND FUNCTIONAL STATUS - GENERAL
MOVING FROM LYING ON BACK TO SITTING ON SIDE OF FLAT BED: UNABLE
HELP NEEDED FOR BATHING: A LITTLE
CLIMB 3 TO 5 STEPS WITH RAILING: A LITTLE
WALKING IN HOSPITAL ROOM: A LITTLE
MOVING TO AND FROM BED TO CHAIR: A LITTLE
SUGGESTED CMS G CODE MODIFIER MOBILITY: CK
DAILY ACTIVITIY SCORE: 20
STANDING UP FROM CHAIR USING ARMS: A LITTLE
DRESSING REGULAR LOWER BODY CLOTHING: A LITTLE
SUGGESTED CMS G CODE MODIFIER DAILY ACTIVITY: CJ
TOILETING: A LITTLE
MOBILITY SCORE: 17
PERSONAL GROOMING: A LITTLE

## 2018-03-01 ASSESSMENT — LIFESTYLE VARIABLES
TOTAL SCORE: 0
DO YOU DRINK ALCOHOL: YES
CONSUMPTION TOTAL: NEGATIVE
EVER FELT BAD OR GUILTY ABOUT YOUR DRINKING: NO
AVERAGE NUMBER OF DAYS PER WEEK YOU HAVE A DRINK CONTAINING ALCOHOL: 1
HAVE PEOPLE ANNOYED YOU BY CRITICIZING YOUR DRINKING: NO
TOTAL SCORE: 0
TOTAL SCORE: 0
SUBSTANCE_ABUSE: 0
EVER HAD A DRINK FIRST THING IN THE MORNING TO STEADY YOUR NERVES TO GET RID OF A HANGOVER: NO
HOW MANY TIMES IN THE PAST YEAR HAVE YOU HAD 5 OR MORE DRINKS IN A DAY: 0
ON A TYPICAL DAY WHEN YOU DRINK ALCOHOL HOW MANY DRINKS DO YOU HAVE: 1
HAVE YOU EVER FELT YOU SHOULD CUT DOWN ON YOUR DRINKING: NO

## 2018-03-01 ASSESSMENT — PAIN SCALES - GENERAL
PAINLEVEL_OUTOF10: 0

## 2018-03-01 ASSESSMENT — GAIT ASSESSMENTS
DEVIATION: BRADYKINETIC;SHUFFLED GAIT;DECREASED BASE OF SUPPORT
ASSISTIVE DEVICE: FRONT WHEEL WALKER
GAIT LEVEL OF ASSIST: CONTACT GUARD ASSIST
DISTANCE (FEET): 30

## 2018-03-01 ASSESSMENT — ACTIVITIES OF DAILY LIVING (ADL): TOILETING: INDEPENDENT

## 2018-03-01 NOTE — CARE PLAN
Problem: Communication  Goal: The ability to communicate needs accurately and effectively will improve  Outcome: PROGRESSING AS EXPECTED  Patient educated on medications, procedures and use of call light. Patient will continue to verbalize and improve on education and communication in the plan of care.      Problem: Safety  Goal: Will remain free from falls  Outcome: PROGRESSING AS EXPECTED  Educated patient on use of call light, no slip socks on, bed lowest position. All needs attended to. Patient verbalized understanding.

## 2018-03-01 NOTE — PROGRESS NOTES
Assumed care of Pt A&O 4. Pt sitting on edge of bed with no signs of labored breathing. On RA. Tele monitor in place, cardiac rhythm being monitored. Call light within reach, bed in lowest position, upper bed rails up, bed alarm on.   Pt was updated on plan of care for the night . Will continue to monitor.   Pt continues to refuses placement of a new IV.

## 2018-03-01 NOTE — THERAPY
"Physical Therapy Evaluation completed.   Bed Mobility:  Supine to Sit: Stand by Assist  Transfers: Sit to Stand: Contact Guard Assist  Gait: Level Of Assist: Contact Guard Assist with Front-Wheel Walker       Plan of Care: Will benefit from Physical Therapy 3 times per week  Discharge Recommendations: Equipment: Will Continue to Assess for Equipment Needs. Post-acute therapy Discharge to SNF vs Rehab prior to DC home     See \"Rehab Therapy-Acute\" Patient Summary Report for complete documentation.     "

## 2018-03-01 NOTE — PROGRESS NOTES
Monitor summary: SR 63 - 71, SC .20, QRS .08, QT .44 with PAC f PVC, and coup  per strip from monitor room.

## 2018-03-01 NOTE — PROGRESS NOTES
PIV site giving discomfort and wanted to have it dcd, refused to have another one restarted at this time, will try later as planned.

## 2018-03-01 NOTE — PROGRESS NOTES
Report received, pt care assumed, tele box on. VSS, pt assessment complete. Pt aaox4, no signs of distress noted at this time. POC discussed with pt and verbalizes no questions. Pt c/o of no pain. Pt denies any additional needs at this time. Bed in lowest position, bed alarm on, pt educated on fall risk and verbalized understanding, call light within reach, will continue to monitor.

## 2018-03-01 NOTE — PROGRESS NOTES
[facility] Hospitalist Progress Note    Date of Service: 3/1/2018    Chief Complaint  85 y.o. female admitted 2018 with chest pain    Interval Problem Update  From a cardiac standpoint patient has been stabilized. Patient at this point is chest pain-free. I spoke with cardiology in detail they do not feel that she would benefit from any additional services. Echocardiogram was essentially normal. Patient is extremely debilitated and at this point the patient would benefit from his skilled therapy. The patient thus at this point is pending PT OT evaluation and a  transferred to skilled nursing once accepted.    Consultants/Specialty  Cardiology    Disposition  To be determined        Review of Systems   Constitutional: Negative for malaise/fatigue and weight loss.   HENT: Negative.  Negative for ear pain and tinnitus.    Eyes: Negative.  Negative for photophobia and pain.   Respiratory: Negative.  Negative for sputum production, shortness of breath and stridor.    Cardiovascular: Negative.  Negative for orthopnea and claudication.   Gastrointestinal: Negative.  Negative for abdominal pain, diarrhea and vomiting.   Genitourinary: Negative.  Negative for dysuria and flank pain.   Musculoskeletal: Positive for joint pain (right hip). Negative for myalgias and neck pain.   Skin: Negative.    Neurological: Positive for weakness. Negative for tremors, sensory change and speech change.   Endo/Heme/Allergies: Negative.  Negative for environmental allergies and polydipsia.   Psychiatric/Behavioral: Negative.  Negative for depression, substance abuse and suicidal ideas. The patient does not have insomnia.       Physical Exam  Laboratory/Imaging   Hemodynamics  Temp (24hrs), Av.9 °C (98.4 °F), Min:36.6 °C (97.9 °F), Max:37.3 °C (99.1 °F)   Temperature: 36.6 °C (97.9 °F)  Pulse  Av.2  Min: 61  Max: 121    Blood Pressure : 145/82      Respiratory      Respiration: 18, Pulse Oximetry: 94 %        RUL Breath Sounds:  Clear, RML Breath Sounds: Clear, RLL Breath Sounds: Clear, RYAN Breath Sounds: Clear, LLL Breath Sounds: Clear    Fluids    Intake/Output Summary (Last 24 hours) at 03/01/18 1559  Last data filed at 03/01/18 0800   Gross per 24 hour   Intake              360 ml   Output                0 ml   Net              360 ml       Nutrition  Orders Placed This Encounter   Procedures   • Diet Order     Standing Status:   Standing     Number of Occurrences:   1     Order Specific Question:   Diet:     Answer:   Cardiac [6]     Physical Exam   Constitutional: She is oriented to person, place, and time. She appears well-developed and well-nourished. No distress.   HENT:   Head: Normocephalic and atraumatic.   Right Ear: External ear normal.   Left Ear: External ear normal.   Eyes: Conjunctivae and EOM are normal. Pupils are equal, round, and reactive to light.   Neck: Normal range of motion. Neck supple. No thyromegaly present.   Cardiovascular: Normal rate and regular rhythm.    Murmur heard.   Systolic murmur is present with a grade of 2/6   Pulmonary/Chest: Effort normal and breath sounds normal. No stridor.   Abdominal: Soft. Bowel sounds are normal. She exhibits no distension. There is no tenderness.   Musculoskeletal: Normal range of motion. She exhibits tenderness (joint).   Lymphadenopathy:     She has no cervical adenopathy.   Neurological: She is alert and oriented to person, place, and time. She has normal reflexes. She displays normal reflexes. Coordination normal.   Skin: Skin is warm and dry. She is not diaphoretic. No erythema.   Psychiatric: She has a normal mood and affect. Her behavior is normal. Judgment and thought content normal.   Nursing note and vitals reviewed.      Recent Labs      02/27/18   0418  02/28/18   0353   WBC  8.2  6.1   RBC  4.33  3.47*   HEMOGLOBIN  14.7  11.9*   HEMATOCRIT  45.2  36.2*   MCV  104.4*  104.3*   MCH  33.9*  34.3*   MCHC  32.5*  32.9*   RDW  54.3*  53.3*   PLATELETCT  283  211    MPV  10.8  10.9     Recent Labs      02/27/18   0418  02/28/18   0353   SODIUM  141  138   POTASSIUM  3.9  3.6   CHLORIDE  109  112   CO2  20  22   GLUCOSE  128*  93   BUN  19  22   CREATININE  1.21  1.38   CALCIUM  9.3  8.3*         Recent Labs      02/27/18   0418   BNPBTYPENAT  122*     Recent Labs      02/28/18   0353   TRIGLYCERIDE  53   HDL  54   LDL  81          Assessment/Plan     Non-ST elevated myocardial infarction (non-STEMI) (CMS-[facility])   Assessment & Plan    Continue at this point with 10 A inhibitor for anticoagulation.  Continue with Lipitor for dyslipidemia management  Continue on metoprolol 12.5 mg twice daily for rate control  Continue with lisinopril 10 mg daily.  Continue with low-fat low-cholesterol diet.        Chest pain   Assessment & Plan    Negative at this point for chest pain.        Carotid atherosclerosis, bilateral- (present on admission)   Assessment & Plan    With previous history of CVA patient will remain at this point on anticoagulation        CAD (coronary artery disease)- (present on admission)   Assessment & Plan    Continue at this point with medical optimization        History of CVA (cerebrovascular accident)   Assessment & Plan    Remains on anticoagulation        Anxiety- (present on admission)   Assessment & Plan    Today well controlled        Dyslipidemia- (present on admission)   Assessment & Plan    Continue with statin and low-fat low-cholesterol diet        HTN (hypertension), benign- (present on admission)   Assessment & Plan    Continue with medical management          Quality-Core Measures   Reviewed items::  EKG reviewed, Labs reviewed, Medications reviewed and Radiology images reviewed  Henning catheter::  No Henning  DVT: Eliquis.  Ulcer Prophylaxis::  Not indicated  Assessed for rehabilitation services:  Patient was assess for and/or received rehabilitation services during this hospitalization

## 2018-03-01 NOTE — PROGRESS NOTES
Cardiology Progress Note               Author: Kenyatta PHILIP Nima MORTON Date & Time created: 3/1/2018  11:01 AM     Interval History:  85 year old female admitted for tingling in her hands. Hx of HTN, CAD with prior inferior MI, PAF, and HLD. She had prior STEMI in '15 with successful PTCA of RCA (not amenable to PCI). She has a mildly elevated troponin and she was apparently not taking her anticoagulation as prescribed.    3/1: up with PT with walker    Telemetry: SR 66-79    Chief Complaint:  Tingling in hands    Review of Systems   Constitutional: Negative for fever and malaise/fatigue.   Respiratory: Negative for cough and shortness of breath.    Cardiovascular: Negative for chest pain, palpitations, orthopnea, claudication, leg swelling and PND.   Gastrointestinal: Negative for abdominal pain.   Musculoskeletal: Negative for myalgias.   Neurological: Negative for dizziness.   All other systems reviewed and are negative.      Physical Exam   Constitutional: She is oriented to person, place, and time. She appears well-developed and well-nourished. No distress.   HENT:   Head: Normocephalic and atraumatic.   Eyes: EOM are normal.   Neck: Normal range of motion. No JVD present.   Cardiovascular: Normal rate, regular rhythm, normal heart sounds and intact distal pulses.    No murmur heard.  Pulmonary/Chest: Effort normal and breath sounds normal. No respiratory distress.   Abdominal: Soft. Bowel sounds are normal.   Musculoskeletal: She exhibits no edema.   Walker for mobility    Neurological: She is alert and oriented to person, place, and time.   Skin: Skin is warm and dry.   Psychiatric: She has a normal mood and affect.   Nursing note and vitals reviewed.      Hemodynamics:  Temp (24hrs), Av.8 °C (98.3 °F), Min:36.1 °C (96.9 °F), Max:37.3 °C (99.1 °F)  Temperature: 36.7 °C (98.1 °F)  Pulse  Av.8  Min: 61  Max: 121   Blood Pressure : (!) 163/78     Respiratory:    Respiration: 18, Pulse Oximetry: 93 %         RUL Breath Sounds: Clear, RML Breath Sounds: Clear, RLL Breath Sounds: Clear, RYAN Breath Sounds: Clear, LLL Breath Sounds: Clear  Fluids:  Date 03/01/18 0700 - 03/02/18 0659   Shift 0522-3257 6716-1734 7502-6124 24 Hour Total   I  N  T  A  K  E   P.O. 240   240    Shift Total 240   240   O  U  T  P  U  T   Shift Total       Weight (kg) 64.2 64.2 64.2 64.2       Weight: 64.2 kg (141 lb 8.6 oz)  GI/Nutrition:  Orders Placed This Encounter   Procedures   • Diet Order     Standing Status:   Standing     Number of Occurrences:   1     Order Specific Question:   Diet:     Answer:   Cardiac [6]     Lab Results:  Recent Labs      02/27/18   0418  02/28/18   0353   WBC  8.2  6.1   RBC  4.33  3.47*   HEMOGLOBIN  14.7  11.9*   HEMATOCRIT  45.2  36.2*   MCV  104.4*  104.3*   MCH  33.9*  34.3*   MCHC  32.5*  32.9*   RDW  54.3*  53.3*   PLATELETCT  283  211   MPV  10.8  10.9     Recent Labs      02/27/18   0418  02/28/18   0353   SODIUM  141  138   POTASSIUM  3.9  3.6   CHLORIDE  109  112   CO2  20  22   GLUCOSE  128*  93   BUN  19  22   CREATININE  1.21  1.38   CALCIUM  9.3  8.3*         Recent Labs      02/27/18   0418   BNPBTYPENAT  122*     Recent Labs      02/27/18   0418  02/27/18   0612  02/27/18   0747  02/27/18   1225   TROPONINI  0.10*  0.30*  0.46*  0.59*   BNPBTYPENAT  122*   --    --    --      Recent Labs      02/28/18   0353   TRIGLYCERIDE  53   HDL  54   LDL  81         Medical Decision Making, by Problem:  Active Hospital Problems    Diagnosis   • *Non-ST elevated myocardial infarction (non-STEMI) (CMS-[facility]) [I21.4]   • Chest pain [R07.9]   • Carotid atherosclerosis, bilateral [I65.23]   • CAD (coronary artery disease) [I25.10]   • Anxiety [F41.9]   • Dyslipidemia [E78.5]   • HTN (hypertension), benign [I10]   • History of CVA (cerebrovascular accident) [Z86.73]     Plan:  1. CAD with prior STEM in '15  -no angina  -echo unremarkable, no wall motion abnorm  -troponin trending up, follow symptoms  -cont med  management at this time with effient, bb, ace, statin, imdur    2. HLD  -statin  -LDL goal <70 with CAD  -repeat labs outpatient    3. HTN  -elevated SBP in 160;s  -cont med management with bb, ace, norvasc, imdur    4. Prior CVA   -weak with tingling in arm  -therapies for mobility/strengthening  -carotid disease with moderate R stenosis in '16  -recommend repeat duplex  -cont effient, statin    5. PAF  -SR during this admission  -Eliquis for OAC, poss insurance issue, will discuss with SW on cost  -if too expensive, okay for xarelto or coumadin  -cont rate control with bb    6. Tingling/numbness in R arm  -evaluated patients with arms above head  -worsening numbness with arm elevation noted  -concern for cervical stenosis v. Cardiac  -primary team to manage  -check carotid duplex as well    We will continue to follow alongside you in the care of this patient. I would anticipate discharge home tomorrow, as patient has no family in town to take her home and her blood pressure medication is being actively titrated, as well as pending carotid duplex imaging.    Quality-Core Measures   Reviewed items::  EKG reviewed, Radiology images reviewed, Medications reviewed and Labs reviewed  Henning catheter::  No Henning  DVT: eliquis.  DVT prophylaxis - mechanical:  Not indicated at this time, ambulatory  Ulcer Prophylaxis::  Not indicated

## 2018-03-01 NOTE — THERAPY
"Occupational Therapy Evaluation completed.   Functional Status:  Pt presents to skilled OT services with mild deficits with ADLs/IADLs due to decreased dynamic balance, and generalized strength and endurance deficits. Pt performed functional mobilty and toilet t/f with cga use of gb's and multiple attempts, toileting and LB dressing cga for balance in standing. Pt verbalizes limited support in the community and appears to have decreased insight into current deficits vs irritation during session. Pt would benefit from acute skilled services while in house and would benefit from post acute skilled services prior to d/c to home given limited support in the community.   Plan of Care: Will benefit from Occupational Therapy 3 times per week  Discharge Recommendations:  Equipment: Will Continue to Assess for Equipment Needs.       See \"Rehab Therapy-Acute\" Patient Summary Report for complete documentation.    "

## 2018-03-01 NOTE — PROGRESS NOTES
Pt resting in bed at this time. Even visible chest rise. No signs of distress. Bed alarm on. Call light in place. Bed in low and locked position. Hourly rounding in place.

## 2018-03-01 NOTE — PROGRESS NOTES
Cardiology Progress Note               Author: Peri Castañeda Date & Time created: 3/1/2018  12:29 PM     Interval History:  Mrs. Jurado is a very pleasant 85-year-old female with chronic hypertension, coronary artery disease status post inferior MI, paroxysmal atrial fibrillation, hyperlipidemia who presented to the emergency room with complaints of upper extremity numbness and tingling. She had an inferior ST elevation MI in 2015 and underwent balloon angioplasty of the RCA but the stent could not be deployed. Her symptoms were very atypical for cardiac. I did place nitroglycerin paste on her chest but she cannot tell if it's made any significant difference. She continues to have symptoms of numbness and tingling which appear noncardiac. Her echocardiogram showed normal wall motion. Tried to provide reassurance that we had not found anything particularly harmful. I do agree to going to rehabilitation and moving in with her daughter would be very helpful for her. She felt dizzy when she got up to the bathroom but her blood pressure was normal. She is still feeling some palpitations but she's been in sinus rhythm with rare PVCs on telemetry.    Chief Complaint:  Tingling in the hands    Review of Systems   Cardiovascular: Positive for palpitations. Negative for chest pain, orthopnea, claudication and leg swelling.   Neurological: Positive for tingling and sensory change. Negative for speech change.   Endo/Heme/Allergies: Negative for polydipsia. Does not bruise/bleed easily.   Psychiatric/Behavioral: Positive for depression and memory loss.       Physical Exam   General: No acute distress. Well nourished.  HEENT: EOM grossly intact, no scleral icterus, no pharyngeal erythema.   Neck:  No JVD, no bruits, trachea midline  CVS: RRR. Normal S1, split S2.1/6 TOMY LUSB. No LE edema.  2+ radial pulses, 1+ DT pulses  Resp: CTAB. No wheezing or crackles/rhonchi. Normal respiratory effort.  Abdomen: Soft, NT, no mikal  hepatomegaly.  MSK/Ext: No clubbing or cyanosis.  Skin: Warm and dry, no rashes.  Neurological: CN III-XII grossly intact. No focal deficits.   Psych: A&O x 3, appropriate affect, adequate judgement, impaired STM, LTM    Hemodynamics:  Temp (24hrs), Av.9 °C (98.4 °F), Min:36.6 °C (97.9 °F), Max:37.3 °C (99.1 °F)  Temperature: 36.6 °C (97.9 °F)  Pulse  Av.2  Min: 61  Max: 121   Blood Pressure : 145/82     Respiratory:    Respiration: 18, Pulse Oximetry: 94 %     FH/SH reviewed, unchanged.  Fluids:     Weight: 64.2 kg (141 lb 8.6 oz)  GI/Nutrition:  Orders Placed This Encounter   Procedures   • Diet Order     Standing Status:   Standing     Number of Occurrences:   1     Order Specific Question:   Diet:     Answer:   Cardiac [6]     Lab Results:  Recent Labs      188  18   0353   WBC  8.2  6.1   RBC  4.33  3.47*   HEMOGLOBIN  14.7  11.9*   HEMATOCRIT  45.2  36.2*   MCV  104.4*  104.3*   MCH  33.9*  34.3*   MCHC  32.5*  32.9*   RDW  54.3*  53.3*   PLATELETCT  283  211   MPV  10.8  10.9     Recent Labs      18   0418  18   0353   SODIUM  141  138   POTASSIUM  3.9  3.6   CHLORIDE  109  112   CO2  20  22   GLUCOSE  128*  93   BUN  19  22   CREATININE  1.21  1.38   CALCIUM  9.3  8.3*         Recent Labs      18   0418   BNPBTYPENAT  122*     Recent Labs      18   0418  18   0612  18   0747  18   1225   TROPONINI  0.10*  0.30*  0.46*  0.59*   BNPBTYPENAT  122*   --    --    --      Recent Labs      18   0353   TRIGLYCERIDE  53   HDL  54   LDL  81         Medical Decision Making, by Problem:  Active Hospital Problems    Diagnosis   • *Chest pain [R07.9]   • CAD (coronary artery disease) [I25.10]   • Anxiety [F41.9]   • Dyslipidemia [E78.5]   • HTN (hypertension), benign [I10]   • Carotid atherosclerosis, bilateral [I65.23]     I personally reviewed her echocardiogram and it shows preserved LV systolic function and normal wall motion. I also reviewed  telemetry which showed PVCs.    A/Plan:  1. Pressure mean numbness and tingling, not likely to be a chest pain equivalent.  2. Abnormal troponin of undetermined significance.  3. Prior coronary disease with inferior ST elevation MI in 2015, status post balloon angioplasty, stent could not be deployed. Medical management only.  4. Paroxysmal atrial fibrillation, supposed to be on our request.  5. Cognitive impairment, suspect early dementia, limited social support   6. Hyperlipidemia  7. Anxiety  8. Chronic kidney disease stage III  9. Prior CVA  10. Possible symptomatic PVCs.    At this point I do not have any plans to take her to the catheter lab. Continue current medical regimen with the addition of isosorbide. I have cut down on her ACE inhibitor to make room for more beta blocker. We will arrange for her to have follow-up in our cardiology clinic. Please let us know if you have any further questions.    It was a great pleasure seeing her today. Please do not hesitate to contact me with any questions or concerns related to my care plan.    Peri Castañeda M.D. [facility]       Quality-Core Measures   Reviewed items::  EKG reviewed, Labs reviewed and Medications reviewed

## 2018-03-02 ENCOUNTER — PATIENT OUTREACH (OUTPATIENT)
Dept: HEALTH INFORMATION MANAGEMENT | Facility: OTHER | Age: 83
End: 2018-03-02

## 2018-03-02 ENCOUNTER — HOME HEALTH ADMISSION (OUTPATIENT)
Dept: HOME HEALTH SERVICES | Facility: HOME HEALTHCARE | Age: 83
End: 2018-03-02
Payer: MEDICARE

## 2018-03-02 VITALS
BODY MASS INDEX: 23.62 KG/M2 | WEIGHT: 141.76 LBS | RESPIRATION RATE: 17 BRPM | OXYGEN SATURATION: 94 % | SYSTOLIC BLOOD PRESSURE: 152 MMHG | HEART RATE: 84 BPM | HEIGHT: 65 IN | DIASTOLIC BLOOD PRESSURE: 65 MMHG | TEMPERATURE: 96.5 F

## 2018-03-02 LAB
ANION GAP SERPL CALC-SCNC: 4 MMOL/L (ref 0–11.9)
BUN SERPL-MCNC: 18 MG/DL (ref 8–22)
CALCIUM SERPL-MCNC: 8.5 MG/DL (ref 8.5–10.5)
CHLORIDE SERPL-SCNC: 112 MMOL/L (ref 96–112)
CO2 SERPL-SCNC: 23 MMOL/L (ref 20–33)
CREAT SERPL-MCNC: 1.28 MG/DL (ref 0.5–1.4)
GLUCOSE SERPL-MCNC: 102 MG/DL (ref 65–99)
POTASSIUM SERPL-SCNC: 3.7 MMOL/L (ref 3.6–5.5)
SODIUM SERPL-SCNC: 139 MMOL/L (ref 135–145)

## 2018-03-02 PROCEDURE — A9270 NON-COVERED ITEM OR SERVICE: HCPCS | Performed by: INTERNAL MEDICINE

## 2018-03-02 PROCEDURE — 36415 COLL VENOUS BLD VENIPUNCTURE: CPT

## 2018-03-02 PROCEDURE — 80048 BASIC METABOLIC PNL TOTAL CA: CPT

## 2018-03-02 PROCEDURE — 700102 HCHG RX REV CODE 250 W/ 637 OVERRIDE(OP): Performed by: HOSPITALIST

## 2018-03-02 PROCEDURE — 700102 HCHG RX REV CODE 250 W/ 637 OVERRIDE(OP): Performed by: NURSE PRACTITIONER

## 2018-03-02 PROCEDURE — 700102 HCHG RX REV CODE 250 W/ 637 OVERRIDE(OP): Performed by: INTERNAL MEDICINE

## 2018-03-02 PROCEDURE — A9270 NON-COVERED ITEM OR SERVICE: HCPCS | Performed by: NURSE PRACTITIONER

## 2018-03-02 PROCEDURE — 99239 HOSP IP/OBS DSCHRG MGMT >30: CPT | Performed by: HOSPITALIST

## 2018-03-02 PROCEDURE — A9270 NON-COVERED ITEM OR SERVICE: HCPCS | Performed by: HOSPITALIST

## 2018-03-02 RX ORDER — ATORVASTATIN CALCIUM 40 MG/1
40 TABLET, FILM COATED ORAL
Qty: 30 TAB | Refills: 3 | Status: ON HOLD | OUTPATIENT
Start: 2018-03-02 | End: 2018-06-07

## 2018-03-02 RX ORDER — ISOSORBIDE MONONITRATE 30 MG/1
15 TABLET, EXTENDED RELEASE ORAL DAILY
Qty: 30 TAB | Refills: 3 | Status: SHIPPED | OUTPATIENT
Start: 2018-03-02 | End: 2018-09-07

## 2018-03-02 RX ADMIN — LISINOPRIL 5 MG: 5 TABLET ORAL at 08:10

## 2018-03-02 RX ADMIN — CLOPIDOGREL 75 MG: 75 TABLET, FILM COATED ORAL at 08:09

## 2018-03-02 RX ADMIN — AMLODIPINE BESYLATE 2.5 MG: 5 TABLET ORAL at 08:10

## 2018-03-02 RX ADMIN — METOPROLOL TARTRATE 25 MG: 25 TABLET, FILM COATED ORAL at 08:09

## 2018-03-02 RX ADMIN — ISOSORBIDE MONONITRATE 15 MG: 30 TABLET ORAL at 08:10

## 2018-03-02 RX ADMIN — APIXABAN 5 MG: 5 TABLET, FILM COATED ORAL at 08:09

## 2018-03-02 ASSESSMENT — PAIN SCALES - GENERAL
PAINLEVEL_OUTOF10: 0

## 2018-03-02 ASSESSMENT — LIFESTYLE VARIABLES: EVER_SMOKED: NEVER

## 2018-03-02 NOTE — DISCHARGE PLANNING
Medical SW    DEYSI faxed [facility] choice to [facility] Jennifer.    Plan: SW to f/u for HH acceptance.

## 2018-03-02 NOTE — DISCHARGE SUMMARY
CHIEF COMPLAINT ON ADMISSION  Chief Complaint   Patient presents with   • Chest Pain       CODE STATUS  Full Code    HPI & HOSPITAL COURSE  This is a 85 y.o. female here with chest pain that the patient and later denied. The patient had a complete evaluation of her chest pain. The left initial elevated troponin later on stabilized. Cardiology did not feel that the patient would benefit from cardiac catheterization instead they recommended for the patient to be medically optimized. The patient's medical optimization was successful at this point she is absolute chest pain-free. Physical therapy and occupational therapy evaluated the patient a recommend at this point that the patient received at least 3 times per week therapy. And have offered for the patient to go to a skilled facility to get physical therapy and occupational therapy but she adamantly declined today. Patient was then suggested for home health and the patient was set up with home health. She has elected [facility]. Patient at this point with good stable condition will be discharged home and she will at this point be discharged with family.    The patient met 2-midnight criteria for an inpatient stay at the time of discharge.    Therefore, she is discharged in good and stable condition with close outpatient follow-up.    SPECIFIC OUTPATIENT FOLLOW-UP  Follow-up with the primary care physician in 7-10 days    DISCHARGE PROBLEM LIST  Active Problems:    Carotid atherosclerosis, bilateral POA: Yes    Chest pain POA: Unknown    CAD (coronary artery disease) POA: Yes    HTN (hypertension), benign POA: Yes    Dyslipidemia POA: Yes    Anxiety POA: Yes    History of CVA (cerebrovascular accident) POA: Unknown  Resolved Problems:    RON (renal artery stenosis) (CMS-[facility]) POA: Yes      Overview: Please update, abnormal renal artery US 2013 showing RON. GFR 46 on       3/27/15. ?sxs, complications.      FOLLOW UP  Future Appointments  Date Time Provider  Department Center   4/24/2018 11:40 AM Michael J Bloch, M.D. [facility] None     Ric Garcia M.D.  [address] [facility] [address]  [facility] [address]  [phone]    Schedule an appointment as soon as possible for a visit in 1 week  Follow up appointment      MEDICATIONS ON DISCHARGE   Madeleine Zambrano   Home Medication Instructions ALAN:[id number]    Printed on:03/02/18 0804   Medication Information                      amLODIPine (NORVASC) 5 MG Tab  Take 2.5 mg by mouth every day.             apixaban (ELIQUIS) 5mg Tab  Take 1 Tab by mouth 2 Times a Day.             apixaban (ELIQUIS) 5mg Tab  Take 1 Tab by mouth 2 Times a Day.             Ascorbic Acid (VITAMIN C) 1000 MG Tab  Take 1 Tab by mouth every day.             atorvastatin (LIPITOR) 40 MG Tab  Take 1 Tab by mouth every bedtime.             clopidogrel (PLAVIX) 75 MG Tab  Take 1 Tab by mouth every day.             isosorbide mononitrate SR (IMDUR) 30 MG TABLET SR 24 HR  Take 0.5 Tabs by mouth every day.             lisinopril (PRINIVIL) 10 MG Tab  Take 1 Tab by mouth every day.             metoprolol (LOPRESSOR) 25 MG Tab  Take 25 mg by mouth 2 times a day.                 DIET  Orders Placed This Encounter   Procedures   • Diet Order     Standing Status:   Standing     Number of Occurrences:   1     Order Specific Question:   Diet:     Answer:   Cardiac [6]       ACTIVITY  As tolerated.  Weight bearing as tolerated      CONSULTATIONS  Cardiology    PROCEDURES  None    LABORATORY  Lab Results   Component Value Date/Time    SODIUM 139 03/02/2018 03:40 AM    POTASSIUM 3.7 03/02/2018 03:40 AM    CHLORIDE 112 03/02/2018 03:40 AM    CO2 23 03/02/2018 03:40 AM    GLUCOSE 102 (H) 03/02/2018 03:40 AM    BUN 18 03/02/2018 03:40 AM    CREATININE 1.28 03/02/2018 03:40 AM        Lab Results   Component Value Date/Time    WBC 6.1 02/28/2018 03:53 AM    HEMOGLOBIN 11.9 (L) 02/28/2018 03:53 AM    HEMATOCRIT 36.2 (L) 02/28/2018 03:53 AM    PLATELETCT 211 02/28/2018 03:53  AM        Total time of the discharge process exceeds 45 minutes

## 2018-03-02 NOTE — PROGRESS NOTES
Discharge instructions given and discussed, signed copy in chart. Pt verbalized understanding and all questions answered. Electronic prescriptions sent to pharmacy. Pt discharged home in stable condition on room O2 via car escorted by daughter. Personal belongings w/ patient. Tele box removed, monitor room notified.

## 2018-03-02 NOTE — PROGRESS NOTES
"Pt is refusing to have her bed alarm on. Pt states \"it makes to much noise and does not want it on\".   Pt was educated about importance of using her call light when getting out of bed and agrees to call prior to getting out of bed.   2 RN education provided with Tali GRECO     "

## 2018-03-02 NOTE — DISCHARGE PLANNING
[facility] has received your referral. Acceptance is pending PCP verification. Patient has not seen Dr. Garcia since 2016. A message has been left with him MA to see if he will follow orders until she reestablishes on 3/13. If he refuses patient will need to be set up with a PCP appointment with the DC clinic within 48 hours of DC.    Thank you

## 2018-03-02 NOTE — DISCHARGE INSTRUCTIONS
Discharge Instructions    Discharged to home by car with relative. Discharged via wheelchair, hospital escort: Refused.  Special equipment needed: Walker    Be sure to schedule a follow-up appointment with your primary care doctor or any specialists as instructed.     Discharge Plan:   Diet Plan: Discussed  Activity Level: Discussed  Smoking Cessation Offered: Patient Refused  Confirmed Follow up Appointment: Appointment Scheduled  Confirmed Symptoms Management: Discussed  Medication Reconciliation Updated: Yes  Influenza Vaccine Indication: Patient Refuses    I understand that a diet low in cholesterol, fat, and sodium is recommended for good health. Unless I have been given specific instructions below for another diet, I accept this instruction as my diet prescription.   Other diet: cardiac    Special Instructions: None    · Is patient discharged on Warfarin / Coumadin?   No     Depression / Suicide Risk    As you are discharged from this [facility] facility, it is important to learn how to keep safe from harming yourself.    Recognize the warning signs:  · Abrupt changes in personality, positive or negative- including increase in energy   · Giving away possessions  · Change in eating patterns- significant weight changes-  positive or negative  · Change in sleeping patterns- unable to sleep or sleeping all the time   · Unwillingness or inability to communicate  · Depression  · Unusual sadness, discouragement and loneliness  · Talk of wanting to die  · Neglect of personal appearance   · Rebelliousness- reckless behavior  · Withdrawal from people/activities they love  · Confusion- inability to concentrate     If you or a loved one observes any of these behaviors or has concerns about self-harm, here's what you can do:  · Talk about it- your feelings and reasons for harming yourself  · Remove any means that you might use to hurt yourself (examples: pills, rope, extension cords, firearm)  · Get professional help  from the community (Mental Health, Substance Abuse, psychological counseling)  · Do not be alone:Call your Safe Contact- someone whom you trust who will be there for you.  · Call your local CRISIS HOTLINE 594-4657 or 032-609-6814  · Call your local Children's Mobile Crisis Response Team [place] (982) 831-7532 or www.Kayentis  · Call the toll free National Suicide Prevention Hotlines   · National Suicide Prevention Lifeline 974-845-ZAUP (4764)  · National Hope Line Network 800-SUICIDE (470-6259)    Diet cardiac with 9-13 servings of fruits and vegetables   Activities as tolerated   No smoking, no alcohol, no caffeine   Wear seat belt in motorized vehicle   Take medications as perscribed   Keep appointments   If symptoms worsen call PCP, 911 or urgent care.    Chest Pain, Nonspecific  It is often hard to give a specific diagnosis for the cause of chest pain. There is always a chance that your pain could be related to something serious, like a heart attack or a blood clot in the lungs. You need to follow up with your caregiver for further evaluation. More lab tests or other studies such as X-rays, electrocardiography, stress testing, or cardiac imaging may be needed to find the cause of your pain.  Most of the time, nonspecific chest pain improves within 2 to 3 days with rest and mild pain medicine. For the next few days, avoid physical exertion or activities that bring on pain. Do not smoke. Avoid drinking alcohol. Call your caregiver for routine follow-up as advised.   SEEK IMMEDIATE MEDICAL CARE IF:  · You develop increased chest pain or pain that radiates to the arm, neck, jaw, back, or abdomen.   · You develop shortness of breath, increased coughing, or you start coughing up blood.   · You have severe back or abdominal pain, nausea, or vomiting.   · You develop severe weakness, fainting, fever, or chills.   Document Released: 12/18/2006 Document Revised: 03/11/2013 Document Reviewed:  06/06/2008  ExitCare® Patient Information ©2013 [employer].

## 2018-03-02 NOTE — PROGRESS NOTES
PT has been assisted to the BR multiple times now with her walker. Pt does not report feeling light headed. Although her HR did jump up to 154 non-sustaining while walking to the BR one time.

## 2018-03-02 NOTE — PROGRESS NOTES
Assumed care of PT A&O 4. Pt resting in bed with no signs of labored breathing. On RA. Tele monitor in place, cardiac rhythm being monitored. Call light within reach, bed in lowest position, upper bed rails up, bed alarm on. Pt was updated on plan of care for the night . Will continue to monitor.

## 2018-03-02 NOTE — DISCHARGE PLANNING
ATTN: Case Management  RE: Referral for Home Health                We would like to take this opportunity to thank you for submitting a referral for your patient to continue care with [facility]. Our skilled team is dedicated to helping all patients recover and gain independence in the home setting.            As of 03/02/2018, we have accepted the above patient into our service. A [facility] clinician will be out to see the patient within 48 hours to conduct our initial visit. If you have any questions or concerns regarding the patient’s transition to Home Health, please do not hesitate to contact us. We are open for referrals 7 days a week from 8AM to 5PM at [phone].      We look forward to collaborating with you,  [facility] Team

## 2018-03-02 NOTE — PROGRESS NOTES
Report received at the bed side. No family at bedside. No complains of pain or SOB. Call light and belongings within reach. Pt still refusing bed alarm, educated about fall risks, and importance of using call light when getting out of bed. Pt verbalized understanding.

## 2018-03-02 NOTE — DISCHARGE PLANNING
[facility] received a HH choice form. Per the choice form the referral has been sent to [facility]

## 2018-03-02 NOTE — FACE TO FACE
Face to Face Supporting Documentation - Home Health    The encounter with this patient was in whole or in part the primary reason for home health admission.    Date of encounter:   Patient:                    MRN:                       YOB: 2018  Madeleine Zambrano  [id number]  2/1/1933     Home health to see patient for:  Skilled Nursing care for assessment, interventions & education    Skilled need for:  Exacerbation of Chronic Disease State cad    Skilled nursing interventions to include:  Comment: home medication management and PT/OT therapy 3 times per week    Homebound status evidenced by:  Need the aid of supportive devices such as crutches, canes, wheelchairs or walkers. Leaving home requires a considerable and taxing effort. There is a normal inability to leave the home.    Community Physician to provide follow up care: Ric Garcia M.D.     Optional Interventions? No      I certify the face to face encounter for this home health care referral meets the CMS requirements and the encounter/clinical assessment with the patient was, in whole, or in part, for the medical condition(s) listed above, which is the primary reason for home health care. Based on my clinical findings: the service(s) are medically necessary, support the need for home health care, and the homebound criteria are met.  I certify that this patient has had a face to face encounter by myself.  Annmarie Hess M.D. - NPI: [id number]

## 2018-03-03 NOTE — DISCHARGE PLANNING
[facility] has received your referral. Per conversatoin with Jennifer RANKIN  cannot accept this patient without a PCP signing off for SOC on Monday 3/4. Once we have received this order we can accept. Until then this referral has been placed on hold.    Thank you

## 2018-03-05 ENCOUNTER — PATIENT OUTREACH (OUTPATIENT)
Dept: HEALTH INFORMATION MANAGEMENT | Facility: OTHER | Age: 83
End: 2018-03-05

## 2018-03-07 ENCOUNTER — PATIENT OUTREACH (OUTPATIENT)
Dept: HEALTH INFORMATION MANAGEMENT | Facility: OTHER | Age: 83
End: 2018-03-07

## 2018-03-09 ENCOUNTER — OFFICE VISIT (OUTPATIENT)
Dept: MEDICAL GROUP | Facility: CLINIC | Age: 83
End: 2018-03-09
Payer: MEDICARE

## 2018-03-09 VITALS
BODY MASS INDEX: 25.4 KG/M2 | SYSTOLIC BLOOD PRESSURE: 124 MMHG | WEIGHT: 138 LBS | HEART RATE: 88 BPM | HEIGHT: 62 IN | OXYGEN SATURATION: 97 % | RESPIRATION RATE: 16 BRPM | TEMPERATURE: 97.8 F | DIASTOLIC BLOOD PRESSURE: 68 MMHG

## 2018-03-09 DIAGNOSIS — R20.0 NUMBNESS AND TINGLING IN BOTH HANDS: ICD-10-CM

## 2018-03-09 DIAGNOSIS — Z09 HOSPITAL DISCHARGE FOLLOW-UP: ICD-10-CM

## 2018-03-09 DIAGNOSIS — Z91.81 RISK FOR FALLS: ICD-10-CM

## 2018-03-09 DIAGNOSIS — R20.2 NUMBNESS AND TINGLING IN BOTH HANDS: ICD-10-CM

## 2018-03-09 PROCEDURE — 99214 OFFICE O/P EST MOD 30 MIN: CPT | Performed by: NURSE PRACTITIONER

## 2018-03-09 RX ORDER — DIAZEPAM 2 MG/1
2 TABLET ORAL EVERY 6 HOURS PRN
COMMUNITY
End: 2018-03-28

## 2018-03-09 ASSESSMENT — ENCOUNTER SYMPTOMS
DIZZINESS: 0
ABDOMINAL PAIN: 0
FEVER: 0
SHORTNESS OF BREATH: 0
EYE PAIN: 0
CHILLS: 0
NERVOUS/ANXIOUS: 0
TINGLING: 1
SENSORY CHANGE: 1
WEAKNESS: 0
FALLS: 0
SPUTUM PRODUCTION: 0
FOCAL WEAKNESS: 0
COUGH: 0
HEADACHES: 0
PALPITATIONS: 0
VOMITING: 0
MYALGIAS: 0
NAUSEA: 0
HEARTBURN: 0
DEPRESSION: 0
WHEEZING: 0
BLURRED VISION: 0

## 2018-03-09 NOTE — PROGRESS NOTES
Subjective:     Madeleine Zambrano is a 85 y.o. female who presents for Hospital Follow-up.  Chart reviewed. Discharge summary available for review: Yes   Date of discharge 3/2/2018.  48- hour post discharge RN call completed on 3/7/2018 and documented in the medical record by  Oksana Mccullough.    HPI: Recently hospitalized for chest pain, underlying HTN, Pafib, hyperlipidemia, prior MI s/p angioplasty of RCA in 2015, unable to deploy stent. Cardiologist recommended medical optimization, less likely cardiac in nature. Medication reconciliation done. Pt was not taking medications as instructed. On eliquis, plavix (afib, cad), lipitor, lopressor, imdur, lisinopril and norvasc. Pt is not taking medications as instructed. She does not want to take eliquis, not taking lipitor and imdur. She reports she will let cardiologist know. Cardiologist follow up on 4/24    Since returning home, patient reports never has chest pain. She only has some numbness to her hands which are chronic and that is what brought her to hospital.     The patient denied increased weakness; no difficulty taking care of self at home but pt lives alone at this time and son did worry about her safety. She denied fall after discharge. She ambulate with FWW. She declined HH with PT.     Follow up with PCP on 3/13    Patient Active Problem List    Diagnosis Date Noted   • Non-ST elevated myocardial infarction (non-STEMI) (CMS-HCC) 02/28/2018     Priority: High   • Chest pain 02/27/2018     Priority: High   • CVA (cerebral vascular accident) (CMS-HCC) 09/10/2017     Priority: High   • Carotid atherosclerosis, bilateral 07/08/2016     Priority: High   • Opioid type dependence, continuous (CMS-HCC) 06/11/2015     Priority: High   • CAD (coronary artery disease) 02/01/2015     Priority: Medium   • Anxiety 03/28/2015     Priority: Low   • Leg pain 02/13/2014     Priority: Low   • HTN (hypertension), benign 01/20/2011     Priority: Low   • Dyslipidemia  "01/20/2011     Priority: Low   • Numbness and tingling in both hands 03/09/2018   • History of CVA (cerebrovascular accident) 02/28/2018   • Stage 3 chronic kidney disease 09/10/2017   • Vitreous hemorrhage (CMS-HCC) 04/13/2017   • LFT elevation 10/13/2015   • Risk for falls 08/25/2014   • Osteopenia 08/07/2012   • Back pain 03/28/2012   • DJD (degenerative joint disease) 01/20/2011           Allergies:   Meperidine and Tramadol    Social History:  Social History   Substance Use Topics   • Smoking status: Current Every Day Smoker     Packs/day: 0.50     Years: 0.10     Types: Cigarettes   • Smokeless tobacco: Never Used   • Alcohol use Yes      Comment: 1 per day        ROS:  Review of Systems   Constitutional: Negative for chills, fever and malaise/fatigue.   Eyes: Negative for blurred vision and pain.   Respiratory: Negative for cough, sputum production, shortness of breath and wheezing.    Cardiovascular: Negative for chest pain, palpitations and leg swelling.   Gastrointestinal: Negative for abdominal pain, heartburn, nausea and vomiting.   Genitourinary: Negative for dysuria, frequency and urgency.   Musculoskeletal: Negative for falls and myalgias.   Skin: Negative for rash.   Neurological: Positive for tingling (to her hands, chronic, denied increased weakness, at her baseline) and sensory change (numbness to her hands, chronic,). Negative for dizziness, focal weakness, weakness and headaches.   Psychiatric/Behavioral: Negative for depression. The patient is not nervous/anxious.         Objective:     Blood pressure 124/68, pulse 88, temperature 36.6 °C (97.8 °F), resp. rate 16, height 1.575 m (5' 2\"), weight 62.6 kg (138 lb), SpO2 97 %.     Physical Exam:  Physical Exam   Constitutional: She is oriented to person, place, and time and well-developed, well-nourished, and in no distress.   HENT:   Head: Normocephalic and atraumatic.   Eyes: Conjunctivae are normal.   Neck: Neck supple. No JVD present. No " thyromegaly present.   Cardiovascular: Normal rate and regular rhythm.    No murmur heard.  Pulmonary/Chest: Effort normal and breath sounds normal. No respiratory distress. She has no wheezes.   Abdominal: Soft. Bowel sounds are normal. She exhibits no distension. There is no tenderness.   Musculoskeletal: She exhibits no edema.   Neurological: She is alert and oriented to person, place, and time.   Skin: Skin is warm. No erythema.   Nursing note and vitals reviewed.        Assessment and Plan:     1. Hospital discharge follow-up  Hospitalization and results reviewed with patient. High risk conditions requiring teaching or care coordination were identified and addressed.The patient demonstrate understanding of admission and underlying conditions. The patient understands discharge instructions and when to seek medical attention. Medications reviewed including instructions regarding high risk medications, dosing and side effects.    The patient is able to safely adhere to ADL/IADL, treatment and medication regimen, self-manage of high-risk conditions? Yes per pt. Pt reports she is going to move to live with son soon.   The patient requires physical therapy/home health/DME referral? Recommended but pt declined. Pt reports will call us back if she changes her mind.   The patient requires referral to care coordination/behavioral health/social work?  No   Patient requires referral for pharmacy consult? No   Advance directive/POLST on file?  Yes   Required counseled on advance directive?  No     - Follow up with PCP on 3/13  - hyperlipidemia, prior MI s/p angioplasty of RCA in 2015: we reviewed medications and pt agrees to take plavix, lisinopril, amlodipine, lipitor and metoprolol for now and she will follow up with cardiologist on 4/24. Agree with no eliquis for now but she will have to let cardiologist know.     2. Risk for falls  Declined HH and PT    3. Numbness and tingling in both hands  Chronic, could be cervical  disease with radiculopathy. Given pt's age, not a surgical candidate even if that is really the diagnosis. Gabapentin is recommended to try but pt does not want to take more medications.       Medication Reconciliation  Medication list at end of encounter:   Current Outpatient Prescriptions   Medication Sig Dispense Refill   • diazePAM (VALIUM) 2 MG Tab Take 2 mg by mouth every 6 hours as needed for Anxiety.     • amLODIPine (NORVASC) 5 MG Tab Take 2.5 mg by mouth every day.     • metoprolol (LOPRESSOR) 25 MG Tab Take 25 mg by mouth 2 times a day.     • clopidogrel (PLAVIX) 75 MG Tab Take 1 Tab by mouth every day. 90 Tab 0   • atorvastatin (LIPITOR) 40 MG Tab Take 1 Tab by mouth every bedtime. 30 Tab 3   • isosorbide mononitrate SR (IMDUR) 30 MG TABLET SR 24 HR Take 0.5 Tabs by mouth every day. 30 Tab 3   • apixaban (ELIQUIS) 5mg Tab Take 1 Tab by mouth 2 Times a Day. 60 Tab 11   • Ascorbic Acid (VITAMIN C) 1000 MG Tab Take 1 Tab by mouth every day.     • lisinopril (PRINIVIL) 10 MG Tab Take 1 Tab by mouth every day. 90 Tab 0     No current facility-administered medications for this visit.        Primary care follow-up:  New health conditions identified during hospitalization? No   Labs/pathology/imaging requires future PCP follow-up?  No   Changes to medications during hospitalization or today? Yes during hospitalization    Recommended followup: No Follow-up on file. with Ric Garcia M.D.   Future Appointments       Provider Department Center    3/13/2018 8:20 AM Ric Garcia M.D. [facility] [address]    4/24/2018 11:40 AM Michael J Bloch, M.D. [facility]            Patient Instruction  Patient offered educational material on discharge diagnosis and management of symptoms/red flags. Patient instructed to keep follow-up appointments and to bring written questions and and actual medications to each office visit. Patient  instructed to call PCP/specialist with any problems/questions/concerns. Patient verbalizes understanding and has no further questions at this time.    Face-to-face transitional care management services with moderate complexity medical decision making.

## 2018-03-09 NOTE — PROGRESS NOTES
POST DISCHARGE CALL MADE BY Oksana Mccullough.  Discharge Date:3/2/2018   Date of Outreach Call: 3/7/2018  1:46 PM  Now that you're home, how are you doing? Fair  Do you have questions about your medications? No    Did you fill your medications? No  Comment: PT son shanda be picking up medictaons tomorrow.     Do you have a follow-up appointment scheduled?Yes    Discharging Department: Telemetry 7    Number of Attempts: 1  Current or previous attempts completed within two business days of discharge? Yes  Provided education regarding treatment plan, medication, self-management, ADLs? Yes  Has patient completed Advance Directive? If yes, advise them to bring to appointment. Yes  Care Manager phone number provided? Yes  Is there anything else I can help you with? No

## 2018-03-09 NOTE — PATIENT INSTRUCTIONS
If you need further assistance, or have any questions; concerns or lingering symptoms before seeing your Primary Care Provider or specialist.     Do not hesitate to contact us.     Please contact us at the Post-Hospital Follow Up Program at [phone].   Our offices hours are Monday-Friday 8 am-5 pm.

## 2018-03-15 ENCOUNTER — APPOINTMENT (OUTPATIENT)
Dept: RADIOLOGY | Facility: MEDICAL CENTER | Age: 83
DRG: 445 | End: 2018-03-15
Attending: EMERGENCY MEDICINE
Payer: MEDICARE

## 2018-03-15 ENCOUNTER — RESOLUTE PROFESSIONAL BILLING HOSPITAL PROF FEE (OUTPATIENT)
Dept: HOSPITALIST | Facility: MEDICAL CENTER | Age: 83
End: 2018-03-15
Payer: MEDICARE

## 2018-03-15 ENCOUNTER — HOSPITAL ENCOUNTER (INPATIENT)
Facility: MEDICAL CENTER | Age: 83
LOS: 2 days | DRG: 445 | End: 2018-03-17
Attending: EMERGENCY MEDICINE | Admitting: HOSPITALIST
Payer: MEDICARE

## 2018-03-15 ENCOUNTER — APPOINTMENT (OUTPATIENT)
Dept: RADIOLOGY | Facility: MEDICAL CENTER | Age: 83
DRG: 445 | End: 2018-03-15
Attending: INTERNAL MEDICINE
Payer: MEDICARE

## 2018-03-15 DIAGNOSIS — N30.01 ACUTE CYSTITIS WITH HEMATURIA: ICD-10-CM

## 2018-03-15 PROBLEM — K83.1 BILIARY OBSTRUCTION: Status: ACTIVE | Noted: 2018-03-15

## 2018-03-15 PROBLEM — E87.6 HYPOKALEMIA: Status: ACTIVE | Noted: 2018-03-15

## 2018-03-15 PROBLEM — N39.0 UTI (URINARY TRACT INFECTION): Status: ACTIVE | Noted: 2018-03-15

## 2018-03-15 PROBLEM — R10.9 ABDOMINAL PAIN: Status: ACTIVE | Noted: 2018-03-15

## 2018-03-15 LAB
ALBUMIN SERPL BCP-MCNC: 3.4 G/DL (ref 3.2–4.9)
ALBUMIN/GLOB SERPL: 1.3 G/DL
ALP SERPL-CCNC: 364 U/L (ref 30–99)
ALT SERPL-CCNC: 207 U/L (ref 2–50)
ANION GAP SERPL CALC-SCNC: 8 MMOL/L (ref 0–11.9)
APPEARANCE UR: CLEAR
APTT PPP: 31.8 SEC (ref 24.7–36)
AST SERPL-CCNC: 202 U/L (ref 12–45)
BACTERIA #/AREA URNS HPF: NEGATIVE /HPF
BASOPHILS # BLD AUTO: 0.3 % (ref 0–1.8)
BASOPHILS # BLD: 0.02 K/UL (ref 0–0.12)
BILIRUB SERPL-MCNC: 3.3 MG/DL (ref 0.1–1.5)
BILIRUB UR QL STRIP.AUTO: ABNORMAL
BNP SERPL-MCNC: 241 PG/ML (ref 0–100)
BUN SERPL-MCNC: 16 MG/DL (ref 8–22)
CALCIUM SERPL-MCNC: 8.7 MG/DL (ref 8.5–10.5)
CHLORIDE SERPL-SCNC: 109 MMOL/L (ref 96–112)
CO2 SERPL-SCNC: 23 MMOL/L (ref 20–33)
COLOR UR: ABNORMAL
CREAT SERPL-MCNC: 1.02 MG/DL (ref 0.5–1.4)
CULTURE IF INDICATED INDCX: YES UA CULTURE
EKG IMPRESSION: NORMAL
EOSINOPHIL # BLD AUTO: 0.19 K/UL (ref 0–0.51)
EOSINOPHIL NFR BLD: 3.3 % (ref 0–6.9)
EPI CELLS #/AREA URNS HPF: ABNORMAL /HPF
ERYTHROCYTE [DISTWIDTH] IN BLOOD BY AUTOMATED COUNT: 51.6 FL (ref 35.9–50)
GLOBULIN SER CALC-MCNC: 2.7 G/DL (ref 1.9–3.5)
GLUCOSE SERPL-MCNC: 134 MG/DL (ref 65–99)
GLUCOSE UR STRIP.AUTO-MCNC: NEGATIVE MG/DL
HCT VFR BLD AUTO: 37.1 % (ref 37–47)
HGB BLD-MCNC: 12.4 G/DL (ref 12–16)
HYALINE CASTS #/AREA URNS LPF: ABNORMAL /LPF
IMM GRANULOCYTES # BLD AUTO: 0.02 K/UL (ref 0–0.11)
IMM GRANULOCYTES NFR BLD AUTO: 0.3 % (ref 0–0.9)
INR PPP: 1.1 (ref 0.87–1.13)
KETONES UR STRIP.AUTO-MCNC: ABNORMAL MG/DL
LACTATE BLD-SCNC: 1 MMOL/L (ref 0.5–2)
LACTATE BLD-SCNC: 1.3 MMOL/L (ref 0.5–2)
LEUKOCYTE ESTERASE UR QL STRIP.AUTO: ABNORMAL
LIPASE SERPL-CCNC: 47 U/L (ref 11–82)
LYMPHOCYTES # BLD AUTO: 0.6 K/UL (ref 1–4.8)
LYMPHOCYTES NFR BLD: 10.5 % (ref 22–41)
MCH RBC QN AUTO: 34.4 PG (ref 27–33)
MCHC RBC AUTO-ENTMCNC: 33.4 G/DL (ref 33.6–35)
MCV RBC AUTO: 103.1 FL (ref 81.4–97.8)
MICRO URNS: ABNORMAL
MONOCYTES # BLD AUTO: 0.53 K/UL (ref 0–0.85)
MONOCYTES NFR BLD AUTO: 9.3 % (ref 0–13.4)
MUCOUS THREADS #/AREA URNS HPF: ABNORMAL /HPF
NEUTROPHILS # BLD AUTO: 4.36 K/UL (ref 2–7.15)
NEUTROPHILS NFR BLD: 76.3 % (ref 44–72)
NITRITE UR QL STRIP.AUTO: POSITIVE
NRBC # BLD AUTO: 0 K/UL
NRBC BLD-RTO: 0 /100 WBC
PH UR STRIP.AUTO: 6 [PH]
PLATELET # BLD AUTO: 337 K/UL (ref 164–446)
PMV BLD AUTO: 10.9 FL (ref 9–12.9)
POTASSIUM SERPL-SCNC: 3.3 MMOL/L (ref 3.6–5.5)
PROT SERPL-MCNC: 6.1 G/DL (ref 6–8.2)
PROT UR QL STRIP: 100 MG/DL
PROTHROMBIN TIME: 13.9 SEC (ref 12–14.6)
RBC # BLD AUTO: 3.6 M/UL (ref 4.2–5.4)
RBC # URNS HPF: ABNORMAL /HPF
RBC UR QL AUTO: NEGATIVE
SODIUM SERPL-SCNC: 140 MMOL/L (ref 135–145)
SP GR UR STRIP.AUTO: 1.02
TRANS CELLS #/AREA URNS HPF: ABNORMAL /HPF
TROPONIN I SERPL-MCNC: 0.02 NG/ML (ref 0–0.04)
UROBILINOGEN UR STRIP.AUTO-MCNC: 2 MG/DL
WBC # BLD AUTO: 5.7 K/UL (ref 4.8–10.8)
WBC #/AREA URNS HPF: ABNORMAL /HPF

## 2018-03-15 PROCEDURE — 84484 ASSAY OF TROPONIN QUANT: CPT

## 2018-03-15 PROCEDURE — 74176 CT ABD & PELVIS W/O CONTRAST: CPT

## 2018-03-15 PROCEDURE — 700102 HCHG RX REV CODE 250 W/ 637 OVERRIDE(OP): Performed by: HOSPITALIST

## 2018-03-15 PROCEDURE — 85730 THROMBOPLASTIN TIME PARTIAL: CPT

## 2018-03-15 PROCEDURE — 700111 HCHG RX REV CODE 636 W/ 250 OVERRIDE (IP): Performed by: EMERGENCY MEDICINE

## 2018-03-15 PROCEDURE — 83605 ASSAY OF LACTIC ACID: CPT

## 2018-03-15 PROCEDURE — 770006 HCHG ROOM/CARE - MED/SURG/GYN SEMI*

## 2018-03-15 PROCEDURE — A9270 NON-COVERED ITEM OR SERVICE: HCPCS | Performed by: HOSPITALIST

## 2018-03-15 PROCEDURE — 83690 ASSAY OF LIPASE: CPT

## 2018-03-15 PROCEDURE — 87086 URINE CULTURE/COLONY COUNT: CPT

## 2018-03-15 PROCEDURE — 93005 ELECTROCARDIOGRAM TRACING: CPT | Performed by: EMERGENCY MEDICINE

## 2018-03-15 PROCEDURE — 36415 COLL VENOUS BLD VENIPUNCTURE: CPT

## 2018-03-15 PROCEDURE — 71045 X-RAY EXAM CHEST 1 VIEW: CPT

## 2018-03-15 PROCEDURE — 87040 BLOOD CULTURE FOR BACTERIA: CPT

## 2018-03-15 PROCEDURE — 85025 COMPLETE CBC W/AUTO DIFF WBC: CPT

## 2018-03-15 PROCEDURE — 96374 THER/PROPH/DIAG INJ IV PUSH: CPT

## 2018-03-15 PROCEDURE — 99223 1ST HOSP IP/OBS HIGH 75: CPT | Performed by: HOSPITALIST

## 2018-03-15 PROCEDURE — 700101 HCHG RX REV CODE 250: Performed by: HOSPITALIST

## 2018-03-15 PROCEDURE — 85610 PROTHROMBIN TIME: CPT

## 2018-03-15 PROCEDURE — 81001 URINALYSIS AUTO W/SCOPE: CPT

## 2018-03-15 PROCEDURE — 83880 ASSAY OF NATRIURETIC PEPTIDE: CPT

## 2018-03-15 PROCEDURE — 99285 EMERGENCY DEPT VISIT HI MDM: CPT

## 2018-03-15 PROCEDURE — 80053 COMPREHEN METABOLIC PANEL: CPT

## 2018-03-15 RX ORDER — AMLODIPINE BESYLATE 2.5 MG/1
2.5 TABLET ORAL DAILY
Status: DISCONTINUED | OUTPATIENT
Start: 2018-03-15 | End: 2018-03-15

## 2018-03-15 RX ORDER — SODIUM CHLORIDE AND POTASSIUM CHLORIDE 150; 900 MG/100ML; MG/100ML
INJECTION, SOLUTION INTRAVENOUS CONTINUOUS
Status: DISCONTINUED | OUTPATIENT
Start: 2018-03-15 | End: 2018-03-16

## 2018-03-15 RX ORDER — AMLODIPINE BESYLATE 5 MG/1
5 TABLET ORAL DAILY
Status: DISCONTINUED | OUTPATIENT
Start: 2018-03-16 | End: 2018-03-17 | Stop reason: HOSPADM

## 2018-03-15 RX ORDER — ONDANSETRON 2 MG/ML
4 INJECTION INTRAMUSCULAR; INTRAVENOUS EVERY 4 HOURS PRN
Status: DISCONTINUED | OUTPATIENT
Start: 2018-03-15 | End: 2018-03-17 | Stop reason: HOSPADM

## 2018-03-15 RX ORDER — LORAZEPAM 2 MG/ML
0.5 INJECTION INTRAMUSCULAR
Status: DISCONTINUED | OUTPATIENT
Start: 2018-03-15 | End: 2018-03-17 | Stop reason: HOSPADM

## 2018-03-15 RX ORDER — ATORVASTATIN CALCIUM 20 MG/1
40 TABLET, FILM COATED ORAL
Status: DISCONTINUED | OUTPATIENT
Start: 2018-03-15 | End: 2018-03-15

## 2018-03-15 RX ORDER — BISACODYL 10 MG
10 SUPPOSITORY, RECTAL RECTAL
Status: DISCONTINUED | OUTPATIENT
Start: 2018-03-15 | End: 2018-03-17 | Stop reason: HOSPADM

## 2018-03-15 RX ORDER — ASCORBIC ACID 500 MG
TABLET ORAL DAILY
Status: DISCONTINUED | OUTPATIENT
Start: 2018-03-15 | End: 2018-03-17 | Stop reason: HOSPADM

## 2018-03-15 RX ORDER — ISOSORBIDE MONONITRATE 30 MG/1
15 TABLET, EXTENDED RELEASE ORAL DAILY
Status: DISCONTINUED | OUTPATIENT
Start: 2018-03-15 | End: 2018-03-17 | Stop reason: HOSPADM

## 2018-03-15 RX ORDER — POLYETHYLENE GLYCOL 3350 17 G/17G
1 POWDER, FOR SOLUTION ORAL
Status: DISCONTINUED | OUTPATIENT
Start: 2018-03-15 | End: 2018-03-17 | Stop reason: HOSPADM

## 2018-03-15 RX ORDER — CEFTRIAXONE 2 G/1
2 INJECTION, POWDER, FOR SOLUTION INTRAMUSCULAR; INTRAVENOUS ONCE
Status: COMPLETED | OUTPATIENT
Start: 2018-03-15 | End: 2018-03-15

## 2018-03-15 RX ORDER — LABETALOL HYDROCHLORIDE 5 MG/ML
10 INJECTION, SOLUTION INTRAVENOUS EVERY 4 HOURS PRN
Status: DISCONTINUED | OUTPATIENT
Start: 2018-03-15 | End: 2018-03-17 | Stop reason: HOSPADM

## 2018-03-15 RX ORDER — LISINOPRIL 10 MG/1
10 TABLET ORAL DAILY
Status: DISCONTINUED | OUTPATIENT
Start: 2018-03-15 | End: 2018-03-17 | Stop reason: HOSPADM

## 2018-03-15 RX ORDER — ONDANSETRON 4 MG/1
4 TABLET, ORALLY DISINTEGRATING ORAL EVERY 4 HOURS PRN
Status: DISCONTINUED | OUTPATIENT
Start: 2018-03-15 | End: 2018-03-17 | Stop reason: HOSPADM

## 2018-03-15 RX ORDER — AMOXICILLIN 250 MG
2 CAPSULE ORAL 2 TIMES DAILY
Status: DISCONTINUED | OUTPATIENT
Start: 2018-03-15 | End: 2018-03-17 | Stop reason: HOSPADM

## 2018-03-15 RX ORDER — DIAZEPAM 2 MG/1
2 TABLET ORAL EVERY 6 HOURS PRN
Status: DISCONTINUED | OUTPATIENT
Start: 2018-03-15 | End: 2018-03-17 | Stop reason: HOSPADM

## 2018-03-15 RX ADMIN — CEFTRIAXONE SODIUM 2 G: 2 INJECTION, POWDER, FOR SOLUTION INTRAMUSCULAR; INTRAVENOUS at 09:48

## 2018-03-15 RX ADMIN — METOPROLOL TARTRATE 25 MG: 25 TABLET, FILM COATED ORAL at 21:57

## 2018-03-15 RX ADMIN — POTASSIUM CHLORIDE AND SODIUM CHLORIDE: 900; 150 INJECTION, SOLUTION INTRAVENOUS at 14:59

## 2018-03-15 RX ADMIN — LISINOPRIL 10 MG: 10 TABLET ORAL at 18:21

## 2018-03-15 RX ADMIN — METOPROLOL TARTRATE 25 MG: 25 TABLET, FILM COATED ORAL at 15:09

## 2018-03-15 RX ADMIN — ISOSORBIDE MONONITRATE 15 MG: 30 TABLET ORAL at 15:09

## 2018-03-15 RX ADMIN — OXYCODONE HYDROCHLORIDE AND ACETAMINOPHEN 500 MG: 500 TABLET ORAL at 15:09

## 2018-03-15 ASSESSMENT — LIFESTYLE VARIABLES
CONSUMPTION TOTAL: NEGATIVE
EVER FELT BAD OR GUILTY ABOUT YOUR DRINKING: NO
HAVE YOU EVER FELT YOU SHOULD CUT DOWN ON YOUR DRINKING: NO
AVERAGE NUMBER OF DAYS PER WEEK YOU HAVE A DRINK CONTAINING ALCOHOL: 2
EVER HAD A DRINK FIRST THING IN THE MORNING TO STEADY YOUR NERVES TO GET RID OF A HANGOVER: NO
TOTAL SCORE: 0
HOW MANY TIMES IN THE PAST YEAR HAVE YOU HAD 5 OR MORE DRINKS IN A DAY: 0
ALCOHOL_USE: YES
ON A TYPICAL DAY WHEN YOU DRINK ALCOHOL HOW MANY DRINKS DO YOU HAVE: 1
TOTAL SCORE: 0
EVER_SMOKED: YES
TOTAL SCORE: 0
HAVE PEOPLE ANNOYED YOU BY CRITICIZING YOUR DRINKING: NO

## 2018-03-15 ASSESSMENT — COGNITIVE AND FUNCTIONAL STATUS - GENERAL
TOILETING: A LITTLE
SUGGESTED CMS G CODE MODIFIER DAILY ACTIVITY: CI
MOBILITY SCORE: 20
STANDING UP FROM CHAIR USING ARMS: A LITTLE
SUGGESTED CMS G CODE MODIFIER MOBILITY: CJ
MOVING FROM LYING ON BACK TO SITTING ON SIDE OF FLAT BED: A LITTLE
CLIMB 3 TO 5 STEPS WITH RAILING: A LITTLE
DAILY ACTIVITIY SCORE: 23
WALKING IN HOSPITAL ROOM: A LITTLE

## 2018-03-15 ASSESSMENT — PAIN SCALES - GENERAL
PAINLEVEL_OUTOF10: 0

## 2018-03-15 NOTE — ED NOTES
Called pt's daughter Felicitas and informed her pt will get admitted. Felicitas asked if we can call her on her cell phone when room assigned. Cell#[phone]

## 2018-03-15 NOTE — ED PROVIDER NOTES
ED Provider Note    CHIEF COMPLAINT  Chief Complaint   Patient presents with   • N/V     n/v x 24 hours, states vomitted x 12    • Blood in Urine       HPI  Madeleine Zambrano is a 85 y.o. female who presents with bloody urine. Patient has had nausea and vomiting over the last 1 week. Worse since yesterday. Decreased appetite. No diarrhea. This morning noticed bright red blood in her urine. She denies dysuria or hematuria. She does have cramping abdominal pain worse on the right side of her abdomen. No modifying factors. She has not known to have a fever. She feels generally weak and tired. No shortness of breath, chest pain or cough.    REVIEW OF SYSTEMS  As per HPI, otherwise a 10 point review of systems is negative    PAST MEDICAL HISTORY  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HCC)    • Arrhythmia    • Arthritis    • Back pain 3/28/2012   • CATARACT    • Coronary artery disease    • Disorder of thyroid    • Glaucoma    • Hyperlipidemia    • Hypertension    • MEDICAL HOME 11/07/12   • Myocardial infarct    • Pain     sacrum 1/10   • Stroke (CMS-[facility])    • TIA (transient ischemic attack) 09/2017   • Urinary incontinence        SOCIAL HISTORY  Social History   Substance Use Topics   • Smoking status: Current Every Day Smoker     Packs/day: 0.25     Years: 0.10     Types: Cigarettes   • Smokeless tobacco: Never Used   • Alcohol use Yes      Comment: 1 per day       SURGICAL HISTORY  Past Surgical History:   Procedure Laterality Date   • VITRECTOMY POSTERIOR Right 4/13/2017    Procedure: VITRECTOMY POSTERIOR-LASER, SF6 GAS;  Surgeon: Jessica Arita M.D.;  Location: SURGERY SAME DAY [facility] ORS;  Service:    • RIGO BY LAPAROSCOPY  10/9/2015    Procedure: RIGO BY LAPAROSCOPY;  Surgeon: Ric Zambrano M.D.;  Location: SURGERY [facility];  Service:    • CATARACT PHACO WITH IOL  4/27/2011    Performed by EVERETTE COOPER at SURGERY SAME DAY [facility] ORS   • CATARACT PHACO WITH  "IOL  4/13/2011    Performed by EVERETTE COOPER at SURGERY SAME DAY [facility]S   • OTHER ORTHOPEDIC SURGERY  1980    pins in left hip   • ATHROPLASTY      broken hip 1990   • OTHER CARDIAC SURGERY         CURRENT MEDICATIONS  Home Medications     Reviewed by Kimberly Sexton (Pharmacy Tech) on 03/15/18 at 0936  Med List Status: Complete   Medication Last Dose Status   amLODIPine (NORVASC) 5 MG Tab 3/14/2018 Active   Ascorbic Acid (VITAMIN C) 1000 MG Tab 3/14/2018 Active   atorvastatin (LIPITOR) 40 MG Tab 3/14/2018 Active   clopidogrel (PLAVIX) 75 MG Tab 3/14/2018 Active   diazePAM (VALIUM) 2 MG Tab 3/14/2018 Active   isosorbide mononitrate SR (IMDUR) 30 MG TABLET SR 24 HR 3/14/2018 Active   lisinopril (PRINIVIL) 10 MG Tab 3/10/2018 Active   metoprolol (LOPRESSOR) 25 MG Tab 3/14/2018 Active                ALLERGIES  Allergies   Allergen Reactions   • Meperidine Vomiting and Nausea   • Tramadol Vomiting and Nausea       PHYSICAL EXAM  VITAL SIGNS: BP (!) 181/61   Pulse 73   Temp 37.4 °C (99.3 °F)   Resp 16   Ht 1.575 m (5' 2\")   Wt 63.5 kg (139 lb 15.9 oz)   SpO2 97%   BMI 25.60 kg/m²    Constitutional: Awake and alert, lethargic appearing elderly female  HENT:  Atraumatic, Normocephalic.Oropharynx moist mucus membranes  Eyes: Normal inspection  Neck: Supple  Cardiovascular: Normal heart rate, Normal rhythm.  Symmetric peripheral pulses.   Thorax & Lungs: No respiratory distress, No wheezing, No rales, No rhonchi, No chest tenderness.   Abdomen: Bowel sounds normal, soft, non-distended, possible mild tenderness over the right abdomen. No rebound or peritonitis. Genitalia without vaginal bleeding. Rectal exam without hemorrhoid or other acute abnormality   Skin: Warm, Dry, No rash.   Extremities: No asymmetric swelling  Neurologic: Grossly normal   Psychiatric: Anxious appearing    RADIOLOGY/PROCEDURES  CT-RENAL COLIC EVALUATION(A/P W/O)   Final Result      No evidence of renal, ureteral or bladder " calculi. No hydronephrosis. Calcifications at the renal dennis bilaterally likely represent vascular calcification.      Prominence of the common duct likely represent ectasia in the setting of prior cholecystectomy.      Colonic diverticulosis.      Prominent atherosclerotic plaque.      Hypodense hepatic lesion likely represents a cyst.      Small hiatal hernia.      Compression fractures of T12 and L1 have a chronic appearance.         DX-CHEST-PORTABLE (1 VIEW)   Final Result      Cardiomegaly.      Minimal interstitial prominence may represent minimal edema.      Atherosclerotic plaque.      [id number]    (Results Pending)      Imaging is interpreted by radiologist    Labs:  Results for orders placed or performed during the hospital encounter of 03/15/18   CBC WITH DIFFERENTIAL   Result Value Ref Range    WBC 5.7 4.8 - 10.8 K/uL    RBC 3.60 (L) 4.20 - 5.40 M/uL    Hemoglobin 12.4 12.0 - 16.0 g/dL    Hematocrit 37.1 37.0 - 47.0 %    .1 (H) 81.4 - 97.8 fL    MCH 34.4 (H) 27.0 - 33.0 pg    MCHC 33.4 (L) 33.6 - 35.0 g/dL    RDW 51.6 (H) 35.9 - 50.0 fL    Platelet Count 337 164 - 446 K/uL    MPV 10.9 9.0 - 12.9 fL    Neutrophils-Polys 76.30 (H) 44.00 - 72.00 %    Lymphocytes 10.50 (L) 22.00 - 41.00 %    Monocytes 9.30 0.00 - 13.40 %    Eosinophils 3.30 0.00 - 6.90 %    Basophils 0.30 0.00 - 1.80 %    Immature Granulocytes 0.30 0.00 - 0.90 %    Nucleated RBC 0.00 /100 WBC    Neutrophils (Absolute) 4.36 2.00 - 7.15 K/uL    Lymphs (Absolute) 0.60 (L) 1.00 - 4.80 K/uL    Monos (Absolute) 0.53 0.00 - 0.85 K/uL    Eos (Absolute) 0.19 0.00 - 0.51 K/uL    Baso (Absolute) 0.02 0.00 - 0.12 K/uL    Immature Granulocytes (abs) 0.02 0.00 - 0.11 K/uL    NRBC (Absolute) 0.00 K/uL   COMP METABOLIC PANEL   Result Value Ref Range    Sodium 140 135 - 145 mmol/L    Potassium 3.3 (L) 3.6 - 5.5 mmol/L    Chloride 109 96 - 112 mmol/L    Co2 23 20 - 33 mmol/L    Anion Gap 8.0 0.0 - 11.9    Glucose 134 (H) 65 - 99 mg/dL    Bun 16 8  - 22 mg/dL    Creatinine 1.02 0.50 - 1.40 mg/dL    Calcium 8.7 8.5 - 10.5 mg/dL    AST(SGOT) 202 (H) 12 - 45 U/L    ALT(SGPT) 207 (H) 2 - 50 U/L    Alkaline Phosphatase 364 (H) 30 - 99 U/L    Total Bilirubin 3.3 (H) 0.1 - 1.5 mg/dL    Albumin 3.4 3.2 - 4.9 g/dL    Total Protein 6.1 6.0 - 8.2 g/dL    Globulin 2.7 1.9 - 3.5 g/dL    A-G Ratio 1.3 g/dL   URINALYSIS,CULTURE IF INDICATED   Result Value Ref Range    Color DK Yellow     Character Clear     Specific Gravity 1.025 <1.035    Ph 6.0 5.0 - 8.0    Glucose Negative Negative mg/dL    Ketones Trace (A) Negative mg/dL    Protein 100 (A) Negative mg/dL    Bilirubin Large (A) Negative    Urobilinogen, Urine 2.0 Negative    Nitrite Positive (A) Negative    Leukocyte Esterase Trace (A) Negative    Occult Blood Negative Negative    Micro Urine Req Microscopic     Culture Indicated Yes UA Culture   Btype Natriuretic Peptide (BNP)   Result Value Ref Range    B Natriuretic Peptide 241 (H) 0 - 100 pg/mL   Prothrombin Time (PT/INR)   Result Value Ref Range    PT 13.9 12.0 - 14.6 sec    INR 1.10 0.87 - 1.13   APTT   Result Value Ref Range    APTT 31.8 24.7 - 36.0 sec   Lipase   Result Value Ref Range    Lipase 47 11 - 82 U/L   Troponin STAT   Result Value Ref Range    Troponin I 0.02 0.00 - 0.04 ng/mL   URINE MICROSCOPIC (W/UA)   Result Value Ref Range    WBC 2-5 /hpf    RBC 2-5 (A) /hpf    Bacteria Negative None /hpf    Epithelial Cells Many (A) /hpf    Trans Epithelial Cells Few /hpf    Mucous Threads Moderate /hpf    Hyaline Cast 0-2 /lpf   ESTIMATED GFR   Result Value Ref Range    GFR If African American >60 >60 mL/min/1.73 m 2    GFR If Non  51 (A) >60 mL/min/1.73 m 2   LACTIC ACID   Result Value Ref Range    Lactic Acid 1.0 0.5 - 2.0 mmol/L   EKG (ER)   Result Value Ref Range    Report       [facility] Emergency Dept.    Test Date:  2018-03-15  Pt Name:    CATHIBRIANNA MOBLEY               Department: ER  MRN:        [id number]                       Room:       [facility]  Gender:     Female                       Technician: [id number]  :        [id number]                   Requested By:MARY VIVEROS  Order #:    [id number]                    Reading MD: MARY VIVEROS MD    Measurements  Intervals                                Axis  Rate:       67                           P:          81  PA:         140                          QRS:        -16  QRSD:       94                           T:          23  QT:         420  QTc:        444    Interpretive Statements  SINUS RHYTHM  BORDERLINE LEFT AXIS DEVIATION  Compared to ECG 2018 14:19:29  Wandering atrial pacemaker no longer present  Left ventricular hypertrophy no longer present    Electronically Signed On 3- 10:32:48 PDT by MARY VIVEROS MD           COURSE & MEDICAL DECISION MAKING  Patient presents with hematuria and abdominal pain. She appears weak and tired. IV was established. Laboratory data collected. Mini catheterized urine specimen was obtained which did not appear grossly bloody.  exam was performed as described above. Vital signs were stable. Data returned as noted. She has a nitrite positive UTI. Cultures and lactic acid were obtained. She is not tachycardic, hypotensive and has a normal lactic acid. She does not appear dehydrated. She is given Rocephin intravenously. She has elevated liver function tests including bilirubin. CT scan demonstrates a dilated, bile duct.    Consulted Dr. Rosenberg. He requested MRCP    Consulted Dr. Medley for admission.      FINAL IMPRESSION  1. Urinary tract infection  2. Elevated liver function tests, rule out biliary obstruction  3. Generalized weakness      This dictation was created using voice recognition software. The accuracy of the dictation is limited to the abilities of the software.  The nursing notes were reviewed and certain aspects of this information were incorporated into this note.      Electronically signed by: Mary Viveros  3/15/2018 10:31 AM

## 2018-03-15 NOTE — PROGRESS NOTES
Patient refused to continue with MRI exam once she was on the table and told of exam length. RN aware.  Patient returned to room

## 2018-03-15 NOTE — ED TRIAGE NOTES
Pt BIBa from home for c/o n/v x 24 hours. Pt reports vomiting x12. Pt also c/o hematuria x 3-4 days.    Pt Aox4. SPO2 @75% on Ra. Placed O2 at 4 L/min SPO2 now at 94%.  Pt denies pain at this time.    Awaiting MD ambriz

## 2018-03-15 NOTE — H&P
PRIMARY CARE PROVIDER:  Dr. Ric Garcia.    CHIEF COMPLAINT:  Nausea, vomiting, and dark urine.    HISTORY OF PRESENT ILLNESS:  The patient is an 85-year-old female who   presented to the emergency room concerned about gross hematuria.  She reported   that she had dark brown urine this morning, she has also had abdominal pain   in her epigastric and right upper quadrant associated with nausea and vomiting   over the past few days.  She has been having poor appetite.  No diarrhea, no   melena, no rectal bleeding.  She denies any dysuria or hematuria.  She has   been having urinary urgency.  No fever, no chills.    REVIEW OF SYSTEMS:  As above, otherwise reviewed and negative.    PAST MEDICAL HISTORY:  Significant for coronary artery disease, dyslipidemia,   hypertension, history of stroke.    PAST SURGICAL HISTORY:  Significant for vitrectomy, cholecystectomy, cataract   surgery, hip arthroplasty.    SOCIAL HISTORY:  She smokes few cigarettes per day, drinks alcohol   occasionally.  No illicit drug use.    FAMILY HISTORY:  Reviewed, not pertinent to the presenting problem.    HOME MEDICATIONS:  Amlodipine 2.5 mg daily, vitamin C supplement, Lipitor 40   mg at bedtime, Plavix 75 mg daily, diazepam 2 mg 6 hours as needed for   anxiety, Imdur 15 mg daily, lisinopril 10 mg daily, metoprolol 25 mg b.i.d.    PHYSICAL EXAMINATION:  GENERAL:  She is alert, oriented x3.  She is anxious.  VITAL SIGNS:  Temperature is 37.4, pulse 70, respiratory rate is 17, blood   pressure 181/61, pulse oximetry is 99%.  HEAD AND NECK:  Pupils equal.  Supple neck.  No jugular venous distention.    Oropharynx is clear.  No cervical lymphadenopathy.  HEART:  Regular rate and rhythm.  Normal S1, S2.  No murmurs, rubs or gallops.  LUNGS:  Clear with symmetric air entry bilaterally.  No chest wall tenderness.  ABDOMEN:  Soft.  She has right upper quadrant tenderness.  No guarding or   rebound.  Bowel sounds are positive.  No  hepatosplenomegaly.  EXTREMITIES:  No edema, no clubbing, no cyanosis.  NEUROLOGIC:  No focal deficits.  SKIN:  No rash.    DIAGNOSTICS:  White blood cell count is 5.7, hemoglobin is 12.4, hematocrit   37.1, .1, platelet count 337.  Urinalysis revealed large bilirubin,   positive nitrites, trace leukocyte esterase, 2-5 white blood cells, many   epithelial cells.  Sodium is 140, potassium 3.3, chloride 109, bicarbonate 23,   glucose 134, BUN 16, creatinine 1.02.  , ALT was 207, alkaline   phosphatase 364, total bilirubin is 3.3.  Of note, her LFTs were normal on   February 28th.  CT of the abdomen, renal colic revealed no evidence of renal,   ureteral or bladder calculi, no hydronephrosis, calcifications of the renal   hilum bilaterally likely represent vascular calcification, prominence of the   common duct likely representing ectasia in the setting of prior   cholecystectomy, colonic diverticulosis, prominent atherosclerotic plaque,   hypodense hepatic lesion likely represents a cyst, small hiatal hernia,   compression fracture of T12 and L1 of a chronic appearance.  Chest x-ray   reveals cardiomegaly, minimal interstitial prominence may represent minimal   edema, atherosclerotic plaque.    ASSESSMENT AND PLAN:  1.  Abdominal pain with elevated liver function tests and mildly dilated   common bile duct concerning for possible obstructive jaundice.  2.  Hypertension.  3.  History of coronary artery disease.  4.  Anxiety.  5.  Hypokalemia.  6.  Nausea and vomiting.  7.  Possible urinary tract infection.    PLAN:  The patient will be admitted.  I suspect her dark urine was not   hematuria, but likely secondary to her elevated bilirubin.    Given her mildly urine symptoms, she has been started on ceftriaxone by the   emergency room physician, it would be reasonable to complete a 3-day course.    Dr. Tyson from GI has been consulted.  He recommended an MRCP, which will be   ordered.  The patient is very  anxious.  She will be premedicated with   lorazepam prior to the MRI.  We will follow up on those test results and on   further GI recommendations.    We will monitor LFTs.    Clinically, she does not appear to have cholangitis.    We will continue with her medical therapy for her hypertension, coronary   artery disease, but I will hold Plavix at this time in case she needs to   having an ERCP based on the MRI results.    We will replete her potassium with maintenance fluids.    We will order p.r.n. labetalol for her uncontrolled hypertension.    Plan of care reviewed with the patient and her questions answered.    She will likely require more than 2 midnights stay for treatment of her   medical condition.    We will increase her amlodipine.    We will use SCDs for DVT prophylaxis in case ERCP is needed.       ____________________________________     PATRICIA ANGEL MD    GB / NTS    DD:  03/15/2018 12:19:32  DT:  03/15/2018 12:41:39    D#:  [id number]  Job#:  [id number]    cc: PONCE AUSTIN MD

## 2018-03-15 NOTE — CONSULTS
Gastroenterology Consult Note:    Rena Sharma  Date & Time note created:    3/15/2018   3:12 PM     Patient ID:  Name:             Madeleine Zambrano  YOB: 1933  Age:                 85 y.o.  female  MRN:               [id number]    Referring MD:  Dr. Hugo Sprague                                                             Chief Complaint(s):      Elevated LFT     History of Present Illness:    This is a very pleasant 85 y.o. female with the past medical history as listed below.    She presented to the emergency room concerned about gross hematuria.  She reported that she had dark brown urine 3/15/18. She has also had abdominal pain in her epigastric and right upper quadrant associated with nausea and vomiting over the past few days.  She has been having poor appetite.  No diarrhea, no melena, no rectal bleeding.  She denies any dysuria, hematuria or urinary urgency.  No fever, no chills. In 2015, she had her gallbladder removed at [facility], which showed chronic cholecystitis.     Otherwise the patient is doing fine without complaints of fever/chills/weight loss/dysphagia/odynophagia/heartburn/bloating/constipation/diarrhea/melena/hematochezia.    Review of Systems:      Constitutional: Denies fevers, weight changes  Eyes: Denies changes in vision, pos jaundice  Ears/Nose/Throat/Mouth: Denies nasal congestion or sore throat   Cardiovascular: Denies chest pain, Denies palpitations   Respiratory: Denies shortness of breath, denies cough  Gastrointestinal/Hepatic: pos RUQ abdominal pain, nausea, vomiting, denies diarrhea, constipation or GI bleeding   Genitourinary: Denies dysuria or frequency  Musculoskeletal/Rheum: Denies  joint pain and swelling, edema  Skin: Denies rash  Neurological: Denies headache, confusion, memory loss or focal weakness/parasthesias  Psychiatric: denies mood disorder   Endocrine: Melissa thyroid problems  Heme/Oncology/Lymph Nodes: Denies enlarged lymph nodes, denies brusing or  known bleeding disorder  All other systems were reviewed and are negative (AMA/CMS criteria)              Past Medical History:   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HCC)    • Arrhythmia    • Arthritis    • Back pain 3/28/2012   • CATARACT    • Coronary artery disease    • Disorder of thyroid    • Glaucoma    • Hyperlipidemia    • Hypertension    • MEDICAL HOME 11/07/12   • Myocardial infarct    • Pain     sacrum 1/10   • Stroke (CMS-HCC)    • TIA (transient ischemic attack) 09/2017   • Urinary incontinence      Active Hospital Problems    Diagnosis   • CAD (coronary artery disease) [I25.10]     Priority: Medium   • HTN (hypertension), benign [I10]     Priority: Low   • Abdominal pain [R10.9]   • UTI (urinary tract infection) [N39.0]   • Biliary obstruction [K83.1]   • Hypokalemia [E87.6]       Past Surgical History:  Past Surgical History:   Procedure Laterality Date   • VITRECTOMY POSTERIOR Right 4/13/2017    Procedure: VITRECTOMY POSTERIOR-LASER, SF6 GAS;  Surgeon: Jessica Arita M.D.;  Location: SURGERY SAME DAY [facility];  Service:    • RIGO BY LAPAROSCOPY  10/9/2015    Procedure: RIGO BY LAPAROSCOPY;  Surgeon: Ric Zambrano M.D.;  Location: SURGERY [facility];  Service:    • CATARACT PHACO WITH IOL  4/27/2011    Performed by EVERETTE COPOER at SURGERY SAME DAY [facility]   • CATARACT PHACO WITH IOL  4/13/2011    Performed by EVERETTE COOPER at SURGERY SAME DAY [facility]   • OTHER ORTHOPEDIC SURGERY  1980    pins in left hip   • ATHROPLASTY      broken hip 1990   • OTHER CARDIAC SURGERY         Hospital Medications:  Current Facility-Administered Medications   Medication Dose Frequency Provider Last Rate Last Dose   • ascorbic acid (ascorbic acid) tablet   DAILY Alexis Raymond M.D.   500 mg at 03/15/18 1509   • diazePAM (VALIUM) tablet 2 mg  2 mg Q6HRS PRN Alexis Raymond M.D.       • isosorbide mononitrate SR (IMDUR) tablet 15 mg  15 mg DAILY  Alexis Raymond M.D.   15 mg at 03/15/18 1509   • lisinopril (PRINIVIL) 10 MG tablet 10 mg  10 mg DAILY Alexis Raymond M.D.       • metoprolol (LOPRESSOR) tablet 25 mg  25 mg BID Alexis Raymond M.D.   25 mg at 03/15/18 1509   • senna-docusate (PERICOLACE or SENOKOT S) 8.6-50 MG per tablet 2 Tab  2 Tab BID Alexis Raymond M.D.        And   • polyethylene glycol/lytes (MIRALAX) PACKET 1 Packet  1 Packet QDAY PRN Alexis Raymond M.D.        And   • magnesium hydroxide (MILK OF MAGNESIA) suspension 30 mL  30 mL QDAY PRN Alexis Raymond M.D.        And   • bisacodyl (DULCOLAX) suppository 10 mg  10 mg QDAY PRN Alexis Raymond M.D.       • 0.9 % NaCl with KCl 20 mEq infusion   Continuous Alexis Raymond M.D. 75 mL/hr at 03/15/18 1459     • ondansetron (ZOFRAN) syringe/vial injection 4 mg  4 mg Q4HRS PRN Alexis Raymond M.D.       • ondansetron (ZOFRAN ODT) dispertab 4 mg  4 mg Q4HRS PRN Alexis Raymond M.D.       • LORazepam (ATIVAN) injection 0.5 mg  0.5 mg Once PRN Alexis Raymond M.D.       • [START ON 3/16/2018] cefTRIAXone (ROCEPHIN) syringe 2 g  2 g Q24HRS Alexis Raymond M.D.       • [START ON 3/16/2018] amLODIPine (NORVASC) tablet 5 mg  5 mg DAILY Alexis Raymond M.D.       • labetalol (NORMODYNE,TRANDATE) injection 10 mg  10 mg Q4HRS PRN Alexis Raymond M.D.         Last reviewed on 3/15/2018  9:36 AM by Kimberly Sexton    Current Outpatient Medications:  Prescriptions Prior to Admission   Medication Sig Dispense Refill Last Dose   • metoprolol (LOPRESSOR) 25 MG Tab Take 1 Tab by mouth 2 times a day. 60 Tab 2 3/14/2018 at pm   • diazePAM (VALIUM) 2 MG Tab Take 2 mg by mouth every 6 hours as needed for Anxiety.   3/14/2018 at am   • atorvastatin (LIPITOR) 40 MG Tab Take 1 Tab by mouth every bedtime. 30 Tab 3 3/14/2018 at pm   • isosorbide mononitrate SR (IMDUR) 30 MG TABLET SR 24 HR Take 0.5 Tabs by mouth every day. 30 Tab 3 3/14/2018  "at am   • amLODIPine (NORVASC) 5 MG Tab Take 2.5 mg by mouth every day.   3/14/2018 at am   • Ascorbic Acid (VITAMIN C) 1000 MG Tab Take 1 Tab by mouth every day.   3/14/2018 at am   • clopidogrel (PLAVIX) 75 MG Tab Take 1 Tab by mouth every day. 90 Tab 0 3/14/2018 at am   • lisinopril (PRINIVIL) 10 MG Tab Take 1 Tab by mouth every day. 90 Tab 0 3/10/2018 at am       Medication Allergy:  Allergies   Allergen Reactions   • Meperidine Vomiting and Nausea   • Tramadol Vomiting and Nausea       Family History:  Family History   Problem Relation Age of Onset   • Heart Disease Mother    • Cancer Father        Social History:  Social History     Social History   • Marital status:      Spouse name: N/A   • Number of children: N/A   • Years of education: N/A     Occupational History   • Not on file.     Social History Main Topics   • Smoking status: Current Every Day Smoker     Packs/day: 0.25     Years: 0.10     Types: Cigarettes   • Smokeless tobacco: Never Used   • Alcohol use Yes      Comment: 1 per day   • Drug use: No   • Sexual activity: Not on file     Other Topics Concern   • Not on file     Social History Narrative   • No narrative on file       Physical Exam:  Weight/BMI: Body mass index is 24.76 kg/m².  Blood pressure 146/70, pulse 74, temperature 36.4 °C (97.6 °F), resp. rate 14, height 1.575 m (5' 2\"), weight 61.4 kg (135 lb 5.8 oz), SpO2 97 %.  Vitals:    03/15/18 1000 03/15/18 1030 03/15/18 1100 03/15/18 1220   BP:    146/70   Pulse: 73 69 68 74   Resp: 16 16 15 14   Temp:    36.4 °C (97.6 °F)   SpO2: 97% 98% 98% 97%   Weight:    61.4 kg (135 lb 5.8 oz)   Height:         Oxygen Therapy:  Pulse Oximetry: 97 %, O2 (LPM): 0, O2 Delivery: None (Room Air)  No intake or output data in the 24 hours ending 03/15/18 1512    Constitutional:   Well developed, well nourished, no acute distress  HEENT:  Normocephalic, Atraumatic, Conjunctiva not pale, Sclera icteric, Oropharynx moist mucous membranes, No oral " exudates, Nose normal.  No thyromegaly.  Neck:  Normal range of motion, No cervical tenderness,  no JVD.  Chest/Lungs:  Symmetric expansion, no spider angioma, breath sounds clear to auscultation bilaterally,  no crackles, no wheezing.   Cardiovascular:  Normal heart rate, Normal rhythm, No murmurs, No rubs, No gallops.    Abdomen: Bowel sounds normal, Soft, No active tenderness, No guarding, No rebound, No masses, No hepatosplenomegaly.  Extremities: No cyanosis/clubbing/edema/palmar erythema/flapping tremor  Skin: Warm, Dry, No erythema, No rash, no induration.    MDM (Data Review):     Records reviewed and summarized in current documentation    Lab Data Review:  Recent Results (from the past 24 hour(s))   CBC WITH DIFFERENTIAL    Collection Time: 03/15/18  7:38 AM   Result Value Ref Range    WBC 5.7 4.8 - 10.8 K/uL    RBC 3.60 (L) 4.20 - 5.40 M/uL    Hemoglobin 12.4 12.0 - 16.0 g/dL    Hematocrit 37.1 37.0 - 47.0 %    .1 (H) 81.4 - 97.8 fL    MCH 34.4 (H) 27.0 - 33.0 pg    MCHC 33.4 (L) 33.6 - 35.0 g/dL    RDW 51.6 (H) 35.9 - 50.0 fL    Platelet Count 337 164 - 446 K/uL    MPV 10.9 9.0 - 12.9 fL    Neutrophils-Polys 76.30 (H) 44.00 - 72.00 %    Lymphocytes 10.50 (L) 22.00 - 41.00 %    Monocytes 9.30 0.00 - 13.40 %    Eosinophils 3.30 0.00 - 6.90 %    Basophils 0.30 0.00 - 1.80 %    Immature Granulocytes 0.30 0.00 - 0.90 %    Nucleated RBC 0.00 /100 WBC    Neutrophils (Absolute) 4.36 2.00 - 7.15 K/uL    Lymphs (Absolute) 0.60 (L) 1.00 - 4.80 K/uL    Monos (Absolute) 0.53 0.00 - 0.85 K/uL    Eos (Absolute) 0.19 0.00 - 0.51 K/uL    Baso (Absolute) 0.02 0.00 - 0.12 K/uL    Immature Granulocytes (abs) 0.02 0.00 - 0.11 K/uL    NRBC (Absolute) 0.00 K/uL   COMP METABOLIC PANEL    Collection Time: 03/15/18  7:38 AM   Result Value Ref Range    Sodium 140 135 - 145 mmol/L    Potassium 3.3 (L) 3.6 - 5.5 mmol/L    Chloride 109 96 - 112 mmol/L    Co2 23 20 - 33 mmol/L    Anion Gap 8.0 0.0 - 11.9    Glucose 134 (H) 65 -  99 mg/dL    Bun 16 8 - 22 mg/dL    Creatinine 1.02 0.50 - 1.40 mg/dL    Calcium 8.7 8.5 - 10.5 mg/dL    AST(SGOT) 202 (H) 12 - 45 U/L    ALT(SGPT) 207 (H) 2 - 50 U/L    Alkaline Phosphatase 364 (H) 30 - 99 U/L    Total Bilirubin 3.3 (H) 0.1 - 1.5 mg/dL    Albumin 3.4 3.2 - 4.9 g/dL    Total Protein 6.1 6.0 - 8.2 g/dL    Globulin 2.7 1.9 - 3.5 g/dL    A-G Ratio 1.3 g/dL   URINALYSIS,CULTURE IF INDICATED    Collection Time: 03/15/18  7:38 AM   Result Value Ref Range    Color DK Yellow     Character Clear     Specific Gravity 1.025 <1.035    Ph 6.0 5.0 - 8.0    Glucose Negative Negative mg/dL    Ketones Trace (A) Negative mg/dL    Protein 100 (A) Negative mg/dL    Bilirubin Large (A) Negative    Urobilinogen, Urine 2.0 Negative    Nitrite Positive (A) Negative    Leukocyte Esterase Trace (A) Negative    Occult Blood Negative Negative    Micro Urine Req Microscopic     Culture Indicated Yes UA Culture   Btype Natriuretic Peptide (BNP)    Collection Time: 03/15/18  7:38 AM   Result Value Ref Range    B Natriuretic Peptide 241 (H) 0 - 100 pg/mL   Prothrombin Time (PT/INR)    Collection Time: 03/15/18  7:38 AM   Result Value Ref Range    PT 13.9 12.0 - 14.6 sec    INR 1.10 0.87 - 1.13   APTT    Collection Time: 03/15/18  7:38 AM   Result Value Ref Range    APTT 31.8 24.7 - 36.0 sec   Lipase    Collection Time: 03/15/18  7:38 AM   Result Value Ref Range    Lipase 47 11 - 82 U/L   Troponin STAT    Collection Time: 03/15/18  7:38 AM   Result Value Ref Range    Troponin I 0.02 0.00 - 0.04 ng/mL   URINE MICROSCOPIC (W/UA)    Collection Time: 03/15/18  7:38 AM   Result Value Ref Range    WBC 2-5 /hpf    RBC 2-5 (A) /hpf    Bacteria Negative None /hpf    Epithelial Cells Many (A) /hpf    Trans Epithelial Cells Few /hpf    Mucous Threads Moderate /hpf    Hyaline Cast 0-2 /lpf   ESTIMATED GFR    Collection Time: 03/15/18  7:38 AM   Result Value Ref Range    GFR If African American >60 >60 mL/min/1.73 m 2    GFR If Non African  American 51 (A) >60 mL/min/1.73 m 2   EKG (ER)    Collection Time: 03/15/18  8:10 AM   Result Value Ref Range    Report       [facility] of the [facility] Emergency Dept.    Test Date:  2018-03-15  Pt Name:    ERNIE MOBLEY               Department: ER  MRN:        [id number]                      Room:       [facility]  Gender:     Female                       Technician: [id number]  :        [id number]                   Requested By:MARY VIVEROS  Order #:    [id number]                    Reading MD: MARY VIVEROS MD    Measurements  Intervals                                Axis  Rate:       67                           P:          81  DE:         140                          QRS:        -16  QRSD:       94                           T:          23  QT:         420  QTc:        444    Interpretive Statements  SINUS RHYTHM  BORDERLINE LEFT AXIS DEVIATION  Compared to ECG 2018 14:19:29  Wandering atrial pacemaker no longer present  Left ventricular hypertrophy no longer present    Electronically Signed On 3- 10:32:48 PDT by MARY VIVEROS MD     LACTIC ACID    Collection Time: 03/15/18  9:46 AM   Result Value Ref Range    Lactic Acid 1.0 0.5 - 2.0 mmol/L         Imaging/Procedures Review:    3/15/18 CT renal colic:   No evidence of renal, ureteral or bladder calculi. No hydronephrosis. Calcifications at the renal dennis bilaterally likely represent vascular calcification.  Prominence of the common duct likely represent ectasia in the setting of prior cholecystectomy.  Colonic diverticulosis.  Prominent atherosclerotic plaque.  Hypodense hepatic lesion likely represents a cyst.  Small hiatal hernia.  Compression fractures of T12 and L1 have a chronic appearance.    MDM (Assessment and Plan):     Active Hospital Problems    Diagnosis   • CAD (coronary artery disease) [I25.10]     Priority: Medium   • HTN (hypertension), benign [I10]     Priority: Low   • Abdominal pain [R10.9]   • UTI (urinary tract infection)  [N39.0]   • Biliary obstruction [K83.1]   • Hypokalemia [E87.6]         Assessment  - Elevated LFT, suspect choledocholithiasis or sludge, possible passing stones  - Abdominal pain with elevated liver function tests and mildly dilated common bile duct concerning for possible obstructive jaundice. Less likely cholangitis  - Hypertension.   - History of coronary artery disease.   - Anxiety.   - Hypokalemia.   - Nausea and vomiting.   - Possible urinary tract infection.    Plan  1. Declined MRCP  2. A diagnostic and therapeutic ERCP with possible intervention tomorrow will be beneficial. Currently scheduled at [place] [facility] 11 AM  3. Risks, benefits, and alternatives were discussed with patient. Consenting persons were given an opportunity to ask questions and discuss other options. Risks including but not limited to failed or incomplete endoscopy, ineffective therapy, perforation, infection, bleeding, missed lesion(s), cardiac and/or pulmonary event, aspiration, stroke, possible need for surgery, hospitalization possibly prolonged, discomfort, unsuccessful and/or incomplete procedure, possible need for repeat procedures and/or additional testings, damage to adjacent organs and/or vascular structures, medication reaction, disability, death, and other adverse events possibly life-threatening. Discussion was undertaken with Layman's terms. Consenting persons stated understanding and acceptance of these risks, and wished to proceed. Consent was given in clear state of mind. All questions were answered.  4. Full liq tonight  5. NPO after midnight    Thank you very much for allowing me to participate in the care of your patient.  Please feel free to contact me anytime at [phone].     Rena Sharma M.D.

## 2018-03-16 ENCOUNTER — PATIENT OUTREACH (OUTPATIENT)
Dept: HEALTH INFORMATION MANAGEMENT | Facility: OTHER | Age: 83
End: 2018-03-16

## 2018-03-16 ENCOUNTER — APPOINTMENT (OUTPATIENT)
Dept: RADIOLOGY | Facility: MEDICAL CENTER | Age: 83
DRG: 445 | End: 2018-03-16
Attending: INTERNAL MEDICINE
Payer: MEDICARE

## 2018-03-16 LAB
ALBUMIN SERPL BCP-MCNC: 2.7 G/DL (ref 3.2–4.9)
ALBUMIN/GLOB SERPL: 1.3 G/DL
ALP SERPL-CCNC: 260 U/L (ref 30–99)
ALT SERPL-CCNC: 135 U/L (ref 2–50)
ANION GAP SERPL CALC-SCNC: 8 MMOL/L (ref 0–11.9)
AST SERPL-CCNC: 113 U/L (ref 12–45)
BASOPHILS # BLD AUTO: 0.9 % (ref 0–1.8)
BASOPHILS # BLD: 0.04 K/UL (ref 0–0.12)
BILIRUB SERPL-MCNC: 1.2 MG/DL (ref 0.1–1.5)
BUN SERPL-MCNC: 14 MG/DL (ref 8–22)
CALCIUM SERPL-MCNC: 7.9 MG/DL (ref 8.5–10.5)
CHLORIDE SERPL-SCNC: 114 MMOL/L (ref 96–112)
CO2 SERPL-SCNC: 18 MMOL/L (ref 20–33)
CREAT SERPL-MCNC: 1 MG/DL (ref 0.5–1.4)
EOSINOPHIL # BLD AUTO: 0.28 K/UL (ref 0–0.51)
EOSINOPHIL NFR BLD: 6.2 % (ref 0–6.9)
ERYTHROCYTE [DISTWIDTH] IN BLOOD BY AUTOMATED COUNT: 52.3 FL (ref 35.9–50)
GLOBULIN SER CALC-MCNC: 2.1 G/DL (ref 1.9–3.5)
GLUCOSE SERPL-MCNC: 81 MG/DL (ref 65–99)
HCT VFR BLD AUTO: 31.8 % (ref 37–47)
HGB BLD-MCNC: 10.4 G/DL (ref 12–16)
IMM GRANULOCYTES # BLD AUTO: 0.02 K/UL (ref 0–0.11)
IMM GRANULOCYTES NFR BLD AUTO: 0.4 % (ref 0–0.9)
LYMPHOCYTES # BLD AUTO: 1.16 K/UL (ref 1–4.8)
LYMPHOCYTES NFR BLD: 25.6 % (ref 22–41)
MCH RBC QN AUTO: 34 PG (ref 27–33)
MCHC RBC AUTO-ENTMCNC: 32.7 G/DL (ref 33.6–35)
MCV RBC AUTO: 103.9 FL (ref 81.4–97.8)
MONOCYTES # BLD AUTO: 0.51 K/UL (ref 0–0.85)
MONOCYTES NFR BLD AUTO: 11.3 % (ref 0–13.4)
NEUTROPHILS # BLD AUTO: 2.52 K/UL (ref 2–7.15)
NEUTROPHILS NFR BLD: 55.6 % (ref 44–72)
NRBC # BLD AUTO: 0 K/UL
NRBC BLD-RTO: 0 /100 WBC
PLATELET # BLD AUTO: 294 K/UL (ref 164–446)
PMV BLD AUTO: 10.9 FL (ref 9–12.9)
POTASSIUM SERPL-SCNC: 3.6 MMOL/L (ref 3.6–5.5)
PROT SERPL-MCNC: 4.8 G/DL (ref 6–8.2)
RBC # BLD AUTO: 3.06 M/UL (ref 4.2–5.4)
SODIUM SERPL-SCNC: 140 MMOL/L (ref 135–145)
WBC # BLD AUTO: 4.5 K/UL (ref 4.8–10.8)

## 2018-03-16 PROCEDURE — 160009 HCHG ANES TIME/MIN: Performed by: INTERNAL MEDICINE

## 2018-03-16 PROCEDURE — 160002 HCHG RECOVERY MINUTES (STAT): Performed by: INTERNAL MEDICINE

## 2018-03-16 PROCEDURE — A9270 NON-COVERED ITEM OR SERVICE: HCPCS | Performed by: HOSPITALIST

## 2018-03-16 PROCEDURE — BF111ZZ FLUOROSCOPY OF BILIARY AND PANCREATIC DUCTS USING LOW OSMOLAR CONTRAST: ICD-10-PCS | Performed by: INTERNAL MEDICINE

## 2018-03-16 PROCEDURE — 160035 HCHG PACU - 1ST 60 MINS PHASE I: Performed by: INTERNAL MEDICINE

## 2018-03-16 PROCEDURE — 160208 HCHG ENDO MINUTES - EA ADDL 1 MIN LEVEL 4: Performed by: INTERNAL MEDICINE

## 2018-03-16 PROCEDURE — 700111 HCHG RX REV CODE 636 W/ 250 OVERRIDE (IP): Performed by: HOSPITALIST

## 2018-03-16 PROCEDURE — 700102 HCHG RX REV CODE 250 W/ 637 OVERRIDE(OP): Performed by: HOSPITALIST

## 2018-03-16 PROCEDURE — 160036 HCHG PACU - EA ADDL 30 MINS PHASE I: Performed by: INTERNAL MEDICINE

## 2018-03-16 PROCEDURE — 700105 HCHG RX REV CODE 258: Performed by: INTERNAL MEDICINE

## 2018-03-16 PROCEDURE — 99233 SBSQ HOSP IP/OBS HIGH 50: CPT | Performed by: HOSPITALIST

## 2018-03-16 PROCEDURE — 160203 HCHG ENDO MINUTES - 1ST 30 MINS LEVEL 4: Performed by: INTERNAL MEDICINE

## 2018-03-16 PROCEDURE — 700101 HCHG RX REV CODE 250

## 2018-03-16 PROCEDURE — 160048 HCHG OR STATISTICAL LEVEL 1-5: Performed by: INTERNAL MEDICINE

## 2018-03-16 PROCEDURE — 74328 X-RAY BILE DUCT ENDOSCOPY: CPT

## 2018-03-16 PROCEDURE — 36415 COLL VENOUS BLD VENIPUNCTURE: CPT

## 2018-03-16 PROCEDURE — 700102 HCHG RX REV CODE 250 W/ 637 OVERRIDE(OP)

## 2018-03-16 PROCEDURE — 0F768ZZ DILATION OF LEFT HEPATIC DUCT, VIA NATURAL OR ARTIFICIAL OPENING ENDOSCOPIC: ICD-10-PCS | Performed by: INTERNAL MEDICINE

## 2018-03-16 PROCEDURE — 700111 HCHG RX REV CODE 636 W/ 250 OVERRIDE (IP)

## 2018-03-16 PROCEDURE — 500066 HCHG BITE BLOCK, ECT: Performed by: INTERNAL MEDICINE

## 2018-03-16 PROCEDURE — 80053 COMPREHEN METABOLIC PANEL: CPT

## 2018-03-16 PROCEDURE — 770006 HCHG ROOM/CARE - MED/SURG/GYN SEMI*

## 2018-03-16 PROCEDURE — 85025 COMPLETE CBC W/AUTO DIFF WBC: CPT

## 2018-03-16 PROCEDURE — 0FC98ZZ EXTIRPATION OF MATTER FROM COMMON BILE DUCT, VIA NATURAL OR ARTIFICIAL OPENING ENDOSCOPIC: ICD-10-PCS | Performed by: INTERNAL MEDICINE

## 2018-03-16 PROCEDURE — 0F758ZZ DILATION OF RIGHT HEPATIC DUCT, VIA NATURAL OR ARTIFICIAL OPENING ENDOSCOPIC: ICD-10-PCS | Performed by: INTERNAL MEDICINE

## 2018-03-16 PROCEDURE — A9270 NON-COVERED ITEM OR SERVICE: HCPCS

## 2018-03-16 RX ORDER — HEPARIN SODIUM 5000 [USP'U]/ML
5000 INJECTION, SOLUTION INTRAVENOUS; SUBCUTANEOUS EVERY 8 HOURS
Status: DISCONTINUED | OUTPATIENT
Start: 2018-03-16 | End: 2018-03-17 | Stop reason: HOSPADM

## 2018-03-16 RX ORDER — SODIUM CHLORIDE, SODIUM LACTATE, POTASSIUM CHLORIDE, CALCIUM CHLORIDE 600; 310; 30; 20 MG/100ML; MG/100ML; MG/100ML; MG/100ML
INJECTION, SOLUTION INTRAVENOUS CONTINUOUS
Status: DISCONTINUED | OUTPATIENT
Start: 2018-03-16 | End: 2018-03-17

## 2018-03-16 RX ORDER — INDOMETHACIN 50 MG/1
100 SUPPOSITORY RECTAL
Status: DISCONTINUED | OUTPATIENT
Start: 2018-03-16 | End: 2018-03-17 | Stop reason: HOSPADM

## 2018-03-16 RX ORDER — INDOMETHACIN 50 MG/1
SUPPOSITORY RECTAL
Status: COMPLETED
Start: 2018-03-16 | End: 2018-03-16

## 2018-03-16 RX ORDER — SODIUM CHLORIDE, SODIUM LACTATE, POTASSIUM CHLORIDE, CALCIUM CHLORIDE 600; 310; 30; 20 MG/100ML; MG/100ML; MG/100ML; MG/100ML
1000 INJECTION, SOLUTION INTRAVENOUS CONTINUOUS
Status: DISCONTINUED | OUTPATIENT
Start: 2018-03-16 | End: 2018-03-17 | Stop reason: HOSPADM

## 2018-03-16 RX ADMIN — INDOMETHACIN 100 MG: 50 SUPPOSITORY RECTAL at 11:45

## 2018-03-16 RX ADMIN — METOPROLOL TARTRATE 25 MG: 25 TABLET, FILM COATED ORAL at 08:50

## 2018-03-16 RX ADMIN — HEPARIN SODIUM 5000 UNITS: 5000 INJECTION, SOLUTION INTRAVENOUS; SUBCUTANEOUS at 14:07

## 2018-03-16 RX ADMIN — SODIUM CHLORIDE, POTASSIUM CHLORIDE, SODIUM LACTATE AND CALCIUM CHLORIDE 1000 ML: 600; 310; 30; 20 INJECTION, SOLUTION INTRAVENOUS at 18:00

## 2018-03-16 RX ADMIN — LISINOPRIL 10 MG: 10 TABLET ORAL at 08:50

## 2018-03-16 RX ADMIN — ISOSORBIDE MONONITRATE 15 MG: 30 TABLET ORAL at 08:50

## 2018-03-16 RX ADMIN — SODIUM CHLORIDE, POTASSIUM CHLORIDE, SODIUM LACTATE AND CALCIUM CHLORIDE 1000 ML: 600; 310; 30; 20 INJECTION, SOLUTION INTRAVENOUS at 11:30

## 2018-03-16 RX ADMIN — METOPROLOL TARTRATE 25 MG: 25 TABLET, FILM COATED ORAL at 20:08

## 2018-03-16 RX ADMIN — AMLODIPINE BESYLATE 5 MG: 5 TABLET ORAL at 08:50

## 2018-03-16 RX ADMIN — OXYCODONE HYDROCHLORIDE AND ACETAMINOPHEN 1000 MG: 500 TABLET ORAL at 08:49

## 2018-03-16 RX ADMIN — SODIUM CHLORIDE, POTASSIUM CHLORIDE, SODIUM LACTATE AND CALCIUM CHLORIDE: 600; 310; 30; 20 INJECTION, SOLUTION INTRAVENOUS at 20:14

## 2018-03-16 RX ADMIN — HEPARIN SODIUM 5000 UNITS: 5000 INJECTION, SOLUTION INTRAVENOUS; SUBCUTANEOUS at 20:09

## 2018-03-16 RX ADMIN — DIAZEPAM 2 MG: 2 TABLET ORAL at 20:08

## 2018-03-16 RX ADMIN — CEFTRIAXONE 2 G: 2 INJECTION, POWDER, FOR SOLUTION INTRAMUSCULAR; INTRAVENOUS at 08:50

## 2018-03-16 RX ADMIN — SODIUM CHLORIDE, POTASSIUM CHLORIDE, SODIUM LACTATE AND CALCIUM CHLORIDE: 600; 310; 30; 20 INJECTION, SOLUTION INTRAVENOUS at 11:30

## 2018-03-16 ASSESSMENT — ENCOUNTER SYMPTOMS
HEADACHES: 0
CHILLS: 0
VOMITING: 0
DIARRHEA: 0
WEAKNESS: 0
COUGH: 0
FEVER: 0
ABDOMINAL PAIN: 1
CONSTIPATION: 0
NAUSEA: 1
FOCAL WEAKNESS: 0
MYALGIAS: 0
NERVOUS/ANXIOUS: 1
PALPITATIONS: 0
DIZZINESS: 0
SHORTNESS OF BREATH: 0

## 2018-03-16 ASSESSMENT — PAIN SCALES - GENERAL
PAINLEVEL_OUTOF10: 0

## 2018-03-16 NOTE — PROGRESS NOTES
"PT refused to do the MRI.  I offered pt to have Ativan IV given as per order for anxiety/comfort.  PT states \"there is no way I can lay there for 45 minutes for a MRI\".  Dr. Hugo Sprague made aware.  Dr. Sharma at bedside and aware.  Dr. Hugo Sprague said to start pt on clear liquid diet and adv as tolerated to GI soft.  Dr. Sharma to do ERCP tomorrow and said NPO after midnight.    "

## 2018-03-16 NOTE — PROGRESS NOTES
Pt A&Ox4, pt denies pain, N/T, and N/V. Reviewed POC with pt. NPO at midnight per orders for procedure on 3/16/18. Pt ambulates with one person assist with FWW. Call light and personal belongings within reach.

## 2018-03-16 NOTE — DIETARY
"Nutrition services: Day 1 of admit.  Madeleine Zambrano is a 85 y.o. female with admitting DX of UTI (urinary tract infection), chief complaint of N/V, dark urine.  Hx of CAD, dyslipidemia, hypertension, stroke.   Pt with poor PO intake PTA, per nutrition admit screening.       Assessment:  Height: 157.5 cm (5' 2\")  Weight: 61.4 kg (135 lb 5.8 oz)  Body mass index is 24.76 kg/m².    Evaluation:   1. Diet/Intake: Pt is NPO for ERCP at this time; unavailable for dietary and weight history interview.    2. Poor PO intake pta likely r/t Dx.   3. Pt to begin CLD/ no red foods tomorrow, per GI.   4. Adjusted calcium = 8.9 (WNL)   +BM 3/14     Recommendations/Plan:  1. Advance diet as tolerated, per GI.  Pt will likely benefit from six small meals/day to promote increased PO intake and tolerance.   2. Encourage intake of fluids.   3. Document intake of all meals and snacks  as % taken in ADL's to provide interdisciplinary communication across all shifts.   4. Monitor weight.  5. Nutrition rep will continue to see patient for ongoing meal and snack preferences.             "

## 2018-03-16 NOTE — PROGRESS NOTES
[facility] Hospitalist Progress Note    Date of Service: 3/16/2018    Chief Complaint  85 y.o. female admitted 3/15/2018 with abdominal pain and biliary obstruction, taken for ERCP with stone removal on 3/16    Interval Problem Update  3/16 - discussed with patient at length. She was quite frustrated about waiting for her MRCP/ERCP; ultimately she went for an ERCP this morning and had a stone removal. She feels much better today, much less nausea, much less abdominal pain. Questions answered. Advised of plan.     Consultants/Specialty  [facility], GI    Disposition  Home tomorrow if tolerating diet        Review of Systems   Constitutional: Negative for chills and fever.   Respiratory: Negative for cough and shortness of breath.    Cardiovascular: Negative for chest pain and palpitations.   Gastrointestinal: Positive for abdominal pain and nausea. Negative for constipation, diarrhea and vomiting.   Genitourinary: Negative for dysuria.   Musculoskeletal: Negative for myalgias.   Skin: Negative for itching.   Neurological: Negative for dizziness, focal weakness, weakness and headaches.   Psychiatric/Behavioral: The patient is nervous/anxious (irritated in delays).    All other systems reviewed and are negative.     Physical Exam  Laboratory/Imaging   Hemodynamics  Temp (24hrs), Av.7 °C (98 °F), Min:36.4 °C (97.6 °F), Max:36.9 °C (98.5 °F)   Temperature: 36.9 °C (98.5 °F)  Pulse  Av.4  Min: 61  Max: 78 Heart Rate (Monitored): 60  Blood Pressure : 153/77, NIBP: (!) 170/68      Respiratory      Respiration: 16, Pulse Oximetry: 97 %             Fluids    Intake/Output Summary (Last 24 hours) at 18 1247  Last data filed at 18 1235   Gross per 24 hour   Intake             1300 ml   Output                5 ml   Net             1295 ml       Nutrition  Orders Placed This Encounter   Procedures   • DIET NPO     Standing Status:   Standing     Number of Occurrences:   8     Order Specific Question:   Restrict to:      Answer:   Sips with Medications [3]   • DIET ORDER     Standing Status:   Standing     Number of Occurrences:   1     Order Specific Question:   Diet:     Answer:   Clear Liquids - No Red Foods [12]     Physical Exam   Constitutional: She is oriented to person, place, and time. She appears well-developed and well-nourished.   HENT:   Head: Normocephalic and atraumatic.   Mouth/Throat: Oropharynx is clear and moist.   Eyes: Conjunctivae and EOM are normal. Pupils are equal, round, and reactive to light. No scleral icterus.   Neck: Normal range of motion. Neck supple. No tracheal deviation present. No thyromegaly present.   Cardiovascular: Normal rate, regular rhythm, normal heart sounds and intact distal pulses.    No murmur heard.  Pulmonary/Chest: Effort normal and breath sounds normal. No respiratory distress. She has no wheezes.   Abdominal: Soft. Bowel sounds are normal. She exhibits no distension. There is tenderness (minimal).   Musculoskeletal: Normal range of motion. She exhibits no edema or tenderness.   Lymphadenopathy:     She has no cervical adenopathy.        Right: No supraclavicular adenopathy present.        Left: No supraclavicular adenopathy present.   Neurological: She is alert and oriented to person, place, and time. No cranial nerve deficit.   Skin: Skin is warm and dry.   Vitals reviewed.      Recent Labs      03/15/18   0738  03/16/18   0153   WBC  5.7  4.5*   RBC  3.60*  3.06*   HEMOGLOBIN  12.4  10.4*   HEMATOCRIT  37.1  31.8*   MCV  103.1*  103.9*   MCH  34.4*  34.0*   MCHC  33.4*  32.7*   RDW  51.6*  52.3*   PLATELETCT  337  294   MPV  10.9  10.9     Recent Labs      03/15/18   0738  03/16/18   0153   SODIUM  140  140   POTASSIUM  3.3*  3.6   CHLORIDE  109  114*   CO2  23  18*   GLUCOSE  134*  81   BUN  16  14   CREATININE  1.02  1.00   CALCIUM  8.7  7.9*     Recent Labs      03/15/18   0738   APTT  31.8   INR  1.10     Recent Labs      03/15/18   0738   BNPBTYPENAT  241*               Assessment/Plan     Biliary obstruction- (present on admission)   Assessment & Plan    Now s/p ERCP        Abdominal pain- (present on admission)   Assessment & Plan    Felt secondary to choledocholithiasis and biliary congestion.  Now s/p ERCP   And improving         CAD (coronary artery disease)- (present on admission)   Assessment & Plan    Medically managed        Hypokalemia- (present on admission)   Assessment & Plan    Potassium supplementation, oral preferred  Expect increase of 0.1 mEq/L per each 10 mEq given  Will recheck after supplementation  Goal > 4 mEq/L  Lab Results   Component Value Date/Time    POTASSIUM 3.6 03/16/2018 01:53 AM           UTI (urinary tract infection)- (present on admission)   Assessment & Plan    Abx coverage for biliary obstruction will cover        HTN (hypertension), benign- (present on admission)   Assessment & Plan    SBP goal less than 140 mmHg  DBP goal less than 90 mmHg  PRN and antihypertensives to titrate by hospitalist towards goal  Most recent Blood Pressure : 153/77           Quality-Core Measures   Reviewed items::  Labs reviewed and Medications reviewed  DVT prophylaxis pharmacological::  Heparin  Ulcer Prophylaxis::  Not indicated

## 2018-03-16 NOTE — ASSESSMENT & PLAN NOTE
SBP goal less than 140 mmHg  DBP goal less than 90 mmHg  PRN and antihypertensives to titrate by hospitalist towards goal  Most recent Blood Pressure : 153/77

## 2018-03-16 NOTE — CARE PLAN
Problem: Safety  Goal: Will remain free from falls    Intervention: Assess risk factors for falls  Pt refusing bed alarm at this time, pt mobility assessed, calls appropriately for assistance, treaded slipper socks on, call light within reach      Problem: Knowledge Deficit  Goal: Knowledge of disease process/condition, treatment plan, diagnostic tests, and medications will improve    Intervention: Assess knowledge level of disease process/condition, treatment plan, diagnostic tests, and medications  Reviewed POc with pt, answered all questions at this time

## 2018-03-16 NOTE — PROGRESS NOTES
Patient seen and examined in [facility] pre-op before ERCP with appropriate indication of abnormal LFTs with epigastric pain suggestive of choledocholithiasis.  Risks, benefits, and alternatives were discussed with patient.  Consenting person was given an opportunity to ask questions and discuss other options.  Risks including but not limited to pancreatitis, contrast reaction, radiation exposure, retained choledocholithiasis, possible need for temporary stent placement, perforation, infection, bleeding, missed lesion(s), cardiac and/or pulmonary event, aspiration, stroke, possible need for surgery and/or interventional radiology, hospitalization possibly prolonged, discomfort, unsuccessful and/or incomplete procedure, indefinite diagnosis, ineffective therapy and/or persistent symptoms, possible need for repeat procedures and/or additional testings, damage to adjacent organs and/or vascular structures, medication reaction, disability, death, and other adverse events possibly life-threatening.  Discussion was undertaken with Layman's terms.  Consenting person stated understanding and acceptance of these risks, and wished to proceed.  Informed consent was given in clear state of mind.

## 2018-03-16 NOTE — RESPIRATORY CARE
COPD EDUCATION by COPD CLINICAL EDUCATOR  3/16/2018 at 6:28 AM by Francy Wetzel     Patient reviewed by COPD education team. Patient does not qualify for COPD program.

## 2018-03-16 NOTE — ASSESSMENT & PLAN NOTE
Potassium supplementation, oral preferred  Expect increase of 0.1 mEq/L per each 10 mEq given  Will recheck after supplementation  Goal > 4 mEq/L  Lab Results   Component Value Date/Time    POTASSIUM 3.6 03/16/2018 01:53 AM

## 2018-03-16 NOTE — CARE PLAN
Problem: Nutritional:  Goal: Achieve adequate nutritional intake  Patient to advance diet past clear liquids and consume 50% of meals  Outcome: NOT MET

## 2018-03-16 NOTE — PROGRESS NOTES
Two RN skin check performed with Sara GRECO.  No skin breakdown/ reddness noted. Bruise under left breast. Dry and flakey skin on legs and feet.

## 2018-03-16 NOTE — PROGRESS NOTES
Pt refusing to have bed alarm on.  PT alert and oriented x4.  Pt educated on the importance of the bed alarm.  Pt still refusing the bed alarm.  Pt has side rails up, non-skid socks on, and call bell in reach.

## 2018-03-16 NOTE — OR NURSING
Pt A&OX4. Pt on 2 L of oxygen via nasal cannula. High SBP in PACU, 170. Pt has no complaints of pain or nausea in PACU. L hand skin tear in OR, gauze in place. Report called to ANGUS Stephens. Pt out of PACU via bed with transport.

## 2018-03-17 VITALS
HEART RATE: 65 BPM | RESPIRATION RATE: 16 BRPM | DIASTOLIC BLOOD PRESSURE: 73 MMHG | TEMPERATURE: 98.1 F | SYSTOLIC BLOOD PRESSURE: 159 MMHG | WEIGHT: 135.36 LBS | OXYGEN SATURATION: 92 % | HEIGHT: 62 IN | BODY MASS INDEX: 24.91 KG/M2

## 2018-03-17 PROBLEM — N39.0 UTI (URINARY TRACT INFECTION): Status: RESOLVED | Noted: 2018-03-15 | Resolved: 2018-03-17

## 2018-03-17 PROBLEM — E87.6 HYPOKALEMIA: Status: RESOLVED | Noted: 2018-03-15 | Resolved: 2018-03-17

## 2018-03-17 PROBLEM — R10.9 ABDOMINAL PAIN: Status: RESOLVED | Noted: 2018-03-15 | Resolved: 2018-03-17

## 2018-03-17 PROBLEM — K83.1 BILIARY OBSTRUCTION: Status: RESOLVED | Noted: 2018-03-15 | Resolved: 2018-03-17

## 2018-03-17 LAB
BACTERIA UR CULT: NORMAL
SIGNIFICANT IND 70042: NORMAL
SITE SITE: NORMAL
SOURCE SOURCE: NORMAL

## 2018-03-17 PROCEDURE — 700102 HCHG RX REV CODE 250 W/ 637 OVERRIDE(OP): Performed by: HOSPITALIST

## 2018-03-17 PROCEDURE — 700111 HCHG RX REV CODE 636 W/ 250 OVERRIDE (IP): Performed by: HOSPITALIST

## 2018-03-17 PROCEDURE — 99239 HOSP IP/OBS DSCHRG MGMT >30: CPT | Performed by: HOSPITALIST

## 2018-03-17 PROCEDURE — A9270 NON-COVERED ITEM OR SERVICE: HCPCS | Performed by: HOSPITALIST

## 2018-03-17 PROCEDURE — 700105 HCHG RX REV CODE 258: Performed by: INTERNAL MEDICINE

## 2018-03-17 RX ORDER — CYCLOBENZAPRINE HCL 10 MG
10 TABLET ORAL ONCE
Status: DISCONTINUED | OUTPATIENT
Start: 2018-03-17 | End: 2018-03-17 | Stop reason: HOSPADM

## 2018-03-17 RX ORDER — ACETAMINOPHEN 325 MG/1
650 TABLET ORAL EVERY 6 HOURS PRN
Status: DISCONTINUED | OUTPATIENT
Start: 2018-03-17 | End: 2018-03-17 | Stop reason: HOSPADM

## 2018-03-17 RX ADMIN — ACETAMINOPHEN 650 MG: 325 TABLET, FILM COATED ORAL at 09:13

## 2018-03-17 RX ADMIN — ISOSORBIDE MONONITRATE 15 MG: 30 TABLET ORAL at 09:14

## 2018-03-17 RX ADMIN — OXYCODONE HYDROCHLORIDE AND ACETAMINOPHEN 1000 MG: 500 TABLET ORAL at 09:14

## 2018-03-17 RX ADMIN — HEPARIN SODIUM 5000 UNITS: 5000 INJECTION, SOLUTION INTRAVENOUS; SUBCUTANEOUS at 06:27

## 2018-03-17 RX ADMIN — LISINOPRIL 10 MG: 10 TABLET ORAL at 09:13

## 2018-03-17 RX ADMIN — ONDANSETRON HYDROCHLORIDE 4 MG: 2 INJECTION, SOLUTION INTRAMUSCULAR; INTRAVENOUS at 06:26

## 2018-03-17 RX ADMIN — METOPROLOL TARTRATE 25 MG: 25 TABLET, FILM COATED ORAL at 09:13

## 2018-03-17 RX ADMIN — CEFTRIAXONE 2 G: 2 INJECTION, POWDER, FOR SOLUTION INTRAMUSCULAR; INTRAVENOUS at 09:13

## 2018-03-17 RX ADMIN — SODIUM CHLORIDE, POTASSIUM CHLORIDE, SODIUM LACTATE AND CALCIUM CHLORIDE: 600; 310; 30; 20 INJECTION, SOLUTION INTRAVENOUS at 06:28

## 2018-03-17 RX ADMIN — AMLODIPINE BESYLATE 5 MG: 5 TABLET ORAL at 09:13

## 2018-03-17 ASSESSMENT — PAIN SCALES - GENERAL
PAINLEVEL_OUTOF10: 0

## 2018-03-17 NOTE — DISCHARGE INSTRUCTIONS
Discharge Instructions    Discharged to home by car with relative. Discharged via wheelchair, hospital escort: Yes.  Special equipment needed: Walker    Be sure to schedule a follow-up appointment with your primary care doctor or any specialists as instructed.     Discharge Plan:   Diet Plan: Discussed  Activity Level: Discussed  Smoking Cessation Offered: Patient Refused  Confirmed Follow up Appointment: Patient to Call and Schedule Appointment  Confirmed Symptoms Management: Discussed  Medication Reconciliation Updated: Yes  Influenza Vaccine Indication: Patient Refuses    I understand that a diet low in cholesterol, fat, and sodium is recommended for good health. Unless I have been given specific instructions below for another diet, I accept this instruction as my diet prescription.   Other diet: regular    Special Instructions: None    · Is patient discharged on Warfarin / Coumadin?   No     Depression / Suicide Risk    As you are discharged from this [facility] facility, it is important to learn how to keep safe from harming yourself.    Recognize the warning signs:  · Abrupt changes in personality, positive or negative- including increase in energy   · Giving away possessions  · Change in eating patterns- significant weight changes-  positive or negative  · Change in sleeping patterns- unable to sleep or sleeping all the time   · Unwillingness or inability to communicate  · Depression  · Unusual sadness, discouragement and loneliness  · Talk of wanting to die  · Neglect of personal appearance   · Rebelliousness- reckless behavior  · Withdrawal from people/activities they love  · Confusion- inability to concentrate     If you or a loved one observes any of these behaviors or has concerns about self-harm, here's what you can do:  · Talk about it- your feelings and reasons for harming yourself  · Remove any means that you might use to hurt yourself (examples: pills, rope, extension cords, firearm)  · Get  professional help from the community (Mental Health, Substance Abuse, psychological counseling)  · Do not be alone:Call your Safe Contact- someone whom you trust who will be there for you.  · Call your local CRISIS HOTLINE 568-3862 or 664-141-8378  · Call your local Children's Mobile Crisis Response Team [place] (178) 143-7718 or www.Quibly  · Call the toll free National Suicide Prevention Hotlines   · National Suicide Prevention Lifeline 486-303-QBHB (0909)  · National Hope Line Network 800-SUICIDE (206-7917)

## 2018-03-17 NOTE — PROGRESS NOTES
Gastroenterology Progress Note:    Rena Zachary  Date & Time note created:    3/17/2018   11:38 AM     Patient ID:  Name:             Madeleine Zambrano  YOB: 1933  Age:                 85 y.o.  female  MRN:               [id number]    Referring MD:  Dr. Hugo Sprague                                                             Chief Complaint(s):      Elevated LFT      History of Present Illness:    This is a very pleasant 85 y.o. female with the past medical history as listed below.     She presented to the emergency room concerned about gross hematuria.  She reported that she had dark brown urine 3/15/18. She has also had abdominal pain in her epigastric and right upper quadrant associated with nausea and vomiting over the past few days.  She has been having poor appetite.  No diarrhea, no melena, no rectal bleeding.  She denies any dysuria, hematuria or urinary urgency.  No fever, no chills. In 2015, she had her gallbladder removed at [facility], which showed chronic cholecystitis.     3/16/2018 ERCP:  1. Choledocholithiasis with obstruction, extracted  2. Biliary sphincterotomy performed  3. No evidence of double-duct sign.    3/17/2018 Doing great. Tolerating diet. No pain.     Otherwise the patient is doing fine without complaints of fever/chills/weight loss/dysphagia/odynophagia/heartburn/bloating/constipation/diarrhea/melena/hematochezia.     Review of Systems:      Constitutional: Denies fevers, weight changes  Eyes: Denies changes in vision, pos jaundice  Ears/Nose/Throat/Mouth: Denies nasal congestion or sore throat   Cardiovascular: Denies chest pain, Denies palpitations   Respiratory: Denies shortness of breath, denies cough  Gastrointestinal/Hepatic: minimal RUQ abdominal pain, nausea, vomiting, denies diarrhea, constipation or GI bleeding   Genitourinary: Denies dysuria or frequency  Musculoskeletal/Rheum: Denies  joint pain and swelling, edema  Skin: Denies rash  Neurological: Denies  headache, confusion, memory loss or focal weakness/parasthesias  Psychiatric: denies mood disorder   Endocrine: Melissa thyroid problems  Heme/Oncology/Lymph Nodes: Denies enlarged lymph nodes, denies brusing or known bleeding disorder  All other systems were reviewed and are negative (AMA/CMS criteria)              Past Medical History:   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HCC)    • Arrhythmia    • Arthritis    • Back pain 3/28/2012   • CATARACT    • Coronary artery disease    • Disorder of thyroid    • Glaucoma    • Hyperlipidemia    • Hypertension    • MEDICAL HOME 11/07/12   • Myocardial infarct    • Pain     sacrum 1/10   • Stroke (CMS-HCC)    • TIA (transient ischemic attack) 09/2017   • Urinary incontinence      Active Hospital Problems    Diagnosis   • CAD (coronary artery disease) [I25.10]     Priority: Medium   • HTN (hypertension), benign [I10]     Priority: Low       Past Surgical History:  Past Surgical History:   Procedure Laterality Date   • ERCP IN OR  3/16/2018    Procedure: ERCP IN OR;  Surgeon: Orlin Caldera M.D.;  Location: SURGERY [facility];  Service: Gastroenterology   • VITRECTOMY POSTERIOR Right 4/13/2017    Procedure: VITRECTOMY POSTERIOR-LASER, SF6 GAS;  Surgeon: Jessica Arita M.D.;  Location: SURGERY SAME DAY [facility];  Service:    • RIGO BY LAPAROSCOPY  10/9/2015    Procedure: RIGO BY LAPAROSCOPY;  Surgeon: Ric Zambrano M.D.;  Location: SURGERY [facility];  Service:    • CATARACT PHACO WITH IOL  4/27/2011    Performed by EVERETTE COOPER at SURGERY SAME DAY [facility] ORS   • CATARACT PHACO WITH IOL  4/13/2011    Performed by EVERETTE COOPER at SURGERY SAME DAY [facility] ORS   • OTHER ORTHOPEDIC SURGERY  1980    pins in left hip   • ATHROPLASTY      broken hip 1990   • OTHER CARDIAC SURGERY         Hospital Medications:  Current Facility-Administered Medications   Medication Dose Frequency Provider Last Rate Last Dose   • acetaminophen  (TYLENOL) tablet 650 mg  650 mg Q6HRS PRN Sabas Garcia M.D.   650 mg at 03/17/18 0913   • cyclobenzaprine (FLEXERIL) tablet 10 mg  10 mg Once Sabas Garcia M.D.       • lactated ringers infusion  1,000 mL Continuous Orlin Caldera M.D. 1,000 mL/hr at 03/16/18 1800 1,000 mL at 03/16/18 1800   • indomethacin (INDOCIN) suppository 100 mg  100 mg Post-Op once Orlin Caldera M.D.   100 mg at 03/16/18 1145   • heparin injection 5,000 Units  5,000 Units Q8HRS Sabas Garcia M.D.   5,000 Units at 03/17/18 0627   • ascorbic acid (ascorbic acid) tablet   DAILY Alexis Raymond M.D.   1,000 mg at 03/17/18 0914   • diazePAM (VALIUM) tablet 2 mg  2 mg Q6HRS PRN Alexis Raymond M.D.   2 mg at 03/16/18 2008   • isosorbide mononitrate SR (IMDUR) tablet 15 mg  15 mg DAILY Alexis Raymond M.D.   15 mg at 03/17/18 0914   • lisinopril (PRINIVIL) 10 MG tablet 10 mg  10 mg DAILY Alexis Raymond M.D.   10 mg at 03/17/18 0913   • metoprolol (LOPRESSOR) tablet 25 mg  25 mg BID Alexis Raymond M.D.   25 mg at 03/17/18 0913   • senna-docusate (PERICOLACE or SENOKOT S) 8.6-50 MG per tablet 2 Tab  2 Tab BID Alexis Raymond M.D.        And   • polyethylene glycol/lytes (MIRALAX) PACKET 1 Packet  1 Packet QDAY PRN Alexis Raymond M.D.        And   • magnesium hydroxide (MILK OF MAGNESIA) suspension 30 mL  30 mL QDAY PRN Alexis Raymond M.D.        And   • bisacodyl (DULCOLAX) suppository 10 mg  10 mg QDAY PRN Alexis Raymond M.D.       • ondansetron (ZOFRAN) syringe/vial injection 4 mg  4 mg Q4HRS PRN Alexis Raymond M.D.   4 mg at 03/17/18 0626   • ondansetron (ZOFRAN ODT) dispertab 4 mg  4 mg Q4HRS PRN Alexis Raymond M.D.       • LORazepam (ATIVAN) injection 0.5 mg  0.5 mg Once PRN Alexis Raymond M.D.       • cefTRIAXone (ROCEPHIN) syringe 2 g  2 g Q24HRS Alexis Raymond M.D.   2 g at 03/17/18 0913   • amLODIPine (NORVASC) tablet 5 mg  5 mg DAILY Alexis Arias  "DARWIN Raymond   5 mg at 03/17/18 0913   • labetalol (NORMODYNE,TRANDATE) injection 10 mg  10 mg Q4HRS PRN Alexis Raymond M.D.         Last reviewed on 3/15/2018  9:36 AM by Kimberly Sexton    Current Outpatient Medications:  Prescriptions Prior to Admission   Medication Sig Dispense Refill Last Dose   • metoprolol (LOPRESSOR) 25 MG Tab Take 1 Tab by mouth 2 times a day. 60 Tab 2 3/14/2018 at pm   • diazePAM (VALIUM) 2 MG Tab Take 2 mg by mouth every 6 hours as needed for Anxiety.   3/14/2018 at am   • atorvastatin (LIPITOR) 40 MG Tab Take 1 Tab by mouth every bedtime. 30 Tab 3 3/14/2018 at pm   • isosorbide mononitrate SR (IMDUR) 30 MG TABLET SR 24 HR Take 0.5 Tabs by mouth every day. 30 Tab 3 3/14/2018 at am   • amLODIPine (NORVASC) 5 MG Tab Take 2.5 mg by mouth every day.   3/14/2018 at am   • Ascorbic Acid (VITAMIN C) 1000 MG Tab Take 1 Tab by mouth every day.   3/14/2018 at am   • clopidogrel (PLAVIX) 75 MG Tab Take 1 Tab by mouth every day. 90 Tab 0 3/14/2018 at am   • lisinopril (PRINIVIL) 10 MG Tab Take 1 Tab by mouth every day. 90 Tab 0 3/10/2018 at am       Medication Allergy:  Allergies   Allergen Reactions   • Meperidine Vomiting and Nausea   • Tramadol Vomiting and Nausea       Physical Exam:  Weight/BMI: Body mass index is 24.76 kg/m².  Blood pressure 155/86, pulse 96, temperature 36.7 °C (98 °F), resp. rate 16, height 1.575 m (5' 2\"), weight 61.4 kg (135 lb 5.8 oz), SpO2 95 %, not currently breastfeeding.  Vitals:    03/16/18 1245 03/16/18 1430 03/16/18 2000 03/17/18 0400   BP:  148/66 (!) 177/92 155/86   Pulse:  62 94 96   Resp: 14 14 20 16   Temp:  36.1 °C (97 °F) 36.2 °C (97.2 °F) 36.7 °C (98 °F)   SpO2: 98% 96% 93% 95%   Weight:       Height:         Oxygen Therapy:  Pulse Oximetry: 95 %, O2 (LPM): 0, O2 Delivery: Nasal Cannula    Intake/Output Summary (Last 24 hours) at 03/17/18 1138  Last data filed at 03/16/18 1235   Gross per 24 hour   Intake              800 ml   Output            "     0 ml   Net              800 ml       Constitutional:   Well developed, well nourished, no acute distress  HEENT:  Normocephalic, Atraumatic, Conjunctiva not pale, Sclera not icteric, Oropharynx moist mucous membranes, No oral exudates, Nose normal.  No thyromegaly.  Neck:  Normal range of motion, No cervical tenderness,  no JVD.  Chest/Lungs:  Symmetric expansion, no spider angioma, breath sounds clear to auscultation bilaterally,  no crackles, no wheezing.   Cardiovascular:  Normal heart rate, Normal rhythm, No murmurs, No rubs, No gallops.    Abdomen: Bowel sounds normal, Soft, No tenderness, No guarding, No rebound, No masses, No hepatosplenomegaly.  Extremities: No cyanosis/clubbing/edema/palmar erythema/flapping tremor  Skin: Warm, Dry, No erythema, No rash, no induration.    MDM (Data Review):     Records reviewed and summarized in current documentation    Lab Data Review:  No results found for this or any previous visit (from the past 24 hour(s)).      Imaging/Procedures Review:    3/15/18 CT renal colic:   No evidence of renal, ureteral or bladder calculi. No hydronephrosis. Calcifications at the renal dennis bilaterally likely represent vascular calcification.  Prominence of the common duct likely represent ectasia in the setting of prior cholecystectomy.  Colonic diverticulosis.  Prominent atherosclerotic plaque.  Hypodense hepatic lesion likely represents a cyst.  Small hiatal hernia.  Compression fractures of T12 and L1 have a chronic appearance.    MDM (Assessment and Plan):     Active Hospital Problems    Diagnosis   • CAD (coronary artery disease) [I25.10]     Priority: Medium   • HTN (hypertension), benign [I10]     Priority: Low         Assessment  - Choledocholithiasis with obstruction, extracted  - Elevated LFT, improving  - Abdominal pain with elevated liver function tests and mildly dilated common bile duct concerning for possible obstructive jaundice. Resolved  - Hypertension.   - History of  coronary artery disease.   - Anxiety.   - Hypokalemia.   - Nausea and vomiting.   - Possible urinary tract infection.     Plan  1. Cardiac diet  2. Ok to go home from GI standpoint  3. F/U with GI prn    Thank you very much for allowing me to participate in the care of your patient.  Please feel free to contact me anytime at [phone].     Rena Sharma M.D.

## 2018-03-17 NOTE — DISCHARGE SUMMARY
CHIEF COMPLAINT ON ADMISSION  Chief Complaint   Patient presents with   • N/V     n/v x 24 hours, states vomitted x 12    • Blood in Urine       CODE STATUS  Full Code    HPI & HOSPITAL COURSE  This is a 85 y.o. female here with abdominal pain. She was felt to have a biliary obstruction so was admitted and sent for MRCP, though she was unable to complete it. She instead went for ERCP which did demonstrate a stone and she had marked improvement in her pain and nausea and vomiting. She felt much better and was able to tolerate a regular diet. She had complete resolution of her admitting complaint.     The patient met 2-midnight criteria for an inpatient stay at the time of discharge.    Therefore, she is discharged in good and stable condition with close outpatient follow-up.      DISCHARGE PROBLEM LIST  Active Problems:    CAD (coronary artery disease) POA: Yes    HTN (hypertension), benign POA: Yes  Resolved Problems:    Abdominal pain POA: Yes    Biliary obstruction POA: Yes    UTI (urinary tract infection) POA: Yes    Hypokalemia POA: Yes      FOLLOW UP  Future Appointments  Date Time Provider Department Center   4/6/2018 11:20 AM Ric Garcia M.D. [address]   4/24/2018 11:40 AM Michael J Bloch, M.D. [facility] None     Rena Sharma M.D.  [address] [facility] [address]  [phone]          [facility]  [facility] [address]  [phone]      Office will call you to setup your follow up appointment thank you       MEDICATIONS ON DISCHARGE   Madeleine Zambrano   Home Medication Instructions ALAN:[id number]    Printed on:03/17/18 1106   Medication Information                      amLODIPine (NORVASC) 5 MG Tab  Take 2.5 mg by mouth every day.             Ascorbic Acid (VITAMIN C) 1000 MG Tab  Take 1 Tab by mouth every day.             atorvastatin (LIPITOR) 40 MG Tab  Take 1 Tab by mouth every bedtime.             clopidogrel (PLAVIX) 75 MG Tab  Take 1 Tab by mouth every day.              diazePAM (VALIUM) 2 MG Tab  Take 2 mg by mouth every 6 hours as needed for Anxiety.             isosorbide mononitrate SR (IMDUR) 30 MG TABLET SR 24 HR  Take 0.5 Tabs by mouth every day.             lisinopril (PRINIVIL) 10 MG Tab  Take 1 Tab by mouth every day.             metoprolol (LOPRESSOR) 25 MG Tab  Take 1 Tab by mouth 2 times a day.                 DIET  Orders Placed This Encounter   Procedures   • DIET ORDER     Standing Status:   Standing     Number of Occurrences:   1     Order Specific Question:   Diet:     Answer:   Regular [1]       ACTIVITY  As tolerated.  Weight bearing as tolerated      CONSULTATIONS  Orlin Caldera MD - gastroenterology    PROCEDURES  None    LABORATORY  Lab Results   Component Value Date/Time    SODIUM 140 03/16/2018 01:53 AM    POTASSIUM 3.6 03/16/2018 01:53 AM    CHLORIDE 114 (H) 03/16/2018 01:53 AM    CO2 18 (L) 03/16/2018 01:53 AM    GLUCOSE 81 03/16/2018 01:53 AM    BUN 14 03/16/2018 01:53 AM    CREATININE 1.00 03/16/2018 01:53 AM        Lab Results   Component Value Date/Time    WBC 4.5 (L) 03/16/2018 01:53 AM    HEMOGLOBIN 10.4 (L) 03/16/2018 01:53 AM    HEMATOCRIT 31.8 (L) 03/16/2018 01:53 AM    PLATELETCT 294 03/16/2018 01:53 AM        Total time of the discharge process exceeds 36 minutes

## 2018-03-17 NOTE — PROGRESS NOTES
Discharge education completed with patient, patient verbalized understanding. Patient left via wheelchair with son in law at 1435.

## 2018-03-17 NOTE — CARE PLAN
Progressing as expected    Problem: Safety  Goal: Will remain free from falls    Intervention: Assess risk factors for falls  Pt refusing bed alarm at this time, pt mobility assessed, calls appropriately for assistance, treaded slipper socks on, call light within reach      Problem: Knowledge Deficit  Goal: Knowledge of disease process/condition, treatment plan, diagnostic tests, and medications will improve    Intervention: Assess knowledge level of disease process/condition, treatment plan, diagnostic tests, and medications  Reviewed POc with pt, answered all questions at this time

## 2018-03-17 NOTE — PROGRESS NOTES
Pt A&Ox4, pt denies pain, N/T, and N/V. Reviewed POC with pt. Pt on full liquid diet, tolerating well. Pt ambulates with SBA and FWW. Pt refusing bed alarm but calls appropriately to get OOB. Dressed tear on left hand. Call light and personal belongings within reach

## 2018-03-19 ENCOUNTER — PATIENT OUTREACH (OUTPATIENT)
Dept: HEALTH INFORMATION MANAGEMENT | Facility: OTHER | Age: 83
End: 2018-03-19

## 2018-03-20 LAB
BACTERIA BLD CULT: NORMAL
SIGNIFICANT IND 70042: NORMAL
SITE SITE: NORMAL
SOURCE SOURCE: NORMAL

## 2018-03-28 ENCOUNTER — APPOINTMENT (OUTPATIENT)
Dept: RADIOLOGY | Facility: MEDICAL CENTER | Age: 83
End: 2018-03-28
Attending: EMERGENCY MEDICINE
Payer: MEDICARE

## 2018-03-28 ENCOUNTER — HOSPITAL ENCOUNTER (OUTPATIENT)
Facility: MEDICAL CENTER | Age: 83
End: 2018-03-29
Attending: EMERGENCY MEDICINE | Admitting: HOSPITALIST
Payer: MEDICARE

## 2018-03-28 ENCOUNTER — RESOLUTE PROFESSIONAL BILLING HOSPITAL PROF FEE (OUTPATIENT)
Dept: HOSPITALIST | Facility: MEDICAL CENTER | Age: 83
End: 2018-03-28
Payer: MEDICARE

## 2018-03-28 DIAGNOSIS — N18.30 STAGE 3 CHRONIC KIDNEY DISEASE (HCC): ICD-10-CM

## 2018-03-28 DIAGNOSIS — R20.0 NUMBNESS AND TINGLING IN BOTH HANDS: ICD-10-CM

## 2018-03-28 DIAGNOSIS — R20.2 NUMBNESS AND TINGLING IN BOTH HANDS: ICD-10-CM

## 2018-03-28 DIAGNOSIS — R07.9 CHEST PAIN AT REST: ICD-10-CM

## 2018-03-28 DIAGNOSIS — Z86.73 HISTORY OF CVA (CEREBROVASCULAR ACCIDENT): ICD-10-CM

## 2018-03-28 DIAGNOSIS — I25.10 CORONARY ARTERY DISEASE INVOLVING NATIVE CORONARY ARTERY OF NATIVE HEART WITHOUT ANGINA PECTORIS: ICD-10-CM

## 2018-03-28 PROBLEM — R11.10 VOMITING: Status: ACTIVE | Noted: 2018-03-28

## 2018-03-28 PROBLEM — R74.8 ALKALINE PHOSPHATASE ELEVATION: Status: ACTIVE | Noted: 2018-03-28

## 2018-03-28 LAB
ALBUMIN SERPL BCP-MCNC: 3.4 G/DL (ref 3.2–4.9)
ALBUMIN/GLOB SERPL: 1.2 G/DL
ALP SERPL-CCNC: 298 U/L (ref 30–99)
ALT SERPL-CCNC: 50 U/L (ref 2–50)
ANION GAP SERPL CALC-SCNC: 8 MMOL/L (ref 0–11.9)
APPEARANCE UR: CLEAR
APTT PPP: 30.4 SEC (ref 24.7–36)
AST SERPL-CCNC: 121 U/L (ref 12–45)
BACTERIA #/AREA URNS HPF: NEGATIVE /HPF
BASOPHILS # BLD AUTO: 0.6 % (ref 0–1.8)
BASOPHILS # BLD: 0.08 K/UL (ref 0–0.12)
BILIRUB SERPL-MCNC: 1.1 MG/DL (ref 0.1–1.5)
BILIRUB UR QL STRIP.AUTO: NEGATIVE
BNP SERPL-MCNC: 88 PG/ML (ref 0–100)
BUN SERPL-MCNC: 17 MG/DL (ref 8–22)
CALCIUM SERPL-MCNC: 8.9 MG/DL (ref 8.5–10.5)
CHLORIDE SERPL-SCNC: 109 MMOL/L (ref 96–112)
CO2 SERPL-SCNC: 23 MMOL/L (ref 20–33)
COLOR UR: YELLOW
CREAT SERPL-MCNC: 1.11 MG/DL (ref 0.5–1.4)
EKG IMPRESSION: NORMAL
EOSINOPHIL # BLD AUTO: 0.15 K/UL (ref 0–0.51)
EOSINOPHIL NFR BLD: 1.2 % (ref 0–6.9)
EPI CELLS #/AREA URNS HPF: NORMAL /HPF
ERYTHROCYTE [DISTWIDTH] IN BLOOD BY AUTOMATED COUNT: 56.7 FL (ref 35.9–50)
GLOBULIN SER CALC-MCNC: 2.8 G/DL (ref 1.9–3.5)
GLUCOSE SERPL-MCNC: 130 MG/DL (ref 65–99)
GLUCOSE UR STRIP.AUTO-MCNC: NEGATIVE MG/DL
HCT VFR BLD AUTO: 35 % (ref 37–47)
HGB BLD-MCNC: 11.3 G/DL (ref 12–16)
IMM GRANULOCYTES # BLD AUTO: 0.06 K/UL (ref 0–0.11)
IMM GRANULOCYTES NFR BLD AUTO: 0.5 % (ref 0–0.9)
INR PPP: 1.08 (ref 0.87–1.13)
KETONES UR STRIP.AUTO-MCNC: NEGATIVE MG/DL
LEUKOCYTE ESTERASE UR QL STRIP.AUTO: NEGATIVE
LIPASE SERPL-CCNC: 36 U/L (ref 11–82)
LYMPHOCYTES # BLD AUTO: 1.12 K/UL (ref 1–4.8)
LYMPHOCYTES NFR BLD: 8.6 % (ref 22–41)
MCH RBC QN AUTO: 34 PG (ref 27–33)
MCHC RBC AUTO-ENTMCNC: 32.3 G/DL (ref 33.6–35)
MCV RBC AUTO: 105.4 FL (ref 81.4–97.8)
MICRO URNS: ABNORMAL
MONOCYTES # BLD AUTO: 0.91 K/UL (ref 0–0.85)
MONOCYTES NFR BLD AUTO: 7 % (ref 0–13.4)
NEUTROPHILS # BLD AUTO: 10.66 K/UL (ref 2–7.15)
NEUTROPHILS NFR BLD: 82.1 % (ref 44–72)
NITRITE UR QL STRIP.AUTO: NEGATIVE
NRBC # BLD AUTO: 0 K/UL
NRBC BLD-RTO: 0 /100 WBC
PH UR STRIP.AUTO: 7.5 [PH]
PLATELET # BLD AUTO: 392 K/UL (ref 164–446)
PMV BLD AUTO: 10 FL (ref 9–12.9)
POTASSIUM SERPL-SCNC: 4.6 MMOL/L (ref 3.6–5.5)
PROT SERPL-MCNC: 6.2 G/DL (ref 6–8.2)
PROT UR QL STRIP: 30 MG/DL
PROTHROMBIN TIME: 13.7 SEC (ref 12–14.6)
RBC # BLD AUTO: 3.32 M/UL (ref 4.2–5.4)
RBC # URNS HPF: NORMAL /HPF
RBC UR QL AUTO: NEGATIVE
SODIUM SERPL-SCNC: 140 MMOL/L (ref 135–145)
SP GR UR REFRACTOMETRY: >1.045
TROPONIN I SERPL-MCNC: <0.01 NG/ML (ref 0–0.04)
TROPONIN I SERPL-MCNC: <0.01 NG/ML (ref 0–0.04)
UROBILINOGEN UR STRIP.AUTO-MCNC: 0.2 MG/DL
WBC # BLD AUTO: 13 K/UL (ref 4.8–10.8)
WBC #/AREA URNS HPF: NORMAL /HPF

## 2018-03-28 PROCEDURE — 85610 PROTHROMBIN TIME: CPT

## 2018-03-28 PROCEDURE — 700105 HCHG RX REV CODE 258: Performed by: HOSPITALIST

## 2018-03-28 PROCEDURE — 36415 COLL VENOUS BLD VENIPUNCTURE: CPT

## 2018-03-28 PROCEDURE — 93005 ELECTROCARDIOGRAM TRACING: CPT | Performed by: EMERGENCY MEDICINE

## 2018-03-28 PROCEDURE — 85025 COMPLETE CBC W/AUTO DIFF WBC: CPT

## 2018-03-28 PROCEDURE — 700102 HCHG RX REV CODE 250 W/ 637 OVERRIDE(OP): Performed by: EMERGENCY MEDICINE

## 2018-03-28 PROCEDURE — 74177 CT ABD & PELVIS W/CONTRAST: CPT

## 2018-03-28 PROCEDURE — A9270 NON-COVERED ITEM OR SERVICE: HCPCS | Performed by: EMERGENCY MEDICINE

## 2018-03-28 PROCEDURE — 700117 HCHG RX CONTRAST REV CODE 255: Performed by: EMERGENCY MEDICINE

## 2018-03-28 PROCEDURE — G0378 HOSPITAL OBSERVATION PER HR: HCPCS

## 2018-03-28 PROCEDURE — 84484 ASSAY OF TROPONIN QUANT: CPT

## 2018-03-28 PROCEDURE — 99220 PR INITIAL OBSERVATION CARE,LEVL III: CPT | Performed by: HOSPITALIST

## 2018-03-28 PROCEDURE — 99285 EMERGENCY DEPT VISIT HI MDM: CPT

## 2018-03-28 PROCEDURE — 700102 HCHG RX REV CODE 250 W/ 637 OVERRIDE(OP): Performed by: HOSPITALIST

## 2018-03-28 PROCEDURE — 83880 ASSAY OF NATRIURETIC PEPTIDE: CPT

## 2018-03-28 PROCEDURE — 71045 X-RAY EXAM CHEST 1 VIEW: CPT

## 2018-03-28 PROCEDURE — 93005 ELECTROCARDIOGRAM TRACING: CPT

## 2018-03-28 PROCEDURE — 85730 THROMBOPLASTIN TIME PARTIAL: CPT

## 2018-03-28 PROCEDURE — 81001 URINALYSIS AUTO W/SCOPE: CPT

## 2018-03-28 PROCEDURE — 80053 COMPREHEN METABOLIC PANEL: CPT

## 2018-03-28 PROCEDURE — A9270 NON-COVERED ITEM OR SERVICE: HCPCS | Performed by: HOSPITALIST

## 2018-03-28 PROCEDURE — 83690 ASSAY OF LIPASE: CPT

## 2018-03-28 RX ORDER — ONDANSETRON 4 MG/1
4 TABLET, ORALLY DISINTEGRATING ORAL EVERY 4 HOURS PRN
Status: DISCONTINUED | OUTPATIENT
Start: 2018-03-28 | End: 2018-03-29 | Stop reason: HOSPADM

## 2018-03-28 RX ORDER — CLOPIDOGREL BISULFATE 75 MG/1
75 TABLET ORAL DAILY
Status: DISCONTINUED | OUTPATIENT
Start: 2018-03-28 | End: 2018-03-29 | Stop reason: HOSPADM

## 2018-03-28 RX ORDER — AMOXICILLIN 250 MG
2 CAPSULE ORAL 2 TIMES DAILY
Status: DISCONTINUED | OUTPATIENT
Start: 2018-03-28 | End: 2018-03-29 | Stop reason: HOSPADM

## 2018-03-28 RX ORDER — ACETAMINOPHEN 325 MG/1
650 TABLET ORAL EVERY 6 HOURS PRN
Status: DISCONTINUED | OUTPATIENT
Start: 2018-03-28 | End: 2018-03-29 | Stop reason: HOSPADM

## 2018-03-28 RX ORDER — ISOSORBIDE MONONITRATE 30 MG/1
15 TABLET, EXTENDED RELEASE ORAL DAILY
Status: DISCONTINUED | OUTPATIENT
Start: 2018-03-29 | End: 2018-03-29 | Stop reason: HOSPADM

## 2018-03-28 RX ORDER — ONDANSETRON 2 MG/ML
4 INJECTION INTRAMUSCULAR; INTRAVENOUS EVERY 4 HOURS PRN
Status: DISCONTINUED | OUTPATIENT
Start: 2018-03-28 | End: 2018-03-29 | Stop reason: HOSPADM

## 2018-03-28 RX ORDER — BISACODYL 10 MG
10 SUPPOSITORY, RECTAL RECTAL
Status: DISCONTINUED | OUTPATIENT
Start: 2018-03-28 | End: 2018-03-29 | Stop reason: HOSPADM

## 2018-03-28 RX ORDER — SODIUM CHLORIDE 9 MG/ML
INJECTION, SOLUTION INTRAVENOUS CONTINUOUS
Status: DISCONTINUED | OUTPATIENT
Start: 2018-03-28 | End: 2018-03-29 | Stop reason: HOSPADM

## 2018-03-28 RX ORDER — AMLODIPINE BESYLATE 5 MG/1
2.5 TABLET ORAL DAILY
Status: DISCONTINUED | OUTPATIENT
Start: 2018-03-28 | End: 2018-03-29 | Stop reason: HOSPADM

## 2018-03-28 RX ORDER — POLYETHYLENE GLYCOL 3350 17 G/17G
1 POWDER, FOR SOLUTION ORAL
Status: DISCONTINUED | OUTPATIENT
Start: 2018-03-28 | End: 2018-03-29 | Stop reason: HOSPADM

## 2018-03-28 RX ORDER — ATORVASTATIN CALCIUM 20 MG/1
40 TABLET, FILM COATED ORAL
Status: DISCONTINUED | OUTPATIENT
Start: 2018-03-28 | End: 2018-03-29 | Stop reason: HOSPADM

## 2018-03-28 RX ADMIN — NITROGLYCERIN 0.5 INCH: 20 OINTMENT TOPICAL at 11:27

## 2018-03-28 RX ADMIN — CLOPIDOGREL 75 MG: 75 TABLET, FILM COATED ORAL at 14:55

## 2018-03-28 RX ADMIN — METOPROLOL TARTRATE 25 MG: 25 TABLET, FILM COATED ORAL at 20:05

## 2018-03-28 RX ADMIN — ATORVASTATIN CALCIUM 40 MG: 20 TABLET, FILM COATED ORAL at 20:05

## 2018-03-28 RX ADMIN — ACETAMINOPHEN 650 MG: 325 TABLET, FILM COATED ORAL at 16:04

## 2018-03-28 RX ADMIN — IOHEXOL 100 ML: 350 INJECTION, SOLUTION INTRAVENOUS at 12:00

## 2018-03-28 RX ADMIN — AMLODIPINE BESYLATE 2.5 MG: 5 TABLET ORAL at 14:56

## 2018-03-28 RX ADMIN — SODIUM CHLORIDE: 9 INJECTION, SOLUTION INTRAVENOUS at 14:25

## 2018-03-28 ASSESSMENT — LIFESTYLE VARIABLES
TOTAL SCORE: 0
DO YOU DRINK ALCOHOL: NO
HOW MANY TIMES IN THE PAST YEAR HAVE YOU HAD 5 OR MORE DRINKS IN A DAY: 0
EVER FELT BAD OR GUILTY ABOUT YOUR DRINKING: NO
HAVE YOU EVER FELT YOU SHOULD CUT DOWN ON YOUR DRINKING: NO
TOTAL SCORE: 0
CONSUMPTION TOTAL: NEGATIVE
ON A TYPICAL DAY WHEN YOU DRINK ALCOHOL HOW MANY DRINKS DO YOU HAVE: 1
EVER_SMOKED: YES
AVERAGE NUMBER OF DAYS PER WEEK YOU HAVE A DRINK CONTAINING ALCOHOL: 5
HAVE PEOPLE ANNOYED YOU BY CRITICIZING YOUR DRINKING: NO
DO YOU DRINK ALCOHOL: YES
EVER HAD A DRINK FIRST THING IN THE MORNING TO STEADY YOUR NERVES TO GET RID OF A HANGOVER: NO
TOTAL SCORE: 0

## 2018-03-28 ASSESSMENT — ENCOUNTER SYMPTOMS
PALPITATIONS: 0
ROS GI COMMENTS: POOR APPETITE
CHILLS: 0
FEVER: 0

## 2018-03-28 ASSESSMENT — PAIN SCALES - GENERAL
PAINLEVEL_OUTOF10: 0
PAINLEVEL_OUTOF10: 0
PAINLEVEL_OUTOF10: 8
PAINLEVEL_OUTOF10: 0
PAINLEVEL_OUTOF10: 8
PAINLEVEL_OUTOF10: 0
PAINLEVEL_OUTOF10: 3

## 2018-03-28 ASSESSMENT — PATIENT HEALTH QUESTIONNAIRE - PHQ9
2. FEELING DOWN, DEPRESSED, IRRITABLE, OR HOPELESS: NOT AT ALL
1. LITTLE INTEREST OR PLEASURE IN DOING THINGS: NOT AT ALL
SUM OF ALL RESPONSES TO PHQ9 QUESTIONS 1 AND 2: 0
1. LITTLE INTEREST OR PLEASURE IN DOING THINGS: NOT AT ALL
2. FEELING DOWN, DEPRESSED, IRRITABLE, OR HOPELESS: NOT AT ALL
SUM OF ALL RESPONSES TO PHQ9 QUESTIONS 1 AND 2: 0

## 2018-03-28 NOTE — ED PROVIDER NOTES
ED Provider Note    Scribed for Dragan León M.D. by Alexandria Diaz. 3/28/2018  11:08 AM    Primary care provider: Ric Garcia M.D.  Means of arrival: EMS  History obtained from: Patient  History limited by: None    CHIEF COMPLAINT  Chief Complaint   Patient presents with   • Chest Pain       HPI  Madeleine Zambrano is a 85 y.o. female who presents to the Emergency Department by EMS for chest pain that began at 7AM this morning. Her pain began when she woke up located in her abdomen and back. Her pain the moved difficulty throughout her chest with radiation to her left jaw and shoulder for three hours. She denies diaphoresis or dysuria associated. Patient was given 4 chewable Aspirin en transit to ED which alleviated her chest pain. Patient denies any falls recently. She has history of heart attack and stroke. Patient previously saw  for Cardiology.    REVIEW OF SYSTEMS  Pertinent negatives include no diaphoresis or dysuria. As above, all other systems reviewed and are negative.   See HPI for further details.   C.    PAST MEDICAL HISTORY   has a past medical history of Anginal syndrome (CMS-HCC); Arrhythmia; Arthritis; Back pain (3/28/2012); CATARACT; Coronary artery disease; Disorder of thyroid; Glaucoma; Hyperlipidemia; Hypertension; MEDICAL HOME (11/07/12); Myocardial infarct; Pain; Stroke (CMS-HCC); TIA (transient ischemic attack) (09/2017); and Urinary incontinence.    SURGICAL HISTORY   has a past surgical history that includes athroplasty; other orthopedic surgery (1980); cataract phaco with iol (4/13/2011); cataract phaco with iol (4/27/2011); shante by laparoscopy (10/9/2015); vitrectomy posterior (Right, 4/13/2017); other cardiac surgery; and ercp in or (3/16/2018).    SOCIAL HISTORY  Social History   Substance Use Topics   • Smoking status: Current Every Day Smoker     Packs/day: 0.25     Years: 0.10     Types: Cigarettes   • Smokeless tobacco: Never Used   • Alcohol use Yes      " Comment: 1 per day      History   Drug Use No       FAMILY HISTORY  Family History   Problem Relation Age of Onset   • Heart Disease Mother    • Cancer Father        CURRENT MEDICATIONS  No current facility-administered medications on file prior to encounter.      Current Outpatient Prescriptions on File Prior to Encounter   Medication Sig Dispense Refill   • metoprolol (LOPRESSOR) 25 MG Tab Take 1 Tab by mouth 2 times a day. 60 Tab 2   • diazePAM (VALIUM) 2 MG Tab Take 2 mg by mouth every 6 hours as needed for Anxiety.     • atorvastatin (LIPITOR) 40 MG Tab Take 1 Tab by mouth every bedtime. 30 Tab 3   • isosorbide mononitrate SR (IMDUR) 30 MG TABLET SR 24 HR Take 0.5 Tabs by mouth every day. 30 Tab 3   • amLODIPine (NORVASC) 5 MG Tab Take 2.5 mg by mouth every day.     • Ascorbic Acid (VITAMIN C) 1000 MG Tab Take 1 Tab by mouth every day.     • clopidogrel (PLAVIX) 75 MG Tab Take 1 Tab by mouth every day. 90 Tab 0   • lisinopril (PRINIVIL) 10 MG Tab Take 1 Tab by mouth every day. 90 Tab 0       ALLERGIES  Allergies   Allergen Reactions   • Meperidine Vomiting and Nausea   • Tramadol Vomiting and Nausea       PHYSICAL EXAM  VITAL SIGNS: /49   Pulse 63   Temp 36.2 °C (97.2 °F)   Resp 16   Ht 1.549 m (5' 1\")   Wt 61.2 kg (135 lb)   BMI 25.51 kg/m²   Constitutional: Appears very weak, elderly female, No acute distress, Non-toxic appearance.   HENT: Normocephalic, Atraumatic, Bilateral external ears normal, Oropharynx is clear mucous membranes are dry. No oral exudates or nasal discharge.   Eyes: Pupils are equal round and reactive, EOMI, Conjunctiva normal, No discharge.   Neck: Normal range of motion, No tenderness, Supple, No stridor. No meningismus.  Lymphatic: No lymphadenopathy noted.   Cardiovascular: Regular rate and rhythm without murmur rub or gallop.  Thorax & Lungs: Clear breath sounds bilaterally without wheezes, rhonchi or rales. There is no chest wall tenderness.   Abdomen: Soft, left lower " quadrant tenderness, non-distended. There is no rebound or guarding. No organomegaly is appreciated. Bowel sounds are normal.  Skin: Normal without rash.   Back: Upper lumbar spine tenderness, No CVA tenderness.   Extremities: Intact distal pulses, No edema, No tenderness, No cyanosis, No clubbing. Capillary refill is  3 seconds.   Musculoskeletal: Good range of motion in all major joints. No tenderness to palpation or major deformities noted.   Neurologic: Alert & oriented x 3, Normal motor function, Normal sensory function, No focal deficits noted. Reflexes are normal.  Psychiatric: Affect normal, Judgment normal, Mood normal. There is no suicidal ideation or patient reported hallucinations.       DIAGNOSTIC STUDIES / PROCEDURES    LABS  Labs Reviewed   CBC WITH DIFFERENTIAL - Abnormal; Notable for the following:        Result Value    WBC 13.0 (*)     RBC 3.32 (*)     Hemoglobin 11.3 (*)     Hematocrit 35.0 (*)     .4 (*)     MCH 34.0 (*)     MCHC 32.3 (*)     RDW 56.7 (*)     Neutrophils-Polys 82.10 (*)     Lymphocytes 8.60 (*)     Neutrophils (Absolute) 10.66 (*)     Monos (Absolute) 0.91 (*)     All other components within normal limits    Narrative:     Indicate which anticoagulants the patient is on:->UNKNOWN   TROPONIN    Narrative:     Indicate which anticoagulants the patient is on:->UNKNOWN   BTYPE NATRIURETIC PEPTIDE    Narrative:     Indicate which anticoagulants the patient is on:->UNKNOWN   COMP METABOLIC PANEL    Narrative:     Indicate which anticoagulants the patient is on:->UNKNOWN   PROTHROMBIN TIME    Narrative:     Indicate which anticoagulants the patient is on:->UNKNOWN   APTT    Narrative:     Indicate which anticoagulants the patient is on:->UNKNOWN   LIPASE    Narrative:     Indicate which anticoagulants the patient is on:->UNKNOWN      All labs reviewed by me.    EKG Interpretation:  EKG Interpretation    Interpreted by me    Rhythm: normal sinus   Rate: normal  Axis:  normal  Ectopy: none  Conduction: normal  ST Segments: no acute change  T Waves: no acute change  Q Waves: none    Clinical Impression: no acute changes and normal EKG    RADIOLOGY  CT-ABDOMEN-PELVIS WITH   Final Result      1.  No definite acute abnormality of the abdomen or pelvis.   2.  Colonic diverticulosis without evidence of diverticulitis.   3.  Extensive atherosclerosis.      DX-CHEST-LIMITED (1 VIEW)   Final Result      Scattered linear atelectasis similar to previous. No consolidation.        The radiologist's interpretation of all radiological studies have been reviewed by me.    COURSE & MEDICAL DECISION MAKING  Nursing notes, VS, PMSFHx reviewed in chart.    11:08 AM Patient seen and examined at bedside. Discussed with patient that I will order laboratory and radiology tests to further evaluate.  Ordered for Troponin, BNP, CBC, CMP, Prothrombin Time, APTT, Lipase, DX-chest, EKG to evaluate. Patient was treated with 2% 0.5 inch nitrobid for her symptoms.     Laboratory evaluation reveals white blood cell count elevated at over 13,000 with 82% PMN shift. Serum electrolytes are unremarkable with no evidence of acidosis. Slight elevation of alk phos at 298. Troponin is normal and coagulation studies are also normal with a BNP that is 88 and unremarkable.    11:38 AM Ordered CT-abdomen with. This shows no evidence of diverticulitis but there is diverticulosis. I think she will need inpatient status to rule out myocardial infarction given her presentation and description by history. Patient's comfortable with this plan of care.    12:41 PM - I discussed the patient's case and the above findings with Dr. Beth (Hospitalist) who agrees to admit the patient.       DISPOSITION:  Patient will be admitted to Dr. Beth (Hospitalist) in guarded condition.      FINAL IMPRESSION  1. Chest pain at rest          IAlexandria (Scribe), am scribing for, and in the presence of, Dragan León,  M.D..    Electronically signed by: Alexandria Diaz (Scribe), 3/28/2018    IDragan M.D. personally performed the services described in this documentation, as scribed by Alexandria Diaz in my presence, and it is both accurate and complete.    The note accurately reflects work and decisions made by me.  Dragan León  3/28/2018  1:16 PM

## 2018-03-28 NOTE — PROGRESS NOTES
2 RN skin check done: No evidence of skin breakdown. Pt with scattered redness, dry skin and scabs.

## 2018-03-28 NOTE — DISCHARGE PLANNING
Son in law (Faisal) notified of pt's admission and room number per pt request.     Care Transition Team Assessment    Information Source  Orientation : Oriented x 4  Information Given By: Patient  Who is responsible for making decisions for patient? : Patient    Readmission Evaluation  Is this a readmission?: Yes - unplanned readmission    Elopement Risk  Legal Hold: No  Ambulatory or Self Mobile in Wheelchair: No-Not an Elopement Risk    Interdisciplinary Discharge Planning  Does Admitting Nurse Feel This Could be a Complex Discharge?: No  Primary Care Physician: Ric Garcia  Lives with - Patient's Self Care Capacity: Alone and Able to Care For Self  Support Systems: Children  Housing / Facility: 1 Story Apartment / Condo  Do You Take your Prescribed Medications Regularly: Yes  Able to Return to Previous ADL's: Yes  Mobility Issues: Yes  Prior Services: Home-Independent  Assistance Needed: No  Durable Medical Equipment: Walker    Discharge Preparedness  What are your discharge supports?: Child  Prior Functional Level: Independent with Activities of Daily Living, Uses Walker  Difficulity with ADLs: Walking  Difficulty with ADLs Comment: walker  Difficulity with IADLs: Driving  Difficulity with IADL Comments: taxi or children    Functional Assesment  Prior Functional Level: Independent with Activities of Daily Living, Uses Walker    Finances  Financial Barriers to Discharge: No  Prescription Coverage: Yes     Discharge Risks or Barriers  Discharge risks or barriers?: No  Patient risk factors: Readmission    Anticipated Discharge Information  Anticipated discharge disposition: Home  Discharge Address: facesheet updated

## 2018-03-28 NOTE — PROGRESS NOTES
Received care of pt from ER RN. Pt A+O x 4. Denies need for pain medication. Able to transfer self from gurney to bed with little difficulty.

## 2018-03-28 NOTE — ED TRIAGE NOTES
Pt BIB EMS from [facility] where pt lives  Chief Complaint   Patient presents with   • Chest Pain   pt reports chest pain started at 0700 when she awoke  Pt states she can not describe the pain but reports she also had a cough  Pt was given 325 ASA and x 1 nitro by EMS, BP was 187/84 and after nitro was 135/60  Pt rest on gurav, pt provided call light, instructed to call if needing any assistance, instructed not to get up by self, gurney in lowest position.

## 2018-03-28 NOTE — ED NOTES
Med rec completed by interview with patient at bedside  No abx in past 30 days  Pt reported that she has not taken her diazepam in a long time and reported that she has not taken lisinopril 10 mg in over 2 weeks due to not having a refill on that medication

## 2018-03-29 ENCOUNTER — PATIENT OUTREACH (OUTPATIENT)
Dept: HEALTH INFORMATION MANAGEMENT | Facility: OTHER | Age: 83
End: 2018-03-29

## 2018-03-29 ENCOUNTER — APPOINTMENT (OUTPATIENT)
Dept: RADIOLOGY | Facility: MEDICAL CENTER | Age: 83
End: 2018-03-29
Attending: INTERNAL MEDICINE
Payer: MEDICARE

## 2018-03-29 VITALS
WEIGHT: 136.47 LBS | RESPIRATION RATE: 18 BRPM | OXYGEN SATURATION: 96 % | SYSTOLIC BLOOD PRESSURE: 172 MMHG | HEART RATE: 67 BPM | DIASTOLIC BLOOD PRESSURE: 79 MMHG | TEMPERATURE: 99 F | HEIGHT: 61 IN | BODY MASS INDEX: 25.76 KG/M2

## 2018-03-29 PROBLEM — R11.10 VOMITING: Status: RESOLVED | Noted: 2018-03-28 | Resolved: 2018-03-29

## 2018-03-29 PROBLEM — R07.9 CHEST PAIN: Status: RESOLVED | Noted: 2018-02-27 | Resolved: 2018-03-29

## 2018-03-29 LAB
ALBUMIN SERPL BCP-MCNC: 3.3 G/DL (ref 3.2–4.9)
ALBUMIN/GLOB SERPL: 1.3 G/DL
ALP SERPL-CCNC: 301 U/L (ref 30–99)
ALT SERPL-CCNC: 178 U/L (ref 2–50)
ANION GAP SERPL CALC-SCNC: 9 MMOL/L (ref 0–11.9)
AST SERPL-CCNC: 374 U/L (ref 12–45)
BASOPHILS # BLD AUTO: 0.5 % (ref 0–1.8)
BASOPHILS # BLD: 0.03 K/UL (ref 0–0.12)
BILIRUB SERPL-MCNC: 1.9 MG/DL (ref 0.1–1.5)
BUN SERPL-MCNC: 15 MG/DL (ref 8–22)
CALCIUM SERPL-MCNC: 8.7 MG/DL (ref 8.5–10.5)
CHLORIDE SERPL-SCNC: 109 MMOL/L (ref 96–112)
CO2 SERPL-SCNC: 18 MMOL/L (ref 20–33)
CREAT SERPL-MCNC: 0.94 MG/DL (ref 0.5–1.4)
EOSINOPHIL # BLD AUTO: 0.13 K/UL (ref 0–0.51)
EOSINOPHIL NFR BLD: 2 % (ref 0–6.9)
ERYTHROCYTE [DISTWIDTH] IN BLOOD BY AUTOMATED COUNT: 55.9 FL (ref 35.9–50)
GLOBULIN SER CALC-MCNC: 2.6 G/DL (ref 1.9–3.5)
GLUCOSE SERPL-MCNC: 131 MG/DL (ref 65–99)
HCT VFR BLD AUTO: 33.1 % (ref 37–47)
HGB BLD-MCNC: 11.1 G/DL (ref 12–16)
IMM GRANULOCYTES # BLD AUTO: 0.02 K/UL (ref 0–0.11)
IMM GRANULOCYTES NFR BLD AUTO: 0.3 % (ref 0–0.9)
LYMPHOCYTES # BLD AUTO: 0.64 K/UL (ref 1–4.8)
LYMPHOCYTES NFR BLD: 10 % (ref 22–41)
MCH RBC QN AUTO: 34.7 PG (ref 27–33)
MCHC RBC AUTO-ENTMCNC: 33.5 G/DL (ref 33.6–35)
MCV RBC AUTO: 103.4 FL (ref 81.4–97.8)
MONOCYTES # BLD AUTO: 0.59 K/UL (ref 0–0.85)
MONOCYTES NFR BLD AUTO: 9.2 % (ref 0–13.4)
NEUTROPHILS # BLD AUTO: 4.98 K/UL (ref 2–7.15)
NEUTROPHILS NFR BLD: 78 % (ref 44–72)
NRBC # BLD AUTO: 0 K/UL
NRBC BLD-RTO: 0 /100 WBC
PLATELET # BLD AUTO: 362 K/UL (ref 164–446)
PMV BLD AUTO: 10.5 FL (ref 9–12.9)
POTASSIUM SERPL-SCNC: 3.9 MMOL/L (ref 3.6–5.5)
PROT SERPL-MCNC: 5.9 G/DL (ref 6–8.2)
RBC # BLD AUTO: 3.2 M/UL (ref 4.2–5.4)
SODIUM SERPL-SCNC: 136 MMOL/L (ref 135–145)
TROPONIN I SERPL-MCNC: 0.01 NG/ML (ref 0–0.04)
WBC # BLD AUTO: 6.4 K/UL (ref 4.8–10.8)

## 2018-03-29 PROCEDURE — 76705 ECHO EXAM OF ABDOMEN: CPT

## 2018-03-29 PROCEDURE — 700105 HCHG RX REV CODE 258: Performed by: HOSPITALIST

## 2018-03-29 PROCEDURE — 84484 ASSAY OF TROPONIN QUANT: CPT

## 2018-03-29 PROCEDURE — 36415 COLL VENOUS BLD VENIPUNCTURE: CPT

## 2018-03-29 PROCEDURE — 80053 COMPREHEN METABOLIC PANEL: CPT

## 2018-03-29 PROCEDURE — 700102 HCHG RX REV CODE 250 W/ 637 OVERRIDE(OP): Performed by: HOSPITALIST

## 2018-03-29 PROCEDURE — 99217 PR OBSERVATION CARE DISCHARGE: CPT | Performed by: INTERNAL MEDICINE

## 2018-03-29 PROCEDURE — 85025 COMPLETE CBC W/AUTO DIFF WBC: CPT

## 2018-03-29 PROCEDURE — G0378 HOSPITAL OBSERVATION PER HR: HCPCS

## 2018-03-29 PROCEDURE — A9270 NON-COVERED ITEM OR SERVICE: HCPCS | Performed by: HOSPITALIST

## 2018-03-29 RX ORDER — HYDRALAZINE HYDROCHLORIDE 20 MG/ML
10 INJECTION INTRAMUSCULAR; INTRAVENOUS EVERY 4 HOURS PRN
Status: DISCONTINUED | OUTPATIENT
Start: 2018-03-29 | End: 2018-03-29 | Stop reason: HOSPADM

## 2018-03-29 RX ADMIN — CLOPIDOGREL 75 MG: 75 TABLET, FILM COATED ORAL at 16:17

## 2018-03-29 RX ADMIN — METOPROLOL TARTRATE 25 MG: 25 TABLET, FILM COATED ORAL at 16:18

## 2018-03-29 RX ADMIN — SODIUM CHLORIDE: 9 INJECTION, SOLUTION INTRAVENOUS at 10:10

## 2018-03-29 RX ADMIN — AMLODIPINE BESYLATE 2.5 MG: 5 TABLET ORAL at 16:17

## 2018-03-29 ASSESSMENT — PAIN SCALES - GENERAL: PAINLEVEL_OUTOF10: 0

## 2018-03-29 NOTE — FACE TO FACE
Face to Face Supporting Documentation - Home Health    The encounter with this patient was in whole or in part the primary reason for home health admission.    Date of encounter:   Patient:                    MRN:                       YOB: 2018  Madeleine Zambrano  [id number]  2/1/1933     Home health to see patient for:  Skilled Nursing care for assessment, interventions & education, Physical Therapy evaluation and treatment and Occupational therapy evaluation and treatment    Skilled need for:  Exacerbation of Chronic Disease State copd, weakness    Skilled nursing interventions to include:  Comment: Medication management     Homebound status evidenced by:  Need the aid of supportive devices such as crutches, canes, wheelchairs or walkers or Needs the assistance of another person in order to leave the home. Leaving home requires a considerable and taxing effort. There is a normal inability to leave the home.    Community Physician to provide follow up care: Ric Garcia M.D.     Optional Interventions? No      I certify the face to face encounter for this home health care referral meets the CMS requirements and the encounter/clinical assessment with the patient was, in whole, or in part, for the medical condition(s) listed above, which is the primary reason for home health care. Based on my clinical findings: the service(s) are medically necessary, support the need for home health care, and the homebound criteria are met.  I certify that this patient has had a face to face encounter by myself.  NATASHA Duncan. - NPI: [id number]

## 2018-03-29 NOTE — DISCHARGE PLANNING
Received choice from [facility] [place] for patient home health services, referral has been sent to [facility]  per patient request.

## 2018-03-29 NOTE — DISCHARGE PLANNING
Call placed to Hayley with [facility], patient can not be accepted for services at this time since they are short with staff and at capacity.    Referral has been sent to [facility]. [facility] will follow up for status on acceptance.

## 2018-03-29 NOTE — DISCHARGE PLANNING
ATTN: Case Management  RE: Referral for Home Health    Reason for referral denial: [facility] is currently at capacity for Referrals                We would like to take this opportunity to thank you for submitting a referral for your patient to continue the journey to recovery with [facility]. We hope to facilitate continued referrals from your facility as our goal is to provide quality, skilled care to as many patients as possible.            Unfortunately, we are not able to accept your patient into our service for the reason listed above. If further clarity is needed, our Intake Coordinators are available to discuss any barriers to service.            If you have any questions or concerns regarding this or future patients’ transition to Home Health, please do not hesitate to contact us. We are open for referrals 7 days a week from 8AM to 5PM at [phone].      We look forward to collaborating with you in the future,  [facility] Team

## 2018-03-29 NOTE — DISCHARGE SUMMARY
Hospital Medicine Discharge Note     Patient ID:  Madeleine Zambrano  [id number]  85 y.o.female  2/1/1933    Admit Date:  3/28/2018       Discharge Date:   3/29/2018    Primary Care Provider: Ric Garcia M.D.    Admitting Physician: Buddy Beth M.D.  Discharging Physician: Fernando Gordon M.D.    Chief Complaint: Chest pain     Discharge Diagnoses:   Active Problems:    Chest pain- resolved patient states was never actually chest pain that was more abdominal pain    CAD (coronary artery disease)- continue outpatient management follow-up with PCP    Vomiting- no further episodes patient tolerating by mouth    Alkaline phosphatase elevation- follow-up with PCP      Chronic Medical Problems:  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HCC)    • Arrhythmia    • Arthritis    • Back pain 3/28/2012   • CATARACT    • Coronary artery disease    • Disorder of thyroid    • Glaucoma    • Hyperlipidemia    • Hypertension    • MEDICAL HOME 11/07/12   • Myocardial infarct    • Pain     sacrum 1/10   • Stroke (CMS-HCC)    • TIA (transient ischemic attack) 09/2017   • Urinary incontinence        Code Status: Full Code    Hospital Summary:       Please refer to H&P by Buddy Beth M.D. on 3/28/2018 for full details.      In brief, Madeleine Zambrano is a 85 y.o. female who was admitted 3/28/2018 for chest pain. Patient has no medical history of anginal syndrome, arrhythmia, arthritis, back pain, cataracts, coronary artery disease, thyroid dysfunction, glaucoma, hyperlipidemia, hypertension, previous MI, CVA and TIA. Patient has had previous admissions in January and February for chest pain which cardiologist elected not to undergo heart catheterization. Patient was recently hospitalized for abdominal pain with elevated liver function test and underwent an ERCP for stone extraction on March 16, she presents to the emergency room today with continued elevated liver enzymes and abdominal  pain.   Patient was admitted to the CDU for further workup and monitoring. Patient's post on telemetry monitoring with no acute cardiac events noted. Patient was provided oxygen therapy as needed as well as antiemetics and analgesics. EKG on admission shows sinus bradycardia with no acute ST changes or T-wave abnormalities. Chest x-ray shows scattered linear atelectasis similar to previous x-ray on her last admission, no mediastinal widening noted. CBC on admission shows anemia with slightly elevated white blood cells. White blood cells to normalize prior to discharge CMP was essentially benign with the exception of slightly elevated glucose as well as elevated AST and alk phos, these are likely related to her recent stone and ERCP. Cc the abdomen and pelvis shows no definite acute abnormality. Ultrasound and liver shows 1.6 cm hepatic cyst otherwise no acute findings. BNP within normal limits. Troponin remained negative ×3. Urinalysis negative for infection at this time.  On exam his morning patient states she has no longer any pain in her abdomen or chest, no shortness of breath, nausea, vomiting or diarrhea. Patient is able to tolerate by mouth intake with no complaints of nausea or vomiting. Patient is maintaining oxygen saturations on room air. Patient has no tenderness on palpation of abdomen bowel sounds are normal. Patient's lungs are clear bilaterally on auscultation. Patient is able to ambulate on her own however she does feel that she has a little bit weaker than she was previously. Have initiated home health to help with her physical therapy needs at home as well as help with medication management. Patient will follow-up with her PCP and we have scheduled an appointment for her within the week.    Therefore, she is discharged in good and stable condition with close outpatient follow-up.    Consultants:      None     Studies:    Imaging/ Testing:      US-LIVER AND BILIARY TREE   Final Result      Status  post cholecystectomy.      1.6 cm hepatic cyst.      Atherosclerotic plaque.            CT-ABDOMEN-PELVIS WITH   Final Result      1.  No definite acute abnormality of the abdomen or pelvis.   2.  Colonic diverticulosis without evidence of diverticulitis.   3.  Extensive atherosclerosis.      DX-CHEST-LIMITED (1 VIEW)   Final Result      Scattered linear atelectasis similar to previous. No consolidation.            Laboratory:   Recent Labs      03/28/18   1052  03/29/18   0407   WBC  13.0*  6.4   RBC  3.32*  3.20*   HEMOGLOBIN  11.3*  11.1*   HEMATOCRIT  35.0*  33.1*   MCV  105.4*  103.4*   MCH  34.0*  34.7*   MCHC  32.3*  33.5*   RDW  56.7*  55.9*   PLATELETCT  392  362   MPV  10.0  10.5       Recent Labs      03/28/18   1052  03/29/18   0407   SODIUM  140  136   POTASSIUM  4.6  3.9   CHLORIDE  109  109   CO2  23  18*   GLUCOSE  130*  131*   BUN  17  15   CREATININE  1.11  0.94   CALCIUM  8.9  8.7       Recent Labs      03/28/18   1052  03/29/18   0407   ALTSGPT  50  178*   ASTSGOT  121*  374*   ALKPHOSPHAT  298*  301*   TBILIRUBIN  1.1  1.9*   LIPASE  36   --    GLUCOSE  130*  131*        Recent Labs      03/28/18   1052   BNPBTYPENAT  88             Recent Labs      03/28/18   1052  03/28/18   1423  03/29/18   0407   TROPONINI  <0.01  <0.01  0.01       Results     Procedure Component Value Units Date/Time    URINALYSIS [[id number]]  (Abnormal) Collected:  03/28/18 1639    Order Status:  Completed Specimen:  Urine from Urine, Clean Catch Updated:  03/28/18 7389     Micro Urine Req Microscopic     Color Yellow     Character Clear     Ph 7.5     Glucose Negative mg/dL      Ketones Negative mg/dL      Protein 30 (A) mg/dL      Bilirubin Negative     Urobilinogen, Urine 0.2     Nitrite Negative     Leukocyte Esterase Negative     Occult Blood Negative          Procedures/Surgeries:        None     Disposition:    Discharge home    Condition:  Stable    Instructions:   Activity: As tolerated.  Diet: Cardiac  Followup:  With PCP   Instructions:  -Please continue to take all medications as prescribed   -Given instructions to return to the ER if any new or worsening symptoms, worsening condition, or failure to improve  -Call PCP for followup  -No smoking, no alcohol, no caffeine  -Encourage risk factor reduction with tobacco and alcohol abstinence, diet changes, weight loss, and exercise.     Follow-Up  Future Appointments  Date Time Provider Department Center   4/6/2018 11:20 AM Ric Garcia M.D. [address]   4/24/2018 11:40 AM Michael J Bloch, M.D. [facility] None       Discharge Medications:             Medication List      CONTINUE taking these medications      Instructions   amLODIPine 5 MG Tabs  Commonly known as:  NORVASC   Take 2.5 mg by mouth every day.  Dose:  2.5 mg     atorvastatin 40 MG Tabs  Commonly known as:  LIPITOR   Take 1 Tab by mouth every bedtime.  Dose:  40 mg     clopidogrel 75 MG Tabs  Commonly known as:  PLAVIX   Doctor's comments:  Called to [facility]   jlf  Take 1 Tab by mouth every day.  Dose:  75 mg     isosorbide mononitrate SR 30 MG Tb24  Commonly known as:  IMDUR   Take 0.5 Tabs by mouth every day.  Dose:  15 mg     metoprolol 25 MG Tabs  Commonly known as:  LOPRESSOR   Take 1 Tab by mouth 2 times a day.  Dose:  25 mg     Vitamin C 1000 MG Tabs   Take 1 Tab by mouth every day.  Dose:  1 Tab               Please CC the above physicians    FARIBA Duncan  3/29/2018  1:16 PM

## 2018-03-29 NOTE — H&P
" [facility] Medicine History and Physical    Date of Service  3/28/2018    Chief Complaint  Chief Complaint   Patient presents with   • Chest Pain       History of Presenting Illness  85 y.o. female who presented 3/28/2018 with chest pain. Ms. Zambrano has a history of urinary artery disease with a heart attack in 2015 for what she went balloon angioplasty. Medically managed and has had admissions in January and February for chest pain for which cardiology is elected not to undergo heart catheterization. This month, she developed abdominal and chest pain found to have elevated liver function tests and underwent ERCP for stone extraction on March 16. She states today at about 7:00 she was drinking coffee developed nausea and vomited afterwards felt pain in her chest, shoulders, back, upper abdomen. He felt \"freezing\" and short of breath and called 911. He has had a workup in the emergency room blood place under observation status for telemetry monitoring serial troponins and further workup.   Primary Care Physician  Ric Garcia M.D.    Consultants      Code Status  full    Review of Systems  Review of Systems   Constitutional: Negative for chills and fever.   Cardiovascular: Positive for chest pain. Negative for palpitations.        No Syncopal episodes   Gastrointestinal:        Poor appetite   All other systems reviewed and are negative.       Past Medical History  Past Medical History:   Diagnosis Date   • TIA (transient ischemic attack) 09/2017   • MEDICAL HOME 11/07/12   • Back pain 3/28/2012   • Anginal syndrome (CMS-HCC)    • Arrhythmia    • Arthritis    • CATARACT    • Coronary artery disease    • Disorder of thyroid    • Glaucoma    • Hyperlipidemia    • Hypertension    • Myocardial infarct    • Pain     sacrum 1/10   • Stroke (CMS-HCC)    • Urinary incontinence        Surgical History  Past Surgical History:   Procedure Laterality Date   • ERCP IN OR  3/16/2018    Procedure: ERCP IN OR;  Surgeon: Orlin COBB" DARWIN Caldera;  Location: SURGERY [facility];  Service: Gastroenterology   • VITRECTOMY POSTERIOR Right 4/13/2017    Procedure: VITRECTOMY POSTERIOR-LASER, SF6 GAS;  Surgeon: Jessica Arita M.D.;  Location: [facility]ERY [facility];  Service:    • RIGO BY LAPAROSCOPY  10/9/2015    Procedure: RIGO BY LAPAROSCOPY;  Surgeon: Ric Zambrano M.D.;  Location: SURGERY [facility];  Service:    • CATARACT PHACO WITH IOL  4/27/2011    Performed by EVERETTE COOPER at SURGERY [facility]   • CATARACT PHACO WITH IOL  4/13/2011    Performed by EVERETTE COOPER at SURGERY [facility]   • OTHER ORTHOPEDIC SURGERY  1980    pins in left hip   • ATHROPLASTY      broken hip 1990   • OTHER CARDIAC SURGERY         Medications  No current facility-administered medications on file prior to encounter.      Current Outpatient Prescriptions on File Prior to Encounter   Medication Sig Dispense Refill   • metoprolol (LOPRESSOR) 25 MG Tab Take 1 Tab by mouth 2 times a day. 60 Tab 2   • atorvastatin (LIPITOR) 40 MG Tab Take 1 Tab by mouth every bedtime. 30 Tab 3   • isosorbide mononitrate SR (IMDUR) 30 MG TABLET SR 24 HR Take 0.5 Tabs by mouth every day. 30 Tab 3   • amLODIPine (NORVASC) 5 MG Tab Take 2.5 mg by mouth every day.     • Ascorbic Acid (VITAMIN C) 1000 MG Tab Take 1 Tab by mouth every day.     • clopidogrel (PLAVIX) 75 MG Tab Take 1 Tab by mouth every day. 90 Tab 0       Family History  Family History   Problem Relation Age of Onset   • Heart Disease Mother    • Cancer Father    no family history of blood clots    Social History  Social History   Substance Use Topics   • Smoking status: Current Every Day Smoker     Packs/day: 0.25     Years: 0.10     Types: Cigarettes   • Smokeless tobacco: Never Used   • Alcohol use Yes      Comment: 1 per day   she lives alone  Her daughter as well as son in law live locally  Allergies  Allergies   Allergen Reactions   • Meperidine Vomiting and Nausea   •  Tramadol Vomiting and Nausea        Physical Exam  Laboratory   Hemodynamics  Temp (24hrs), Av.5 °C (97.7 °F), Min:36.2 °C (97.2 °F), Max:36.8 °C (98.2 °F)   Temperature: 36.8 °C (98.2 °F)  Pulse  Av.5  Min: 54  Max: 76    Blood Pressure : 134/56      Respiratory      Respiration: 19, Pulse Oximetry: 96 %             Physical Exam   Constitutional: She is oriented to person, place, and time. No distress.   HENT:   Hirsutism     Eyes: No scleral icterus.   Neck: Neck supple.   Cardiovascular:   No murmur heard.  Bradycardia, sinus rhythm   Pulmonary/Chest: Effort normal and breath sounds normal. No respiratory distress.   Abdominal: Soft. She exhibits no distension. There is no tenderness.   Musculoskeletal: She exhibits no edema or tenderness.   Neurological: She is alert and oriented to person, place, and time.   Skin: She is not diaphoretic.   Large brown left forearm skin lesion   Psychiatric:   Flat affect   Nursing note and vitals reviewed.      Recent Labs      18   1052   WBC  13.0*   RBC  3.32*   HEMOGLOBIN  11.3*   HEMATOCRIT  35.0*   MCV  105.4*   MCH  34.0*   MCHC  32.3*   RDW  56.7*   PLATELETCT  392   MPV  10.0     Recent Labs      18   1052   SODIUM  140   POTASSIUM  4.6   CHLORIDE  109   CO2  23   GLUCOSE  130*   BUN  17   CREATININE  1.11   CALCIUM  8.9     Recent Labs      18   1052   ALTSGPT  50   ASTSGOT  121*   ALKPHOSPHAT  298*   TBILIRUBIN  1.1   LIPASE  36   GLUCOSE  130*     Recent Labs      18   1052   APTT  30.4   INR  1.08     Recent Labs      18   1052   BNPBTYPENAT  88         Lab Results   Component Value Date    TROPONINI <0.01 2018     Urinalysis:    Lab Results  Component Value Date/Time   SPECGRAVITY >1.045 2018 1639   GLUCOSEUR Negative 2018 1639   KETONES Negative 2018 1639   NITRITE Negative 2018 1639   WBCURINE 0-2 2018 1639   RBCURINE 0-2 2018 1639   BACTERIA Negative 2018 1639   EPITHELCELL  Few 03/28/2018 1639        Imaging  CT-ABDOMEN-PELVIS WITH   Final Result      1.  No definite acute abnormality of the abdomen or pelvis.   2.  Colonic diverticulosis without evidence of diverticulitis.   3.  Extensive atherosclerosis.      DX-CHEST-LIMITED (1 VIEW)   Final Result      Scattered linear atelectasis similar to previous. No consolidation.        EKG: bradycardia without ST nor T wave changes   Assessment/Plan     I anticipate this patient is appropriate for observation status at this time.    Chest pain- (present on admission)   Assessment & Plan    This is in the setting of known coronary artery disease.   She'll be placed under observation status and have serial troponins monitored on a telemetry floor.  She was admitted in January as well as February for chest pain panel times cardiology has elected not to undergo cardiac catheterization and her advanced age and known disease.  Be treated medically at this point in time.        CAD (coronary artery disease)- (present on admission)   Assessment & Plan    History of heart catheterization 2015 with balloon angioplasty  Continue medical management with metoprolol, Lipitor, isosorbide, and plavix.        Alkaline phosphatase elevation- (present on admission)   Assessment & Plan    She had a recent ERCP with stone extraction on March 16, 2018  Her liver function test remain somewhat elevated will be repeated in the morning        Vomiting- (present on admission)   Assessment & Plan    IV fluids and zofran            VTE prophylaxis: lovenox

## 2018-03-29 NOTE — RESPIRATORY CARE
COPD EDUCATION by COPD CLINICAL EDUCATOR  3/29/2018 at 9:14 AM by Francy Wetzel     Patient reviewed by COPD education team. Patient does not qualify for COPD program.

## 2018-03-29 NOTE — ASSESSMENT & PLAN NOTE
This is in the setting of known coronary artery disease.   She'll be placed under observation status and have serial troponins monitored on a telemetry floor.  She was admitted in January as well as February for chest pain panel times cardiology has elected not to undergo cardiac catheterization and her advanced age and known disease.  Be treated medically at this point in time.

## 2018-03-29 NOTE — PROGRESS NOTES
Assumed care of Pt at 0700 after report from ANGUS Gutierrez, assessment complete.  Pt resting comfortably, A & O X 4, VSS, nsr;  Pt denies chest pain, pressure, but c/o chronic back pain, discomfort.  Pt updated on and understands POC; assisted to RR with walker; medications held for NPO.  Pt waiting for US, no needs at this time. Bed in low position, call light within reach - will continue to monitor.

## 2018-03-29 NOTE — ASSESSMENT & PLAN NOTE
She had a recent ERCP with stone extraction on March 16, 2018  Her liver function test remain somewhat elevated will be repeated in the morning

## 2018-03-29 NOTE — ASSESSMENT & PLAN NOTE
History of heart catheterization 2015 with balloon angioplasty  Continue medical management with metoprolol, Lipitor, isosorbide, and plavix.

## 2018-03-29 NOTE — PROGRESS NOTES
Pt medicated for pain control with tylenol po prn. Heat packs given. Will pass care to NOC RN @ [facility]

## 2018-03-29 NOTE — PROGRESS NOTES
A/o,assessment completed,poc discussed,verbalized understanding,denies CP,sob, resting comfortably, SR on tele hr=80 with occ. PVCs,bed alarm on for safety,call button within reach,will continue to monitor.

## 2018-03-30 ENCOUNTER — HOME HEALTH ADMISSION (OUTPATIENT)
Dept: HOME HEALTH SERVICES | Facility: HOME HEALTHCARE | Age: 83
End: 2018-03-30
Payer: MEDICARE

## 2018-03-30 NOTE — DISCHARGE PLANNING
[facility] RN and pt aware [facility] will be calling pt when [facility] approves HH visit.  [facility] will be submitting auth request to see pt for skilled visits as ordered by MD/NP.  No other needs verbalized/id'd by pt/staff/MD

## 2018-03-30 NOTE — DISCHARGE PLANNING
ATTN: Case Management  RE: Referral for Home Health                We would like to take this opportunity to thank you for submitting a referral for your patient to continue care with [facility]. Our skilled team is dedicated to helping all patients recover and gain independence in the home setting.            As of 3/30/2018, we have accepted the above patient into our service. A [facility] clinician will be out to see the patient within 48 hours to conduct our initial visit. If you have any questions or concerns regarding the patient’s transition to Home Health, please do not hesitate to contact us. We are open for referrals 7 days a week from 8AM to 5PM at [phone].      We look forward to collaborating with you,  [facility] Team

## 2018-03-30 NOTE — DISCHARGE PLANNING
Call from SCP (Ajith) patient will be accepted by [facility] and seen by Monday 4/2. Per SCP, conversation with Brenda with [facility] and agreed to see patient for HH services.

## 2018-03-30 NOTE — PROGRESS NOTES
Pt given and understands discharge instructions; PIV removed, covered and wrapped with coban.  Pt assisted with dressing, waiting for ride home with family.

## 2018-03-30 NOTE — DISCHARGE INSTRUCTIONS
Nonspecific Chest Pain  Chest pain can be caused by many different conditions. There is a chance that your pain could be related to something serious, such as a heart attack or a blood clot in your lungs. Chest pain can also be caused by conditions that are not life-threatening. If you have chest pain, it is very important to follow up with your doctor.  Follow these instructions at home:  Medicines  · If you were prescribed an antibiotic medicine, take it as told by your doctor. Do not stop taking the antibiotic even if you start to feel better.  · Take over-the-counter and prescription medicines only as told by your doctor.  Lifestyle  · Do not use any products that contain nicotine or tobacco, such as cigarettes and e-cigarettes. If you need help quitting, ask your doctor.  · Do not drink alcohol.  · Make lifestyle changes as told by your doctor. These may include:  ¨ Getting regular exercise. Ask your doctor for some activities that are safe for you.  ¨ Eating a heart-healthy diet. A diet specialist (dietitian) can help you to learn healthy eating options.  ¨ Staying at a healthy weight.  ¨ Managing diabetes, if needed.  ¨ Lowering your stress, as with deep breathing or spending time in nature.  General instructions  · Avoid any activities that make you feel chest pain.  · If your chest pain is because of heartburn:  ¨ Raise (elevate) the head of your bed about 6 inches (15 cm). You can do this by putting blocks under the bed legs at the head of the bed.  ¨ Do not sleep with extra pillows under your head. That does not help heartburn.  · Keep all follow-up visits as told by your doctor. This is important. This includes any further testing if your chest pain does not go away.  Contact a doctor if:  · Your chest pain does not go away.  · You have a rash with blisters on your chest.  · You have a fever.  · You have chills.  Get help right away if:  · Your chest pain is worse.  · You have a cough that gets worse, or  you cough up blood.  · You have very bad (severe) pain in your belly (abdomen).  · You are very weak.  · You pass out (faint).  · You have either of these for no clear reason:  ¨ Sudden chest discomfort.  ¨ Sudden discomfort in your arms, back, neck, or jaw.  · You have shortness of breath at any time.  · You suddenly start to sweat, or your skin gets clammy.  · You feel sick to your stomach (nauseous).  · You throw up (vomit).  · You suddenly feel light-headed or dizzy.  · Your heart starts to beat fast, or it feels like it is skipping beats.  These symptoms may be an emergency. Do not wait to see if the symptoms will go away. Get medical help right away. Call your local emergency services (911 in the [place] Do not drive yourself to the hospital.   This information is not intended to replace advice given to you by your health care provider. Make sure you discuss any questions you have with your health care provider.  Document Released: 06/05/2009 Document Revised: 09/11/2017 Document Reviewed: 09/11/2017  [employer] Patient Education © 2017 [employer]  Discharge Instructions    Discharged to home by car with relative. Discharged via wheelchair, hospital escort: Yes.  Special equipment needed: Not Applicable    Be sure to schedule a follow-up appointment with your primary care doctor or any specialists as instructed.     Discharge Plan:   Diet Plan: Discussed  Activity Level: Discussed  Smoking Cessation Offered: Patient Counseled  Confirmed Follow up Appointment: Patient to Call and Schedule Appointment  Confirmed Symptoms Management: Discussed  Medication Reconciliation Updated: Yes  Influenza Vaccine Indication: Patient Refuses    I understand that a diet low in cholesterol, fat, and sodium is recommended for good health. Unless I have been given specific instructions below for another diet, I accept this instruction as my diet prescription.   Other diet: Regular    Special Instructions: None    · Is  patient discharged on Warfarin / Coumadin?   No     Depression / Suicide Risk    As you are discharged from this [facility] facility, it is important to learn how to keep safe from harming yourself.    Recognize the warning signs:  · Abrupt changes in personality, positive or negative- including increase in energy   · Giving away possessions  · Change in eating patterns- significant weight changes-  positive or negative  · Change in sleeping patterns- unable to sleep or sleeping all the time   · Unwillingness or inability to communicate  · Depression  · Unusual sadness, discouragement and loneliness  · Talk of wanting to die  · Neglect of personal appearance   · Rebelliousness- reckless behavior  · Withdrawal from people/activities they love  · Confusion- inability to concentrate     If you or a loved one observes any of these behaviors or has concerns about self-harm, here's what you can do:  · Talk about it- your feelings and reasons for harming yourself  · Remove any means that you might use to hurt yourself (examples: pills, rope, extension cords, firearm)  · Get professional help from the community (Mental Health, Substance Abuse, psychological counseling)  · Do not be alone:Call your Safe Contact- someone whom you trust who will be there for you.  · Call your local CRISIS HOTLINE 761-4677 or 110-229-1383  · Call your local Children's Mobile Crisis Response Team [place] (958) 798-4121 or www.Spotigo  · Call the toll free National Suicide Prevention Hotlines   · National Suicide Prevention Lifeline 807-930-QJHX (9619)  · National Hope Line Network 800-SUICIDE (091-4987)

## 2018-03-30 NOTE — DISCHARGE PLANNING
Call placed to Artie with [facility], states that [facility] has denied patient HH services with [facility].   Call placed to Haley with [facility], notified of [facility], [facility] and [facility] unable to take patient at this time due to staffing and filled to capacity. Per Haley, information will be expedited to potential auth.   CCS will continue to follow for HH services and auth.

## 2018-03-31 ENCOUNTER — HOME CARE VISIT (OUTPATIENT)
Dept: HOME HEALTH SERVICES | Facility: HOME HEALTHCARE | Age: 83
End: 2018-03-31

## 2018-04-05 ENCOUNTER — TELEPHONE (OUTPATIENT)
Dept: MEDICAL GROUP | Facility: MEDICAL CENTER | Age: 83
End: 2018-04-05

## 2018-04-05 NOTE — TELEPHONE ENCOUNTER
Future Appointments       Provider Department Center    4/6/2018 11:20 AM Ric Garcia M.D.; [facility] [address]    4/24/2018 11:40 AM Michael J Bloch, M.D. [facility] [place] [facility]            [facility] ANNUAL WELLNESS VISIT PRE-VISIT PLANNING WITHOUT OUTREACH     1.  Reviewed note from last office visit with PCP: YES Last office visit 08/29/16    2.  If any orders were placed at last visit, do we have Results/Consult Notes?        •  Labs - Labs ordered, completed on 08/29/18 and results are in chart.       •  Imaging - Imaging ordered, completed and results are in chart. 08/29/18       •  Referrals - Referral ordered, patient was seen and consult notes are in chart. Care Teams updated  YES.     3.  Immunizations were updated in [facility] using WebIZ?: Yes       •  WebIZ Recommendations: PREVNAR (PCV13)  and TDAP        •  Is patient due for Tdap? YES. Patient was not notified of copay/out of pocket cost.       •  Is patient due for Shingles? NO     4.  Patient has the following Care Path diagnoses on Problem List:  NONE    5.  MDX printed and highlighted for Provider? YES    6.  Patient is due for the following Health Maintenance Topics:     Health Maintenance Due   Topic Date Due   • IMM DTaP/Tdap/Td Vaccine (1 - Tdap) 02/01/1952   • IMM PNEUMOCOCCAL 65+ (ADULT) LOW/MEDIUM RISK SERIES (2 of 2 - PCV13) 06/05/2013   • BONE DENSITY  04/12/2017   • Annual Wellness Visit  08/30/2017

## 2018-04-24 ENCOUNTER — OFFICE VISIT (OUTPATIENT)
Dept: VASCULAR LAB | Facility: MEDICAL CENTER | Age: 83
End: 2018-04-24
Attending: INTERNAL MEDICINE
Payer: MEDICARE

## 2018-04-24 VITALS
HEART RATE: 64 BPM | WEIGHT: 129.4 LBS | DIASTOLIC BLOOD PRESSURE: 53 MMHG | SYSTOLIC BLOOD PRESSURE: 129 MMHG | BODY MASS INDEX: 24.43 KG/M2 | HEIGHT: 61 IN

## 2018-04-24 DIAGNOSIS — E78.5 DYSLIPIDEMIA: ICD-10-CM

## 2018-04-24 DIAGNOSIS — I25.10 CORONARY ARTERY DISEASE DUE TO LIPID RICH PLAQUE: ICD-10-CM

## 2018-04-24 DIAGNOSIS — Z72.0 TOBACCO ABUSE: ICD-10-CM

## 2018-04-24 DIAGNOSIS — I25.83 CORONARY ARTERY DISEASE DUE TO LIPID RICH PLAQUE: ICD-10-CM

## 2018-04-24 DIAGNOSIS — I48.91 ATRIAL FIBRILLATION, UNSPECIFIED TYPE (HCC): ICD-10-CM

## 2018-04-24 DIAGNOSIS — I10 ESSENTIAL HYPERTENSION: ICD-10-CM

## 2018-04-24 DIAGNOSIS — I65.23 CAROTID ATHEROSCLEROSIS, BILATERAL: ICD-10-CM

## 2018-04-24 PROCEDURE — 99214 OFFICE O/P EST MOD 30 MIN: CPT | Performed by: INTERNAL MEDICINE

## 2018-04-24 PROCEDURE — 99212 OFFICE O/P EST SF 10 MIN: CPT

## 2018-04-24 ASSESSMENT — ENCOUNTER SYMPTOMS
BRUISES/BLEEDS EASILY: 0
MYALGIAS: 0
COUGH: 0
FOCAL WEAKNESS: 0
BACK PAIN: 1
NERVOUS/ANXIOUS: 0
PALPITATIONS: 0
HEADACHES: 0
DEPRESSION: 0
CLAUDICATION: 0
SHORTNESS OF BREATH: 1

## 2018-04-24 NOTE — PATIENT INSTRUCTIONS
We will send you to see cardiology.    BLOOD THINNERS;  - continue clopidogrel (plavix)  - no aspirin or eliquis for now    BLOOD PRESSURE  - continue amlodipine  - continue metoprolol    CHOLESTEROL:  - continue atorvastatin    Start keeping track of BP at home - bring in log to next visit  Bring in all medications to next visit so that we can review  Think about quitting smoking!!    Get blood work as attached one week before next visit    Follow Up:  June 8 at 1040    Michael Bloch, MD  [facility]  [phone]

## 2018-04-24 NOTE — PROGRESS NOTES
"Resistant Hypertension FOLLOW UP VISIT  Subjective:   Madeleine Zambrano is a 85 y.o. female who presents today 4-24-18  Chief Complaint   Patient presents with   • Amb Vascular Reason For Visit     Follow up     HPI:   Patient here for followup of coronary artery disease, carotid disease, hypertension and dyslipidemia.   We have not seen her in many years  Has been in hospital a couple of times with chest pain  Cards saw patient and decided not to pursue angiogram  No further chest pain since discharge  Lots of bruising but no bleeding  On plavix - no bleeding  Refuses   Does not take aspirin  States good adherence with amlodipine and metoprolol  Not checking BP at home  Takes atorvastatin, but she thinks 1/2 tab  Taking 1/2 tab of imdur    DIET AND EXERCISE:  Weight Change: no change  Diet: low sodium  Exercise: minimal exercise   Smoking - 3 cigs/day     Review of Systems   Constitutional: Positive for malaise/fatigue.   Respiratory: Positive for shortness of breath. Negative for cough.    Cardiovascular: Negative for chest pain, palpitations, claudication and leg swelling.   Musculoskeletal: Positive for back pain. Negative for myalgias.   Neurological: Negative for focal weakness and headaches.   Endo/Heme/Allergies: Does not bruise/bleed easily.   Psychiatric/Behavioral: Negative for depression. The patient is not nervous/anxious.       Objective:     Vitals:    04/24/18 1148 04/24/18 1152   BP: 150/53 129/53   Weight: 58.7 kg (129 lb 6.4 oz)    Height: 1.549 m (5' 1\")      Body mass index is 24.45 kg/m².  Physical Exam   Constitutional: No distress.   Cardiovascular: Normal rate, regular rhythm and intact distal pulses.  Exam reveals no gallop and no friction rub.    Murmur heard.  Pulmonary/Chest: No respiratory distress. She has no wheezes. She has no rales.   Abdominal: There is no tenderness.   Musculoskeletal: She exhibits edema.   Tr edema bilat   Neurological: No cranial nerve deficit.   Walks " with walker  Speech slow but clear   Skin: She is not diaphoretic.   Psychiatric: Her behavior is normal.   Somewhat flat effect   Vitals reviewed.    Data review:     MRI head sep 2017:  1.  Focal signal abnormality in the RIGHT deep frontal white matter is likely an area of subacute ischemia rather than an area of acute ischemia or demyelination  2.  Atrophy  3.  White matter changes  4.  Motion degraded study  5.  Remote LEFT basal ganglia CVA    Echo Feb 2018  Mild concentric left ventricular hypertrophy.  Left ventricular ejection fraction is visually estimated to be 65%.  Normal regional wall motion.    Carotid duplex march 2018:  1.  50 - 69% right carotid bulb and internal carotid arterial stenosis.   2.  Less than 50% left internal carotid arterial stenosis.    3.  Normal bilateral subclavian and vertebral arterial exam.    Medical Decision Making:  Today's Assessment / Status / Plan:     1. Essential hypertension  COMP METABOLIC PANEL   2. Coronary artery disease due to lipid rich plaque  REFERRAL TO CARDIOLOGY    CBC WITHOUT DIFFERENTIAL   3. Carotid atherosclerosis, bilateral     4. Atrial fibrillation, unspecified type (CMS-HCC)  REFERRAL TO CARDIOLOGY   5. Dyslipidemia  COMP METABOLIC PANEL    LIPID PROFILE    TSH   6. Tobacco abuse       Patient Type: Primary Prevention    Etiology of Established CVD if Present:   1. carotid atherosclerosis, moderate without previous intervention  2. coronary artery disease status post non-Q-wave MI status post PTCA but unable to place stent due to technical issues  3. Possible afib in 2017 without known recurrence    Lipid Management:    Patient qualifies for moderate intensity lipid-lowering therapy based on current ACC AHA guidelines  Goal LDL <70 and goal nonHDL <100  Currently on high intensity statin - possible confusion about dose  Plan:  - continue atorvastatin - bring in meds to review next vsiit  - Recheck fasting lipid panel    Blood Pressure  Management:  ACC/AHA goal <130/80 but I would want to target 130s/80s on average given her fragility   Unclear level of control  Based upon her most recent renal artery duplex, I do not think that renal artery stenosis playing a role.    Has edema on higher dose DHP CCBs - tolerates 2.5 daily,   Had constipation on higher doses of nonDHP CCBs.  Does not appear to be on acei or arb at present  Plan:  - Continue metoprolol  - continue low dose amlodipine  - bring in meds for review  - consider adding low dose ARB if renal function stable (either in addition to or instead of amlodipine depending on BP)  - restart home blood pressure monitoring and bring in written log  - May be candidate for 24-hour and with her blood pressure monitor    Glycemic Status: Normal   - check fasting glucose    Anti-Platelet/Anti-Coagulant Tx: yes  Has lots of bruising, but no significant bleeding on anti-platelet monotherapy  Very reluctant to restart anticoagulation  - continue clopidogrel monotherapy for now  - defer to cards as to whether or not to add NOAC  - she may be a good candidate for the vascular protection dose of xarelto (2.5 bid) when and if approved (COMPASS trial)    Smoking: recommended complete smoking cessation.  She is not interested in e cig or any medications at present.  Will need to address again at next visit    Physical Activity: frequency : as much walking as possible    Weight Management and Nutrition:  Focus on low sodium.    Other:     1. carotid atherosclerosis, moderate, without previous intervention. Continue medical management.  Recheck carotid duplex in one year    2. coronary artery disease status post non-Q-wave MI status post PTCA but unable to place stent due to technical issues.  Has been in hospital on a couple of recent occassions with chest pain, but no MI or intervention. Continue medical management.  Will defer to cards as to whether or not should stay on imdur and whether or not should have  further w/u of chest pain.  Cardiology referral placed (patient previously saw     3.  Possible Paroxysmal afib - If she in fact has afib she has a very high chads-vasc score = at least 7 including h/o tia/cva.  Will defer further management to cards, including discussion of whether or not to pursue a rhythm monitor and/or pursue anticoagulation.     Instructed to follow-up with PCP for remainder of adult medical needs: yes  We will partner with other providers in the management of established vascular disease and cardiometabolic risk factors.    Studies to Be Obtained:   carotid duplex in April 2019  Labs to Be Obtained: As above prior to next visit    Follow up in: 6 weeks    Michael J Bloch, M.D.    CC:   Ric Garcia M.D.

## 2018-04-29 ENCOUNTER — PATIENT OUTREACH (OUTPATIENT)
Dept: HEALTH INFORMATION MANAGEMENT | Facility: OTHER | Age: 83
End: 2018-04-29

## 2018-04-30 NOTE — PROGRESS NOTES
Patient Madeleine Zambrano was  originally  discharged on 03/02/2018 for chest pain. IHD patient Advocate assisted with multiple discharge orders including 1- Primary Care physician appointment and 1- Discharge Clinic follow up with Candis Arteaga on 03/17/2018.   The patient did not kept her Primary Care physician appointment.  Patient readmitted to [facility] on 03/15/2018 for UTI Patient was discharge on 03/17/2018.   IHD patient Advocate assisted with multiple discharge orders including confirming 2 appointments 1- Primary Care Physician and 1- Gastroenterology follow up with Dr. Carleen Sharma. The patient did not  kept any of these two appointments.  The patient was instructed to start Home Health services upon discharge which the patient declined  Home Health services on 04/02/2018.   IHD Patient Advocate made multiple outreach calls to the  patient since discharge and [facility] has not been able to get in contact with patient.  The patient has 1 future  Cardiologist appointment scheduled

## 2018-05-07 ENCOUNTER — APPOINTMENT (OUTPATIENT)
Dept: RADIOLOGY | Facility: MEDICAL CENTER | Age: 83
End: 2018-05-07
Attending: EMERGENCY MEDICINE
Payer: MEDICARE

## 2018-05-07 ENCOUNTER — HOSPITAL ENCOUNTER (EMERGENCY)
Facility: MEDICAL CENTER | Age: 83
End: 2018-05-07
Attending: EMERGENCY MEDICINE
Payer: MEDICARE

## 2018-05-07 VITALS — HEART RATE: 77 BPM | OXYGEN SATURATION: 99 % | WEIGHT: 130 LBS | BODY MASS INDEX: 24.56 KG/M2

## 2018-05-07 DIAGNOSIS — M54.50 ACUTE MIDLINE LOW BACK PAIN WITHOUT SCIATICA: ICD-10-CM

## 2018-05-07 LAB
ALBUMIN SERPL BCP-MCNC: 1.9 G/DL (ref 3.2–4.9)
ALBUMIN/GLOB SERPL: 1.3 G/DL
ALP SERPL-CCNC: 60 U/L (ref 30–99)
ALT SERPL-CCNC: 10 U/L (ref 2–50)
ANION GAP SERPL CALC-SCNC: 10 MMOL/L (ref 0–11.9)
APPEARANCE UR: CLEAR
AST SERPL-CCNC: 25 U/L (ref 12–45)
BASOPHILS # BLD AUTO: 0.8 % (ref 0–1.8)
BASOPHILS # BLD: 0.07 K/UL (ref 0–0.12)
BILIRUB SERPL-MCNC: 0.4 MG/DL (ref 0.1–1.5)
BILIRUB UR QL STRIP.AUTO: NEGATIVE
BNP SERPL-MCNC: 80 PG/ML (ref 0–100)
BUN SERPL-MCNC: 13 MG/DL (ref 8–22)
CALCIUM SERPL-MCNC: 9.7 MG/DL (ref 8.5–10.5)
CHLORIDE SERPL-SCNC: 109 MMOL/L (ref 96–112)
CO2 SERPL-SCNC: 24 MMOL/L (ref 20–33)
COLOR UR: YELLOW
CREAT SERPL-MCNC: 0.54 MG/DL (ref 0.5–1.4)
EKG IMPRESSION: NORMAL
EOSINOPHIL # BLD AUTO: 0.3 K/UL (ref 0–0.51)
EOSINOPHIL NFR BLD: 3.4 % (ref 0–6.9)
ERYTHROCYTE [DISTWIDTH] IN BLOOD BY AUTOMATED COUNT: 53.9 FL (ref 35.9–50)
GLOBULIN SER CALC-MCNC: 1.5 G/DL (ref 1.9–3.5)
GLUCOSE SERPL-MCNC: 75 MG/DL (ref 65–99)
GLUCOSE UR STRIP.AUTO-MCNC: NEGATIVE MG/DL
HCT VFR BLD AUTO: 40.1 % (ref 37–47)
HGB BLD-MCNC: 13.4 G/DL (ref 12–16)
IMM GRANULOCYTES # BLD AUTO: 0.04 K/UL (ref 0–0.11)
IMM GRANULOCYTES NFR BLD AUTO: 0.5 % (ref 0–0.9)
KETONES UR STRIP.AUTO-MCNC: ABNORMAL MG/DL
LEUKOCYTE ESTERASE UR QL STRIP.AUTO: NEGATIVE
LIPASE SERPL-CCNC: 16 U/L (ref 11–82)
LYMPHOCYTES # BLD AUTO: 1.6 K/UL (ref 1–4.8)
LYMPHOCYTES NFR BLD: 18.2 % (ref 22–41)
MCH RBC QN AUTO: 34.1 PG (ref 27–33)
MCHC RBC AUTO-ENTMCNC: 33.4 G/DL (ref 33.6–35)
MCV RBC AUTO: 102 FL (ref 81.4–97.8)
MICRO URNS: ABNORMAL
MONOCYTES # BLD AUTO: 0.68 K/UL (ref 0–0.85)
MONOCYTES NFR BLD AUTO: 7.7 % (ref 0–13.4)
NEUTROPHILS # BLD AUTO: 6.09 K/UL (ref 2–7.15)
NEUTROPHILS NFR BLD: 69.4 % (ref 44–72)
NITRITE UR QL STRIP.AUTO: NEGATIVE
NRBC # BLD AUTO: 0 K/UL
NRBC BLD-RTO: 0 /100 WBC
PH UR STRIP.AUTO: 6 [PH]
PLATELET # BLD AUTO: 273 K/UL (ref 164–446)
PMV BLD AUTO: 10.3 FL (ref 9–12.9)
POTASSIUM SERPL-SCNC: 3.9 MMOL/L (ref 3.6–5.5)
PROT SERPL-MCNC: 3.4 G/DL (ref 6–8.2)
PROT UR QL STRIP: NEGATIVE MG/DL
RBC # BLD AUTO: 3.93 M/UL (ref 4.2–5.4)
RBC UR QL AUTO: NEGATIVE
SODIUM SERPL-SCNC: 143 MMOL/L (ref 135–145)
SP GR UR STRIP.AUTO: 1.01
TROPONIN I SERPL-MCNC: <0.01 NG/ML (ref 0–0.04)
UROBILINOGEN UR STRIP.AUTO-MCNC: 0.2 MG/DL
WBC # BLD AUTO: 8.8 K/UL (ref 4.8–10.8)

## 2018-05-07 PROCEDURE — 84484 ASSAY OF TROPONIN QUANT: CPT

## 2018-05-07 PROCEDURE — 74176 CT ABD & PELVIS W/O CONTRAST: CPT

## 2018-05-07 PROCEDURE — 93005 ELECTROCARDIOGRAM TRACING: CPT | Performed by: EMERGENCY MEDICINE

## 2018-05-07 PROCEDURE — 96374 THER/PROPH/DIAG INJ IV PUSH: CPT

## 2018-05-07 PROCEDURE — 80053 COMPREHEN METABOLIC PANEL: CPT

## 2018-05-07 PROCEDURE — 700111 HCHG RX REV CODE 636 W/ 250 OVERRIDE (IP): Performed by: EMERGENCY MEDICINE

## 2018-05-07 PROCEDURE — 83880 ASSAY OF NATRIURETIC PEPTIDE: CPT

## 2018-05-07 PROCEDURE — 36415 COLL VENOUS BLD VENIPUNCTURE: CPT

## 2018-05-07 PROCEDURE — 83690 ASSAY OF LIPASE: CPT

## 2018-05-07 PROCEDURE — 81003 URINALYSIS AUTO W/O SCOPE: CPT

## 2018-05-07 PROCEDURE — 85025 COMPLETE CBC W/AUTO DIFF WBC: CPT

## 2018-05-07 PROCEDURE — 96375 TX/PRO/DX INJ NEW DRUG ADDON: CPT

## 2018-05-07 PROCEDURE — 99285 EMERGENCY DEPT VISIT HI MDM: CPT

## 2018-05-07 RX ORDER — MORPHINE SULFATE 4 MG/ML
4 INJECTION, SOLUTION INTRAMUSCULAR; INTRAVENOUS ONCE
Status: COMPLETED | OUTPATIENT
Start: 2018-05-07 | End: 2018-05-07

## 2018-05-07 RX ORDER — ONDANSETRON 2 MG/ML
4 INJECTION INTRAMUSCULAR; INTRAVENOUS ONCE
Status: COMPLETED | OUTPATIENT
Start: 2018-05-07 | End: 2018-05-07

## 2018-05-07 RX ADMIN — MORPHINE SULFATE 4 MG: 4 INJECTION INTRAVENOUS at 09:27

## 2018-05-07 RX ADMIN — ONDANSETRON 4 MG: 2 INJECTION INTRAMUSCULAR; INTRAVENOUS at 09:27

## 2018-05-07 ASSESSMENT — PAIN SCALES - GENERAL: PAINLEVEL_OUTOF10: 7

## 2018-05-07 NOTE — ED NOTES
Reviewed discharge with patient, she verbalized understanding.  Ambulated patient to the br and helped her to dress.  Left via wheel chair with her son-in law.

## 2018-05-07 NOTE — DISCHARGE INSTRUCTIONS
Back Pain, Adult  Back pain is very common in adults. The cause of back pain is rarely dangerous and the pain often gets better over time. The cause of your back pain may not be known. Some common causes of back pain include:  · Strain of the muscles or ligaments supporting the spine.  · Wear and tear (degeneration) of the spinal disks.  · Arthritis.  · Direct injury to the back.  For many people, back pain may return. Since back pain is rarely dangerous, most people can learn to manage this condition on their own.  Follow these instructions at home:  Watch your back pain for any changes. The following actions may help to lessen any discomfort you are feeling:  · Remain active. It is stressful on your back to sit or  one place for long periods of time. Do not sit, drive, or  one place for more than 30 minutes at a time. Take short walks on even surfaces as soon as you are able. Try to increase the length of time you walk each day.  · Exercise regularly as directed by your health care provider. Exercise helps your back heal faster. It also helps avoid future injury by keeping your muscles strong and flexible.  · Do not stay in bed. Resting more than 1-2 days can delay your recovery.  · Pay attention to your body when you bend and lift. The most comfortable positions are those that put less stress on your recovering back. Always use proper lifting techniques, including:  ¨ Bending your knees.  ¨ Keeping the load close to your body.  ¨ Avoiding twisting.  · Find a comfortable position to sleep. Use a firm mattress and lie on your side with your knees slightly bent. If you lie on your back, put a pillow under your knees.  · Avoid feeling anxious or stressed. Stress increases muscle tension and can worsen back pain. It is important to recognize when you are anxious or stressed and learn ways to manage it, such as with exercise.  · Take medicines only as directed by your health care provider.  Over-the-counter medicines to reduce pain and inflammation are often the most helpful. Your health care provider may prescribe muscle relaxant drugs. These medicines help dull your pain so you can more quickly return to your normal activities and healthy exercise.  · Apply ice to the injured area:  ¨ Put ice in a plastic bag.  ¨ Place a towel between your skin and the bag.  ¨ Leave the ice on for 20 minutes, 2-3 times a day for the first 2-3 days. After that, ice and heat may be alternated to reduce pain and spasms.  · Maintain a healthy weight. Excess weight puts extra stress on your back and makes it difficult to maintain good posture.  Contact a health care provider if:  · You have pain that is not relieved with rest or medicine.  · You have increasing pain going down into the legs or buttocks.  · You have pain that does not improve in one week.  · You have night pain.  · You lose weight.  · You have a fever or chills.  Get help right away if:  · You develop new bowel or bladder control problems.  · You have unusual weakness or numbness in your arms or legs.  · You develop nausea or vomiting.  · You develop abdominal pain.  · You feel faint.  This information is not intended to replace advice given to you by your health care provider. Make sure you discuss any questions you have with your health care provider.  Document Released: 12/18/2006 Document Revised: 04/27/2017 Document Reviewed: 04/21/2015  [employer] Interactive Patient Education © 2017 [employer]

## 2018-05-07 NOTE — ED NOTES
Med revc updated and complete  Allergies reviewed  Pt reports no antibiotics in the last 30 days, that she had to take at home.

## 2018-05-07 NOTE — ED NOTES
[place] fall risk assessment complete, fall precautions in placed, sign in place, call light within reach, wrist band in place.

## 2018-05-07 NOTE — ED TRIAGE NOTES
Brought in by EMS, complaints of acute abd pain that radiated to her back that started today.  n/v started shortly after.  Vitals stable.  EKG complete, labs sent.

## 2018-05-07 NOTE — ED NOTES
Daughter called and stated that patient's son passed away yesterday and that her mom may not be feeling well in relation to this.  ERP notified and verbalized understanding.  If patient is discharged her son-in law will be able to pick patient up.

## 2018-05-07 NOTE — ED PROVIDER NOTES
ED Provider Note    CHIEF COMPLAINT  Chief Complaint   Patient presents with   • N/V   • Abdominal Pain       HPI  Madeleine Zambrano is a 85 y.o. female who presents for evaluation of abdominal pain vomiting and back pain. Her symptoms started approximate 7 AM this morning. She is a very poor historian. It sounds as if she has abdominal pain and back pain. She's had vomiting but no fevers. She denies any numbness or motor weakness. She's had no trauma. When asked if she's had this pain before she states yes and states that she has a history of kidney stones. She denies any urinary symptoms. She is continually asking to change position due to her discomfort.    REVIEW OF SYSTEMS  See HPI for further details. All other systems are negative.     PAST MEDICAL HISTORY  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HCC) (HCC)    • Arrhythmia    • Arthritis    • Back pain 3/28/2012   • CATARACT    • Coronary artery disease    • Disorder of thyroid    • Glaucoma    • Hyperlipidemia    • Hypertension    • MEDICAL HOME 11/07/12   • Myocardial infarct (HCC)    • Pain     sacrum 1/10   • Stroke (CMS-HCC) (HCC)    • TIA (transient ischemic attack) 09/2017   • Urinary incontinence        FAMILY HISTORY  Family History   Problem Relation Age of Onset   • Heart Disease Mother    • Cancer Father        SOCIAL HISTORY  Social History     Social History   • Marital status:      Spouse name: N/A   • Number of children: N/A   • Years of education: N/A     Social History Main Topics   • Smoking status: Current Every Day Smoker     Packs/day: 0.25     Years: 0.10     Types: Cigarettes   • Smokeless tobacco: Never Used   • Alcohol use Yes      Comment: 1 per day   • Drug use: No   • Sexual activity: Not on file     Other Topics Concern   • Not on file     Social History Narrative   • No narrative on file       SURGICAL HISTORY  Past Surgical History:   Procedure Laterality Date   • ERCP IN OR  3/16/2018     Procedure: ERCP IN OR;  Surgeon: Orlin Caldera M.D.;  Location: SURGERY [facility];  Service: Gastroenterology   • VITRECTOMY POSTERIOR Right 4/13/2017    Procedure: VITRECTOMY POSTERIOR-LASER, SF6 GAS;  Surgeon: Jessica Arita M.D.;  Location: SURGERY SAME DAY [facility];  Service:    • RIGO BY LAPAROSCOPY  10/9/2015    Procedure: RIGO BY LAPAROSCOPY;  Surgeon: Ric Zambrano M.D.;  Location: SURGERY [facility];  Service:    • CATARACT PHACO WITH IOL  4/27/2011    Performed by EVERETTE COOPER at SURGERY SAME DAY [facility] ORS   • CATARACT PHACO WITH IOL  4/13/2011    Performed by EVERETTE COOPER at SURGERY SAME DAY [facility]   • OTHER ORTHOPEDIC SURGERY  1980    pins in left hip   • ATHROPLASTY      broken hip 1990   • OTHER CARDIAC SURGERY         CURRENT MEDICATIONS  Home Medications    **Home medications have not yet been reviewed for this encounter**         ALLERGIES  Allergies   Allergen Reactions   • Meperidine Vomiting and Nausea   • Tramadol Vomiting and Nausea       PHYSICAL EXAM  VITAL SIGNS: Pulse 64 Comment: left arm BP   Wt 59 kg (130 lb)   SpO2 94% Comment: left arm BP   BMI 24.56 kg/m²     Constitutional: Well developed, Well nourished,  Non-toxic appearance.   HENT: Normocephalic, Atraumatic.   Eyes:  EOMI, Conjunctiva normal, No discharge.   Cardiovascular: Normal heart rate, Normal rhythm, No murmurs, No rubs, No gallops.   Thorax & Lungs: Lungs clear to auscultation bilaterally without wheezes, rales or rhonchi. No respiratory distress.    Abdomen:  Soft, No tenderness, No masses, No pulsatile masses. No guarding and no rebound.  Skin: Warm, Dry.   Extremities:  No edema, No cyanosis. No calf tenderness or swelling.  Musculoskeletal: Good range of motion in all major joints.  Neurologic: Awake alert.    EKG  EKG Interpretation    Interpreted by emergency department physician    Rhythm: normal sinus   Rate: normal  Axis: normal  Ectopy: none  Conduction: normal  ST  Segments: no acute change  T Waves: no acute change  Q Waves: none    Clinical Impression: no acute changes        RADIOLOGY/PROCEDURES  CT-RENAL COLIC EVALUATION(A/P W/O)   Final Result      No renal or ureteral calculus or obstruction.   Diverticula colon. No evidence diverticulitis. No free fluid.   Atherosclerotic changes including coronary artery calcifications.   Tiny adrenal gland nodules unchanged.   1.3 cm cyst right lobe liver unchanged.            COURSE & MEDICAL DECISION MAKING  Pertinent Labs & Imaging studies reviewed. (See chart for details)  This is an 85-year-old here for evaluation of abdominal pain and back pain. She states she's really having back pain at this time. She denies any trauma. She has a history of kidney stones. She is really not a very good historian and it's difficult to figure out exactly what her chief complaint is. She appears neurologically intact and she has good distal pulses. On exam I do not appreciate a pulsatile mass I do not believe that this represents a vascular issue. Laboratories including urinalysis which is negative. BNP is negative. Her chemistries are normal with the exception of her proteins being low. Lipase is normal as well as her liver function studies. CBC shows normal white count and differential. Her troponin I and BNP are negative. CT scan of the abdomen and pelvis shows no evidence of renal or ureteral calculi or obstruction. Upon repeat evaluation the patient is resting comfortably. She's been treated with 4 mg of morphine and 4 mg of Zofran. She states she feels very much better at this time. The patient's daughter has called in and spoke with the nurse. She informed us that the patient had a son who  yesterday. I spoke to the patient about this. She states that he was 45 years old. At this time I do not think she requires acute hospitalization or further evaluation the emergency department. I will have her follow-up with her primary care  provider.    FINAL IMPRESSION  1. Low back pain  2.   3.         Electronically signed by: Clyde Zavaleta, 5/7/2018 9:13 AM

## 2018-05-24 ENCOUNTER — APPOINTMENT (OUTPATIENT)
Dept: MEDICAL GROUP | Facility: MEDICAL CENTER | Age: 83
End: 2018-05-24
Payer: MEDICARE

## 2018-06-05 ENCOUNTER — APPOINTMENT (OUTPATIENT)
Dept: RADIOLOGY | Facility: MEDICAL CENTER | Age: 83
DRG: 445 | End: 2018-06-05
Attending: HOSPITALIST
Payer: MEDICARE

## 2018-06-05 ENCOUNTER — HOSPITAL ENCOUNTER (INPATIENT)
Facility: MEDICAL CENTER | Age: 83
LOS: 2 days | DRG: 445 | End: 2018-06-07
Attending: EMERGENCY MEDICINE | Admitting: HOSPITALIST
Payer: MEDICARE

## 2018-06-05 ENCOUNTER — APPOINTMENT (OUTPATIENT)
Dept: RADIOLOGY | Facility: MEDICAL CENTER | Age: 83
DRG: 445 | End: 2018-06-05
Attending: EMERGENCY MEDICINE
Payer: MEDICARE

## 2018-06-05 DIAGNOSIS — R79.89 ELEVATED LACTIC ACID LEVEL: ICD-10-CM

## 2018-06-05 DIAGNOSIS — R10.13 EPIGASTRIC PAIN: ICD-10-CM

## 2018-06-05 DIAGNOSIS — D72.828 OTHER ELEVATED WHITE BLOOD CELL (WBC) COUNT: ICD-10-CM

## 2018-06-05 DIAGNOSIS — R79.89 ELEVATED LFTS: ICD-10-CM

## 2018-06-05 LAB
ALBUMIN SERPL BCP-MCNC: 3.6 G/DL (ref 3.2–4.9)
ALBUMIN/GLOB SERPL: 1.3 G/DL
ALP SERPL-CCNC: 222 U/L (ref 30–99)
ALT SERPL-CCNC: 263 U/L (ref 2–50)
ANION GAP SERPL CALC-SCNC: 10 MMOL/L (ref 0–11.9)
APPEARANCE UR: CLEAR
AST SERPL-CCNC: 725 U/L (ref 12–45)
BACTERIA #/AREA URNS HPF: ABNORMAL /HPF
BASOPHILS # BLD AUTO: 0.6 % (ref 0–1.8)
BASOPHILS # BLD: 0.07 K/UL (ref 0–0.12)
BILIRUB SERPL-MCNC: 1.4 MG/DL (ref 0.1–1.5)
BILIRUB UR QL STRIP.AUTO: NEGATIVE
BNP SERPL-MCNC: 83 PG/ML (ref 0–100)
BUN SERPL-MCNC: 20 MG/DL (ref 8–22)
CALCIUM SERPL-MCNC: 9.4 MG/DL (ref 8.5–10.5)
CHLORIDE SERPL-SCNC: 108 MMOL/L (ref 96–112)
CO2 SERPL-SCNC: 24 MMOL/L (ref 20–33)
COLOR UR: YELLOW
CREAT SERPL-MCNC: 1.07 MG/DL (ref 0.5–1.4)
EOSINOPHIL # BLD AUTO: 0.09 K/UL (ref 0–0.51)
EOSINOPHIL NFR BLD: 0.8 % (ref 0–6.9)
EPI CELLS #/AREA URNS HPF: ABNORMAL /HPF
ERYTHROCYTE [DISTWIDTH] IN BLOOD BY AUTOMATED COUNT: 54 FL (ref 35.9–50)
GLOBULIN SER CALC-MCNC: 2.7 G/DL (ref 1.9–3.5)
GLUCOSE SERPL-MCNC: 129 MG/DL (ref 65–99)
GLUCOSE UR STRIP.AUTO-MCNC: NEGATIVE MG/DL
HCT VFR BLD AUTO: 42.9 % (ref 37–47)
HGB BLD-MCNC: 13.8 G/DL (ref 12–16)
HYALINE CASTS #/AREA URNS LPF: ABNORMAL /LPF
IMM GRANULOCYTES # BLD AUTO: 0.04 K/UL (ref 0–0.11)
IMM GRANULOCYTES NFR BLD AUTO: 0.3 % (ref 0–0.9)
KETONES UR STRIP.AUTO-MCNC: NEGATIVE MG/DL
LACTATE BLD-SCNC: 2.7 MMOL/L (ref 0.5–2)
LEUKOCYTE ESTERASE UR QL STRIP.AUTO: NEGATIVE
LIPASE SERPL-CCNC: 28 U/L (ref 11–82)
LYMPHOCYTES # BLD AUTO: 0.54 K/UL (ref 1–4.8)
LYMPHOCYTES NFR BLD: 4.7 % (ref 22–41)
MAGNESIUM SERPL-MCNC: 1.9 MG/DL (ref 1.5–2.5)
MCH RBC QN AUTO: 32.9 PG (ref 27–33)
MCHC RBC AUTO-ENTMCNC: 32.2 G/DL (ref 33.6–35)
MCV RBC AUTO: 102.4 FL (ref 81.4–97.8)
MICRO URNS: ABNORMAL
MONOCYTES # BLD AUTO: 0.8 K/UL (ref 0–0.85)
MONOCYTES NFR BLD AUTO: 6.9 % (ref 0–13.4)
NEUTROPHILS # BLD AUTO: 10.01 K/UL (ref 2–7.15)
NEUTROPHILS NFR BLD: 86.7 % (ref 44–72)
NITRITE UR QL STRIP.AUTO: NEGATIVE
NRBC # BLD AUTO: 0 K/UL
NRBC BLD-RTO: 0 /100 WBC
PH UR STRIP.AUTO: 6.5 [PH]
PLATELET # BLD AUTO: 228 K/UL (ref 164–446)
PMV BLD AUTO: 10.7 FL (ref 9–12.9)
POTASSIUM SERPL-SCNC: 3.5 MMOL/L (ref 3.6–5.5)
PROT SERPL-MCNC: 6.3 G/DL (ref 6–8.2)
PROT UR QL STRIP: 30 MG/DL
RBC # BLD AUTO: 4.19 M/UL (ref 4.2–5.4)
RBC # URNS HPF: ABNORMAL /HPF
RBC UR QL AUTO: NEGATIVE
SODIUM SERPL-SCNC: 142 MMOL/L (ref 135–145)
SP GR UR STRIP.AUTO: 1.02
TROPONIN I SERPL-MCNC: 0.01 NG/ML (ref 0–0.04)
UROBILINOGEN UR STRIP.AUTO-MCNC: 1 MG/DL
WBC # BLD AUTO: 11.6 K/UL (ref 4.8–10.8)
WBC #/AREA URNS HPF: ABNORMAL /HPF

## 2018-06-05 PROCEDURE — A9270 NON-COVERED ITEM OR SERVICE: HCPCS | Performed by: HOSPITALIST

## 2018-06-05 PROCEDURE — 99285 EMERGENCY DEPT VISIT HI MDM: CPT

## 2018-06-05 PROCEDURE — 99223 1ST HOSP IP/OBS HIGH 75: CPT | Mod: AI | Performed by: HOSPITALIST

## 2018-06-05 PROCEDURE — 84484 ASSAY OF TROPONIN QUANT: CPT

## 2018-06-05 PROCEDURE — 85025 COMPLETE CBC W/AUTO DIFF WBC: CPT

## 2018-06-05 PROCEDURE — 76705 ECHO EXAM OF ABDOMEN: CPT

## 2018-06-05 PROCEDURE — 700105 HCHG RX REV CODE 258: Performed by: HOSPITALIST

## 2018-06-05 PROCEDURE — 700102 HCHG RX REV CODE 250 W/ 637 OVERRIDE(OP): Performed by: HOSPITALIST

## 2018-06-05 PROCEDURE — 96374 THER/PROPH/DIAG INJ IV PUSH: CPT

## 2018-06-05 PROCEDURE — 700111 HCHG RX REV CODE 636 W/ 250 OVERRIDE (IP): Performed by: EMERGENCY MEDICINE

## 2018-06-05 PROCEDURE — 83605 ASSAY OF LACTIC ACID: CPT

## 2018-06-05 PROCEDURE — 51701 INSERT BLADDER CATHETER: CPT

## 2018-06-05 PROCEDURE — 71045 X-RAY EXAM CHEST 1 VIEW: CPT

## 2018-06-05 PROCEDURE — A9270 NON-COVERED ITEM OR SERVICE: HCPCS | Performed by: EMERGENCY MEDICINE

## 2018-06-05 PROCEDURE — 80053 COMPREHEN METABOLIC PANEL: CPT

## 2018-06-05 PROCEDURE — 700111 HCHG RX REV CODE 636 W/ 250 OVERRIDE (IP): Performed by: HOSPITALIST

## 2018-06-05 PROCEDURE — 93005 ELECTROCARDIOGRAM TRACING: CPT | Performed by: EMERGENCY MEDICINE

## 2018-06-05 PROCEDURE — 83690 ASSAY OF LIPASE: CPT

## 2018-06-05 PROCEDURE — 770020 HCHG ROOM/CARE - TELE (206)

## 2018-06-05 PROCEDURE — 81001 URINALYSIS AUTO W/SCOPE: CPT

## 2018-06-05 PROCEDURE — 700102 HCHG RX REV CODE 250 W/ 637 OVERRIDE(OP): Performed by: EMERGENCY MEDICINE

## 2018-06-05 PROCEDURE — 83880 ASSAY OF NATRIURETIC PEPTIDE: CPT

## 2018-06-05 PROCEDURE — 83735 ASSAY OF MAGNESIUM: CPT

## 2018-06-05 PROCEDURE — A9270 NON-COVERED ITEM OR SERVICE: HCPCS | Performed by: STUDENT IN AN ORGANIZED HEALTH CARE EDUCATION/TRAINING PROGRAM

## 2018-06-05 PROCEDURE — 700102 HCHG RX REV CODE 250 W/ 637 OVERRIDE(OP): Performed by: STUDENT IN AN ORGANIZED HEALTH CARE EDUCATION/TRAINING PROGRAM

## 2018-06-05 RX ORDER — ASPIRIN 81 MG/1
324 TABLET, CHEWABLE ORAL ONCE
Status: COMPLETED | OUTPATIENT
Start: 2018-06-05 | End: 2018-06-05

## 2018-06-05 RX ORDER — ONDANSETRON 2 MG/ML
4 INJECTION INTRAMUSCULAR; INTRAVENOUS ONCE
Status: COMPLETED | OUTPATIENT
Start: 2018-06-05 | End: 2018-06-05

## 2018-06-05 RX ORDER — ACETAMINOPHEN 325 MG/1
325 TABLET ORAL EVERY 6 HOURS PRN
Status: DISCONTINUED | OUTPATIENT
Start: 2018-06-05 | End: 2018-06-07 | Stop reason: HOSPADM

## 2018-06-05 RX ORDER — AMOXICILLIN 250 MG
2 CAPSULE ORAL 2 TIMES DAILY
Status: DISCONTINUED | OUTPATIENT
Start: 2018-06-05 | End: 2018-06-07 | Stop reason: HOSPADM

## 2018-06-05 RX ORDER — OMEPRAZOLE 20 MG/1
20 CAPSULE, DELAYED RELEASE ORAL DAILY
Status: DISCONTINUED | OUTPATIENT
Start: 2018-06-05 | End: 2018-06-07 | Stop reason: HOSPADM

## 2018-06-05 RX ORDER — DIPHENHYDRAMINE HCL 25 MG
25 TABLET ORAL ONCE
Status: COMPLETED | OUTPATIENT
Start: 2018-06-05 | End: 2018-06-05

## 2018-06-05 RX ORDER — CLOPIDOGREL BISULFATE 75 MG/1
75 TABLET ORAL
Status: DISCONTINUED | OUTPATIENT
Start: 2018-06-06 | End: 2018-06-07 | Stop reason: HOSPADM

## 2018-06-05 RX ORDER — ONDANSETRON 2 MG/ML
4 INJECTION INTRAMUSCULAR; INTRAVENOUS EVERY 4 HOURS PRN
Status: DISCONTINUED | OUTPATIENT
Start: 2018-06-05 | End: 2018-06-07 | Stop reason: HOSPADM

## 2018-06-05 RX ORDER — HEPARIN SODIUM 5000 [USP'U]/ML
5000 INJECTION, SOLUTION INTRAVENOUS; SUBCUTANEOUS EVERY 8 HOURS
Status: DISCONTINUED | OUTPATIENT
Start: 2018-06-05 | End: 2018-06-07 | Stop reason: HOSPADM

## 2018-06-05 RX ORDER — BISACODYL 10 MG
10 SUPPOSITORY, RECTAL RECTAL
Status: DISCONTINUED | OUTPATIENT
Start: 2018-06-05 | End: 2018-06-07 | Stop reason: HOSPADM

## 2018-06-05 RX ORDER — ASCORBIC ACID 500 MG
1000 TABLET ORAL DAILY
Status: DISCONTINUED | OUTPATIENT
Start: 2018-06-06 | End: 2018-06-07 | Stop reason: HOSPADM

## 2018-06-05 RX ORDER — SODIUM CHLORIDE 9 MG/ML
INJECTION, SOLUTION INTRAVENOUS CONTINUOUS
Status: DISPENSED | OUTPATIENT
Start: 2018-06-05 | End: 2018-06-05

## 2018-06-05 RX ORDER — POLYETHYLENE GLYCOL 3350 17 G/17G
1 POWDER, FOR SOLUTION ORAL
Status: DISCONTINUED | OUTPATIENT
Start: 2018-06-05 | End: 2018-06-07 | Stop reason: HOSPADM

## 2018-06-05 RX ORDER — ONDANSETRON 4 MG/1
4 TABLET, ORALLY DISINTEGRATING ORAL EVERY 4 HOURS PRN
Status: DISCONTINUED | OUTPATIENT
Start: 2018-06-05 | End: 2018-06-07 | Stop reason: HOSPADM

## 2018-06-05 RX ORDER — ISOSORBIDE MONONITRATE 30 MG/1
15 TABLET, EXTENDED RELEASE ORAL DAILY
Status: DISCONTINUED | OUTPATIENT
Start: 2018-06-05 | End: 2018-06-07 | Stop reason: HOSPADM

## 2018-06-05 RX ORDER — AMLODIPINE BESYLATE 5 MG/1
2.5 TABLET ORAL EVERY EVENING
Status: DISCONTINUED | OUTPATIENT
Start: 2018-06-05 | End: 2018-06-07 | Stop reason: HOSPADM

## 2018-06-05 RX ORDER — ACETAMINOPHEN 325 MG/1
650 TABLET ORAL EVERY 6 HOURS PRN
Status: DISCONTINUED | OUTPATIENT
Start: 2018-06-05 | End: 2018-06-05

## 2018-06-05 RX ADMIN — SODIUM CHLORIDE: 9 INJECTION, SOLUTION INTRAVENOUS at 13:35

## 2018-06-05 RX ADMIN — METOPROLOL TARTRATE 25 MG: 25 TABLET, FILM COATED ORAL at 13:39

## 2018-06-05 RX ADMIN — ONDANSETRON 4 MG: 2 INJECTION INTRAMUSCULAR; INTRAVENOUS at 09:25

## 2018-06-05 RX ADMIN — DIPHENHYDRAMINE HCL 25 MG: 25 TABLET ORAL at 21:10

## 2018-06-05 RX ADMIN — LIDOCAINE HYDROCHLORIDE 30 ML: 20 SOLUTION OROPHARYNGEAL at 09:25

## 2018-06-05 RX ADMIN — HEPARIN SODIUM 5000 UNITS: 5000 INJECTION, SOLUTION INTRAVENOUS; SUBCUTANEOUS at 21:10

## 2018-06-05 RX ADMIN — ASPIRIN 324 MG: 81 TABLET, CHEWABLE ORAL at 09:24

## 2018-06-05 RX ADMIN — OMEPRAZOLE 20 MG: 20 CAPSULE, DELAYED RELEASE ORAL at 13:39

## 2018-06-05 RX ADMIN — AMLODIPINE BESYLATE 2.5 MG: 5 TABLET ORAL at 17:40

## 2018-06-05 ASSESSMENT — PATIENT HEALTH QUESTIONNAIRE - PHQ9
1. LITTLE INTEREST OR PLEASURE IN DOING THINGS: NOT AT ALL
2. FEELING DOWN, DEPRESSED, IRRITABLE, OR HOPELESS: NOT AT ALL
SUM OF ALL RESPONSES TO PHQ9 QUESTIONS 1 AND 2: 0

## 2018-06-05 ASSESSMENT — LIFESTYLE VARIABLES
EVER HAD A DRINK FIRST THING IN THE MORNING TO STEADY YOUR NERVES TO GET RID OF A HANGOVER: NO
EVER_SMOKED: YES
TOTAL SCORE: 0
TOTAL SCORE: 0
DO YOU DRINK ALCOHOL: NO
CONSUMPTION TOTAL: NEGATIVE
HAVE YOU EVER FELT YOU SHOULD CUT DOWN ON YOUR DRINKING: NO
HOW MANY TIMES IN THE PAST YEAR HAVE YOU HAD 5 OR MORE DRINKS IN A DAY: 0
ON A TYPICAL DAY WHEN YOU DRINK ALCOHOL HOW MANY DRINKS DO YOU HAVE: 1
ALCOHOL_USE: YES
TOTAL SCORE: 0
AVERAGE NUMBER OF DAYS PER WEEK YOU HAVE A DRINK CONTAINING ALCOHOL: 7
HAVE PEOPLE ANNOYED YOU BY CRITICIZING YOUR DRINKING: NO
EVER FELT BAD OR GUILTY ABOUT YOUR DRINKING: NO

## 2018-06-05 ASSESSMENT — PAIN SCALES - GENERAL
PAINLEVEL_OUTOF10: 0
PAINLEVEL_OUTOF10: ASSUMED PAIN PRESENT
PAINLEVEL_OUTOF10: 8

## 2018-06-05 NOTE — ED TRIAGE NOTES
Pt BIB [facility] with c/c of back pain, generalized abd pain, and N/V. Pt reporting symptoms for approx 2 days. Pt is a poor historian.

## 2018-06-05 NOTE — ED PROVIDER NOTES
"ED Provider Note    Scribed for Ovi Brewer M.D. by David Rizvi. 6/5/2018, 8:57 AM.    Primary care provider: Ric Garcia M.D.  Means of arrival: EMS  History obtained from: patient  History limited by: none    CHIEF COMPLAINT  Chief Complaint   Patient presents with   • Nausea   • Back Pain       HPI  Madeleine Zambrano is a 85 y.o. female who presents to the Emergency Department complaining of epigastric abdominal pain for the last 2 days. Patient describes her pain as band like around her epigastric abdomen. She reports associated back pain, nausea, vomiting, left arm pain. Patient states that she feels as if she had \"another heart attack\" this morning secondary to her left arm pain. Her primary care provider is Dr. Garcia. Patient denies fever, diarrhea, aspirin use.     REVIEW OF SYSTEMS  Pertinent positives include back pain, nausea, vomiting, abdominal pain, arm pain. Pertinent negatives include no fever, diarrhea. As above, all other systems reviewed and are negative.   See HPI for further details.   C.    PAST MEDICAL HISTORY   has a past medical history of Anginal syndrome (CMS-HCC) (HCC); Arrhythmia; Arthritis; Back pain (3/28/2012); CATARACT; Coronary artery disease; Disorder of thyroid; Glaucoma; Hyperlipidemia; Hypertension; MEDICAL HOME (11/07/12); Myocardial infarct (HCC); Pain; Stroke (CMS-HCC) (HCC); TIA (transient ischemic attack) (09/2017); and Urinary incontinence.    SURGICAL HISTORY   has a past surgical history that includes athroplasty; other orthopedic surgery (1980); cataract phaco with iol (4/13/2011); cataract phaco with iol (4/27/2011); shante by laparoscopy (10/9/2015); vitrectomy posterior (Right, 4/13/2017); other cardiac surgery; and ercp in or (3/16/2018).    SOCIAL HISTORY  Social History   Substance Use Topics   • Smoking status: Current Every Day Smoker     Packs/day: 0.25     Years: 0.10     Types: Cigarettes   • Smokeless tobacco: Never Used   • Alcohol " use Yes      Comment: 1 per day      History   Drug Use No       FAMILY HISTORY  Family History   Problem Relation Age of Onset   • Heart Disease Mother    • Cancer Father        CURRENT MEDICATIONS  Home Medications     Reviewed by Bhavesh Calvert R.N. (Registered Nurse) on 06/05/18 at 0846  Med List Status: Unable to Obtain   Medication Last Dose Status   amLODIPine (NORVASC) 5 MG Tab 5/6/2018 Active   Ascorbic Acid (VITAMIN C) 1000 MG Tab 5/6/2018 Active   atorvastatin (LIPITOR) 40 MG Tab 5/6/2018 Active   clopidogrel (PLAVIX) 75 MG Tab  Active   isosorbide mononitrate SR (IMDUR) 30 MG TABLET SR 24 HR 5/6/2018 Active   metoprolol (LOPRESSOR) 25 MG Tab 5/6/2018 Active                ALLERGIES  Allergies   Allergen Reactions   • Meperidine Vomiting and Nausea   • Tramadol Vomiting and Nausea       PHYSICAL EXAM  VITAL SIGNS: /84   Pulse 68   Temp 36.6 °C (97.8 °F)   Resp (!) 24   Wt 59 kg (130 lb)   BMI 24.56 kg/m²   Vitals reviewed.  Constitutional: Alert in no apparent distress.  HENT: No signs of trauma, Bilateral external ears normal, Nose normal.   Eyes: Pupils are equal and reactive, Conjunctiva normal, Non-icteric.   Neck: Normal range of motion, No tenderness, Supple, No stridor.   Lymphatic: No lymphadenopathy noted.   Cardiovascular: Regular rate and rhythm, no murmurs.   Thorax & Lungs: Normal breath sounds, No respiratory distress, No wheezing, No chest tenderness.   Abdomen: Bowel sounds normal, Soft, No tenderness, No peritoneal signs, No masses, No pulsatile masses.   Skin: Warm, Dry, No erythema, No rash.   Back: No bony tenderness, No CVA tenderness.   Extremities: Intact distal pulses, No edema, No tenderness, No cyanosis  Musculoskeletal: Good range of motion in all major joints. No tenderness to palpation or major deformities noted.   Neurologic: Alert , Normal motor function, Normal sensory function, No focal deficits noted.   Psychiatric: Affect normal, Judgment normal, Mood  normal.     DIAGNOSTIC STUDIES / PROCEDURES    LABS  Labs Reviewed   URINALYSIS,CULTURE IF INDICATED - Abnormal; Notable for the following:        Result Value    Protein 30 (*)     All other components within normal limits   CBC WITH DIFFERENTIAL - Abnormal; Notable for the following:     WBC 11.6 (*)     RBC 4.19 (*)     .4 (*)     MCHC 32.2 (*)     RDW 54.0 (*)     Neutrophils-Polys 86.70 (*)     Lymphocytes 4.70 (*)     Neutrophils (Absolute) 10.01 (*)     Lymphs (Absolute) 0.54 (*)     All other components within normal limits   COMP METABOLIC PANEL - Abnormal; Notable for the following:     Potassium 3.5 (*)     Glucose 129 (*)     AST(SGOT) 725 (*)     ALT(SGPT) 263 (*)     Alkaline Phosphatase 222 (*)     All other components within normal limits   LACTIC ACID - Abnormal; Notable for the following:     Lactic Acid 2.7 (*)     All other components within normal limits   URINE MICROSCOPIC (W/UA) - Abnormal; Notable for the following:     Bacteria Few (*)     All other components within normal limits   ESTIMATED GFR - Abnormal; Notable for the following:     GFR If  59 (*)     GFR If Non  49 (*)     All other components within normal limits   LIPASE   TROPONIN   BTYPE NATRIURETIC PEPTIDE   MAGNESIUM   TSH   HEPATITIS PANEL ACUTE(4 COMPONENTS)   FOLATE   All labs reviewed by me.    EKG Interpretation:  Interpreted by me    12 Lead EKG interpreted by me to show:  Normal sinus rhythm  Rate 68  Axis: LAD -23  Intervals: Normal  Normal T waves  Normal ST segments  My impression of this EKG: Does not indicate ischemia or arrhythmia at this time.    RADIOLOGY  DX-CHEST-PORTABLE (1 VIEW)   Final Result      Mild interstitial prominence may represent mild edema.      Cardiomegaly.      Atherosclerotic plaque.         The radiologist's interpretation of all radiological studies have been reviewed by me.    COURSE & MEDICAL DECISION MAKING  Nursing notes, VS, PMSFHx reviewed in  chart.  Differential diagnoses include but not limited to: ACS, pancreatitis, abdominal pain of unknown cause.     Obtained and reviewed past medical records from May 7, 2018 which indicated CT abdomen pelvis that was negative for acute disease. Also had CT abdomen pelvis March 2018 and was also negative for acute disease. .    8:57 AM - Patient seen and examined at bedside. Her reported symptoms have improved since arrival but are still present. I discussed evaluation in the ED. I also discussed her social situation and living at home alone. She is currently discussing moving in with her family to help her social situation. Ordered for CBC with differential, CMP, Lipase, Troponin, BNP, Lactic acid, Urinalysis to evaluate. Patient will be treated with  mg, GI cocktail 30 ml, Zofran 4 mg for her symptoms.      10:16 AM - Ordered for DX chest to further evaluate.     10:56 AM - LFTs are elevated. She has a history of cholecystectomy.     11:02 AM - I discussed the patient's case and the above findings with Dr. Hilario (hospitalist) who agrees to admit the patient.     DISPOSITION:  Patient will be admitted to hospital in guarded condition.    FINAL IMPRESSION  1. Epigastric pain    2. Other elevated white blood cell (WBC) count    3. Elevated LFTs    4. Elevated lactic acid level          David RUSHING (Scribe), am scribing for, and in the presence of, Ovi Brewer M.D..    Electronically signed by: David Rizvi (Scribe), 6/5/2018    IOvi M.D. personally performed the services described in this documentation, as scribed by David Rizvi in my presence, and it is both accurate and complete.    The note accurately reflects work and decisions made by me.  Ovi Brewer  6/5/2018  12:27 PM

## 2018-06-05 NOTE — DISCHARGE PLANNING
Per chart review, pt has utilized post acute services with [facility] for SNF and [facility]  for home health services.

## 2018-06-05 NOTE — ED NOTES
Up to bedside commode and transferred to hospital bed. Bedside report to Geeta GRECO. Transferred to tele on monitor with RN.

## 2018-06-05 NOTE — PROGRESS NOTES
2 RN skin check completed, blanching redness behind R. Ear.  Bruising to bilateral upper extremities.  Redness and  Cracked heels on bilateral lower extremities.

## 2018-06-05 NOTE — CARE PLAN
Problem: Communication  Goal: The ability to communicate needs accurately and effectively will improve  Outcome: PROGRESSING SLOWER THAN EXPECTED  Patient does not want to participate in cares.     Problem: Safety  Goal: Will remain free from falls  Outcome: PROGRESSING AS EXPECTED  Bed alarm on. Patient educated on fall prevention.

## 2018-06-06 PROBLEM — N17.9 AKI (ACUTE KIDNEY INJURY) (HCC): Status: ACTIVE | Noted: 2018-06-06

## 2018-06-06 PROBLEM — E03.9 HYPOTHYROIDISM: Status: ACTIVE | Noted: 2018-06-06

## 2018-06-06 PROBLEM — D75.89 MACROCYTOSIS: Status: ACTIVE | Noted: 2018-06-06

## 2018-06-06 LAB
ALBUMIN SERPL BCP-MCNC: 2.9 G/DL (ref 3.2–4.9)
ALBUMIN/GLOB SERPL: 1.3 G/DL
ALP SERPL-CCNC: 235 U/L (ref 30–99)
ALT SERPL-CCNC: 378 U/L (ref 2–50)
ANION GAP SERPL CALC-SCNC: 7 MMOL/L (ref 0–11.9)
AST SERPL-CCNC: 447 U/L (ref 12–45)
BILIRUB SERPL-MCNC: 1.7 MG/DL (ref 0.1–1.5)
BUN SERPL-MCNC: 17 MG/DL (ref 8–22)
CALCIUM SERPL-MCNC: 8.2 MG/DL (ref 8.5–10.5)
CHLORIDE SERPL-SCNC: 111 MMOL/L (ref 96–112)
CO2 SERPL-SCNC: 22 MMOL/L (ref 20–33)
CREAT SERPL-MCNC: 1.19 MG/DL (ref 0.5–1.4)
FOLATE SERPL-MCNC: 8.3 NG/ML
GLOBULIN SER CALC-MCNC: 2.3 G/DL (ref 1.9–3.5)
GLUCOSE SERPL-MCNC: 92 MG/DL (ref 65–99)
HAV IGM SERPL QL IA: NEGATIVE
HBV CORE IGM SER QL: NEGATIVE
HBV SURFACE AG SER QL: NEGATIVE
HCV AB SER QL: NEGATIVE
INR PPP: 1.15 (ref 0.87–1.13)
POTASSIUM SERPL-SCNC: 3.8 MMOL/L (ref 3.6–5.5)
PROT SERPL-MCNC: 5.2 G/DL (ref 6–8.2)
PROTHROMBIN TIME: 14.4 SEC (ref 12–14.6)
SODIUM SERPL-SCNC: 140 MMOL/L (ref 135–145)
TSH SERPL DL<=0.005 MIU/L-ACNC: 0.67 UIU/ML (ref 0.38–5.33)

## 2018-06-06 PROCEDURE — 36415 COLL VENOUS BLD VENIPUNCTURE: CPT

## 2018-06-06 PROCEDURE — 84443 ASSAY THYROID STIM HORMONE: CPT

## 2018-06-06 PROCEDURE — 700102 HCHG RX REV CODE 250 W/ 637 OVERRIDE(OP): Performed by: HOSPITALIST

## 2018-06-06 PROCEDURE — A9270 NON-COVERED ITEM OR SERVICE: HCPCS | Performed by: HOSPITALIST

## 2018-06-06 PROCEDURE — 80053 COMPREHEN METABOLIC PANEL: CPT

## 2018-06-06 PROCEDURE — 85610 PROTHROMBIN TIME: CPT

## 2018-06-06 PROCEDURE — 99232 SBSQ HOSP IP/OBS MODERATE 35: CPT | Performed by: INTERNAL MEDICINE

## 2018-06-06 PROCEDURE — 80074 ACUTE HEPATITIS PANEL: CPT

## 2018-06-06 PROCEDURE — 700105 HCHG RX REV CODE 258: Performed by: INTERNAL MEDICINE

## 2018-06-06 PROCEDURE — 82746 ASSAY OF FOLIC ACID SERUM: CPT

## 2018-06-06 PROCEDURE — 700111 HCHG RX REV CODE 636 W/ 250 OVERRIDE (IP): Performed by: HOSPITALIST

## 2018-06-06 PROCEDURE — 770020 HCHG ROOM/CARE - TELE (206)

## 2018-06-06 RX ORDER — SODIUM CHLORIDE 9 MG/ML
INJECTION, SOLUTION INTRAVENOUS CONTINUOUS
Status: DISCONTINUED | OUTPATIENT
Start: 2018-06-06 | End: 2018-06-07 | Stop reason: HOSPADM

## 2018-06-06 RX ADMIN — HEPARIN SODIUM 5000 UNITS: 5000 INJECTION, SOLUTION INTRAVENOUS; SUBCUTANEOUS at 05:04

## 2018-06-06 RX ADMIN — AMLODIPINE BESYLATE 2.5 MG: 5 TABLET ORAL at 17:22

## 2018-06-06 RX ADMIN — OXYCODONE HYDROCHLORIDE AND ACETAMINOPHEN 1000 MG: 500 TABLET ORAL at 05:05

## 2018-06-06 RX ADMIN — SODIUM CHLORIDE: 9 INJECTION, SOLUTION INTRAVENOUS at 15:30

## 2018-06-06 RX ADMIN — CLOPIDOGREL 75 MG: 75 TABLET, FILM COATED ORAL at 05:05

## 2018-06-06 RX ADMIN — METOPROLOL TARTRATE 25 MG: 25 TABLET, FILM COATED ORAL at 05:05

## 2018-06-06 RX ADMIN — OMEPRAZOLE 20 MG: 20 CAPSULE, DELAYED RELEASE ORAL at 05:05

## 2018-06-06 RX ADMIN — METOPROLOL TARTRATE 25 MG: 25 TABLET, FILM COATED ORAL at 17:23

## 2018-06-06 ASSESSMENT — PAIN SCALES - GENERAL
PAINLEVEL_OUTOF10: 0

## 2018-06-06 ASSESSMENT — ENCOUNTER SYMPTOMS
DIZZINESS: 0
LOSS OF CONSCIOUSNESS: 0
BLURRED VISION: 0
NAUSEA: 0
BLOOD IN STOOL: 0
SHORTNESS OF BREATH: 0
CONSTIPATION: 0
FOCAL WEAKNESS: 0
ABDOMINAL PAIN: 0
VOMITING: 0
MYALGIAS: 0
COUGH: 0
DEPRESSION: 0
SPEECH CHANGE: 0
NECK PAIN: 0
HEMOPTYSIS: 0
CHILLS: 0
PALPITATIONS: 0
FEVER: 0

## 2018-06-06 NOTE — ASSESSMENT & PLAN NOTE
-AST greater than ALT  -AST going down, but ALT rising, TBILI barely abnormal, labs are c/w prior CBD stone  -AB US negative for CBD dilation, 0.9 cm or gallstones  -consulted Dr Garcia of GI, repeat ERCP?, though abdominal pain has subsided, no clear s/s infection, could have a passed stone at this time  -continue current diet  -trend LFT's  -no abx's  -patient refuses MRCP completely due to claustrophobia  -hepatitis panel negative

## 2018-06-06 NOTE — PROGRESS NOTES
"Pt ambulated to the bathroom. Pt stated that she was \"sleeping well\". Pt is now back in bed and call light is within reach. Will continue to monitor.  "

## 2018-06-06 NOTE — CONSULTS
Reason for consult: Abdominal pain, abnormal liver function test.      HPI:   86 yo F, Ms. Zambrano with pmh of arrhtymia, CAD, MI, /p PTCA w/o any stents, CVA, HTN., s/p laparoscopic cholecystectomy (in 2015)  admitted on 6/5 for acute onset of abdominal pain with nasuea, vomiting.     As per the patient, she had acute onset of pain in her upper abdomen since yesterday morning. She reported to eat a local pizza the night before which was horrible. In the morning when she went to the toilet, she felt dizzy, nauseous and having pain in the upper abdomen. She vomited one time at home which contained undigested food w/o any bile stain or blood in it. She again threw up in the ambulance. She described abdominal pain as a constant band like, very intense pain, involving epigastrium to the back. She denies any melena, bleeding per rectum, hematemesis. She had a 2 other similar episodes in the past. She had an ERCP study in March' 18 where she had sphincterotomy, with removal of gall bladder stones. She could not tolerated MRCP during that time due to phobia. She offers no other issues.   During the encounter she c/o resolution of abdominal pain and nausea and vomiting.      Review of Systems   Constitutional: Negative for chills, fever and malaise/fatigue.   Respiratory: Negative for cough, hemoptysis and shortness of breath.    Cardiovascular: Negative for chest pain, palpitations and leg swelling.   Gastrointestinal: Negative for abdominal pain, blood in stool, constipation, melena, nausea and vomiting.   Genitourinary: Negative for dysuria.   Neurological: Negative for dizziness, speech change, focal weakness and loss of consciousness.   Psychiatric/Behavioral: Negative for depression.             Past Medical History:   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HCC) (HCC)    • Arrhythmia    • Arthritis    • Back pain 3/28/2012   • CATARACT    • Coronary artery disease    • Disorder of thyroid     • Glaucoma    • Hyperlipidemia    • Hypertension    • MEDICAL HOME 11/07/12   • Myocardial infarct (HCC)    • Pain     sacrum 1/10   • Stroke (CMS-HCC) (HCC)    • TIA (transient ischemic attack) 09/2017   • Urinary incontinence        Past Surgical History:  Past Surgical History:   Procedure Laterality Date   • ERCP IN OR  3/16/2018    Procedure: ERCP IN OR;  Surgeon: Orlin Caldera M.D.;  Location: SURGERY [facility];  Service: Gastroenterology   • VITRECTOMY POSTERIOR Right 4/13/2017    Procedure: VITRECTOMY POSTERIOR-LASER, SF6 GAS;  Surgeon: Jessica Arita M.D.;  Location: SURGERY SAME DAY [facility];  Service:    • RIGO BY LAPAROSCOPY  10/9/2015    Procedure: RIGO BY LAPAROSCOPY;  Surgeon: Ric Zambrano M.D.;  Location: SURGERY [facility];  Service:    • CATARACT PHACO WITH IOL  4/27/2011    Performed by EVERETTE COOPER at SURGERY SAME DAY [facility] ORS   • CATARACT PHACO WITH IOL  4/13/2011    Performed by EVERETTE COOPER at SURGERY SAME DAY [facility] ORS   • OTHER ORTHOPEDIC SURGERY  1980    pins in left hip   • ATHROPLASTY      broken hip 1990   • OTHER CARDIAC SURGERY         Current Outpatient Medications:  Home Medications     Reviewed by Mohini Soares R.N. (Registered Nurse) on 06/05/18 at 1308  Med List Status: Unable to Obtain   Medication Last Dose Status   amLODIPine (NORVASC) 5 MG Tab 5/6/2018 Active   Ascorbic Acid (VITAMIN C) 1000 MG Tab 5/6/2018 Active   atorvastatin (LIPITOR) 40 MG Tab 5/6/2018 Active   clopidogrel (PLAVIX) 75 MG Tab  Active   isosorbide mononitrate SR (IMDUR) 30 MG TABLET SR 24 HR 5/6/2018 Active   metoprolol (LOPRESSOR) 25 MG Tab 5/6/2018 Active                Medication Allergy/Sensitivities:  Allergies   Allergen Reactions   • Meperidine Vomiting and Nausea   • Tramadol Vomiting and Nausea         Family History:  Family History   Problem Relation Age of Onset   • Heart Disease Mother    • Cancer Father        Social History:  Social History  "    Social History   • Marital status:      Spouse name: N/A   • Number of children: N/A   • Years of education: N/A     Occupational History   • Not on file.     Social History Main Topics   • Smoking status: Current Every Day Smoker     Packs/day: 0.25     Years: 0.10     Types: Cigarettes   • Smokeless tobacco: Never Used   • Alcohol use Yes      Comment: 1 per day   • Drug use: No   • Sexual activity: Not on file     Other Topics Concern   • Not on file     Social History Narrative   • No narrative on file     Living situation: Lives alone.   PCP : Dr. Garcia.       Physical Exam     Vitals:    06/05/18 1600 06/05/18 2000 06/06/18 0000 06/06/18 0400   BP: (!) 162/79 141/61 136/60 134/54   Pulse: 63 68 66 66   Resp: 16 16 16 16   Temp: 36.6 °C (97.9 °F) 36.9 °C (98.4 °F) 36.4 °C (97.6 °F) 36.8 °C (98.2 °F)   SpO2: 96% 94% 97% 96%   Weight:    62.2 kg (137 lb 2 oz)   Height:         Body mass index is 24.29 kg/m².  /54   Pulse 66   Temp 36.8 °C (98.2 °F)   Resp 16   Ht 1.6 m (5' 3\")   Wt 62.2 kg (137 lb 2 oz)   SpO2 96%   Breastfeeding? No   BMI 24.29 kg/m²   O2 therapy: Pulse Oximetry: 96 %, O2 (LPM): 2, O2 Delivery: Silicone Nasal Cannula    Physical Exam   Constitutional: She is oriented to person, place, and time.   Elderly female, not in acute distress.   HENT:   Head: Normocephalic and atraumatic.   Eyes: EOM are normal. Pupils are equal, round, and reactive to light.   Neck: Neck supple.   Cardiovascular: Normal rate, regular rhythm and normal heart sounds.    No murmur heard.  Pulmonary/Chest: Effort normal. No respiratory distress. She has no wheezes. She exhibits no tenderness.   Abdominal: Soft. Bowel sounds are normal. She exhibits no distension. There is no tenderness. There is no rebound and no guarding.   Musculoskeletal: She exhibits no edema.   Neurological: She is alert and oriented to person, place, and time.             Data Review           Lab Data Review:  Recent Results " (from the past 24 hour(s))   HEPATITIS PANEL ACUTE(4 COMPONENTS)    Collection Time: 06/06/18  4:31 AM   Result Value Ref Range    Hepatitis B Surface Antigen Negative Negative    Hepatitis C Antibody Negative Negative    Hepatitis B Cors Ab,IgM Negative Negative    Hepatitis A Virus Ab, IgM Negative Negative   TSH (Thyroid Stimulating Hormone)    Collection Time: 06/06/18  4:31 AM   Result Value Ref Range    TSH 0.670 0.380 - 5.330 uIU/mL   FOLATE    Collection Time: 06/06/18  4:31 AM   Result Value Ref Range    Folate -Folic Acid 8.3 >4.0 ng/mL   Comp Metabolic Panel (CMP)    Collection Time: 06/06/18  4:32 AM   Result Value Ref Range    Sodium 140 135 - 145 mmol/L    Potassium 3.8 3.6 - 5.5 mmol/L    Chloride 111 96 - 112 mmol/L    Co2 22 20 - 33 mmol/L    Anion Gap 7.0 0.0 - 11.9    Glucose 92 65 - 99 mg/dL    Bun 17 8 - 22 mg/dL    Creatinine 1.19 0.50 - 1.40 mg/dL    Calcium 8.2 (L) 8.5 - 10.5 mg/dL    AST(SGOT) 447 (H) 12 - 45 U/L    ALT(SGPT) 378 (H) 2 - 50 U/L    Alkaline Phosphatase 235 (H) 30 - 99 U/L    Total Bilirubin 1.7 (H) 0.1 - 1.5 mg/dL    Albumin 2.9 (L) 3.2 - 4.9 g/dL    Total Protein 5.2 (L) 6.0 - 8.2 g/dL    Globulin 2.3 1.9 - 3.5 g/dL    A-G Ratio 1.3 g/dL   ESTIMATED GFR    Collection Time: 06/06/18  4:32 AM   Result Value Ref Range    GFR If  52 (A) >60 mL/min/1.73 m 2    GFR If Non  43 (A) >60 mL/min/1.73 m 2   PROTHROMBIN TIME    Collection Time: 06/06/18  8:40 AM   Result Value Ref Range    PT 14.4 12.0 - 14.6 sec    INR 1.15 (H) 0.87 - 1.13       Imaging/Procedures Review:      US-LIVER AND BILIARY TREE   Final Result      1.  Cyst is noted in the right lobe of the liver.      2.  Liver echogenicity appears heterogeneous which could indicate cirrhosis.      3.  Post cholecystectomy.         DX-CHEST-PORTABLE (1 VIEW)   Final Result      Mild interstitial prominence may represent mild edema.      Cardiomegaly.      Atherosclerotic plaque.                 "      Assessment/Plan     Assessment and Plan:     #Abdominal pain likely due to biliary colic:  #Cholestasis likely due to obstruction in the biliary obstruction:  #Elevated liver enzymes:    Patient' symptoms and lab findings ( elevated AST, ALT, ALP and mild hyperbilirubinemia) is consistent with cholestasis from biliary tract obstruction likely from stones in the biliary tree.  She was explained about the cause of her current condition. She was explained about the best approach in this situation, which is MRCP followed by ERCP. However she declined all these procedures. She is willing to take risk of going home. She does not want any types of intervention including MRI study. She said \" enough is enough\".  At this point, GI has nothing to offer. GI will sign off.          Quality Measures  Quality-Core Measures      "

## 2018-06-06 NOTE — PROGRESS NOTES
"Hospital Medicine Daily Progress Note    Date of Service  6/6/2018    Chief Complaint  85 y.o. female admitted 6/5/2018 with abdominal pain and elevated LFT\"s    Hospital Course  See below    Interval Problem Update  AB pain-resolved at this point, eating food without real issue. Remains afebrile. No nausea or vomiting today.    Elevated LFT's-slowly down-trending. Feels this abdominal pain is somewhat different compared to her prior episode in March 2018.    Consultants/Specialty  GI    Disposition  TELE    Review of Systems  Review of Systems   Constitutional: Positive for malaise/fatigue (mild). Negative for chills and fever.   HENT: Negative for hearing loss.    Eyes: Negative for blurred vision.   Respiratory: Negative for shortness of breath.    Cardiovascular: Negative for chest pain, palpitations and leg swelling.   Gastrointestinal: Negative for abdominal pain, constipation, nausea and vomiting.   Genitourinary: Negative for dysuria and urgency.   Musculoskeletal: Negative for myalgias and neck pain.   Skin: Negative for rash.   Neurological: Negative for dizziness, focal weakness and loss of consciousness.   Psychiatric/Behavioral: Negative for depression.   All other systems reviewed and are negative.       Physical Exam  Blood Pressure : 121/44   Temperature: 36.7 °C (98 °F)   Pulse: 72   Respiration: 17   Pulse Oximetry: 94 %     Physical Exam   Constitutional: She is oriented to person, place, and time. She appears well-developed and well-nourished. No distress.   HENT:   Head: Normocephalic and atraumatic.   Mouth/Throat: No oropharyngeal exudate.   Eyes: Pupils are equal, round, and reactive to light. No scleral icterus.   Neck: Normal range of motion. Neck supple. No thyromegaly present.   Cardiovascular: Normal rate, regular rhythm, normal heart sounds and intact distal pulses.    No murmur heard.  Pulmonary/Chest: Effort normal and breath sounds normal. No respiratory distress.   Abdominal: Soft. " Bowel sounds are normal. She exhibits no distension. There is tenderness (mild in the RUQ). There is no rebound and no guarding.   Musculoskeletal: Normal range of motion. She exhibits no edema or tenderness.   Neurological: She is alert and oriented to person, place, and time. No cranial nerve deficit.   Skin: Skin is warm and dry. No rash noted.   Psychiatric: She has a normal mood and affect.   Nursing note and vitals reviewed.      Fluids    Intake/Output Summary (Last 24 hours) at 06/06/18 1510  Last data filed at 06/06/18 0600   Gross per 24 hour   Intake              475 ml   Output              500 ml   Net              -25 ml       Laboratory  Recent Labs      06/05/18   0919   WBC  11.6*   RBC  4.19*   HEMOGLOBIN  13.8   HEMATOCRIT  42.9   MCV  102.4*   MCH  32.9   MCHC  32.2*   RDW  54.0*   PLATELETCT  228   MPV  10.7     Recent Labs      06/05/18   0919  06/06/18   0432   SODIUM  142  140   POTASSIUM  3.5*  3.8   CHLORIDE  108  111   CO2  24  22   GLUCOSE  129*  92   BUN  20  17   CREATININE  1.07  1.19   CALCIUM  9.4  8.2*     Recent Labs      06/06/18   0840   INR  1.15*     Recent Labs      06/05/18   0919   BNPBTYPENAT  83           Recent Labs      06/05/18   0919  06/06/18   0432  06/06/18   0840   ASTSGOT  725*  447*   --    ALTSGPT  263*  378*   --    TBILIRUBIN  1.4  1.7*   --    ALKPHOSPHAT  222*  235*   --    GLOBULIN  2.7  2.3   --    INR   --    --   1.15*         Imaging  US-LIVER AND BILIARY TREE   Final Result      1.  Cyst is noted in the right lobe of the liver.      2.  Liver echogenicity appears heterogeneous which could indicate cirrhosis.      3.  Post cholecystectomy.         DX-CHEST-PORTABLE (1 VIEW)   Final Result      Mild interstitial prominence may represent mild edema.      Cardiomegaly.      Atherosclerotic plaque.           Assessment/Plan  Abdominal pain- (present on admission)   Assessment & Plan    -?passed gallstone  -AB us unremarkable  -see above  -judicious use of  pain medications  -GI consult        CVA (cerebral vascular accident) (HCC)- (present on admission)   Assessment & Plan    -residual deficits, no new neuro deficits at this time        Macrocytosis- (present on admission)   Assessment & Plan    -Folate level normal, long standing issue, AB US c/w cirrhosis  -no bleeding issues, monitor at this time        Hypothyroidism- (present on admission)   Assessment & Plan    -consider replacement, TSH normal        GUILLERMO (acute kidney injury) (HCC)- (present on admission)   Assessment & Plan    -Cr radha slightly, continue IVF's with NS at 83 mL/hour, check bladder scan, monitor UOP, avoid nephrotoxins        LFT elevation- (present on admission)   Assessment & Plan    -AST greater than ALT  -AST going down, but ALT rising, TBILI barely abnormal, labs are c/w prior CBD stone  -AB US negative for CBD dilation, 0.9 cm or gallstones  -consulted Dr Garcia of GI, repeat ERCP?, though abdominal pain has subsided, no clear s/s infection, could have a passed stone at this time  -continue current diet  -trend LFT's  -no abx's  -patient refuses MRCP completely due to claustrophobia  -hepatitis panel negative        Dyslipidemia- (present on admission)   Assessment & Plan    -hold statin given elevated LFT's  -check CPK        HTN (hypertension), benign- (present on admission)   Assessment & Plan    -well controlled, continue outpatient medications

## 2018-06-06 NOTE — PROGRESS NOTES
Bedside shift report complete. Patient trying to rest and did not want to be involved. Per night shift patient has been yelling at neighbor and the two seem to be having a feud. Will assess for possible room change. Denies pain. Able to get up with one person. Alert and oriented but forgetful. Bed in lowest position, call light in reach. Appears comfortable, clean and covered at this time. Continue to monitor.

## 2018-06-06 NOTE — DIETARY
"Nutrition services: Day 1 of admit.  Madeleine Zambrano is a 85 y.o. female with admitting DX of elevated liver enzymes/abdominal pain.   Consult received for recent unplanned wt loss per nutrition admit screen.    Visited pt at bedside, pt appears appropriate and adequately nourished for age. Pt states that her appetite has been \"not very good\", however does state that she consumes 3 meals per day. Pt states that her wt loss has been gradual over a period of time. Per chart review of previous encounters, pt weighed 64.3 kg on 3/1/18. Current wt of 62.2 kg indicates a 2.1 kg (3%) wt loss over the past 3 months - which is not considered to be significant. Pt reports that she occasionally drinks generic brand nutrition supplements at home - but not on a regular basis. Asked if pt would like Boost Plus supplements during admit and pt was agreeable and seemed eager at idea - requested chocolate flavor.     Assessment:  Height: 160 cm (5' 3\")  Weight: 62.2 kg (137 lb 2 oz)  Body mass index is 24.29 kg/m².  Diet/Intake: Regular; no PO intake recorded at this time    Evaluation:   1. Gradual wt loss over time - 3% in past 3 months (not significant)  2. Ascorbic acid (1000 mg/day) in MAR  3. Reviewed past medical hx, MAR, labs, and flowsheets    Recommendations/Plan:  1. Chocolate Boost Plus 1x/day (can have more if she likes them) - recommend supplement order   2. Encourage intake of meals and supplements  3. Document intake of all meals and supplements as % taken in ADL's to provide interdisciplinary communication across all shifts.   4. Monitor weight.  5. Nutrition rep will continue to see patient for ongoing meal and snack preferences.   6. RD to monitor PO intake, wt trends, and nutrition labs/meds            "

## 2018-06-06 NOTE — H&P
"PRIMARY CARE PHYSICIAN:  Ric Garcia MD    CHIEF COMPLAINT:  Abdominal pain.    HISTORY OF PRESENT ILLNESS:  This is an 85-year-old female who reports   epigastric abdominal pain.  This radiates and what she describes as a \"band\"   all the way around to her back.  She has had some nausea and she has had   vomiting as well.  Patient reports symptoms have been going on for about 12   hours.  She has also had, however, intermittent pain in the past.  It is   difficult for her to answer my question when I asked if this is similar to   what she has had in the past.    Patient has been evaluated in the past when she had elevated LFTs in 03/2018.    At that time, she underwent an ERCP with balloon evacuation of common bile   duct stones and subsequent normalization of her LFTs.  She is unable to tell   me if the symptoms she has now are at all similar to what she had at that   time.    REVIEW OF SYSTEMS:  Positive as noted, otherwise positive for generalized   weakness which has been going on for several months now.  Patient is really   unable to be more specific.    REVIEW OF SYSTEMS:  Somewhat limited as the patient is a poor historian.    PREVIOUS MEDICAL HISTORY:  1.  Dyslipidemia.  2.  Hypertension.  3.  Coronary artery disease status post PTCA in 2015.  No stent.  4.  Previous stroke.  5.  Hypothyroidism.  6.  Tobacco use.  7.  Question of paroxysmal atrial fibrillation.  8.  Choledocholithiasis as noted above.    MEDICATIONS:  1.  Amlodipine 5 mg daily.  2.  Ascorbic acid 1 g daily.  3.  Lipitor 40 mg daily.  4.  Plavix 75 mg daily.  5.  Imdur 30 mg 1/2 p.o. daily.  6.  Metoprolol 25 mg twice daily.    ALLERGIES:  1.  MEPERIDINE.  2.  TRAMADOL.    SOCIAL HISTORY:  Patient smokes 5-10 cigarettes daily.  She drinks alcohol 1   drink, usually a glass of wine with her dinner.    PAST SURGICAL HISTORY:  1.  Total hip arthroplasty.  2.  ORIF of the hip.  3.  Cataract extraction.  4.  Laparoscopic cholecystectomy.  5.  " Vitrectomy.  6.  ERCP is noted.    FAMILY HISTORY:  Reviewed, but not relevant to presentation.    PHYSICAL EXAMINATION:  VITAL SIGNS:  In ED, temperature 36.6, heart rate 68, respiratory rate 14, BP   121/84 and sating 96% on 2 L nasal cannula.  GENERAL:  The patient is awake.  She appears frail, but in no acute distress.  HEENT:  Head is normocephalic and atraumatic.  Mucous membranes slightly dry.    Sclerae and conjunctivae are benign.  NECK:  Trachea is in the midline.  Neck is supple.  There is no JVD or bruits.  RESPIRATORY:  Clear to auscultation bilaterally.  CARDIAC:  Regular rate and rhythm.  No murmurs, rubs or clicks.  ABDOMEN:  Soft, very minimal tenderness to palpation in the epigastrium, no   guarding or rebound, no hepatosplenomegaly or masses.  Negative Saab and   McBurney sign.  EXTREMITIES:  No clubbing, cyanosis or edema.  Good peripheral pulses.  Calves   are nontender to palpation.  SKIN:  Warm and dry.  The patient is somewhat pale.  No rashes appreciated.  NEUROLOGIC:  Alert, oriented.  Speech clear, content logical.  PSYCHIATRIC:  Mood and affect are depressed, otherwise benign.    LABORATORY DATA:  White count 11.6, hemoglobin 13.8 and platelet count 228.    Sodium 142, potassium is 3.5, BUN 20 and creatinine 1.07.  , ,   alkaline phosphatase 222, total bilirubin 1.4, lipase 28, lactic acid 2.79.    Troponin I 0.01.    ASSESSMENT AND PLAN:  This is an 85-year-old female with problem list as   follows:  1.  Abdominal pain:  At this point in time, the etiology remains elusive.    Patient does have elevated LFTs and these are relatively acute, though she did   have them previously in 03/2018, they did normalize subsequent to having had   an ERCP.  Her LFTs were last normal in our records on 05/07/2018.  The pattern   would appear to indicate an intrinsic problem, questionably alcohol, which   the patient does admit to drinking just 1 glass daily; however, this amount of    alcohol intake would not appear to be consistent with her LFTs assuming   course that she is being forthright with us.  At this point in time, we will   admit the patient.  We will follow her LFTs closely.  We will check hepatitis   panel.  We will image her right upper quadrant with an ultrasound.  Consider   further workup dependent on these results.  ____ patient has a GI consultation   which she was seen by GI consultants on her previous admission.  We will of   course stop her statin.  2.  Elevated liver function tests:  We will workup as noted above.  3.  Dyslipidemia:  For the moment, we will be stopping the patient's statin in   the setting of elevated liver enzymes.  4.  Hypertension.  Continue home medications and monitor and titrate.  5.  History of coronary artery disease status post percutaneous transluminal   coronary angioplasty with no stent placement in 2015.  Holding the patient's   statin as noted.  Continue antiplatelet therapy.  6.  Previous cerebrovascular accident:  Stable.  7.  Hypothyroidism:  Continue replacement.  8.  Question alcohol abuse:  By patient's report, she drinks 1 glass of wine   daily.  We will monitor for signs of withdrawal.  9.  Current tobacco use:  I have counseled the patient today on tobacco   cessation for approximately 7 minutes.  We will continue to encourage her and   give p.r.n. replacement while she is here.  10.  Macrocytosis:  This may be related to the patient's alcohol intake.  We   will check folate levels.  11.  Acute kidney injury:  Gently fluid resuscitate and monitor.  12.  Paroxysmal atrial fibrillation:  Patient was started on apixaban in the   past; however, she stopped it, as she thought that this was making her feel   unwell.  This is not an acute issue; however, if she does indeed have it and I   do not see that clearly documented on her records, her SANDIP score would be   high and definitely anticoagulation would be indicated.    Given the  constellation of the patient's issues, we will be admitting her to   full admission status.       ____________________________________     DO YAJAIRA Milligan / ISAIAS    DD:  06/05/2018 19:37:13  DT:  06/05/2018 21:55:26    D#:  [id number]  Job#:  [id number]    cc: PONCE AUSTIN MD

## 2018-06-06 NOTE — PROGRESS NOTES
Patient would like the Hospitalist team to call her GI Doctor for Consult. Please discuss with her.

## 2018-06-06 NOTE — ASSESSMENT & PLAN NOTE
-Cr radha slightly, continue IVF's with NS at 83 mL/hour, check bladder scan, monitor UOP, avoid nephrotoxins

## 2018-06-06 NOTE — CARE PLAN
Problem: Safety  Goal: Will remain free from injury  Outcome: PROGRESSING AS EXPECTED  Patient's risk for injury and falls assessed. Appropriate safety precautions in place. Patient educated to utilize call light for needs. Patient verbalizes understanding.    Problem: Psychosocial Needs:  Goal: Level of anxiety will decrease  Outcome: PROGRESSING AS EXPECTED  Patient is assessed appropriately. Patient is helped to identify factors for stress and anxiety. Patient is taught to deep breathe and other forms of stress/anxiety management. Patient verbalizes understanding. Will continue to monitor.

## 2018-06-06 NOTE — ASSESSMENT & PLAN NOTE
-Folate level normal, long standing issue, AB US c/w cirrhosis  -no bleeding issues, monitor at this time

## 2018-06-06 NOTE — CARE PLAN
Problem: Nutritional:  Goal: Achieve adequate nutritional intake  Patient will consume ~50% of meals/supplements    Outcome: NOT MET  See dietary note - RD following

## 2018-06-06 NOTE — ASSESSMENT & PLAN NOTE
-?passed gallstone  -AB us unremarkable  -see above  -judicious use of pain medications  -GI consult

## 2018-06-06 NOTE — PROGRESS NOTES
Bedside report received from day RN. Assumed care of pt at 1900. Pt is on 2L of O2 NC. Pt has tele box in place. No s/s of pain or discomfort. Educated to call light. Bed is in low and locked position. Will continue to monitor.

## 2018-06-06 NOTE — PROGRESS NOTES
Patient found to have large area of abdominal bleeding from what appears to be the SQ heparin injection site on her abdomen. Soaked through gown and blanket. Dressed, pressure applied. Patient cleaned up. States she does not want her next dose of heparin.

## 2018-06-07 ENCOUNTER — PATIENT OUTREACH (OUTPATIENT)
Dept: HEALTH INFORMATION MANAGEMENT | Facility: OTHER | Age: 83
End: 2018-06-07

## 2018-06-07 VITALS
SYSTOLIC BLOOD PRESSURE: 144 MMHG | WEIGHT: 140.87 LBS | HEART RATE: 114 BPM | OXYGEN SATURATION: 93 % | BODY MASS INDEX: 24.96 KG/M2 | HEIGHT: 63 IN | DIASTOLIC BLOOD PRESSURE: 86 MMHG | TEMPERATURE: 98 F | RESPIRATION RATE: 18 BRPM

## 2018-06-07 PROBLEM — N17.9 AKI (ACUTE KIDNEY INJURY) (HCC): Status: RESOLVED | Noted: 2018-06-06 | Resolved: 2018-06-07

## 2018-06-07 PROBLEM — R10.9 ABDOMINAL PAIN: Status: RESOLVED | Noted: 2018-03-15 | Resolved: 2018-06-07

## 2018-06-07 LAB
ALBUMIN SERPL BCP-MCNC: 2.9 G/DL (ref 3.2–4.9)
ALBUMIN/GLOB SERPL: 1.3 G/DL
ALP SERPL-CCNC: 225 U/L (ref 30–99)
ALT SERPL-CCNC: 266 U/L (ref 2–50)
ANION GAP SERPL CALC-SCNC: 6 MMOL/L (ref 0–11.9)
AST SERPL-CCNC: 193 U/L (ref 12–45)
BASOPHILS # BLD AUTO: 0.8 % (ref 0–1.8)
BASOPHILS # BLD: 0.03 K/UL (ref 0–0.12)
BILIRUB SERPL-MCNC: 0.8 MG/DL (ref 0.1–1.5)
BUN SERPL-MCNC: 14 MG/DL (ref 8–22)
CALCIUM SERPL-MCNC: 8.3 MG/DL (ref 8.5–10.5)
CHLORIDE SERPL-SCNC: 111 MMOL/L (ref 96–112)
CO2 SERPL-SCNC: 24 MMOL/L (ref 20–33)
CREAT SERPL-MCNC: 1.13 MG/DL (ref 0.5–1.4)
EKG IMPRESSION: NORMAL
EOSINOPHIL # BLD AUTO: 0.5 K/UL (ref 0–0.51)
EOSINOPHIL NFR BLD: 13.2 % (ref 0–6.9)
ERYTHROCYTE [DISTWIDTH] IN BLOOD BY AUTOMATED COUNT: 54 FL (ref 35.9–50)
GLOBULIN SER CALC-MCNC: 2.3 G/DL (ref 1.9–3.5)
GLUCOSE SERPL-MCNC: 111 MG/DL (ref 65–99)
HCT VFR BLD AUTO: 35.3 % (ref 37–47)
HGB BLD-MCNC: 11.5 G/DL (ref 12–16)
IMM GRANULOCYTES # BLD AUTO: 0.01 K/UL (ref 0–0.11)
IMM GRANULOCYTES NFR BLD AUTO: 0.3 % (ref 0–0.9)
LYMPHOCYTES # BLD AUTO: 1.08 K/UL (ref 1–4.8)
LYMPHOCYTES NFR BLD: 28.5 % (ref 22–41)
MAGNESIUM SERPL-MCNC: 2.1 MG/DL (ref 1.5–2.5)
MCH RBC QN AUTO: 33.6 PG (ref 27–33)
MCHC RBC AUTO-ENTMCNC: 32.6 G/DL (ref 33.6–35)
MCV RBC AUTO: 103.2 FL (ref 81.4–97.8)
MONOCYTES # BLD AUTO: 0.48 K/UL (ref 0–0.85)
MONOCYTES NFR BLD AUTO: 12.7 % (ref 0–13.4)
NEUTROPHILS # BLD AUTO: 1.69 K/UL (ref 2–7.15)
NEUTROPHILS NFR BLD: 44.5 % (ref 44–72)
NRBC # BLD AUTO: 0 K/UL
NRBC BLD-RTO: 0 /100 WBC
PLATELET # BLD AUTO: 176 K/UL (ref 164–446)
PMV BLD AUTO: 11.3 FL (ref 9–12.9)
POTASSIUM SERPL-SCNC: 4.1 MMOL/L (ref 3.6–5.5)
PROT SERPL-MCNC: 5.2 G/DL (ref 6–8.2)
RBC # BLD AUTO: 3.42 M/UL (ref 4.2–5.4)
SODIUM SERPL-SCNC: 141 MMOL/L (ref 135–145)
WBC # BLD AUTO: 3.8 K/UL (ref 4.8–10.8)

## 2018-06-07 PROCEDURE — 99239 HOSP IP/OBS DSCHRG MGMT >30: CPT | Performed by: INTERNAL MEDICINE

## 2018-06-07 PROCEDURE — 700102 HCHG RX REV CODE 250 W/ 637 OVERRIDE(OP): Performed by: HOSPITALIST

## 2018-06-07 PROCEDURE — A9270 NON-COVERED ITEM OR SERVICE: HCPCS | Performed by: HOSPITALIST

## 2018-06-07 PROCEDURE — 85025 COMPLETE CBC W/AUTO DIFF WBC: CPT

## 2018-06-07 PROCEDURE — 83735 ASSAY OF MAGNESIUM: CPT

## 2018-06-07 PROCEDURE — 80053 COMPREHEN METABOLIC PANEL: CPT

## 2018-06-07 PROCEDURE — 36415 COLL VENOUS BLD VENIPUNCTURE: CPT

## 2018-06-07 RX ADMIN — OXYCODONE HYDROCHLORIDE AND ACETAMINOPHEN 1000 MG: 500 TABLET ORAL at 05:14

## 2018-06-07 RX ADMIN — CLOPIDOGREL 75 MG: 75 TABLET, FILM COATED ORAL at 05:15

## 2018-06-07 RX ADMIN — ISOSORBIDE MONONITRATE 15 MG: 30 TABLET ORAL at 05:13

## 2018-06-07 RX ADMIN — OMEPRAZOLE 20 MG: 20 CAPSULE, DELAYED RELEASE ORAL at 05:13

## 2018-06-07 RX ADMIN — METOPROLOL TARTRATE 25 MG: 25 TABLET, FILM COATED ORAL at 05:14

## 2018-06-07 ASSESSMENT — PAIN SCALES - GENERAL
PAINLEVEL_OUTOF10: 0

## 2018-06-07 NOTE — RESPIRATORY CARE
COPD EDUCATION by COPD CLINICAL EDUCATOR  6/7/2018 at 6:49 AM by Alida Jaramillo     Patient reviewed by COPD education team. Patient does not qualify for COPD program.

## 2018-06-07 NOTE — PROGRESS NOTES
Bedside report received from Mohini GRECO. Assumed care of the patient at 1900. Pt is sitting up in bed with dinner at this time. No family present. Pt is alert and oriented. No s/s of pain or discomfort. Pt is on 2L of O2 NC. Bed is locked and in lowest position. Call light within reach. Will continue to monitor.

## 2018-06-07 NOTE — DOCUMENTATION QUERY
DOCUMENTATION QUERY    PROVIDERS: Please select “Cosign w/ note”to reply to query.    To better represent the severity of illness of your patient, please review the following information and exercise your independent professional judgment in responding to this query.     CVA with residual deficits is documented in the Progress Notes. Based upon the clinical findings, risk factors, and treatment, can the residual deficits be further specified?    Please select all that apply:   • Aphasia  • Ataxia  • Dysphagia  • Slurred speech  • Facial droop (please specify left or right sided)  • Visual disturbance (please specify type, blurriness, double vision, diplopia, etc.)  • Hemiplegia (please specify left or right sided hemiplegia, and if known, dominant or nondominant side)  • Other residual neurological deficit, please specify  • Ruled out: CVS without residual deficits   • Findings of no clinical significance  • Unable to determine     The medical record reflects the following:   Clinical Findings Per Progress Notes:  CVA (cerebral vascular accident) (HCC)- (present on admission)  -residual deficits, no new neuro deficits at this time   Treatment Plavix, ASA, subcutaneous heparin, amlodipine, Imdur, lopressor, & monitor for changes in physical exam   Risk Factors Age, hx of CVA, HTN, dyslipidemia, CAD, question paroxsymal atrial fibrillation, & tobacco use    Location within medical record History & Physical & Progress Notes     Thank you,   Alina Fraser RN  Clinical   Phone [phone]

## 2018-06-07 NOTE — DISCHARGE INSTRUCTIONS
Discharge Instructions    Discharged to home by taxi with self. Discharged via wheelchair, hospital escort: Yes.  Special equipment needed: Not Applicable    Be sure to schedule a follow-up appointment with your primary care doctor or any specialists as instructed.     Discharge Plan:   Diet Plan: Discussed  Activity Level: Discussed  Smoking Cessation Offered: Patient Refused  Confirmed Follow up Appointment: Patient to Call and Schedule Appointment  Confirmed Symptoms Management: Discussed  Medication Reconciliation Updated: Yes  Pneumococcal Vaccine Administered/Refused: Not given - Patient refused pneumococcal vaccine  Influenza Vaccine Indication: Patient Refuses    I understand that a diet low in cholesterol, fat, and sodium is recommended for good health. Unless I have been given specific instructions below for another diet, I accept this instruction as my diet prescription.   Other diet: Cardiac     DISCHARGE NURSING COMMUNICATION Start: 06/07/18 1310, CONTINUOUS, ROUTINE     Comments: Discharge patient Home   Diet bland and then advance slowly over the next several days   Activities as tolerated   Follow ups with PCP in 7-10 days, call for appointment   Meds per med rec sheet   No smoking, no alcohol, no caffeine   Wear seat belt in motorized vehicle   Take medications as perscribed   Keep appointments   If symptoms worsen call PCP, 911 or urgent care.                Special Instructions: None    · Is patient discharged on Warfarin / Coumadin?   No r  Cholecystitis  Cholecystitis is inflammation of the gallbladder. It is often called a gallbladder attack. The gallbladder is a pear-shaped organ that lies beneath the liver on the right side of the body. The gallbladder stores bile, which is a fluid that helps the body to digest fats. If bile builds up in your gallbladder, your gallbladder becomes inflamed. This condition may occur suddenly (be acute). Repeat episodes of acute cholecystitis or prolonged  episodes may lead to a long-term (chronic) condition. Cholecystitis is serious and it requires treatment.  What are the causes?  The most common cause of this condition is gallstones. Gallstones can block the tube (duct) that carries bile out of your gallbladder. This causes bile to build up. Other causes of this condition include:  · Damage to the gallbladder due to a decrease in blood flow.  · Infections in the bile ducts.  · Scars or kinks in the bile ducts.  · Tumors in the liver, pancreas, or gallbladder.  What increases the risk?  This condition is more likely to develop in:  · People who have sickle cell disease.  · People who take birth control pills or use estrogen.  · People who have alcoholic liver disease.  · People who have liver cirrhosis.  · People who have their nutrition delivered through a vein (parenteral nutrition).  · People who do not eat or drink (do fasting) for a long period of time.  · People who are obese.  · People who have rapid weight loss.  · People who are pregnant.  · People who have increased triglyceride levels.  · People who have pancreatitis.  What are the signs or symptoms?  Symptoms of this condition include:  · Abdominal pain, especially in the upper right area of the abdomen.  · Abdominal tenderness or bloating.  · Nausea.  · Vomiting.  · Fever.  · Chills.  · Yellowing of the skin and the whites of the eyes (jaundice).  How is this diagnosed?  This condition is diagnosed with a medical history and physical exam. You may also have other tests, including:  · Imaging tests, such as:  ¨ An ultrasound of the gallbladder.  ¨ A CT scan of the abdomen.  ¨ A gallbladder nuclear scan (HIDA scan). This scan allows your health care provider to see the bile moving from your liver to your gallbladder and to your small intestine.  ¨ MRI.  · Blood tests, such as:  ¨ A complete blood count, because the white blood cell count may be higher than normal.  ¨ Liver function tests, because some  levels may be higher than normal with certain types of gallstones.  How is this treated?  Treatment may include:  · Fasting for a certain amount of time.  · IV fluids.  · Medicine to treat pain or vomiting.  · Antibiotic medicine.  · Surgery to remove your gallbladder (cholecystectomy). This may happen immediately or at a later time.  Follow these instructions at home:  Home care will depend on your treatment. In general:  · Take over-the-counter and prescription medicines only as told by your health care provider.  · If you were prescribed an antibiotic medicine, take it as told by your health care provider. Do not stop taking the antibiotic even if you start to feel better.  · Follow instructions from your health care provider about what to eat or drink. When you are allowed to eat, avoid eating or drinking anything that triggers your symptoms.  · Keep all follow-up visits as told by your health care provider. This is important.  Contact a health care provider if:  · Your pain is not controlled with medicine.  · You have a fever.  Get help right away if:  · Your pain moves to another part of your abdomen or to your back.  · You continue to have symptoms or you develop new symptoms even with treatment.  This information is not intended to replace advice given to you by your health care provider. Make sure you discuss any questions you have with your health care provider.  Document Released: 12/18/2006 Document Revised: 04/27/2017 Document Reviewed: 03/30/2016  [employer] Interactive Patient Education © 2017 [employer]      Depression / Suicide Risk    As you are discharged from this [facility] facility, it is important to learn how to keep safe from harming yourself.    Recognize the warning signs:  · Abrupt changes in personality, positive or negative- including increase in energy   · Giving away possessions  · Change in eating patterns- significant weight changes-  positive or negative  · Change in sleeping  patterns- unable to sleep or sleeping all the time   · Unwillingness or inability to communicate  · Depression  · Unusual sadness, discouragement and loneliness  · Talk of wanting to die  · Neglect of personal appearance   · Rebelliousness- reckless behavior  · Withdrawal from people/activities they love  · Confusion- inability to concentrate     If you or a loved one observes any of these behaviors or has concerns about self-harm, here's what you can do:  · Talk about it- your feelings and reasons for harming yourself  · Remove any means that you might use to hurt yourself (examples: pills, rope, extension cords, firearm)  · Get professional help from the community (Mental Health, Substance Abuse, psychological counseling)  · Do not be alone:Call your Safe Contact- someone whom you trust who will be there for you.  · Call your local CRISIS HOTLINE 217-4285 or 349-548-1491  · Call your local Children's Mobile Crisis Response Team [place] (159) 598-6797 or www.[place]  · Call the toll free National Suicide Prevention Hotlines   · National Suicide Prevention Lifeline 371-907-XQJD (8194)  · National Hope Line Network 800-SUICIDE (203-6343)

## 2018-06-07 NOTE — PROGRESS NOTES
Per night shift patient has been basically refusing cares. She took of her tele box and refused to put it back on. She wants to go home and is also refusing most interventions. Appears clean and covered at time of report. Bed in lowest position, call light within reach. Continue to monitor.

## 2018-06-07 NOTE — PROGRESS NOTES
"Pt bed alarm went off. Walked into pt's room and saw that pt was standing up out of bed with tele monitor off. Education provided to pt about importance of tele monitor. Pt picked up monitor and threw it onto bed. Pt stated, \"and I don't want this IV, I want it out. I don't even want to be here\". Tried to provide therapeutic communication to the pt. Pt continued to state, \"just take this IV out of me, I have rights\". Education provided to pt on importance of IV. IV was removed from patient. Tele monitor was refused. Dr. Lyons notified.   "

## 2018-06-07 NOTE — PROGRESS NOTES
Pt requested sleep medication. Offered pt non-pharmacological methods. Such as change in environment, extra pillows/blankets, snack, keeping the door closed, lights turned off, etc. Pt is sleeping at this time. Will continue to monitor.

## 2018-06-07 NOTE — CARE PLAN
Problem: Skin Integrity  Goal: Risk for impaired skin integrity will decrease  Outcome: PROGRESSING AS EXPECTED  Patient skin assessed. Risk factors that lead to skin breakdown/impairment identified. Interventions to prevent skin breakdown are in place.Will continue to monitor skin integrity.    Problem: Psychosocial Needs:  Goal: Level of anxiety will decrease  Outcome: PROGRESSING AS EXPECTED  Patient is assessed appropriately. Patient is helped to identify factors for stress and anxiety. Patient is taught to deep breathe and other forms of stress/anxiety management. Patient verbalizes understanding. Will continue to monitor.

## 2018-06-07 NOTE — PROGRESS NOTES
Monitor summary:  SR 61-67  (r)PVC  .16/.08/.36     Pt with known brain mets s/p RT. per rad onc f/u MRI stereotactic with contrast was to be done 1/18. Given seizure, would order MRI while pt is admitted as new or worsening brain lesions would .

## 2018-06-07 NOTE — PROGRESS NOTES
Patient has been refusing most interventions. Refused IV and Iv fluids. IV was removed last night per her request. Given discharge teaching but she is disinterested in teaching and states that she'll follow up with her GI Doctor. Monitor box previously removed as she has refused it. States she will take a taxi home because she has a voucher and insists that she can get safely inside by asking the  to go in and get her walker. She states she will wait for a WC ride downstairs.

## 2018-06-08 ENCOUNTER — HOSPITAL ENCOUNTER (INPATIENT)
Facility: MEDICAL CENTER | Age: 83
LOS: 2 days | DRG: 310 | End: 2018-06-10
Attending: EMERGENCY MEDICINE | Admitting: INTERNAL MEDICINE
Payer: MEDICARE

## 2018-06-08 ENCOUNTER — APPOINTMENT (OUTPATIENT)
Dept: RADIOLOGY | Facility: MEDICAL CENTER | Age: 83
DRG: 310 | End: 2018-06-08
Attending: EMERGENCY MEDICINE
Payer: MEDICARE

## 2018-06-08 ENCOUNTER — OFFICE VISIT (OUTPATIENT)
Dept: VASCULAR LAB | Facility: MEDICAL CENTER | Age: 83
End: 2018-06-08
Attending: INTERNAL MEDICINE
Payer: MEDICARE

## 2018-06-08 VITALS — DIASTOLIC BLOOD PRESSURE: 51 MMHG | HEART RATE: 144 BPM | SYSTOLIC BLOOD PRESSURE: 106 MMHG

## 2018-06-08 DIAGNOSIS — R53.1 WEAKNESS: ICD-10-CM

## 2018-06-08 DIAGNOSIS — I48.0 PAROXYSMAL ATRIAL FIBRILLATION (HCC): ICD-10-CM

## 2018-06-08 LAB
ALBUMIN SERPL BCP-MCNC: 3.6 G/DL (ref 3.2–4.9)
ALBUMIN/GLOB SERPL: 1.2 G/DL
ALP SERPL-CCNC: 239 U/L (ref 30–99)
ALT SERPL-CCNC: 181 U/L (ref 2–50)
ANION GAP SERPL CALC-SCNC: 12 MMOL/L (ref 0–11.9)
APTT PPP: 28.3 SEC (ref 24.7–36)
AST SERPL-CCNC: 61 U/L (ref 12–45)
BASOPHILS # BLD AUTO: 1.2 % (ref 0–1.8)
BASOPHILS # BLD: 0.07 K/UL (ref 0–0.12)
BILIRUB SERPL-MCNC: 0.7 MG/DL (ref 0.1–1.5)
BNP SERPL-MCNC: 382 PG/ML (ref 0–100)
BUN SERPL-MCNC: 16 MG/DL (ref 8–22)
CALCIUM SERPL-MCNC: 9.6 MG/DL (ref 8.5–10.5)
CHLORIDE SERPL-SCNC: 108 MMOL/L (ref 96–112)
CO2 SERPL-SCNC: 22 MMOL/L (ref 20–33)
CREAT SERPL-MCNC: 1.18 MG/DL (ref 0.5–1.4)
EKG IMPRESSION: NORMAL
EKG IMPRESSION: NORMAL
EOSINOPHIL # BLD AUTO: 0.15 K/UL (ref 0–0.51)
EOSINOPHIL NFR BLD: 2.5 % (ref 0–6.9)
ERYTHROCYTE [DISTWIDTH] IN BLOOD BY AUTOMATED COUNT: 54.5 FL (ref 35.9–50)
GLOBULIN SER CALC-MCNC: 3 G/DL (ref 1.9–3.5)
GLUCOSE SERPL-MCNC: 152 MG/DL (ref 65–99)
HCT VFR BLD AUTO: 42.3 % (ref 37–47)
HGB BLD-MCNC: 13.8 G/DL (ref 12–16)
IMM GRANULOCYTES # BLD AUTO: 0.04 K/UL (ref 0–0.11)
IMM GRANULOCYTES NFR BLD AUTO: 0.7 % (ref 0–0.9)
INR PPP: 0.88 (ref 0.87–1.13)
LYMPHOCYTES # BLD AUTO: 1.33 K/UL (ref 1–4.8)
LYMPHOCYTES NFR BLD: 22.2 % (ref 22–41)
MAGNESIUM SERPL-MCNC: 1.8 MG/DL (ref 1.5–2.5)
MCH RBC QN AUTO: 33.3 PG (ref 27–33)
MCHC RBC AUTO-ENTMCNC: 32.6 G/DL (ref 33.6–35)
MCV RBC AUTO: 102.2 FL (ref 81.4–97.8)
MONOCYTES # BLD AUTO: 0.45 K/UL (ref 0–0.85)
MONOCYTES NFR BLD AUTO: 7.5 % (ref 0–13.4)
NEUTROPHILS # BLD AUTO: 3.94 K/UL (ref 2–7.15)
NEUTROPHILS NFR BLD: 65.9 % (ref 44–72)
NRBC # BLD AUTO: 0 K/UL
NRBC BLD-RTO: 0 /100 WBC
PHOSPHATE SERPL-MCNC: 2.6 MG/DL (ref 2.5–4.5)
PLATELET # BLD AUTO: 240 K/UL (ref 164–446)
PMV BLD AUTO: 11.3 FL (ref 9–12.9)
POTASSIUM SERPL-SCNC: 3.9 MMOL/L (ref 3.6–5.5)
PROT SERPL-MCNC: 6.6 G/DL (ref 6–8.2)
PROTHROMBIN TIME: 11.7 SEC (ref 12–14.6)
RBC # BLD AUTO: 4.14 M/UL (ref 4.2–5.4)
SODIUM SERPL-SCNC: 142 MMOL/L (ref 135–145)
T4 FREE SERPL-MCNC: 0.84 NG/DL (ref 0.53–1.43)
TROPONIN I SERPL-MCNC: 0.02 NG/ML (ref 0–0.04)
TSH SERPL DL<=0.005 MIU/L-ACNC: 3.17 UIU/ML (ref 0.38–5.33)
WBC # BLD AUTO: 6 K/UL (ref 4.8–10.8)

## 2018-06-08 PROCEDURE — 770020 HCHG ROOM/CARE - TELE (206)

## 2018-06-08 PROCEDURE — 93005 ELECTROCARDIOGRAM TRACING: CPT | Performed by: EMERGENCY MEDICINE

## 2018-06-08 PROCEDURE — 85025 COMPLETE CBC W/AUTO DIFF WBC: CPT

## 2018-06-08 PROCEDURE — 700102 HCHG RX REV CODE 250 W/ 637 OVERRIDE(OP): Performed by: INTERNAL MEDICINE

## 2018-06-08 PROCEDURE — 84439 ASSAY OF FREE THYROXINE: CPT

## 2018-06-08 PROCEDURE — 71045 X-RAY EXAM CHEST 1 VIEW: CPT

## 2018-06-08 PROCEDURE — 83880 ASSAY OF NATRIURETIC PEPTIDE: CPT

## 2018-06-08 PROCEDURE — 99285 EMERGENCY DEPT VISIT HI MDM: CPT

## 2018-06-08 PROCEDURE — 84443 ASSAY THYROID STIM HORMONE: CPT

## 2018-06-08 PROCEDURE — 99221 1ST HOSP IP/OBS SF/LOW 40: CPT | Mod: AI | Performed by: INTERNAL MEDICINE

## 2018-06-08 PROCEDURE — 700111 HCHG RX REV CODE 636 W/ 250 OVERRIDE (IP): Performed by: EMERGENCY MEDICINE

## 2018-06-08 PROCEDURE — 94760 N-INVAS EAR/PLS OXIMETRY 1: CPT

## 2018-06-08 PROCEDURE — 84100 ASSAY OF PHOSPHORUS: CPT

## 2018-06-08 PROCEDURE — 85610 PROTHROMBIN TIME: CPT

## 2018-06-08 PROCEDURE — 85730 THROMBOPLASTIN TIME PARTIAL: CPT

## 2018-06-08 PROCEDURE — 80053 COMPREHEN METABOLIC PANEL: CPT

## 2018-06-08 PROCEDURE — 93005 ELECTROCARDIOGRAM TRACING: CPT

## 2018-06-08 PROCEDURE — 84484 ASSAY OF TROPONIN QUANT: CPT

## 2018-06-08 PROCEDURE — 96374 THER/PROPH/DIAG INJ IV PUSH: CPT

## 2018-06-08 PROCEDURE — A9270 NON-COVERED ITEM OR SERVICE: HCPCS | Performed by: INTERNAL MEDICINE

## 2018-06-08 PROCEDURE — 83735 ASSAY OF MAGNESIUM: CPT

## 2018-06-08 RX ORDER — ONDANSETRON 2 MG/ML
4 INJECTION INTRAMUSCULAR; INTRAVENOUS EVERY 4 HOURS PRN
Status: DISCONTINUED | OUTPATIENT
Start: 2018-06-08 | End: 2018-06-10 | Stop reason: HOSPADM

## 2018-06-08 RX ORDER — ACETAMINOPHEN 325 MG/1
650 TABLET ORAL EVERY 6 HOURS PRN
Status: DISCONTINUED | OUTPATIENT
Start: 2018-06-08 | End: 2018-06-10 | Stop reason: HOSPADM

## 2018-06-08 RX ORDER — AMOXICILLIN 250 MG
2 CAPSULE ORAL 2 TIMES DAILY
Status: DISCONTINUED | OUTPATIENT
Start: 2018-06-08 | End: 2018-06-10 | Stop reason: HOSPADM

## 2018-06-08 RX ORDER — DILTIAZEM HYDROCHLORIDE 5 MG/ML
10 INJECTION INTRAVENOUS ONCE
Status: COMPLETED | OUTPATIENT
Start: 2018-06-08 | End: 2018-06-08

## 2018-06-08 RX ORDER — CLOPIDOGREL BISULFATE 75 MG/1
75 TABLET ORAL DAILY
Status: DISCONTINUED | OUTPATIENT
Start: 2018-06-08 | End: 2018-06-10 | Stop reason: HOSPADM

## 2018-06-08 RX ORDER — ONDANSETRON 4 MG/1
4 TABLET, ORALLY DISINTEGRATING ORAL EVERY 4 HOURS PRN
Status: DISCONTINUED | OUTPATIENT
Start: 2018-06-08 | End: 2018-06-10 | Stop reason: HOSPADM

## 2018-06-08 RX ORDER — BISACODYL 10 MG
10 SUPPOSITORY, RECTAL RECTAL
Status: DISCONTINUED | OUTPATIENT
Start: 2018-06-08 | End: 2018-06-10 | Stop reason: HOSPADM

## 2018-06-08 RX ORDER — POLYETHYLENE GLYCOL 3350 17 G/17G
1 POWDER, FOR SOLUTION ORAL
Status: DISCONTINUED | OUTPATIENT
Start: 2018-06-08 | End: 2018-06-10 | Stop reason: HOSPADM

## 2018-06-08 RX ORDER — DILTIAZEM HYDROCHLORIDE 5 MG/ML
10 INJECTION INTRAVENOUS ONCE
Status: DISCONTINUED | OUTPATIENT
Start: 2018-06-08 | End: 2018-06-08

## 2018-06-08 RX ORDER — HEPARIN SODIUM 5000 [USP'U]/ML
5000 INJECTION, SOLUTION INTRAVENOUS; SUBCUTANEOUS EVERY 8 HOURS
Status: DISCONTINUED | OUTPATIENT
Start: 2018-06-08 | End: 2018-06-09

## 2018-06-08 RX ADMIN — DILTIAZEM HYDROCHLORIDE 10 MG: 5 INJECTION INTRAVENOUS at 13:50

## 2018-06-08 RX ADMIN — ACETAMINOPHEN 650 MG: 325 TABLET, FILM COATED ORAL at 20:16

## 2018-06-08 RX ADMIN — DILTIAZEM HYDROCHLORIDE 30 MG: 30 TABLET, FILM COATED ORAL at 17:50

## 2018-06-08 RX ADMIN — CLOPIDOGREL 75 MG: 75 TABLET, FILM COATED ORAL at 17:49

## 2018-06-08 ASSESSMENT — LIFESTYLE VARIABLES
AVERAGE NUMBER OF DAYS PER WEEK YOU HAVE A DRINK CONTAINING ALCOHOL: 7
ON A TYPICAL DAY WHEN YOU DRINK ALCOHOL HOW MANY DRINKS DO YOU HAVE: 1
EVER_SMOKED: YES
TOTAL SCORE: 0
HAVE YOU EVER FELT YOU SHOULD CUT DOWN ON YOUR DRINKING: NO
CONSUMPTION TOTAL: NEGATIVE
TOTAL SCORE: 0
ALCOHOL_USE: YES
TOTAL SCORE: 0
HAVE PEOPLE ANNOYED YOU BY CRITICIZING YOUR DRINKING: NO
EVER HAD A DRINK FIRST THING IN THE MORNING TO STEADY YOUR NERVES TO GET RID OF A HANGOVER: NO
EVER FELT BAD OR GUILTY ABOUT YOUR DRINKING: NO
HOW MANY TIMES IN THE PAST YEAR HAVE YOU HAD 5 OR MORE DRINKS IN A DAY: 0

## 2018-06-08 ASSESSMENT — PAIN SCALES - GENERAL
PAINLEVEL_OUTOF10: 3
PAINLEVEL_OUTOF10: 10

## 2018-06-08 NOTE — ED TRIAGE NOTES
Pt was upstairs for a vascular follow up due to her hypertension. The staff upstairs found her heart rate to be in the 130s. Pt c/o lightheadedness, dizziness, and neck to right shoulder pain. She was brought down to the ER immediately.

## 2018-06-08 NOTE — ED PROVIDER NOTES
ED Provider Note    Scribed for Geo Perry M.D. by Patricia Oviedo. 6/8/2018  12:09 PM    Primary Care Provider: Ric Garcia M.D.  Means of arrival: [facility]   History limited by: The patient is slow and reluctant to answer questions.     CHIEF COMPLAINT  Chief Complaint   Patient presents with   • Palpitations   • Lightheadedness   • Dizziness   • Anxiety       HPI  Madeleine Zambrano is a 85 y.o. female who presents to the ED for evaluation of tachycardia onset this morning. The patient confirms feeling fine yesterday. She was evaluated by her vascular physician this morning for a follow up evaluation of her hypertension. With this evaluation she was found to have a heart rate of 130. With arrival to the ED her heart rate was noted to be 140. Patient denies chest pain but reports having pain to her right neck, at the base, radiating to her right shoulder onset this morning. Her right shoulder pain is exacerbated with movement. She also complains of a tingling to her left arm and shortness of breath. She denies headache, blurred vision, sore throat, chest pain, abdominal pain, vomiting, diarrhea, rash. Her cardiologist is Dr. Bloch.     HPI is limited. The patient is slow and reluctant to answer questions.       REVIEW OF SYSTEMS  EYES:  Denies blurred vision.   ENT:  Denies sore throat  CARDIOVASCULAR:  Tachycardia. Denies chest pain.   RESPIRATORY: Shortness of breath.   GI:  Denies abdominal pain, vomiting and diarrhea.   MUSCULOSKELETAL:  Pain to her right neck and shoulder.   SKIN:  No rash   NEUROLOGIC:  Tingling to left arm. Denies headache.   ROS is limited. The patient is slow and reluctant to answer questions. C.       PAST MEDICAL HISTORY  Past Medical History:   Diagnosis Date   • Anginal syndrome (HCC)    • Arrhythmia    • Arthritis    • Back pain 3/28/2012   • CATARACT    • Coronary artery disease    • Disorder of thyroid    • Glaucoma    • Hyperlipidemia    • Hypertension    • MEDICAL  HOME 11/07/12   • Myocardial infarct (HCC)    • Pain     sacrum 1/10   • Stroke (HCC)    • TIA (transient ischemic attack) 09/2017   • Urinary incontinence        FAMILY HISTORY  Family History   Problem Relation Age of Onset   • Heart Disease Mother    • Cancer Father        SOCIAL HISTORY   reports that she has been smoking Cigarettes.  She has a 0.02 pack-year smoking history. She has never used smokeless tobacco. She reports that she drinks alcohol. She reports that she does not use drugs.    SURGICAL HISTORY  Past Surgical History:   Procedure Laterality Date   • ERCP IN OR  3/16/2018    Procedure: ERCP IN OR;  Surgeon: Orlin Caldera M.D.;  Location: SURGERY [facility];  Service: Gastroenterology   • VITRECTOMY POSTERIOR Right 4/13/2017    Procedure: VITRECTOMY POSTERIOR-LASER, SF6 GAS;  Surgeon: Jessica Arita M.D.;  Location: SURGERY SAME DAY [facility];  Service:    • RIGO BY LAPAROSCOPY  10/9/2015    Procedure: RIGO BY LAPAROSCOPY;  Surgeon: Ric Zambarno M.D.;  Location: SURGERY [facility];  Service:    • CATARACT PHACO WITH IOL  4/27/2011    Performed by EVERETTE COOPER at SURGERY SAME DAY [facility]   • CATARACT PHACO WITH IOL  4/13/2011    Performed by EVERETTE COOPER at SURGERY SAME DAY [facility]   • OTHER ORTHOPEDIC SURGERY  1980    pins in left hip   • ATHROPLASTY      broken hip 1990   • OTHER CARDIAC SURGERY           CURRENT MEDICATIONS  No current facility-administered medications on file prior to encounter.      Current Outpatient Prescriptions on File Prior to Encounter   Medication Sig Dispense Refill   • clopidogrel (PLAVIX) 75 MG Tab TAKE 1 TAB BY MOUTH EVERY DAY. 90 Tab 1   • metoprolol (LOPRESSOR) 25 MG Tab Take 1 Tab by mouth 2 times a day. 60 Tab 2   • isosorbide mononitrate SR (IMDUR) 30 MG TABLET SR 24 HR Take 0.5 Tabs by mouth every day. 30 Tab 3   • amLODIPine (NORVASC) 5 MG Tab Take 2.5 mg by mouth every morning.     • Ascorbic Acid (VITAMIN C) 1000 MG  "Tab Take 1 Tab by mouth every morning.     Diazepam   Elliquis         ALLERGIES  Allergies   Allergen Reactions   • Meperidine Vomiting and Nausea   • Tramadol Vomiting and Nausea       PHYSICAL EXAM  VITAL SIGNS: /92   Pulse (!) 140   Temp 36.2 °C (97.2 °F)   Resp 17   Ht 1.6 m (5' 3\")   Wt 61.2 kg (135 lb)   SpO2 94%   BMI 23.91 kg/m²    Constitutional: Patient is awake and alert. No acute respiratory distress. Elderly female ill looking   HENT: Normocephalic, Atraumatic, Bilateral external ears normal, Oropharynx pink and dry with no exudates, Nose patent.  Eyes:  Sclera and conjunctiva clear, No discharge.   Neck:  Supple no nuchal rigidity, no thyromegaly or mass. Tenderness to the occipital condyle and right trapezius muscle to the right shoulder. Anterior is midline.   Cardiovascular: Heart is irregularly irregular and rapid, no murmur, rub or thrill.   Thorax & Lungs: Chest is symmetrical, with good breath sounds. No wheezing or crackles. No respiratory distress  Abdomen: Soft, No tenderness no hepatosplenomegaly there is no guarding or rebound,    Skin: Warm, Dry, no petechia, purpura, or rash.   Back: Non tender with palpation, No CVA tenderness.   Extremities: No edema. Non tender. Chronic venous stasis changes.   Musculoskeletal: Good range of motion to upper or lower extremities   Neurologic: Alert & oriented to person, time, and place.  Strength is 4/5 in the upper and lower extremities. Sensory is intact to light touch to face, arms, and legs.  DTRs are symmetrical in biceps brachioradialis, patella and Achilles.         EKG 1  11:19 AM- 13 Lead EKG interpreted by me shows atrial fibrillation with ventricular response of 133.   Axis QRS -31, No ST elevations. In comparison to 6/5 shows the patient was sinus rhythm at that time.       EKG 2  15:11 PM - 13 Lead EKG interpreted by me shows normal sinus rhythm at 65.  . Axis QRS 15, No ST elevations. Flipped T waves in lead III, aVR " and V1. Patient spontaneously cardioverted to sinus rhythm.       LABS  Results for orders placed or performed during the hospital encounter of 06/08/18   CBC w/ Differential   Result Value Ref Range    WBC 6.0 4.8 - 10.8 K/uL    RBC 4.14 (L) 4.20 - 5.40 M/uL    Hemoglobin 13.8 12.0 - 16.0 g/dL    Hematocrit 42.3 37.0 - 47.0 %    .2 (H) 81.4 - 97.8 fL    MCH 33.3 (H) 27.0 - 33.0 pg    MCHC 32.6 (L) 33.6 - 35.0 g/dL    RDW 54.5 (H) 35.9 - 50.0 fL    Platelet Count 240 164 - 446 K/uL    MPV 11.3 9.0 - 12.9 fL    Neutrophils-Polys 65.90 44.00 - 72.00 %    Lymphocytes 22.20 22.00 - 41.00 %    Monocytes 7.50 0.00 - 13.40 %    Eosinophils 2.50 0.00 - 6.90 %    Basophils 1.20 0.00 - 1.80 %    Immature Granulocytes 0.70 0.00 - 0.90 %    Nucleated RBC 0.00 /100 WBC    Neutrophils (Absolute) 3.94 2.00 - 7.15 K/uL    Lymphs (Absolute) 1.33 1.00 - 4.80 K/uL    Monos (Absolute) 0.45 0.00 - 0.85 K/uL    Eos (Absolute) 0.15 0.00 - 0.51 K/uL    Baso (Absolute) 0.07 0.00 - 0.12 K/uL    Immature Granulocytes (abs) 0.04 0.00 - 0.11 K/uL    NRBC (Absolute) 0.00 K/uL   Complete Metabolic Panel (CMP)   Result Value Ref Range    Sodium 142 135 - 145 mmol/L    Potassium 3.9 3.6 - 5.5 mmol/L    Chloride 108 96 - 112 mmol/L    Co2 22 20 - 33 mmol/L    Anion Gap 12.0 (H) 0.0 - 11.9    Glucose 152 (H) 65 - 99 mg/dL    Bun 16 8 - 22 mg/dL    Creatinine 1.18 0.50 - 1.40 mg/dL    Calcium 9.6 8.5 - 10.5 mg/dL    AST(SGOT) 61 (H) 12 - 45 U/L    ALT(SGPT) 181 (H) 2 - 50 U/L    Alkaline Phosphatase 239 (H) 30 - 99 U/L    Total Bilirubin 0.7 0.1 - 1.5 mg/dL    Albumin 3.6 3.2 - 4.9 g/dL    Total Protein 6.6 6.0 - 8.2 g/dL    Globulin 3.0 1.9 - 3.5 g/dL    A-G Ratio 1.2 g/dL   Btype Natriuretic Peptide   Result Value Ref Range    B Natriuretic Peptide 382 (H) 0 - 100 pg/mL   Troponin STAT   Result Value Ref Range    Troponin I 0.02 0.00 - 0.04 ng/mL   Magnesium   Result Value Ref Range    Magnesium 1.8 1.5 - 2.5 mg/dL   Phosphorus   Result Value  Ref Range    Phosphorus 2.6 2.5 - 4.5 mg/dL   PT/INR   Result Value Ref Range    PT 11.7 (L) 12.0 - 14.6 sec    INR 0.88 0.87 - 1.13   APTT   Result Value Ref Range    APTT 28.3 24.7 - 36.0 sec   TSH (for screening thyroid dysfunction)   Result Value Ref Range    TSH 3.170 0.380 - 5.330 uIU/mL   Free Thyroxine (add to TSH for patients with existing thyroid dysfunction)   Result Value Ref Range    Free T-4 0.84 0.53 - 1.43 ng/dL   ESTIMATED GFR   Result Value Ref Range    GFR If  53 (A) >60 mL/min/1.73 m 2    GFR If Non  43 (A) >60 mL/min/1.73 m 2                                         All labs reviewed by me.        RADIOLOGY/PROCEDURES  DX-CHEST-PORTABLE (1 VIEW)   Final Result      Mild diffuse atelectasis/possible interstitial edema.      The radiologist's interpretations of all radiological studies have been reviewed by me.         COURSE & MEDICAL DECISION MAKING  Pertinent Labs & Imaging studies reviewed. (See chart for details)    12:09 PM - Patient seen and examined at bedside. Ordered DX chest, CBC, CMP, BNP, Troponin, Magnesium, phosphorous, PT, APTT, TSH, free thyroxine and estimated GFR.      12:23 PM Obtained and reviewed past medical records which indicated the patient was admitted to the ED on 6/5/2018 for elevated LFTs, elevated lactic acid, and elevated WBC. The patient left against medical advice on 6/7.     1:10 PM Informed the patient refused her shoulder X ray.     1:35 PM Consult with hospitalist, Dr. Beth, who agreed to admit the patient. Ordered Diltiazem 10 mg.     2:10 PM The patient had temporary improvement of her heart rate following Diltiazem but her heart rate is now increasing. Patient was treated with a second dose of Diltiazem 10 mg.     3:45 PM The patient spontaneously cardioverted to a sinus rhythm at this time. The second dose of Diltiazem was not needed.     Critical care time 35 minutes. This includes initial evaluation the patient given her  diltiazem for the rapid ventricular response. Fortunately she converted without needing to be cardioverted. She had a real risk of deterioration stroke or death.    Decision Making  Patient is recently the hospital for back pain. She left the hospital today for the physicians at finish their treatment sever as per their notes. Now has a past atrial fibrillation with a rapid ventricular response. In talking to her it is unclear to me when this started. There is an EKG from several days ago where it was a sinus rhythm. She was stable and is far as her blood pressure went initially. I did give her diltiazem 10 mg IV is slowed her heart rate down into the 110-120 area. We're getting ready to give her another 10 mg of diltiazem when she converted to a sinus rhythm. Rest case the [facility]ist about the patient states she seems to be now having intermittent paroxysmal atrial fibrillation with rapid ventricular response on beta blockers and other medications already. I do not feel she should go home until this is further evaluated level on her other medical issues.      DISPOSITION:  Patient will be admitted to Dr. Beth in guarded condition.        FINAL IMPRESSION  1. Paroxysmal atrial fibrillation (HCC)    2. Weakness    3. Conversion of age fibrillation sinus rhythm after diltiazem  4. Critical care time 35 minutes    PLAN  1. Admission [facility]  2. Continue workup and evaluation of her atrial fibrillation and back pain and weakness        Patricia RUSHING (Alisha), am scribing for, and in the presence of, Geo Perry M.D..  Electronically signed by: Patricia Oviedo (Alisha), 6/8/2018  Geo RUSHING M.D. personally performed the services described in this documentation, as scribed by Patricia Oviedo in my presence, and it is both accurate and complete.    The note accurately reflects work and decisions made by me.  Geo Perry  6/8/2018  7:10 PM

## 2018-06-08 NOTE — PROGRESS NOTES
2 RN skin check with Mone GRECO:  Pt's skin is dry throughout with scattered bruising on bilat UE and bilat LE. Pt's heels dark pink and blanching.

## 2018-06-08 NOTE — PROGRESS NOTES
Report received. Assumed care of pt. Assessment complete. Pt A&Ox4. Pt in no apparent signs of distress. POC discussed. Call light within reach. Bed in low position. All needs are met at this time.

## 2018-06-08 NOTE — DISCHARGE SUMMARY
Discharge Summary    CHIEF COMPLAINT ON ADMISSION  Chief Complaint   Patient presents with   • Nausea   • Back Pain       Reason for Admission  EMS     Admission Date  6/5/2018    CODE STATUS  Prior    HPI & HOSPITAL COURSE  This is a 85 y.o. female here with above issue. Patient was found to have elevated LFT's and mildly elevated total bilirubin with RUQ abdominal pain concerning for recurrent choledocholithiasis. She never had clinical evidence of cholangitis and therefore did not require any antibiotics. Patient was admitted to telemetry, given an altered diet and AB US showed no evidence of gallstones and common bile duct of 0.9 cm. Her LFT's down-trended, T BILI normalized, abdominal pain resolved. GI was consulted for possible repeat ERCP, but she refused this as did the MRCP as well. She was extensively counseled on the possibility of the gallstones returning, but she would like to go home and currently she is clinically stable to do so.  Her outpatient statin was held given liver issues, but this should be re-started in the near future if her LFT's normalized.       Therefore, she is discharged in fair and stable condition to home with close outpatient follow-up.    The patient met 2-midnight criteria for an inpatient stay at the time of discharge.    Discharge Date  6/7/2018    FOLLOW UP ITEMS POST DISCHARGE  -Repeat LFT's in 1 week and F/U with GI in 1-2 months    DISCHARGE DIAGNOSES  Active Problems:    CVA (cerebral vascular accident) (HCC) POA: Yes    HTN (hypertension), benign POA: Yes    Dyslipidemia POA: Yes    LFT elevation POA: Yes    Hypothyroidism POA: Yes    Macrocytosis POA: Yes  Resolved Problems:    Abdominal pain POA: Yes    GUILLERMO (acute kidney injury) (HCC) POA: Yes      FOLLOW UP  Future Appointments  Date Time Provider Department Center   6/8/2018 10:40 AM VASCULAR NURSE PRACTITIONER [facility] None   6/13/2018 10:40 AM GABY Rodriguez [address]     No follow-up provider  specified.    MEDICATIONS ON DISCHARGE     Medication List      CONTINUE taking these medications      Instructions   amLODIPine 5 MG Tabs  Commonly known as:  NORVASC   Take 2.5 mg by mouth every evening.  Dose:  2.5 mg     clopidogrel 75 MG Tabs  Commonly known as:  PLAVIX   TAKE 1 TAB BY MOUTH EVERY DAY.  Dose:  75 mg     isosorbide mononitrate SR 30 MG Tb24  Commonly known as:  IMDUR   Take 0.5 Tabs by mouth every day.  Dose:  15 mg     metoprolol 25 MG Tabs  Commonly known as:  LOPRESSOR   Take 1 Tab by mouth 2 times a day.  Dose:  25 mg     Vitamin C 1000 MG Tabs   Take 1 Tab by mouth every day.  Dose:  1 Tab        STOP taking these medications    atorvastatin 40 MG Tabs  Commonly known as:  LIPITOR            Allergies  Allergies   Allergen Reactions   • Meperidine Vomiting and Nausea   • Tramadol Vomiting and Nausea       DIET  [place] and then advance slowly over the next several days    ACTIVITY  As tolerated.  Weight bearing as tolerated    CONSULTATIONS  -GI    PROCEDURES  US-LIVER AND BILIARY TREE   Final Result      1.  Cyst is noted in the right lobe of the liver.      2.  Liver echogenicity appears heterogeneous which could indicate cirrhosis.      3.  Post cholecystectomy.         DX-CHEST-PORTABLE (1 VIEW)   Final Result      Mild interstitial prominence may represent mild edema.      Cardiomegaly.      Atherosclerotic plaque.            LABORATORY  Lab Results   Component Value Date    SODIUM 141 06/07/2018    POTASSIUM 4.1 06/07/2018    CHLORIDE 111 06/07/2018    CO2 24 06/07/2018    GLUCOSE 111 (H) 06/07/2018    BUN 14 06/07/2018    CREATININE 1.13 06/07/2018        Lab Results   Component Value Date    WBC 3.8 (L) 06/07/2018    HEMOGLOBIN 11.5 (L) 06/07/2018    HEMATOCRIT 35.3 (L) 06/07/2018    PLATELETCT 176 06/07/2018        Total time of the discharge process exceeds 37 minutes.

## 2018-06-09 PROBLEM — I48.0 PAROXYSMAL ATRIAL FIBRILLATION (HCC): Status: ACTIVE | Noted: 2018-06-09

## 2018-06-09 LAB
ANION GAP SERPL CALC-SCNC: 8 MMOL/L (ref 0–11.9)
BASOPHILS # BLD AUTO: 1 % (ref 0–1.8)
BASOPHILS # BLD: 0.05 K/UL (ref 0–0.12)
BUN SERPL-MCNC: 20 MG/DL (ref 8–22)
CALCIUM SERPL-MCNC: 8.8 MG/DL (ref 8.5–10.5)
CHLORIDE SERPL-SCNC: 110 MMOL/L (ref 96–112)
CO2 SERPL-SCNC: 23 MMOL/L (ref 20–33)
CREAT SERPL-MCNC: 1.19 MG/DL (ref 0.5–1.4)
EOSINOPHIL # BLD AUTO: 0.39 K/UL (ref 0–0.51)
EOSINOPHIL NFR BLD: 7.4 % (ref 0–6.9)
ERYTHROCYTE [DISTWIDTH] IN BLOOD BY AUTOMATED COUNT: 53.7 FL (ref 35.9–50)
GLUCOSE SERPL-MCNC: 94 MG/DL (ref 65–99)
HCT VFR BLD AUTO: 35.3 % (ref 37–47)
HGB BLD-MCNC: 11.7 G/DL (ref 12–16)
IMM GRANULOCYTES # BLD AUTO: 0.02 K/UL (ref 0–0.11)
IMM GRANULOCYTES NFR BLD AUTO: 0.4 % (ref 0–0.9)
LYMPHOCYTES # BLD AUTO: 1.63 K/UL (ref 1–4.8)
LYMPHOCYTES NFR BLD: 31 % (ref 22–41)
MCH RBC QN AUTO: 33.5 PG (ref 27–33)
MCHC RBC AUTO-ENTMCNC: 33.1 G/DL (ref 33.6–35)
MCV RBC AUTO: 101.1 FL (ref 81.4–97.8)
MONOCYTES # BLD AUTO: 0.61 K/UL (ref 0–0.85)
MONOCYTES NFR BLD AUTO: 11.6 % (ref 0–13.4)
NEUTROPHILS # BLD AUTO: 2.55 K/UL (ref 2–7.15)
NEUTROPHILS NFR BLD: 48.6 % (ref 44–72)
NRBC # BLD AUTO: 0 K/UL
NRBC BLD-RTO: 0 /100 WBC
PLATELET # BLD AUTO: 209 K/UL (ref 164–446)
PMV BLD AUTO: 10.9 FL (ref 9–12.9)
POTASSIUM SERPL-SCNC: 3.3 MMOL/L (ref 3.6–5.5)
RBC # BLD AUTO: 3.49 M/UL (ref 4.2–5.4)
SODIUM SERPL-SCNC: 141 MMOL/L (ref 135–145)
WBC # BLD AUTO: 5.3 K/UL (ref 4.8–10.8)

## 2018-06-09 PROCEDURE — 80048 BASIC METABOLIC PNL TOTAL CA: CPT

## 2018-06-09 PROCEDURE — 700102 HCHG RX REV CODE 250 W/ 637 OVERRIDE(OP): Performed by: INTERNAL MEDICINE

## 2018-06-09 PROCEDURE — 770020 HCHG ROOM/CARE - TELE (206)

## 2018-06-09 PROCEDURE — 700111 HCHG RX REV CODE 636 W/ 250 OVERRIDE (IP): Performed by: INTERNAL MEDICINE

## 2018-06-09 PROCEDURE — A9270 NON-COVERED ITEM OR SERVICE: HCPCS | Performed by: INTERNAL MEDICINE

## 2018-06-09 PROCEDURE — 85025 COMPLETE CBC W/AUTO DIFF WBC: CPT

## 2018-06-09 PROCEDURE — 36415 COLL VENOUS BLD VENIPUNCTURE: CPT

## 2018-06-09 PROCEDURE — 99232 SBSQ HOSP IP/OBS MODERATE 35: CPT | Performed by: INTERNAL MEDICINE

## 2018-06-09 RX ORDER — POTASSIUM CHLORIDE 20 MEQ/1
20 TABLET, EXTENDED RELEASE ORAL 2 TIMES DAILY
Status: DISCONTINUED | OUTPATIENT
Start: 2018-06-09 | End: 2018-06-10 | Stop reason: HOSPADM

## 2018-06-09 RX ORDER — ATORVASTATIN CALCIUM 40 MG/1
40 TABLET, FILM COATED ORAL
Status: DISCONTINUED | OUTPATIENT
Start: 2018-06-10 | End: 2018-06-09

## 2018-06-09 RX ADMIN — HEPARIN SODIUM 5000 UNITS: 5000 INJECTION, SOLUTION INTRAVENOUS; SUBCUTANEOUS at 06:07

## 2018-06-09 RX ADMIN — POTASSIUM CHLORIDE 20 MEQ: 20 TABLET, EXTENDED RELEASE ORAL at 09:32

## 2018-06-09 RX ADMIN — DILTIAZEM HYDROCHLORIDE 30 MG: 30 TABLET, FILM COATED ORAL at 00:00

## 2018-06-09 RX ADMIN — ACETAMINOPHEN 650 MG: 325 TABLET, FILM COATED ORAL at 15:27

## 2018-06-09 RX ADMIN — DILTIAZEM HYDROCHLORIDE 30 MG: 30 TABLET, FILM COATED ORAL at 06:07

## 2018-06-09 RX ADMIN — POTASSIUM CHLORIDE 20 MEQ: 20 TABLET, EXTENDED RELEASE ORAL at 22:11

## 2018-06-09 RX ADMIN — APIXABAN 5 MG: 5 TABLET, FILM COATED ORAL at 09:32

## 2018-06-09 RX ADMIN — APIXABAN 5 MG: 5 TABLET, FILM COATED ORAL at 22:11

## 2018-06-09 RX ADMIN — CLOPIDOGREL 75 MG: 75 TABLET, FILM COATED ORAL at 06:06

## 2018-06-09 RX ADMIN — METOPROLOL TARTRATE 25 MG: 25 TABLET, FILM COATED ORAL at 15:27

## 2018-06-09 RX ADMIN — METOPROLOL TARTRATE 25 MG: 25 TABLET, FILM COATED ORAL at 09:32

## 2018-06-09 ASSESSMENT — ENCOUNTER SYMPTOMS
NECK PAIN: 0
SHORTNESS OF BREATH: 0
FOCAL WEAKNESS: 0
BRUISES/BLEEDS EASILY: 0
VOMITING: 0
PALPITATIONS: 1
FEVER: 0
MYALGIAS: 0
COUGH: 0
PALPITATIONS: 0
LOSS OF CONSCIOUSNESS: 0
DIZZINESS: 0
HEADACHES: 0
DEPRESSION: 0
NAUSEA: 0
ABDOMINAL PAIN: 0
CHILLS: 0
BLURRED VISION: 0

## 2018-06-09 ASSESSMENT — PAIN SCALES - GENERAL
PAINLEVEL_OUTOF10: 0
PAINLEVEL_OUTOF10: 7
PAINLEVEL_OUTOF10: 0
PAINLEVEL_OUTOF10: 0
PAINLEVEL_OUTOF10: 3
PAINLEVEL_OUTOF10: 0

## 2018-06-09 NOTE — PROGRESS NOTES
Hospital Medicine Daily Progress Note    Date of Service  6/9/2018    Chief Complaint  85 y.o. female admitted 6/8/2018 with A fib RVR    Hospital Course  See below    Interval Problem Update  A fib RVR-rate came down, tele shows conversion to SR, HR varied from 58-73 and down to intermittent 47, once 10 second run of a fib last night. Denies any chest pain or palpitations. Discussed importance of eliquis and patient is interested in taking this medication.    LFT's-down trending still, denies any abdominal pain and eating food without issue.     Consultants/Specialty  none    Disposition  TELE    Review of Systems  Review of Systems   Constitutional: Positive for malaise/fatigue (very ild). Negative for fever.   HENT: Negative for hearing loss.    Eyes: Negative for blurred vision.   Respiratory: Negative for cough.    Cardiovascular: Negative for chest pain and palpitations.   Gastrointestinal: Negative for abdominal pain, nausea and vomiting.   Genitourinary: Negative for dysuria and urgency.   Musculoskeletal: Negative for myalgias and neck pain.   Skin: Negative for rash.   Neurological: Negative for dizziness, focal weakness and loss of consciousness.   Endo/Heme/Allergies: Does not bruise/bleed easily.   Psychiatric/Behavioral: Negative for depression.   All other systems reviewed and are negative.       Physical Exam  Blood Pressure : 145/73   Temperature: 36.7 °C (98 °F)   Pulse: 66   Respiration: 16   Pulse Oximetry: 94 %     Physical Exam   Constitutional: She is oriented to person, place, and time. She appears well-developed and well-nourished. No distress.   HENT:   Head: Normocephalic and atraumatic.   Mouth/Throat: No oropharyngeal exudate.   Eyes: Pupils are equal, round, and reactive to light. No scleral icterus.   Neck: Normal range of motion. Neck supple. No thyromegaly present.   Cardiovascular: Normal rate, regular rhythm, normal heart sounds and intact distal pulses.    No murmur  heard.  Pulmonary/Chest: Effort normal and breath sounds normal. No respiratory distress.   Abdominal: Soft. Bowel sounds are normal. She exhibits no distension. There is no tenderness.   Musculoskeletal: Normal range of motion. She exhibits no edema or tenderness.   Neurological: She is alert and oriented to person, place, and time. No cranial nerve deficit.   Skin: Skin is warm and dry. No rash noted.   Psychiatric: She has a normal mood and affect.   Nursing note and vitals reviewed.      Fluids    Intake/Output Summary (Last 24 hours) at 06/09/18 1059  Last data filed at 06/09/18 0600   Gross per 24 hour   Intake              400 ml   Output              350 ml   Net               50 ml       Laboratory  Recent Labs      06/07/18   0143  06/08/18   1130  06/09/18   0321   WBC  3.8*  6.0  5.3   RBC  3.42*  4.14*  3.49*   HEMOGLOBIN  11.5*  13.8  11.7*   HEMATOCRIT  35.3*  42.3  35.3*   MCV  103.2*  102.2*  101.1*   MCH  33.6*  33.3*  33.5*   MCHC  32.6*  32.6*  33.1*   RDW  54.0*  54.5*  53.7*   PLATELETCT  176  240  209   MPV  11.3  11.3  10.9     Recent Labs      06/07/18   0143  06/08/18   1130  06/09/18   0322   SODIUM  141  142  141   POTASSIUM  4.1  3.9  3.3*   CHLORIDE  111  108  110   CO2  24  22  23   GLUCOSE  111*  152*  94   BUN  14  16  20   CREATININE  1.13  1.18  1.19   CALCIUM  8.3*  9.6  8.8     Recent Labs      06/08/18   1130   APTT  28.3   INR  0.88     Recent Labs      06/08/18   1130   BNPBTYPENAT  382*           Imaging  DX-CHEST-PORTABLE (1 VIEW)   Final Result      Mild diffuse atelectasis/possible interstitial edema.           Assessment/Plan  * Paroxysmal atrial fibrillation (HCC)- (present on admission)   Assessment & Plan    -converted last night and now sustaining  -last ECHO in 2/2018, reviewed by me  -TSH is ok  -stop SA dilt  -start lopressor 25 mg [id number] and likely switch to succinate tomorrow  -start eliquis 5 mg BID as her [id number] score is a 7  -monitor for  "bleeding  -ambulate patient and assess HR response  -likely culprit is medical non adherence, appears euvolemic on exam, no e/o ACS        CVA (cerebral vascular accident) (HCC)- (present on admission)   Assessment & Plan    Stable continue Plavix and statin        CAD (coronary artery disease)- (present on admission)   Assessment & Plan    Continue on Plavix.   -no statin for now as elevated LFT\"s persist        Stage 3 chronic kidney disease- (present on admission)   Assessment & Plan    At baseline  Avoid nephrotoxic medications        HTN (hypertension), benign- (present on admission)   Assessment & Plan    Blood pressure is within normal range  -holding outpatient bP medications, but consider restarting norvasc soon.          "

## 2018-06-09 NOTE — PROGRESS NOTES
Bedside report completed with Carine GRECO. Reviewed POC and safety precautions with pt. Completed admission profile to the best of my ability. Attempted to complete SAD score and PHQ score but pt states she cannot answer those questions. Pt became tearful over recent death of son 03/2018. Provided emotional support. Pt denies thoughts of self harm or harming others. Will monitor.

## 2018-06-09 NOTE — PROGRESS NOTES
Monitor summary    SB-SR 58-73, unsustained drop to 47, occasional PVCs  0.14/0.12/0.40    10 sec run A.fib rates 130's at ~0015. Pt sleeping.

## 2018-06-09 NOTE — ASSESSMENT & PLAN NOTE
Blood pressure is within normal range  -holding outpatient bP medications, but consider restarting norvasc soon.

## 2018-06-09 NOTE — CARE PLAN
Problem: Pain Management  Goal: Pain level will decrease to patient's comfort goal  Outcome: PROGRESSING AS EXPECTED  Pt report right neck and shoulder pain with movement. PRN pain medications given per MAR. Pt resting comfortably.    Problem: Psychosocial Needs:  Goal: Level of anxiety will decrease  Outcome: PROGRESSING SLOWER THAN EXPECTED  Pt anxious and tearful. Encouraged pt to verbalize feelings and needs. Provided emotional support.

## 2018-06-09 NOTE — ASSESSMENT & PLAN NOTE
-converted last night and now sustaining  -last ECHO in 2/2018, reviewed by me  -TSH is ok  -stop SA dilt  -start lopressor 25 mg [id number] and likely switch to succinate tomorrow  -start eliquis 5 mg BID as her [id number] score is a 7  -monitor for bleeding  -ambulate patient and assess HR response  -likely culprit is medical non adherence, appears euvolemic on exam, no e/o ACS

## 2018-06-09 NOTE — RESPIRATORY CARE
COPD EDUCATION by COPD CLINICAL EDUCATOR  6/9/2018 at 7:53 AM by Cassie Morrison     Patient reviewed by COPD education team. Patient does not qualify for COPD program.

## 2018-06-09 NOTE — H&P
Hospital Medicine History and Physical    Date of Service  6/9/2018    Chief Complaint  Chief Complaint   Patient presents with   • Palpitations   • Lightheadedness   • Dizziness   • Anxiety       History of Presenting Illness  85 y.o. female who presented 6/8/2018 with A. fib RVR.     Patient was her vascular clinic follow-up which she was noted to have heart rate of 130s and was sent to the ED. Patient says she's had intermittent palpitations on and off for a while though unable to say how long. Associated with mild dizziness. She denies shortness of breath or chest pain. EKG showed she was in A. fib RVR troponin was normal. . She has a history of paroxysmal A. fib and has been on metoprolol. She was started on Eliquis by cardiology earlier this year however patient decided to stop it sometime around March. Outpatient notes confirm this and patient was to follow-up with her cardiologist to discuss this however has not done so. She has some bruising but no significant bleeding while on Eliquis. She says she's been compliant with her medications.  In the ED she was given IV diltiazem and had conversion to sinus rhythm.  Chest x-ray showed interstitial edema.     Primary Care Physician  Ric Garcia M.D.    Consultants  none    Code Status  DNR    Review of Systems  Review of Systems   Constitutional: Negative for chills and fever.   Eyes: Negative for blurred vision.   Respiratory: Negative for cough and shortness of breath.    Cardiovascular: Positive for palpitations. Negative for chest pain and leg swelling.   Gastrointestinal: Negative for abdominal pain, nausea and vomiting.   Genitourinary: Negative for dysuria.   Skin: Negative for rash.   Neurological: Negative for dizziness and headaches.        Past Medical History  Past Medical History:   Diagnosis Date   • TIA (transient ischemic attack) 09/2017   • MEDICAL HOME 11/07/12   • Back pain 3/28/2012   • Anginal syndrome (HCC)    • Arrhythmia    •  Arthritis    • CATARACT    • Coronary artery disease    • Disorder of thyroid    • Glaucoma    • Hyperlipidemia    • Hypertension    • Myocardial infarct (HCC)    • Pain     sacrum 1/10   • Stroke (HCC)    • Urinary incontinence        Surgical History  Past Surgical History:   Procedure Laterality Date   • ERCP IN OR  3/16/2018    Procedure: ERCP IN OR;  Surgeon: Orlin Caldera M.D.;  Location: SURGERY [facility];  Service: Gastroenterology   • VITRECTOMY POSTERIOR Right 4/13/2017    Procedure: VITRECTOMY POSTERIOR-LASER, SF6 GAS;  Surgeon: Jessica Arita M.D.;  Location: SURGERY SAME DAY [facility];  Service:    • RIGO BY LAPAROSCOPY  10/9/2015    Procedure: RIGO BY LAPAROSCOPY;  Surgeon: Ric Zambrano M.D.;  Location: SURGERY [facility];  Service:    • CATARACT PHACO WITH IOL  4/27/2011    Performed by EVERETTE COOPER at SURGERY SAME DAY [facility] ORS   • CATARACT PHACO WITH IOL  4/13/2011    Performed by EVERETTE COOPER at SURGERY SAME DAY [facility] ORS   • OTHER ORTHOPEDIC SURGERY  1980    pins in left hip   • ATHROPLASTY      broken hip 1990   • OTHER CARDIAC SURGERY         Medications  No current facility-administered medications on file prior to encounter.      Current Outpatient Prescriptions on File Prior to Encounter   Medication Sig Dispense Refill   • clopidogrel (PLAVIX) 75 MG Tab TAKE 1 TAB BY MOUTH EVERY DAY. 90 Tab 1   • metoprolol (LOPRESSOR) 25 MG Tab Take 1 Tab by mouth 2 times a day. 60 Tab 2   • isosorbide mononitrate SR (IMDUR) 30 MG TABLET SR 24 HR Take 0.5 Tabs by mouth every day. 30 Tab 3   • amLODIPine (NORVASC) 5 MG Tab Take 2.5 mg by mouth every morning.     • Ascorbic Acid (VITAMIN C) 1000 MG Tab Take 1 Tab by mouth every morning.         Family History  Family History   Problem Relation Age of Onset   • Heart Disease Mother    • Cancer Father        Social History  Social History   Substance Use Topics   • Smoking status: Current Every Day Smoker     Packs/day:  0.25     Years: 0.10     Types: Cigarettes   • Smokeless tobacco: Never Used   • Alcohol use Yes      Comment: 1 per day       Allergies  Allergies   Allergen Reactions   • Meperidine Vomiting and Nausea   • Tramadol Vomiting and Nausea        Physical Exam  Laboratory   Hemodynamics  Temp (24hrs), Av.2 °C (97.1 °F), Min:35.8 °C (96.5 °F), Max:36.4 °C (97.6 °F)   Temperature: 35.8 °C (96.5 °F)  Pulse  Av.6  Min: 61  Max: 151 Heart Rate (Monitored): 70  Blood Pressure : 132/66, NIBP: 158/68      Respiratory      Respiration: 17, Pulse Oximetry: 96 %     Work Of Breathing / Effort: Mild  RUL Breath Sounds: Clear, RML Breath Sounds: Clear, RLL Breath Sounds: Diminished, RYAN Breath Sounds: Clear, LLL Breath Sounds: Diminished    Physical Exam   Constitutional: She is oriented to person, place, and time. She appears well-developed. She is cooperative.   frail   HENT:   Head: Normocephalic and atraumatic.   Eyes: Conjunctivae and EOM are normal.   Cardiovascular: Normal rate, regular rhythm and normal heart sounds.    Pulmonary/Chest: Effort normal. She has no wheezes.   Abdominal: Soft. There is no tenderness. There is no rebound and no guarding.   Musculoskeletal:   Trace edema, diffuse muscle atrophy   Neurological: She is alert and oriented to person, place, and time.   Skin: Skin is warm and dry.   Nursing note and vitals reviewed.      Recent Labs      18   0143  18   1130   WBC  3.8*  6.0   RBC  3.42*  4.14*   HEMOGLOBIN  11.5*  13.8   HEMATOCRIT  35.3*  42.3   MCV  103.2*  102.2*   MCH  33.6*  33.3*   MCHC  32.6*  32.6*   RDW  54.0*  54.5*   PLATELETCT  176  240   MPV  11.3  11.3     Recent Labs      18   0432  18   0143  18   1130   SODIUM  140  141  142   POTASSIUM  3.8  4.1  3.9   CHLORIDE  111  111  108   CO2  22  24  22   GLUCOSE  92  111*  152*   BUN  17  14  16   CREATININE  1.19  1.13  1.18   CALCIUM  8.2*  8.3*  9.6     Recent Labs      18   0432  18    0143  06/08/18   1130   ALTSGPT  378*  266*  181*   ASTSGOT  447*  193*  61*   ALKPHOSPHAT  235*  225*  239*   TBILIRUBIN  1.7*  0.8  0.7   GLUCOSE  92  111*  152*     Recent Labs      06/06/18   0840  06/08/18   1130   APTT   --   28.3   INR  1.15*  0.88     Recent Labs      06/08/18   1130   BNPBTYPENAT  382*         Lab Results   Component Value Date    TROPONINI 0.02 06/08/2018     Urinalysis:    Lab Results  Component Value Date/Time   SPECGRAVITY 1.016 06/05/2018 0844   GLUCOSEUR Negative 06/05/2018 0844   KETONES Negative 06/05/2018 0844   NITRITE Negative 06/05/2018 0844   WBCURINE 0-2 06/05/2018 0844   RBCURINE 0-2 06/05/2018 0844   BACTERIA Few (A) 06/05/2018 0844   EPITHELCELL Few 06/05/2018 0844        Imaging  DX-CHEST-PORTABLE (1 VIEW)   Final Result      Mild diffuse atelectasis/possible interstitial edema.           Assessment/Plan     I anticipate this patient will require at least two midnights for appropriate medical management, necessitating inpatient admission.    * Paroxysmal atrial fibrillation (HCC)   Assessment & Plan    History of. Has been on metoprolol and compliant per patient. She stopped Eliquis which was prescribed by cardiology. She has some bruising but no significant bleeding.  She said recent echocardiogram on 2/2018 with LVH, normal EF. TSH is normal. No signs of infection or lung disease.  A. fib had converted after IV diltiazem was given. I started on oral diltiazem. Monitor on telemetry if she stays rate controlled and converted to long-acting diltiazem dose.  Patient needs to follow-up with cardiology regarding her Eliquis. Her [id number] score is very high at 7 putting her at risk for further strokes.        CVA (cerebral vascular accident) (HCC)- (present on admission)   Assessment & Plan    Stable continue Plavix and statin        CAD (coronary artery disease)- (present on admission)   Assessment & Plan    Continue on Plavix. She is not on a statin however per Dr. Bloch's  note she is supposed to be on atorvastatin for atherosclerosis. I will order it here.        Stage 3 chronic kidney disease- (present on admission)   Assessment & Plan    At baseline  Avoid nephrotoxic medications        HTN (hypertension), benign- (present on admission)   Assessment & Plan    Blood pressure is within normal range  I will hold outpatient medications for titration of diltiazem for A. fib.            VTE prophylaxis: heparin.

## 2018-06-10 VITALS
TEMPERATURE: 97.2 F | SYSTOLIC BLOOD PRESSURE: 185 MMHG | OXYGEN SATURATION: 96 % | HEART RATE: 60 BPM | HEIGHT: 63 IN | DIASTOLIC BLOOD PRESSURE: 84 MMHG | RESPIRATION RATE: 17 BRPM | WEIGHT: 142.86 LBS | BODY MASS INDEX: 25.31 KG/M2

## 2018-06-10 PROBLEM — I48.0 PAROXYSMAL ATRIAL FIBRILLATION (HCC): Status: RESOLVED | Noted: 2018-06-09 | Resolved: 2018-06-10

## 2018-06-10 LAB
ALBUMIN SERPL BCP-MCNC: 3.3 G/DL (ref 3.2–4.9)
ALBUMIN/GLOB SERPL: 1.2 G/DL
ALP SERPL-CCNC: 187 U/L (ref 30–99)
ALT SERPL-CCNC: 93 U/L (ref 2–50)
ANION GAP SERPL CALC-SCNC: 6 MMOL/L (ref 0–11.9)
AST SERPL-CCNC: 27 U/L (ref 12–45)
BILIRUB SERPL-MCNC: 0.6 MG/DL (ref 0.1–1.5)
BUN SERPL-MCNC: 16 MG/DL (ref 8–22)
CALCIUM SERPL-MCNC: 8.9 MG/DL (ref 8.5–10.5)
CHLORIDE SERPL-SCNC: 107 MMOL/L (ref 96–112)
CO2 SERPL-SCNC: 25 MMOL/L (ref 20–33)
CREAT SERPL-MCNC: 1.13 MG/DL (ref 0.5–1.4)
GLOBULIN SER CALC-MCNC: 2.7 G/DL (ref 1.9–3.5)
GLUCOSE SERPL-MCNC: 97 MG/DL (ref 65–99)
POTASSIUM SERPL-SCNC: 3.8 MMOL/L (ref 3.6–5.5)
PROT SERPL-MCNC: 6 G/DL (ref 6–8.2)
SODIUM SERPL-SCNC: 138 MMOL/L (ref 135–145)

## 2018-06-10 PROCEDURE — 700102 HCHG RX REV CODE 250 W/ 637 OVERRIDE(OP): Performed by: INTERNAL MEDICINE

## 2018-06-10 PROCEDURE — 99239 HOSP IP/OBS DSCHRG MGMT >30: CPT | Performed by: INTERNAL MEDICINE

## 2018-06-10 PROCEDURE — 36415 COLL VENOUS BLD VENIPUNCTURE: CPT

## 2018-06-10 PROCEDURE — 80053 COMPREHEN METABOLIC PANEL: CPT

## 2018-06-10 PROCEDURE — A9270 NON-COVERED ITEM OR SERVICE: HCPCS | Performed by: INTERNAL MEDICINE

## 2018-06-10 RX ORDER — AMLODIPINE BESYLATE 10 MG/1
10 TABLET ORAL
Status: DISCONTINUED | OUTPATIENT
Start: 2018-06-10 | End: 2018-06-10 | Stop reason: HOSPADM

## 2018-06-10 RX ORDER — METOPROLOL SUCCINATE 25 MG/1
25 TABLET, EXTENDED RELEASE ORAL
Status: DISCONTINUED | OUTPATIENT
Start: 2018-06-10 | End: 2018-06-10 | Stop reason: HOSPADM

## 2018-06-10 RX ORDER — POTASSIUM CHLORIDE 20 MEQ/1
20 TABLET, EXTENDED RELEASE ORAL DAILY
Qty: 10 TAB | Refills: 0 | Status: SHIPPED | OUTPATIENT
Start: 2018-06-10 | End: 2018-06-20

## 2018-06-10 RX ORDER — ATORVASTATIN CALCIUM 20 MG/1
20 TABLET, FILM COATED ORAL
Qty: 30 TAB | Refills: 1 | Status: SHIPPED | OUTPATIENT
Start: 2018-06-10 | End: 2018-07-12

## 2018-06-10 RX ORDER — AMLODIPINE BESYLATE 10 MG/1
10 TABLET ORAL DAILY
Qty: 30 TAB | Refills: 1 | Status: SHIPPED | OUTPATIENT
Start: 2018-06-10 | End: 2018-07-12

## 2018-06-10 RX ADMIN — APIXABAN 5 MG: 5 TABLET, FILM COATED ORAL at 06:19

## 2018-06-10 RX ADMIN — AMLODIPINE BESYLATE 10 MG: 10 TABLET ORAL at 11:56

## 2018-06-10 RX ADMIN — CLOPIDOGREL 75 MG: 75 TABLET, FILM COATED ORAL at 06:20

## 2018-06-10 RX ADMIN — METOPROLOL SUCCINATE 25 MG: 25 TABLET, EXTENDED RELEASE ORAL at 11:56

## 2018-06-10 RX ADMIN — POTASSIUM CHLORIDE 20 MEQ: 20 TABLET, EXTENDED RELEASE ORAL at 06:21

## 2018-06-10 RX ADMIN — METOPROLOL TARTRATE 12.5 MG: 25 TABLET, FILM COATED ORAL at 06:21

## 2018-06-10 ASSESSMENT — PAIN SCALES - GENERAL: PAINLEVEL_OUTOF10: 0

## 2018-06-10 NOTE — CARE PLAN
Problem: Communication  Goal: The ability to communicate needs accurately and effectively will improve  Outcome: PROGRESSING AS EXPECTED  RN educated pt on use of call light system, updated on POC and answered all questions, encouraged pt to voice needs.     Problem: Infection  Goal: Will remain free from infection  Outcome: PROGRESSING AS EXPECTED  RN educated pt on infection prevention, RN used thorough hand hygiene before and after interactions with pt, encouraged pt to use proper hand hygiene.

## 2018-06-10 NOTE — PROGRESS NOTES
Bedside report received from day shift RN, pt is resting in bed, no needs at this time, updated on POC and answered all questions, call light and personal belongings in reach, fall interventions in place, tele monitor in place.

## 2018-06-10 NOTE — DISCHARGE INSTRUCTIONS
Discharge Instructions      Activities as tolerated   Follow ups with Primary Care Physican in 7-10 days, call for appointment   No smoking, no alcohol, no caffeine   Wear seat belt in motorized vehicle   Take medications as perscribed   Keep appointments  If symptoms worsen call PCP, 911 or urgent care.      Discharged to home by car with relative. Discharged via wheelchair, hospital escort: Yes.  Special equipment needed: Not Applicable    Be sure to schedule a follow-up appointment with your primary care doctor or any specialists as instructed.     Discharge Plan:   Smoking Cessation Offered: Patient Refused  Pneumococcal Vaccine Administered/Refused: Not given - Patient refused pneumococcal vaccine  Influenza Vaccine Indication: Patient Refuses    I understand that a diet low in cholesterol, fat, and sodium is recommended for good health. Unless I have been given specific instructions below for another diet, I accept this instruction as my diet prescription.   Other diet: Diet cardiac with 9-13 servings of fruits and vegetables.    Special Instructions: None    · Is patient discharged on Warfarin / Coumadin?   No     Depression / Suicide Risk    As you are discharged from this [facility] facility, it is important to learn how to keep safe from harming yourself.    Recognize the warning signs:  · Abrupt changes in personality, positive or negative- including increase in energy   · Giving away possessions  · Change in eating patterns- significant weight changes-  positive or negative  · Change in sleeping patterns- unable to sleep or sleeping all the time   · Unwillingness or inability to communicate  · Depression  · Unusual sadness, discouragement and loneliness  · Talk of wanting to die  · Neglect of personal appearance   · Rebelliousness- reckless behavior  · Withdrawal from people/activities they love  · Confusion- inability to concentrate     If you or a loved one observes any of these behaviors or has  concerns about self-harm, here's what you can do:  · Talk about it- your feelings and reasons for harming yourself  · Remove any means that you might use to hurt yourself (examples: pills, rope, extension cords, firearm)  · Get professional help from the community (Mental Health, Substance Abuse, psychological counseling)  · Do not be alone:Call your Safe Contact- someone whom you trust who will be there for you.  · Call your local CRISIS HOTLINE 295-8880 or 337-063-9584  · Call your local Children's Mobile Crisis Response Team [place] (521) 790-3961 or www.ERCOM  · Call the toll free National Suicide Prevention Hotlines   · National Suicide Prevention Lifeline 819-983-DHML (1111)  · National [place] Line Network 800-SUICIDE (274-4148)    Metoprolol extended-release tablets  What is this medicine?  METOPROLOL (me TOE proe lole) is a beta-blocker. Beta-blockers reduce the workload on the heart and help it to beat more regularly. This medicine is used to treat high blood pressure and to prevent chest pain. It is also used to after a heart attack and to prevent an additional heart attack from occurring.  This medicine may be used for other purposes; ask your health care provider or pharmacist if you have questions.  COMMON BRAND NAME(S): toprol, Toprol TULIO  What should I tell my health care provider before I take this medicine?  They need to know if you have any of these conditions:  -diabetes  -heart or vessel disease like slow heart rate, worsening heart failure, heart block, sick sinus syndrome or Raynaud's disease  -kidney disease  -liver disease  -lung or breathing disease, like asthma or emphysema  -pheochromocytoma  -thyroid disease  -an unusual or allergic reaction to metoprolol, other beta-blockers, medicines, foods, dyes, or preservatives  -pregnant or trying to get pregnant  -breast-feeding  How should I use this medicine?  Take this medicine by mouth with a glass of water. Follow the directions  on the prescription label. Do not crush or chew. Take this medicine with or immediately after meals. Take your doses at regular intervals. Do not take more medicine than directed. Do not stop taking this medicine suddenly. This could lead to serious heart-related effects.  Talk to your pediatrician regarding the use of this medicine in children. While this drug may be prescribed for children as young as 6 years for selected conditions, precautions do apply.  Overdosage: If you think you have taken too much of this medicine contact a poison control center or emergency room at once.  NOTE: This medicine is only for you. Do not share this medicine with others.  What if I miss a dose?  If you miss a dose, take it as soon as you can. If it is almost time for your next dose, take only that dose. Do not take double or extra doses.  What may interact with this medicine?  This medicine may interact with the following medications:  -certain medicines for blood pressure, heart disease, irregular heart beat  -certain medicines for depression, like monoamine oxidase (MAO) inhibitors, fluoxetine, or paroxetine  -clonidine  -dobutamine  -epinephrine  -isoproterenol  -reserpine  This list may not describe all possible interactions. Give your health care provider a list of all the medicines, herbs, non-prescription drugs, or dietary supplements you use. Also tell them if you smoke, drink alcohol, or use illegal drugs. Some items may interact with your medicine.  What should I watch for while using this medicine?  Visit your doctor or health care professional for regular check ups. Contact your doctor right away if your symptoms worsen. Check your blood pressure and pulse rate regularly. Ask your health care professional what your blood pressure and pulse rate should be, and when you should contact them.  You may get drowsy or dizzy. Do not drive, use machinery, or do anything that needs mental alertness until you know how this  medicine affects you. Do not sit or stand up quickly, especially if you are an older patient. This reduces the risk of dizzy or fainting spells. Contact your doctor if these symptoms continue. Alcohol may interfere with the effect of this medicine. Avoid alcoholic drinks.  What side effects may I notice from receiving this medicine?  Side effects that you should report to your doctor or health care professional as soon as possible:  -allergic reactions like skin rash, itching or hives  -cold or numb hands or feet  -depression  -difficulty breathing  -faint  -fever with sore throat  -irregular heartbeat, chest pain  -rapid weight gain  -swollen legs or ankles  Side effects that usually do not require medical attention (report to your doctor or health care professional if they continue or are bothersome):  -anxiety or nervousness  -change in sex drive or performance  -dry skin  -headache  -nightmares or trouble sleeping  -short term memory loss  -stomach upset or diarrhea  -unusually tired  This list may not describe all possible side effects. Call your doctor for medical advice about side effects. You may report side effects to FDA at 7-325-FDA-6139.  Where should I keep my medicine?  Keep out of the reach of children.  Store at room temperature between 15 and 30 degrees C (59 and 86 degrees F). Throw away any unused medicine after the expiration date.  NOTE: This sheet is a summary. It may not cover all possible information. If you have questions about this medicine, talk to your doctor, pharmacist, or health care provider.  © 2018 Elsevier/Gold Standard (2014-08-22 14:41:37)    Apixaban oral tablets  What is this medicine?  APIXABAN (a PIX a ban) is an anticoagulant (blood thinner). It is used to lower the chance of stroke in people with a medical condition called atrial fibrillation. It is also used to treat or prevent blood clots in the lungs or in the veins.  This medicine may be used for other purposes; ask  your health care provider or pharmacist if you have questions.  COMMON BRAND NAME(S): Eliquis  What should I tell my health care provider before I take this medicine?  They need to know if you have any of these conditions:  -bleeding disorders  -bleeding in the brain  -blood in your stools (black or tarry stools) or if you have blood in your vomit  -history of stomach bleeding  -kidney disease  -liver disease  -mechanical heart valve  -an unusual or allergic reaction to apixaban, other medicines, foods, dyes, or preservatives  -pregnant or trying to get pregnant  -breast-feeding  How should I use this medicine?  Take this medicine by mouth with a glass of water. Follow the directions on the prescription label. You can take it with or without food. If it upsets your stomach, take it with food. Take your medicine at regular intervals. Do not take it more often than directed. Do not stop taking except on your doctor's advice. Stopping this medicine may increase your risk of a blot clot. Be sure to refill your prescription before you run out of medicine.  Talk to your pediatrician regarding the use of this medicine in children. Special care may be needed.  Overdosage: If you think you have taken too much of this medicine contact a poison control center or emergency room at once.  NOTE: This medicine is only for you. Do not share this medicine with others.  What if I miss a dose?  If you miss a dose, take it as soon as you can. If it is almost time for your next dose, take only that dose. Do not take double or extra doses.  What may interact with this medicine?  This medicine may interact with the following:  -aspirin and aspirin-like medicines  -certain medicines for fungal infections like ketoconazole and itraconazole  -certain medicines for seizures like carbamazepine and phenytoin  -certain medicines that treat or prevent blood clots like warfarin, enoxaparin, and dalteparin  -clarithromycin  -NSAIDs, medicines for  pain and inflammation, like ibuprofen or naproxen  -rifampin  -ritonavir  -Christa's wort  This list may not describe all possible interactions. Give your health care provider a list of all the medicines, herbs, non-prescription drugs, or dietary supplements you use. Also tell them if you smoke, drink alcohol, or use illegal drugs. Some items may interact with your medicine.  What should I watch for while using this medicine?  Visit your doctor or health care professional for regular checks on your progress.  Notify your doctor or health care professional and seek emergency treatment if you develop breathing problems; changes in vision; chest pain; severe, sudden headache; pain, swelling, warmth in the leg; trouble speaking; sudden numbness or weakness of the face, arm or leg. These can be signs that your condition has gotten worse.  If you are going to have surgery or other procedure, tell your doctor that you are taking this medicine.  What side effects may I notice from receiving this medicine?  Side effects that you should report to your doctor or health care professional as soon as possible:  -allergic reactions like skin rash, itching or hives, swelling of the face, lips, or tongue  -signs and symptoms of bleeding such as bloody or black, tarry stools; red or dark-brown urine; spitting up blood or brown material that looks like coffee grounds; red spots on the skin; unusual bruising or bleeding from the eye, gums, or nose  This list may not describe all possible side effects. Call your doctor for medical advice about side effects. You may report side effects to FDA at 1-111-FDA-3361.  Where should I keep my medicine?  Keep out of the reach of children.  Store at room temperature between 20 and 25 degrees C (68 and 77 degrees F). Throw away any unused medicine after the expiration date.  NOTE: This sheet is a summary. It may not cover all possible information. If you have questions about this medicine, talk to  your doctor, pharmacist, or health care provider.  © 2018 Elsevier/Gold Standard (2017-07-10 11:54:23)    Atorvastatin tablets  What is this medicine?  ATORVASTATIN (a TORE va sta tin) is known as a HMG-CoA reductase inhibitor or 'statin'. It lowers the level of cholesterol and triglycerides in the blood. This drug may also reduce the risk of heart attack, stroke, or other health problems in patients with risk factors for heart disease. Diet and lifestyle changes are often used with this drug.  This medicine may be used for other purposes; ask your health care provider or pharmacist if you have questions.  COMMON BRAND NAME(S): Lipitor  What should I tell my health care provider before I take this medicine?  They need to know if you have any of these conditions:  -frequently drink alcoholic beverages  -history of stroke, TIA  -kidney disease  -liver disease  -muscle aches or weakness  -other medical condition  -an unusual or allergic reaction to atorvastatin, other medicines, foods, dyes, or preservatives  -pregnant or trying to get pregnant  -breast-feeding  How should I use this medicine?  Take this medicine by mouth with a glass of water. Follow the directions on the prescription label. You can take this medicine with or without food. Take your doses at regular intervals. Do not take your medicine more often than directed.  Talk to your pediatrician regarding the use of this medicine in children. While this drug may be prescribed for children as young as 10 years old for selected conditions, precautions do apply.  Overdosage: If you think you have taken too much of this medicine contact a poison control center or emergency room at once.  NOTE: This medicine is only for you. Do not share this medicine with others.  What if I miss a dose?  If you miss a dose, take it as soon as you can. If it is almost time for your next dose, take only that dose. Do not take double or extra doses.  What may interact with this  medicine?  Do not take this medicine with any of the following medications:  -red yeast rice  -telaprevir  -telithromycin  -voriconazole  This medicine may also interact with the following medications:  -alcohol  -antiviral medicines for HIV or AIDS  -boceprevir  -certain antibiotics like clarithromycin, erythromycin, troleandomycin  -certain medicines for cholesterol like fenofibrate or gemfibrozil  -cimetidine  -clarithromycin  -colchicine  -cyclosporine  -digoxin  -female hormones, like estrogens or progestins and birth control pills  -grapefruit juice  -medicines for fungal infections like fluconazole, itraconazole, ketoconazole  -niacin  -rifampin  -spironolactone  This list may not describe all possible interactions. Give your health care provider a list of all the medicines, herbs, non-prescription drugs, or dietary supplements you use. Also tell them if you smoke, drink alcohol, or use illegal drugs. Some items may interact with your medicine.  What should I watch for while using this medicine?  Visit your doctor or health care professional for regular check-ups. You may need regular tests to make sure your liver is working properly.  Tell your doctor or health care professional right away if you get any unexplained muscle pain, tenderness, or weakness, especially if you also have a fever and tiredness. Your doctor or health care professional may tell you to stop taking this medicine if you develop muscle problems. If your muscle problems do not go away after stopping this medicine, contact your health care professional.  This drug is only part of a total heart-health program. Your doctor or a dietician can suggest a low-cholesterol and low-fat diet to help. Avoid alcohol and smoking, and keep a proper exercise schedule.  Do not use this drug if you are pregnant or breast-feeding. Serious side effects to an unborn child or to an infant are possible. Talk to your doctor or pharmacist for more  information.  This medicine may affect blood sugar levels. If you have diabetes, check with your doctor or health care professional before you change your diet or the dose of your diabetic medicine.  If you are going to have surgery tell your health care professional that you are taking this drug.  What side effects may I notice from receiving this medicine?  Side effects that you should report to your doctor or health care professional as soon as possible:  -allergic reactions like skin rash, itching or hives, swelling of the face, lips, or tongue  -dark urine  -fever  -joint pain  -muscle cramps, pain  -redness, blistering, peeling or loosening of the skin, including inside the mouth  -trouble passing urine or change in the amount of urine  -unusually weak or tired  -yellowing of eyes or skin  Side effects that usually do not require medical attention (report to your doctor or health care professional if they continue or are bothersome):  -constipation  -heartburn  -stomach gas, pain, upset  This list may not describe all possible side effects. Call your doctor for medical advice about side effects. You may report side effects to FDA at 7-219-FDA-2480.  Where should I keep my medicine?  Keep out of the reach of children.  Store at room temperature between 20 to 25 degrees C (68 to 77 degrees F). Throw away any unused medicine after the expiration date.  NOTE: This sheet is a summary. It may not cover all possible information. If you have questions about this medicine, talk to your doctor, pharmacist, or health care provider.  © 2018 Elsevier/Gold Standard (2012-11-06 09:18:24)    Potassium Salts tablets, extended-release tablets or capsules  What is this medicine?  POTASSIUM (kaleb TASS i um) is a natural salt that is important for the heart, muscles, and nerves. It is found in many foods and is normally supplied by a well balanced diet. This medicine is used to treat low potassium.  This medicine may be used for  other purposes; ask your health care provider or pharmacist if you have questions.  COMMON BRAND NAME(S): ED-K+10, Glu-K, K-10, K-8, K-Dur, K-Tab, Kaon-CL, Klor-Con, Klor-Con M10, Klor-Con M15, Klor-Con M20, Klotrix, Micro-K, Micro-K Extencaps, Slow-K  What should I tell my health care provider before I take this medicine?  They need to know if you have any of these conditions:  -Door's disease  -dehydration  -diabetes  -difficulty swallowing  -heart disease  -history of high levels of potassium in the blood  -irregular heartbeat  -kidney disease  -recent severe burn  -stomach ulcers or other stomach problems  -an unusual or allergic reaction to potassium, tartrazine, other medicines, foods, dyes, or preservatives  -pregnant or trying to get pregnant  -breast-feeding  How should I use this medicine?  Take this medicine by mouth with a full glass of water. Take with food. Follow the directions on the prescription label. Do not suck on, crush, or chew this medicine. If you have difficulty swallowing, ask the pharmacist how to take. Take your medicine at regular intervals. Do not take it more often than directed. Do not stop taking except on your doctor's advice.  Talk to your pediatrician regarding the use of this medicine in children. Special care may be needed.  Overdosage: If you think you have taken too much of this medicine contact a poison control center or emergency room at once.  NOTE: This medicine is only for you. Do not share this medicine with others.  What if I miss a dose?  If you miss a dose, take it as soon as you can. If it is almost time for your next dose, take only that dose. Do not take double or extra doses.  What may interact with this medicine?  Do not take this medicine with any of the following medications:  -eplerenone  -certain medicines for stomach problems like atropine; difenoxin and glycopyrrolate  -sodium polystyrene sulfonate  This medicine may also interact with the following  medications:  -certain medicines for blood pressure or heart disease like lisinopril, losartan, quinapril, valsartan  -medicines for cold or allergies  -NSAIDs, medicines for pain and inflammation, like ibuprofen or napoxen  -other potassium supplements  -salt substitutes  -some diuretics  This list may not describe all possible interactions. Give your health care provider a list of all the medicines, herbs, non-prescription drugs, or dietary supplements you use. Also tell them if you smoke, drink alcohol, or use illegal drugs. Some items may interact with your medicine.  What should I watch for while using this medicine?  Visit your doctor or health care professional for regular check ups. You will need lab work done regularly.  You may need to be on a special diet while taking this medicine. Ask your doctor.  What side effects may I notice from receiving this medicine?  Side effects that you should report to your doctor or health care professional as soon as possible:  -allergic reactions like skin rash, itching or hives, swelling of the face, lips, or tongue  -anxious  -black, tarry stools  -breathing problems  -confusion  -heartburn  -irregular heartbeat  -numbness or tingling in hands or feet  -pain when swallowing  -unusually weak or tired  -weakness, heaviness of legs  Side effects that usually do not require medical attention (report to your doctor or health care professional if they continue or are bothersome):  -diarrhea  -nausea  -upset stomach  -vomiting  This list may not describe all possible side effects. Call your doctor for medical advice about side effects. You may report side effects to FDA at 1-814-FDA-3962.  Where should I keep my medicine?  Keep out of the reach of children.  Store at room temperature between 15 and 30 degrees C (59 and 86 degrees F ). Keep bottle closed tightly to protect this medicine from light and moisture. Throw away any unused medicine after the expiration date.  NOTE:  This sheet is a summary. It may not cover all possible information. If you have questions about this medicine, talk to your doctor, pharmacist, or health care provider.  © 2018 [employer]vier/Gold Standard (2016-05-26 08:55:21)

## 2018-06-10 NOTE — CARE PLAN
Problem: Pain Management  Goal: Pain level will decrease to patient's comfort goal  Pain is assessed throughout the shift and per unit protocol. Pharmacological and non-pharmacological measures to treat pain are offered to patient. Pt is medicated per MAR.         Problem: Psychosocial Needs:  Goal: Level of anxiety will decrease  Anxiety triggers identified. Calming techniques provided to patient. Patient is involved in POC and is encouraged to verbalize questions and concerns.     Problem: Skin Integrity  Goal: Risk for impaired skin integrity will decrease  Skin is assessed for integrity throughout the shift. Pt is encouraged to reposition and is turned at least every 2 hours. Pt is kept dry and clean.

## 2018-06-10 NOTE — PROGRESS NOTES
"Pt states \"I am tired of not getting what I need, I need sleeping pills I was up all night and I need pain medications\" however every time this RN checked on pt during the night pt was asleep and calm. RN provided reassurance and updates on POC and answered all questions.   "

## 2018-06-10 NOTE — PROGRESS NOTES
Monitor Summary    SB-SR 57-72, O-R PVC. 6 beats of Accelerated Idioventricular.  HR down to 50, 47    18/10/36

## 2018-06-10 NOTE — PROGRESS NOTES
Pt is anxious and sometimes tearful with staff, RN provided pt with reassurance and assistance with self care, no needs at this time.

## 2018-06-10 NOTE — PROGRESS NOTES
"Pt angry over this RN waking her after she asked for pain medications. Upset, states \"you should have never done this.\" Attempted to console pt and explain that pain meds were brought in per pt's request. Pt is still angry. Pt easily goes from being angry to sad.   "

## 2018-06-10 NOTE — PROGRESS NOTES
Pt states she has called and left messages for daughter and son in law re:d/c, states they are most likely in [place] and will cont' to call.

## 2018-06-11 ENCOUNTER — PATIENT OUTREACH (OUTPATIENT)
Dept: HEALTH INFORMATION MANAGEMENT | Facility: OTHER | Age: 83
End: 2018-06-11

## 2018-06-11 NOTE — DISCHARGE SUMMARY
Discharge Summary    CHIEF COMPLAINT ON ADMISSION  Chief Complaint   Patient presents with   • Palpitations   • Lightheadedness   • Dizziness   • Anxiety       Reason for Admission  EMS     Admission Date  6/8/2018    CODE STATUS  Prior    HPI & HOSPITAL COURSE  This is a 85 y.o. female here with above issue. Patient was found to be in atrial fibrillation with RVR. She was admitted to telemetry and started lopressor 25 mg BID. She converted to NSR and held this. She was re-started on her eliquis 5 mg BID given that her [id number] score is 7. She understands the benefits of this medication. Also, her LFT's continued to down-trend from recent issues with CBD stone and her outpatient atorvastatin was re-started at 1/2 dose of 20 mg daily. She was able to ambulate on her own with good HR control and deemed stable to go home.         Therefore, she is discharged in fair and stable condition to home with close outpatient follow-up.    The patient met 2-midnight criteria for an inpatient stay at the time of discharge.    Discharge Date  6/10/2018    FOLLOW UP ITEMS POST DISCHARGE  -F/U with her PCP in 1-2 weeks    DISCHARGE DIAGNOSES  Principal Problem (Resolved):    Paroxysmal atrial fibrillation (HCC) POA: Yes  Active Problems:    CVA (cerebral vascular accident) (HCC) POA: Yes    CAD (coronary artery disease) POA: Yes    HTN (hypertension), benign POA: Yes    Stage 3 chronic kidney disease POA: Yes      FOLLOW UP  Future Appointments  Date Time Provider Department Center   6/13/2018 10:40 AM GABY Rodriguez 75[facility] GURMEET Garcia M.D.  [address]  [facility] [address]  [facility] [address]  [phone]    In 2 weeks        MEDICATIONS ON DISCHARGE     Medication List      START taking these medications      Instructions   apixaban 5mg Tabs  Commonly known as:  ELIQUIS   Take 1 Tab by mouth 2 Times a Day.  Dose:  5 mg     atorvastatin 20 MG Tabs  Commonly known as:  LIPITOR   Take 1 Tab by mouth every  bedtime.  Dose:  20 mg     potassium chloride SA 20 MEQ Tbcr  Commonly known as:  Kdur   Take 1 Tab by mouth every day for 10 days.  Dose:  20 mEq        CHANGE how you take these medications      Instructions   amLODIPine 10 MG Tabs  What changed:  · medication strength  · how much to take  · when to take this  Commonly known as:  NORVASC   Take 1 Tab by mouth every day.  Dose:  10 mg        CONTINUE taking these medications      Instructions   clopidogrel 75 MG Tabs  Commonly known as:  PLAVIX   TAKE 1 TAB BY MOUTH EVERY DAY.  Dose:  75 mg     isosorbide mononitrate SR 30 MG Tb24  Commonly known as:  IMDUR   Take 0.5 Tabs by mouth every day.  Dose:  15 mg     metoprolol 25 MG Tabs  Commonly known as:  LOPRESSOR   Take 1 Tab by mouth 2 times a day.  Dose:  25 mg     Vitamin C 1000 MG Tabs   Take 1 Tab by mouth every morning.  Dose:  1 Tab            Allergies  Allergies   Allergen Reactions   • Meperidine Vomiting and Nausea   • Tramadol Vomiting and Nausea       DIET  Low fat, low sugar, heart healthy    ACTIVITY  As tolerated.  Weight bearing as tolerated    CONSULTATIONS  -none    PROCEDURES  DX-CHEST-PORTABLE (1 VIEW)   Final Result      Mild diffuse atelectasis/possible interstitial edema.            LABORATORY  Lab Results   Component Value Date    SODIUM 138 06/10/2018    POTASSIUM 3.8 06/10/2018    CHLORIDE 107 06/10/2018    CO2 25 06/10/2018    GLUCOSE 97 06/10/2018    BUN 16 06/10/2018    CREATININE 1.13 06/10/2018        Lab Results   Component Value Date    WBC 5.3 06/09/2018    HEMOGLOBIN 11.7 (L) 06/09/2018    HEMATOCRIT 35.3 (L) 06/09/2018    PLATELETCT 209 06/09/2018        Total time of the discharge process exceeds 34 minutes.

## 2018-06-13 ENCOUNTER — PATIENT OUTREACH (OUTPATIENT)
Dept: HEALTH INFORMATION MANAGEMENT | Facility: OTHER | Age: 83
End: 2018-06-13

## 2018-06-13 ENCOUNTER — APPOINTMENT (OUTPATIENT)
Dept: MEDICAL GROUP | Facility: MEDICAL CENTER | Age: 83
End: 2018-06-13
Payer: MEDICARE

## 2018-06-26 ENCOUNTER — PATIENT OUTREACH (OUTPATIENT)
Dept: HEALTH INFORMATION MANAGEMENT | Facility: OTHER | Age: 83
End: 2018-06-26

## 2018-06-26 NOTE — PROGRESS NOTES
1. Attempt #: FINAL   2. HealthConnect Verified: yes    3. Verify PCP: yes    4. Care Team Updated:       •   DME Company (gait device, O2, CPAP, etc.): NO       •   Other Specialists (eye doctor, derm, GYN, cardiology, endo, etc): YES    5.  Reviewed/Updated the following with patient:       •   Communication Preference Obtained? YES       •   Preferred Pharmacy? YES       •   Preferred Lab? YES       •   Family History (document living status of immediate family members and if + hx of cancer, diabetes, hypertension, hyperlipidemia, heart attack, stroke) YES. Was Abstract Encounter opened and chart updated? YES    6. [employer] Activation: declined    7. [employer] [place]: no    8. Annual Wellness Visit Scheduling  Scheduling Status:Scheduled      9. Care Gap Scheduling (Attempt to Schedule EACH Overdue Care Gap!)     Health Maintenance Due   Topic Date Due   • IMM DTaP/Tdap/Td Vaccine (1 - Tdap) 02/01/1952 PT DECLINED    • IMM PNEUMOCOCCAL 65+ (ADULT) LOW/MEDIUM RISK SERIES (2 of 2 - PCV13) 06/05/2013 PT DECLINED    • BONE DENSITY  04/12/2017 PT DECLINED    • Annual Wellness Visit  08/30/2017 SCHEDULED         Scheduled patient for Annual Wellness Visit    10. Patient was advised: “This is a free wellness visit. The provider will screen for medical conditions to help you stay healthy. If you have other concerns to address you may be asked to discuss these at a separate visit or there may be an additional fee.”     11. Patient was informed to arrive 15 min prior to their scheduled appointment and bring in their medication bottles.

## 2018-07-10 ENCOUNTER — PATIENT OUTREACH (OUTPATIENT)
Dept: HEALTH INFORMATION MANAGEMENT | Facility: OTHER | Age: 83
End: 2018-07-10

## 2018-07-12 ENCOUNTER — APPOINTMENT (OUTPATIENT)
Dept: RADIOLOGY | Facility: MEDICAL CENTER | Age: 83
End: 2018-07-12
Attending: EMERGENCY MEDICINE
Payer: MEDICARE

## 2018-07-12 ENCOUNTER — HOSPITAL ENCOUNTER (OUTPATIENT)
Facility: MEDICAL CENTER | Age: 83
End: 2018-07-13
Attending: EMERGENCY MEDICINE | Admitting: INTERNAL MEDICINE
Payer: MEDICARE

## 2018-07-12 DIAGNOSIS — R42 LIGHTHEADEDNESS: ICD-10-CM

## 2018-07-12 DIAGNOSIS — Z86.73 HISTORY OF CVA (CEREBROVASCULAR ACCIDENT): ICD-10-CM

## 2018-07-12 DIAGNOSIS — T88.7XXA MEDICATION SIDE EFFECT: ICD-10-CM

## 2018-07-12 DIAGNOSIS — R00.1 SYMPTOMATIC BRADYCARDIA: ICD-10-CM

## 2018-07-12 LAB
ANION GAP SERPL CALC-SCNC: 9 MMOL/L (ref 0–11.9)
APPEARANCE UR: CLEAR
APTT PPP: 29 SEC (ref 24.7–36)
BACTERIA #/AREA URNS HPF: NEGATIVE /HPF
BASOPHILS # BLD AUTO: 1.8 % (ref 0–1.8)
BASOPHILS # BLD: 0.07 K/UL (ref 0–0.12)
BILIRUB UR QL STRIP.AUTO: NEGATIVE
BUN SERPL-MCNC: 23 MG/DL (ref 8–22)
CALCIUM SERPL-MCNC: 9 MG/DL (ref 8.5–10.5)
CHLORIDE SERPL-SCNC: 110 MMOL/L (ref 96–112)
CO2 SERPL-SCNC: 23 MMOL/L (ref 20–33)
COLOR UR: YELLOW
CREAT SERPL-MCNC: 1.16 MG/DL (ref 0.5–1.4)
EOSINOPHIL # BLD AUTO: 0.17 K/UL (ref 0–0.51)
EOSINOPHIL NFR BLD: 4.3 % (ref 0–6.9)
EPI CELLS #/AREA URNS HPF: NORMAL /HPF
ERYTHROCYTE [DISTWIDTH] IN BLOOD BY AUTOMATED COUNT: 55.1 FL (ref 35.9–50)
GLUCOSE SERPL-MCNC: 150 MG/DL (ref 65–99)
GLUCOSE UR STRIP.AUTO-MCNC: NEGATIVE MG/DL
HCT VFR BLD AUTO: 41.4 % (ref 37–47)
HGB BLD-MCNC: 13.5 G/DL (ref 12–16)
HYALINE CASTS #/AREA URNS LPF: NORMAL /LPF
IMM GRANULOCYTES # BLD AUTO: 0.02 K/UL (ref 0–0.11)
IMM GRANULOCYTES NFR BLD AUTO: 0.5 % (ref 0–0.9)
INR PPP: 1.1 (ref 0.87–1.13)
KETONES UR STRIP.AUTO-MCNC: NEGATIVE MG/DL
LEUKOCYTE ESTERASE UR QL STRIP.AUTO: ABNORMAL
LYMPHOCYTES # BLD AUTO: 1.12 K/UL (ref 1–4.8)
LYMPHOCYTES NFR BLD: 28.4 % (ref 22–41)
MCH RBC QN AUTO: 33.1 PG (ref 27–33)
MCHC RBC AUTO-ENTMCNC: 32.6 G/DL (ref 33.6–35)
MCV RBC AUTO: 101.5 FL (ref 81.4–97.8)
MICRO URNS: ABNORMAL
MONOCYTES # BLD AUTO: 0.35 K/UL (ref 0–0.85)
MONOCYTES NFR BLD AUTO: 8.9 % (ref 0–13.4)
NEUTROPHILS # BLD AUTO: 2.22 K/UL (ref 2–7.15)
NEUTROPHILS NFR BLD: 56.1 % (ref 44–72)
NITRITE UR QL STRIP.AUTO: NEGATIVE
NRBC # BLD AUTO: 0 K/UL
NRBC BLD-RTO: 0 /100 WBC
PH UR STRIP.AUTO: 7.5 [PH]
PLATELET # BLD AUTO: 239 K/UL (ref 164–446)
PMV BLD AUTO: 10.4 FL (ref 9–12.9)
POTASSIUM SERPL-SCNC: 3.6 MMOL/L (ref 3.6–5.5)
PROT UR QL STRIP: NEGATIVE MG/DL
PROTHROMBIN TIME: 13.9 SEC (ref 12–14.6)
RBC # BLD AUTO: 4.08 M/UL (ref 4.2–5.4)
RBC # URNS HPF: NORMAL /HPF
RBC UR QL AUTO: NEGATIVE
SODIUM SERPL-SCNC: 142 MMOL/L (ref 135–145)
SP GR UR STRIP.AUTO: 1.01
TROPONIN I SERPL-MCNC: 0.02 NG/ML (ref 0–0.04)
TSH SERPL DL<=0.005 MIU/L-ACNC: 1.56 UIU/ML (ref 0.38–5.33)
UROBILINOGEN UR STRIP.AUTO-MCNC: 0.2 MG/DL
WBC # BLD AUTO: 4 K/UL (ref 4.8–10.8)
WBC #/AREA URNS HPF: NORMAL /HPF

## 2018-07-12 PROCEDURE — 81001 URINALYSIS AUTO W/SCOPE: CPT

## 2018-07-12 PROCEDURE — 700111 HCHG RX REV CODE 636 W/ 250 OVERRIDE (IP): Performed by: FAMILY MEDICINE

## 2018-07-12 PROCEDURE — 700102 HCHG RX REV CODE 250 W/ 637 OVERRIDE(OP): Performed by: INTERNAL MEDICINE

## 2018-07-12 PROCEDURE — 700105 HCHG RX REV CODE 258: Performed by: EMERGENCY MEDICINE

## 2018-07-12 PROCEDURE — 96376 TX/PRO/DX INJ SAME DRUG ADON: CPT

## 2018-07-12 PROCEDURE — 99220 PR INITIAL OBSERVATION CARE,LEVL III: CPT | Performed by: INTERNAL MEDICINE

## 2018-07-12 PROCEDURE — G0378 HOSPITAL OBSERVATION PER HR: HCPCS

## 2018-07-12 PROCEDURE — 85610 PROTHROMBIN TIME: CPT

## 2018-07-12 PROCEDURE — 85730 THROMBOPLASTIN TIME PARTIAL: CPT

## 2018-07-12 PROCEDURE — 84443 ASSAY THYROID STIM HORMONE: CPT

## 2018-07-12 PROCEDURE — 99285 EMERGENCY DEPT VISIT HI MDM: CPT

## 2018-07-12 PROCEDURE — 84484 ASSAY OF TROPONIN QUANT: CPT

## 2018-07-12 PROCEDURE — A9270 NON-COVERED ITEM OR SERVICE: HCPCS | Performed by: INTERNAL MEDICINE

## 2018-07-12 PROCEDURE — 700111 HCHG RX REV CODE 636 W/ 250 OVERRIDE (IP): Performed by: INTERNAL MEDICINE

## 2018-07-12 PROCEDURE — 71045 X-RAY EXAM CHEST 1 VIEW: CPT

## 2018-07-12 PROCEDURE — 96375 TX/PRO/DX INJ NEW DRUG ADDON: CPT

## 2018-07-12 PROCEDURE — 80048 BASIC METABOLIC PNL TOTAL CA: CPT

## 2018-07-12 PROCEDURE — 700111 HCHG RX REV CODE 636 W/ 250 OVERRIDE (IP): Performed by: EMERGENCY MEDICINE

## 2018-07-12 PROCEDURE — 96374 THER/PROPH/DIAG INJ IV PUSH: CPT

## 2018-07-12 PROCEDURE — 85025 COMPLETE CBC W/AUTO DIFF WBC: CPT

## 2018-07-12 PROCEDURE — 700105 HCHG RX REV CODE 258: Performed by: INTERNAL MEDICINE

## 2018-07-12 RX ORDER — ENALAPRILAT 1.25 MG/ML
1.25 INJECTION INTRAVENOUS EVERY 6 HOURS PRN
Status: DISCONTINUED | OUTPATIENT
Start: 2018-07-12 | End: 2018-07-13 | Stop reason: HOSPADM

## 2018-07-12 RX ORDER — BISACODYL 10 MG
10 SUPPOSITORY, RECTAL RECTAL
Status: DISCONTINUED | OUTPATIENT
Start: 2018-07-12 | End: 2018-07-13 | Stop reason: HOSPADM

## 2018-07-12 RX ORDER — POLYETHYLENE GLYCOL 3350 17 G/17G
1 POWDER, FOR SOLUTION ORAL
Status: DISCONTINUED | OUTPATIENT
Start: 2018-07-12 | End: 2018-07-13 | Stop reason: HOSPADM

## 2018-07-12 RX ORDER — AMOXICILLIN 250 MG
2 CAPSULE ORAL 2 TIMES DAILY
Status: DISCONTINUED | OUTPATIENT
Start: 2018-07-12 | End: 2018-07-13 | Stop reason: HOSPADM

## 2018-07-12 RX ORDER — CLOPIDOGREL BISULFATE 75 MG/1
75 TABLET ORAL DAILY
Status: DISCONTINUED | OUTPATIENT
Start: 2018-07-13 | End: 2018-07-13 | Stop reason: HOSPADM

## 2018-07-12 RX ORDER — ATORVASTATIN CALCIUM 20 MG/1
20 TABLET, FILM COATED ORAL EVERY EVENING
Status: DISCONTINUED | OUTPATIENT
Start: 2018-07-12 | End: 2018-07-13 | Stop reason: HOSPADM

## 2018-07-12 RX ORDER — ISOSORBIDE MONONITRATE 30 MG/1
15 TABLET, EXTENDED RELEASE ORAL DAILY
Status: DISCONTINUED | OUTPATIENT
Start: 2018-07-12 | End: 2018-07-13 | Stop reason: HOSPADM

## 2018-07-12 RX ORDER — ONDANSETRON 4 MG/1
4 TABLET, ORALLY DISINTEGRATING ORAL EVERY 4 HOURS PRN
Status: DISCONTINUED | OUTPATIENT
Start: 2018-07-12 | End: 2018-07-13 | Stop reason: HOSPADM

## 2018-07-12 RX ORDER — AMLODIPINE BESYLATE 5 MG/1
5 TABLET ORAL DAILY
Status: DISCONTINUED | OUTPATIENT
Start: 2018-07-13 | End: 2018-07-13

## 2018-07-12 RX ORDER — AMLODIPINE BESYLATE 10 MG/1
5 TABLET ORAL EVERY MORNING
Status: ON HOLD | COMMUNITY
End: 2018-09-12

## 2018-07-12 RX ORDER — SODIUM CHLORIDE 9 MG/ML
INJECTION, SOLUTION INTRAVENOUS CONTINUOUS
Status: DISCONTINUED | OUTPATIENT
Start: 2018-07-12 | End: 2018-07-13

## 2018-07-12 RX ORDER — ACETAMINOPHEN 325 MG/1
650 TABLET ORAL EVERY 6 HOURS PRN
Status: DISCONTINUED | OUTPATIENT
Start: 2018-07-12 | End: 2018-07-13 | Stop reason: HOSPADM

## 2018-07-12 RX ORDER — ONDANSETRON 2 MG/ML
4 INJECTION INTRAMUSCULAR; INTRAVENOUS ONCE
Status: COMPLETED | OUTPATIENT
Start: 2018-07-12 | End: 2018-07-12

## 2018-07-12 RX ORDER — ONDANSETRON 2 MG/ML
4 INJECTION INTRAMUSCULAR; INTRAVENOUS EVERY 4 HOURS PRN
Status: DISCONTINUED | OUTPATIENT
Start: 2018-07-12 | End: 2018-07-13 | Stop reason: HOSPADM

## 2018-07-12 RX ORDER — SODIUM CHLORIDE 9 MG/ML
1000 INJECTION, SOLUTION INTRAVENOUS ONCE
Status: COMPLETED | OUTPATIENT
Start: 2018-07-12 | End: 2018-07-12

## 2018-07-12 RX ADMIN — ONDANSETRON 4 MG: 2 INJECTION, SOLUTION INTRAMUSCULAR; INTRAVENOUS at 14:40

## 2018-07-12 RX ADMIN — SODIUM CHLORIDE 1000 ML: 9 INJECTION, SOLUTION INTRAVENOUS at 13:37

## 2018-07-12 RX ADMIN — ENALAPRILAT 1.25 MG: 1.25 INJECTION INTRAVENOUS at 20:45

## 2018-07-12 RX ADMIN — PROCHLORPERAZINE EDISYLATE 10 MG: 5 INJECTION INTRAMUSCULAR; INTRAVENOUS at 22:07

## 2018-07-12 RX ADMIN — SODIUM CHLORIDE: 9 INJECTION, SOLUTION INTRAVENOUS at 17:00

## 2018-07-12 RX ADMIN — ONDANSETRON 4 MG: 2 INJECTION, SOLUTION INTRAMUSCULAR; INTRAVENOUS at 20:19

## 2018-07-12 ASSESSMENT — LIFESTYLE VARIABLES
AVERAGE NUMBER OF DAYS PER WEEK YOU HAVE A DRINK CONTAINING ALCOHOL: 1
EVER FELT BAD OR GUILTY ABOUT YOUR DRINKING: NO
ON A TYPICAL DAY WHEN YOU DRINK ALCOHOL HOW MANY DRINKS DO YOU HAVE: 1
TOTAL SCORE: 0
HAVE YOU EVER FELT YOU SHOULD CUT DOWN ON YOUR DRINKING: NO
TOTAL SCORE: 0
HAVE PEOPLE ANNOYED YOU BY CRITICIZING YOUR DRINKING: NO
TOTAL SCORE: 0
ALCOHOL_USE: YES
HOW MANY TIMES IN THE PAST YEAR HAVE YOU HAD 5 OR MORE DRINKS IN A DAY: 0
CONSUMPTION TOTAL: NEGATIVE
EVER HAD A DRINK FIRST THING IN THE MORNING TO STEADY YOUR NERVES TO GET RID OF A HANGOVER: NO
EVER_SMOKED: YES

## 2018-07-12 ASSESSMENT — ENCOUNTER SYMPTOMS
ORTHOPNEA: 0
ABDOMINAL PAIN: 0
SEIZURES: 0
INSOMNIA: 0
HEARTBURN: 0
SHORTNESS OF BREATH: 0
DIZZINESS: 1
WEIGHT LOSS: 0
VOMITING: 0
HEADACHES: 0
STRIDOR: 0
COUGH: 0
BACK PAIN: 0
NECK PAIN: 0
MYALGIAS: 0
EYE PAIN: 0
SPUTUM PRODUCTION: 0
CHILLS: 0
FEVER: 0
NAUSEA: 0
EYE REDNESS: 0
DEPRESSION: 0
PALPITATIONS: 0
DIARRHEA: 0
BLURRED VISION: 0
FOCAL WEAKNESS: 0
EYE DISCHARGE: 0
NERVOUS/ANXIOUS: 0

## 2018-07-12 ASSESSMENT — CHA2DS2 SCORE
HYPERTENSION: YES
CHA2DS2 VASC SCORE: 7
CHF OR LEFT VENTRICULAR DYSFUNCTION: NO
AGE 75 OR GREATER: YES
DIABETES: NO
AGE 65 TO 74: NO
VASCULAR DISEASE: YES
PRIOR STROKE OR TIA OR THROMBOEMBOLISM: YES
SEX: FEMALE

## 2018-07-12 ASSESSMENT — PATIENT HEALTH QUESTIONNAIRE - PHQ9
SUM OF ALL RESPONSES TO PHQ9 QUESTIONS 1 AND 2: 0
1. LITTLE INTEREST OR PLEASURE IN DOING THINGS: NOT AT ALL
2. FEELING DOWN, DEPRESSED, IRRITABLE, OR HOPELESS: NOT AT ALL

## 2018-07-12 ASSESSMENT — PAIN SCALES - GENERAL
PAINLEVEL_OUTOF10: 0
PAINLEVEL_OUTOF10: 0

## 2018-07-12 NOTE — ED TRIAGE NOTES
Chief Complaint   Patient presents with   • Dizziness     x 1 hr   Pt bib [facility] for above chief complaint.  No neuro deficits.

## 2018-07-12 NOTE — ED PROVIDER NOTES
ED Provider Note    CHIEF COMPLAINT  Chief Complaint   Patient presents with   • Dizziness     x 1 hr       HPI  Madeleine Zambrano is a 85 y.o. female who presents to the emergency department with a chief complaint of dizziness.  She describes the dizziness as lightheadedness.  She went to bed last night feeling fine.  She awoke around midnight and went to the bathroom.  She felt okay at that time but then when she woke up around 2:30 in the morning she felt very lightheaded and had generalized weakness and fatigue.  She felt like she had difficulty getting out of the bed.  She felt so weak that she could not get up to use the restroom.  She slept in bed the rest the night.  This morning she awoke and continue to have lightheadedness and therefore decided to come to the emergency department for evaluation.  She has not had any cough or fevers.  No chest pain or abdominal pain.  No dysuria.  She does take multiple cardiac medications.    REVIEW OF SYSTEMS  See HPI for further details. All other systems are negative.     PAST MEDICAL HISTORY  Past Medical History:   Diagnosis Date   • Anginal syndrome (HCC)    • Arrhythmia    • Arthritis    • Back pain 3/28/2012   • CATARACT    • Coronary artery disease    • Disorder of thyroid    • Glaucoma    • Hyperlipidemia    • Hypertension    • MEDICAL HOME 11/07/12   • Myocardial infarct (HCC)    • Pain     sacrum 1/10   • Stroke (HCC)    • TIA (transient ischemic attack) 09/2017   • Urinary incontinence        FAMILY HISTORY  Family History   Problem Relation Age of Onset   • Heart Disease Mother    • Hypertension Mother    • Cancer Father    • No Known Problems Sister    • No Known Problems Brother        SOCIAL HISTORY  Social History     Social History   • Marital status:      Spouse name: N/A   • Number of children: N/A   • Years of education: N/A     Social History Main Topics   • Smoking status: Current Every Day Smoker     Packs/day: 0.25     Years: 0.10  "    Types: Cigarettes   • Smokeless tobacco: Never Used   • Alcohol use Yes      Comment: 1 per day   • Drug use: No   • Sexual activity: Not on file     Other Topics Concern   • Not on file     Social History Narrative   • No narrative on file       SURGICAL HISTORY  Past Surgical History:   Procedure Laterality Date   • ERCP IN OR  3/16/2018    Procedure: ERCP IN OR;  Surgeon: Orlin Caldera M.D.;  Location: SURGERY [facility];  Service: Gastroenterology   • VITRECTOMY POSTERIOR Right 4/13/2017    Procedure: VITRECTOMY POSTERIOR-LASER, SF6 GAS;  Surgeon: Jessica Arita M.D.;  Location: SURGERY SAME DAY [facility];  Service:    • RIGO BY LAPAROSCOPY  10/9/2015    Procedure: RIGO BY LAPAROSCOPY;  Surgeon: Ric Zambrano M.D.;  Location: SURGERY [facility];  Service:    • CATARACT PHACO WITH IOL  4/27/2011    Performed by EVERETTE COOPER at SURGERY SAME DAY [facility] ORS   • CATARACT PHACO WITH IOL  4/13/2011    Performed by EVERETTE COOPER at SURGERY SAME DAY [facility] ORS   • OTHER ORTHOPEDIC SURGERY  1980    pins in left hip   • ATHROPLASTY      broken hip 1990   • OTHER CARDIAC SURGERY         CURRENT MEDICATIONS  Home Medications    **Home medications have not yet been reviewed for this encounter**         ALLERGIES  Allergies   Allergen Reactions   • Meperidine Vomiting and Nausea   • Tramadol Vomiting and Nausea       PHYSICAL EXAM  VITAL SIGNS: BP (!) 180/62   Pulse 60   Temp 36.2 °C (97.2 °F)   Resp 16   Ht 1.6 m (5' 3\")   Wt 64.4 kg (142 lb)   SpO2 91%   BMI 25.15 kg/m²   Constitutional: Well developed, Well nourished, No acute distress, Non-toxic appearance.  Appears fatigued and tired.  HENT: Normocephalic, Atraumatic, Bilateral external ears normal, Oropharynx somewhat dry, No oral exudates, Nose normal.   Eyes: PERRLA, EOMI, Conjunctiva normal, No discharge.   Neck: Normal range of motion, No tenderness, Supple, No stridor.   Cardiovascular: Bradycardic, regular rhythm, no " audible murmur  Thorax & Lungs: Clear to auscultation bilaterally.  No rales, rhonchi, or wheezing.  Abdomen: Bowel sounds normal, Soft, No tenderness, No masses, No pulsatile masses.   Skin: Warm, Dry, No erythema, No rash.   Back: No tenderness, No CVA tenderness.   Extremities: Intact distal pulses, No tenderness, No cyanosis, No clubbing.  Neurologic: Alert & oriented x 3, Normal motor function, Normal sensory function, No focal deficits noted.     EKG  See below  Results for orders placed or performed during the hospital encounter of 07/12/18   CBC w/ Differential   Result Value Ref Range    WBC 4.0 (L) 4.8 - 10.8 K/uL    RBC 4.08 (L) 4.20 - 5.40 M/uL    Hemoglobin 13.5 12.0 - 16.0 g/dL    Hematocrit 41.4 37.0 - 47.0 %    .5 (H) 81.4 - 97.8 fL    MCH 33.1 (H) 27.0 - 33.0 pg    MCHC 32.6 (L) 33.6 - 35.0 g/dL    RDW 55.1 (H) 35.9 - 50.0 fL    Platelet Count 239 164 - 446 K/uL    MPV 10.4 9.0 - 12.9 fL    Neutrophils-Polys 56.10 44.00 - 72.00 %    Lymphocytes 28.40 22.00 - 41.00 %    Monocytes 8.90 0.00 - 13.40 %    Eosinophils 4.30 0.00 - 6.90 %    Basophils 1.80 0.00 - 1.80 %    Immature Granulocytes 0.50 0.00 - 0.90 %    Nucleated RBC 0.00 /100 WBC    Neutrophils (Absolute) 2.22 2.00 - 7.15 K/uL    Lymphs (Absolute) 1.12 1.00 - 4.80 K/uL    Monos (Absolute) 0.35 0.00 - 0.85 K/uL    Eos (Absolute) 0.17 0.00 - 0.51 K/uL    Baso (Absolute) 0.07 0.00 - 0.12 K/uL    Immature Granulocytes (abs) 0.02 0.00 - 0.11 K/uL    NRBC (Absolute) 0.00 K/uL   Basic Metabolic Panel (BMP)   Result Value Ref Range    Sodium 142 135 - 145 mmol/L    Potassium 3.6 3.6 - 5.5 mmol/L    Chloride 110 96 - 112 mmol/L    Co2 23 20 - 33 mmol/L    Glucose 150 (H) 65 - 99 mg/dL    Bun 23 (H) 8 - 22 mg/dL    Creatinine 1.16 0.50 - 1.40 mg/dL    Calcium 9.0 8.5 - 10.5 mg/dL    Anion Gap 9.0 0.0 - 11.9   Troponin STAT   Result Value Ref Range    Troponin I 0.02 0.00 - 0.04 ng/mL   PT/INR   Result Value Ref Range    PT 13.9 12.0 - 14.6 sec     INR 1.10 0.87 - 1.13   APTT   Result Value Ref Range    APTT 29.0 24.7 - 36.0 sec   TSH   Result Value Ref Range    TSH 1.560 0.380 - 5.330 uIU/mL   URINALYSIS CULTURE, IF INDICATED   Result Value Ref Range    Color Yellow     Character Clear     Specific Gravity 1.009 <1.035    Ph 7.5 5.0 - 8.0    Glucose Negative Negative mg/dL    Ketones Negative Negative mg/dL    Protein Negative Negative mg/dL    Bilirubin Negative Negative    Urobilinogen, Urine 0.2 Negative    Nitrite Negative Negative    Leukocyte Esterase Trace (A) Negative    Occult Blood Negative Negative    Micro Urine Req Microscopic    ESTIMATED GFR   Result Value Ref Range    GFR If  54 (A) >60 mL/min/1.73 m 2    GFR If Non  44 (A) >60 mL/min/1.73 m 2   URINE MICROSCOPIC (W/UA)   Result Value Ref Range    WBC 2-5 /hpf    RBC 0-2 /hpf    Bacteria Negative None /hpf    Epithelial Cells Few /hpf    Hyaline Cast 0-2 /lpf         RADIOLOGY/PROCEDURES  DX-CHEST-PORTABLE (1 VIEW)   Final Result      1.  Hypoinflation without other evidence for acute cardiopulmonary disease.   2.  Stable cardiomegaly.            COURSE & MEDICAL DECISION MAKING  Pertinent Labs & Imaging studies reviewed. (See chart for details)    The patient presents today with generalized weakness and fatigue.  She does not have any history of fever, chest pain, or vomiting.  An IV is established.  She is given a fluid bolus to see if this improves her lightheadedness.    Laboratory studies are obtained.  There is no evidence of significant anemia.  No significant electrolyte abnormality, mild hyperglycemia.  Urinalysis is not consistent with a urinary tract infection.    The patient has been bradycardic in the emergency department.  She does take multiple cardiac medications including beta blockers and nitrates.  Possible the patient's symptoms are medication related.  I reexamined the patient at 1530 and the patient is not feeling well.  She does not  feel stable on her feet.  She normally ambulates with a walker but does not feel that she is stable enough to go home.  She continues to feel too lightheaded.  I feel the patient likely has symptomatic bradycardia.  Possibly medication related.  I do feel the patient would benefit from admission the hospital for further evaluation and treatment.  I spoke with the on-call hospitalist Dr. wood for admission    FINAL IMPRESSION  1. Lightheadedness    2. Symptomatic bradycardia    3. Medication side effect              Electronically signed by: Hemal Shay, 7/12/2018 3:30 PM

## 2018-07-12 NOTE — H&P
Hospital Medicine History and Physical      Date of Service  7/12/2018    Chief Complaint  Chief Complaint   Patient presents with   • Dizziness     x 1 hr       History of Presenting Illness  Juan Manuel is a 85 y.o. female PMH of PAF, TIA, HTN, who presents with dizziness started around 10 am today. Because of that she has difficulty with her ambulation and balance. She denies vertigo. Denies any palpitation, chest pain. In the ER she was found to have bradycardic. She will be admitted for observation.  Primary Care Physician  Ric Garcia M.D.      Code Status  Full code    Review of Systems  Review of Systems   Constitutional: Negative for chills, fever and weight loss.   HENT: Negative for congestion and nosebleeds.    Eyes: Negative for blurred vision, pain, discharge and redness.   Respiratory: Negative for cough, sputum production, shortness of breath and stridor.    Cardiovascular: Negative for chest pain, palpitations and orthopnea.   Gastrointestinal: Negative for abdominal pain, diarrhea, heartburn, nausea and vomiting.   Genitourinary: Negative for dysuria, frequency and urgency.   Musculoskeletal: Negative for back pain, myalgias and neck pain.   Skin: Negative for itching and rash.   Neurological: Positive for dizziness. Negative for focal weakness, seizures and headaches.   Psychiatric/Behavioral: Negative for depression. The patient is not nervous/anxious and does not have insomnia.      Please see HPI, all other systems were reviewed and are negative (AMA/CMS criteria)     Past Medical History  Past Medical History:   Diagnosis Date   • TIA (transient ischemic attack) 09/2017   • MEDICAL HOME 11/07/12   • Back pain 3/28/2012   • Anginal syndrome (HCC)    • Arrhythmia    • Arthritis    • CATARACT    • Coronary artery disease    • Disorder of thyroid    • Glaucoma    • Hyperlipidemia    • Hypertension    • Myocardial infarct (HCC)    • Pain     sacrum 1/10   • Stroke (HCC)    • Urinary incontinence         Surgical History  Past Surgical History:   Procedure Laterality Date   • ERCP IN OR  3/16/2018    Procedure: ERCP IN OR;  Surgeon: Orlin Caldera M.D.;  Location: SURGERY [facility];  Service: Gastroenterology   • VITRECTOMY POSTERIOR Right 4/13/2017    Procedure: VITRECTOMY POSTERIOR-LASER, SF6 GAS;  Surgeon: Jessica Arita M.D.;  Location: SURGERY SAME DAY [facility];  Service:    • RIGO BY LAPAROSCOPY  10/9/2015    Procedure: RIGO BY LAPAROSCOPY;  Surgeon: Ric Zambrano M.D.;  Location: SURGERY [facility];  Service:    • CATARACT PHACO WITH IOL  4/27/2011    Performed by EVERETTE COOPER at SURGERY SAME DAY [facility] ORS   • CATARACT PHACO WITH IOL  4/13/2011    Performed by EVERETTE COOPER at SURGERY SAME DAY [facility] ORS   • OTHER ORTHOPEDIC SURGERY  1980    pins in left hip   • ATHROPLASTY      broken hip 1990   • OTHER CARDIAC SURGERY         Medications  No current facility-administered medications on file prior to encounter.      Current Outpatient Prescriptions on File Prior to Encounter   Medication Sig Dispense Refill   • amLODIPine (NORVASC) 10 MG Tab Take 1 Tab by mouth every day. 30 Tab 1   • apixaban (ELIQUIS) 5mg Tab Take 1 Tab by mouth 2 Times a Day. 60 Tab 1   • atorvastatin (LIPITOR) 20 MG Tab Take 1 Tab by mouth every bedtime. 30 Tab 1   • clopidogrel (PLAVIX) 75 MG Tab TAKE 1 TAB BY MOUTH EVERY DAY. 90 Tab 1   • metoprolol (LOPRESSOR) 25 MG Tab Take 1 Tab by mouth 2 times a day. 60 Tab 2   • isosorbide mononitrate SR (IMDUR) 30 MG TABLET SR 24 HR Take 0.5 Tabs by mouth every day. 30 Tab 3   • Ascorbic Acid (VITAMIN C) 1000 MG Tab Take 1 Tab by mouth every morning.       Family History  Family History   Problem Relation Age of Onset   • Heart Disease Mother    • Hypertension Mother    • Cancer Father    • No Known Problems Sister    • No Known Problems Brother          Social History  Social History   Substance Use Topics   • Smoking status: Current Every Day Smoker      Packs/day: 0.25     Years: 0.10     Types: Cigarettes   • Smokeless tobacco: Never Used   • Alcohol use Yes      Comment: 1 per day       Allergies  Allergies   Allergen Reactions   • Meperidine Vomiting and Nausea   • Tramadol Vomiting and Nausea        Physical Exam  Laboratory   Hemodynamics  Temp (24hrs), Av.2 °C (97.2 °F), Min:36.2 °C (97.2 °F), Max:36.2 °C (97.2 °F)   Temperature: 36.2 °C (97.2 °F)  Pulse  Av.5  Min: 49  Max: 60 Heart Rate (Monitored): 60  Blood Pressure : (!) 180/62, NIBP: 150/53      Respiratory      Respiration: 16, Pulse Oximetry: 91 %             Physical Exam   Constitutional: She is oriented to person, place, and time. No distress.   HENT:   Head: Normocephalic and atraumatic.   Mouth/Throat: Oropharynx is clear and moist.   Eyes: Conjunctivae and EOM are normal. Pupils are equal, round, and reactive to light.   Neck: Normal range of motion. Neck supple. No tracheal deviation present. No thyromegaly present.   Cardiovascular: Normal rate and regular rhythm.    No murmur heard.  Pulmonary/Chest: Effort normal and breath sounds normal. No respiratory distress. She has no wheezes.   Abdominal: Soft. Bowel sounds are normal. She exhibits no distension. There is no tenderness.   Musculoskeletal: She exhibits no edema or tenderness.   Neurological: She is alert and oriented to person, place, and time. No cranial nerve deficit.   Skin: Skin is warm and dry. She is not diaphoretic. No erythema.   Psychiatric: She has a normal mood and affect. Her behavior is normal. Thought content normal.       Recent Labs      18   1242   WBC  4.0*   RBC  4.08*   HEMOGLOBIN  13.5   HEMATOCRIT  41.4   MCV  101.5*   MCH  33.1*   MCHC  32.6*   RDW  55.1*   PLATELETCT  239   MPV  10.4     Recent Labs      18   1242   SODIUM  142   POTASSIUM  3.6   CHLORIDE  110   CO2  23   GLUCOSE  150*   BUN  23*   CREATININE  1.16   CALCIUM  9.0     Recent Labs      18   1242   GLUCOSE  150*      Recent Labs      07/12/18   1242   APTT  29.0   INR  1.10             Lab Results   Component Value Date    TROPONINI 0.02 07/12/2018       Imaging  DX-CHEST-PORTABLE (1 VIEW)   Final Result      1.  Hypoinflation without other evidence for acute cardiopulmonary disease.   2.  Stable cardiomegaly.        EKG  per my independant read:  QTc: 404, HR: 65, Normal Sinus Rhythm, no ST/T changes     Assessment/Plan     I anticipate this patient is appropriate for observation status at this time.    Bradycardia- (present on admission)   Assessment & Plan    Plan as above        Dizziness- (present on admission)   Assessment & Plan    Related to bradycardia?  Check orthostatic BP  Started on gentle ivf  Holding metoprolol  Monitor on tele closely  Fall precaution  PT/OT        PAF (paroxysmal atrial fibrillation) (HCC)- (present on admission)   Assessment & Plan    Currently bradycardic  Holding metoprolol  Monitor on tele  On eliquis        Stage 3 chronic kidney disease- (present on admission)   Assessment & Plan    stable            Prophylaxis:  apixaban

## 2018-07-12 NOTE — ASSESSMENT & PLAN NOTE
Related to bradycardia?  Check orthostatic BP  Started on gentle ivf  Holding metoprolol  Monitor on tele closely  Fall precaution  PT/OT

## 2018-07-13 ENCOUNTER — APPOINTMENT (OUTPATIENT)
Dept: RADIOLOGY | Facility: MEDICAL CENTER | Age: 83
End: 2018-07-13
Attending: FAMILY MEDICINE
Payer: MEDICARE

## 2018-07-13 ENCOUNTER — PATIENT OUTREACH (OUTPATIENT)
Dept: HEALTH INFORMATION MANAGEMENT | Facility: OTHER | Age: 83
End: 2018-07-13

## 2018-07-13 VITALS
HEIGHT: 63 IN | TEMPERATURE: 97.8 F | OXYGEN SATURATION: 94 % | HEART RATE: 65 BPM | RESPIRATION RATE: 18 BRPM | WEIGHT: 141.31 LBS | DIASTOLIC BLOOD PRESSURE: 71 MMHG | SYSTOLIC BLOOD PRESSURE: 163 MMHG | BODY MASS INDEX: 25.04 KG/M2

## 2018-07-13 PROBLEM — R42 DIZZINESS: Status: RESOLVED | Noted: 2018-07-12 | Resolved: 2018-07-13

## 2018-07-13 PROBLEM — R00.1 BRADYCARDIA: Status: RESOLVED | Noted: 2018-07-12 | Resolved: 2018-07-13

## 2018-07-13 LAB
ALBUMIN SERPL BCP-MCNC: 3 G/DL (ref 3.2–4.9)
ALBUMIN/GLOB SERPL: 1.5 G/DL
ALP SERPL-CCNC: 67 U/L (ref 30–99)
ALT SERPL-CCNC: 6 U/L (ref 2–50)
ANION GAP SERPL CALC-SCNC: 7 MMOL/L (ref 0–11.9)
AST SERPL-CCNC: 13 U/L (ref 12–45)
BILIRUB SERPL-MCNC: 0.4 MG/DL (ref 0.1–1.5)
BUN SERPL-MCNC: 17 MG/DL (ref 8–22)
CALCIUM SERPL-MCNC: 8.3 MG/DL (ref 8.5–10.5)
CHLORIDE SERPL-SCNC: 110 MMOL/L (ref 96–112)
CO2 SERPL-SCNC: 21 MMOL/L (ref 20–33)
CREAT SERPL-MCNC: 1.03 MG/DL (ref 0.5–1.4)
EKG IMPRESSION: NORMAL
ERYTHROCYTE [DISTWIDTH] IN BLOOD BY AUTOMATED COUNT: 55.4 FL (ref 35.9–50)
GLOBULIN SER CALC-MCNC: 2 G/DL (ref 1.9–3.5)
GLUCOSE SERPL-MCNC: 121 MG/DL (ref 65–99)
HCT VFR BLD AUTO: 36.8 % (ref 37–47)
HGB BLD-MCNC: 12.1 G/DL (ref 12–16)
MCH RBC QN AUTO: 33.6 PG (ref 27–33)
MCHC RBC AUTO-ENTMCNC: 32.9 G/DL (ref 33.6–35)
MCV RBC AUTO: 102.2 FL (ref 81.4–97.8)
PLATELET # BLD AUTO: 228 K/UL (ref 164–446)
PMV BLD AUTO: 10.4 FL (ref 9–12.9)
POTASSIUM SERPL-SCNC: 3.6 MMOL/L (ref 3.6–5.5)
PROT SERPL-MCNC: 5 G/DL (ref 6–8.2)
RBC # BLD AUTO: 3.6 M/UL (ref 4.2–5.4)
SODIUM SERPL-SCNC: 138 MMOL/L (ref 135–145)
WBC # BLD AUTO: 5.4 K/UL (ref 4.8–10.8)

## 2018-07-13 PROCEDURE — 97166 OT EVAL MOD COMPLEX 45 MIN: CPT

## 2018-07-13 PROCEDURE — 80053 COMPREHEN METABOLIC PANEL: CPT

## 2018-07-13 PROCEDURE — 36415 COLL VENOUS BLD VENIPUNCTURE: CPT

## 2018-07-13 PROCEDURE — A9270 NON-COVERED ITEM OR SERVICE: HCPCS | Performed by: FAMILY MEDICINE

## 2018-07-13 PROCEDURE — 700111 HCHG RX REV CODE 636 W/ 250 OVERRIDE (IP): Performed by: INTERNAL MEDICINE

## 2018-07-13 PROCEDURE — 96376 TX/PRO/DX INJ SAME DRUG ADON: CPT

## 2018-07-13 PROCEDURE — 99217 PR OBSERVATION CARE DISCHARGE: CPT | Performed by: INTERNAL MEDICINE

## 2018-07-13 PROCEDURE — 96375 TX/PRO/DX INJ NEW DRUG ADDON: CPT

## 2018-07-13 PROCEDURE — 71045 X-RAY EXAM CHEST 1 VIEW: CPT

## 2018-07-13 PROCEDURE — 93005 ELECTROCARDIOGRAM TRACING: CPT | Performed by: INTERNAL MEDICINE

## 2018-07-13 PROCEDURE — G8988 SELF CARE GOAL STATUS: HCPCS | Mod: CI

## 2018-07-13 PROCEDURE — 700102 HCHG RX REV CODE 250 W/ 637 OVERRIDE(OP): Performed by: FAMILY MEDICINE

## 2018-07-13 PROCEDURE — 700111 HCHG RX REV CODE 636 W/ 250 OVERRIDE (IP): Performed by: FAMILY MEDICINE

## 2018-07-13 PROCEDURE — 700105 HCHG RX REV CODE 258: Performed by: INTERNAL MEDICINE

## 2018-07-13 PROCEDURE — 97162 PT EVAL MOD COMPLEX 30 MIN: CPT

## 2018-07-13 PROCEDURE — G0378 HOSPITAL OBSERVATION PER HR: HCPCS

## 2018-07-13 PROCEDURE — G8987 SELF CARE CURRENT STATUS: HCPCS | Mod: CK

## 2018-07-13 PROCEDURE — 85027 COMPLETE CBC AUTOMATED: CPT

## 2018-07-13 PROCEDURE — 93010 ELECTROCARDIOGRAM REPORT: CPT | Performed by: INTERNAL MEDICINE

## 2018-07-13 RX ORDER — METOCLOPRAMIDE HYDROCHLORIDE 5 MG/ML
10 INJECTION INTRAMUSCULAR; INTRAVENOUS EVERY 4 HOURS PRN
Status: DISCONTINUED | OUTPATIENT
Start: 2018-07-13 | End: 2018-07-13 | Stop reason: HOSPADM

## 2018-07-13 RX ORDER — CLONIDINE HYDROCHLORIDE 0.1 MG/1
0.2 TABLET ORAL ONCE
Status: COMPLETED | OUTPATIENT
Start: 2018-07-13 | End: 2018-07-13

## 2018-07-13 RX ORDER — AMLODIPINE BESYLATE 10 MG/1
10 TABLET ORAL DAILY
Status: DISCONTINUED | OUTPATIENT
Start: 2018-07-13 | End: 2018-07-13 | Stop reason: HOSPADM

## 2018-07-13 RX ORDER — LISINOPRIL 10 MG/1
10 TABLET ORAL
Status: DISCONTINUED | OUTPATIENT
Start: 2018-07-13 | End: 2018-07-13

## 2018-07-13 RX ORDER — ATORVASTATIN CALCIUM 20 MG/1
20 TABLET, FILM COATED ORAL
Qty: 30 TAB | Refills: 1 | Status: SHIPPED | OUTPATIENT
Start: 2018-07-13

## 2018-07-13 RX ADMIN — SODIUM CHLORIDE: 9 INJECTION, SOLUTION INTRAVENOUS at 05:25

## 2018-07-13 RX ADMIN — ENALAPRILAT 1.25 MG: 1.25 INJECTION INTRAVENOUS at 05:32

## 2018-07-13 RX ADMIN — CLONIDINE HYDROCHLORIDE 0.2 MG: 0.1 TABLET ORAL at 01:40

## 2018-07-13 RX ADMIN — METOCLOPRAMIDE 10 MG: 5 INJECTION, SOLUTION INTRAMUSCULAR; INTRAVENOUS at 05:21

## 2018-07-13 RX ADMIN — ONDANSETRON 4 MG: 2 INJECTION, SOLUTION INTRAMUSCULAR; INTRAVENOUS at 03:43

## 2018-07-13 RX ADMIN — PROCHLORPERAZINE EDISYLATE 10 MG: 5 INJECTION INTRAMUSCULAR; INTRAVENOUS at 04:58

## 2018-07-13 ASSESSMENT — GAIT ASSESSMENTS
ASSISTIVE DEVICE: FRONT WHEEL WALKER
GAIT LEVEL OF ASSIST: MINIMAL ASSIST
DISTANCE (FEET): 15
DEVIATION: BRADYKINETIC;SHUFFLED GAIT;DECREASED HEEL STRIKE;DECREASED TOE OFF

## 2018-07-13 ASSESSMENT — COGNITIVE AND FUNCTIONAL STATUS - GENERAL
MOVING FROM LYING ON BACK TO SITTING ON SIDE OF FLAT BED: A LITTLE
SUGGESTED CMS G CODE MODIFIER MOBILITY: CK
DRESSING REGULAR LOWER BODY CLOTHING: A LITTLE
MOVING TO AND FROM BED TO CHAIR: A LITTLE
STANDING UP FROM CHAIR USING ARMS: A LITTLE
DRESSING REGULAR UPPER BODY CLOTHING: A LITTLE
PERSONAL GROOMING: A LITTLE
HELP NEEDED FOR BATHING: A LOT
TOILETING: A LOT
DAILY ACTIVITIY SCORE: 17
WALKING IN HOSPITAL ROOM: A LITTLE
MOBILITY SCORE: 17
TURNING FROM BACK TO SIDE WHILE IN FLAT BAD: A LITTLE
CLIMB 3 TO 5 STEPS WITH RAILING: A LOT
SUGGESTED CMS G CODE MODIFIER DAILY ACTIVITY: CK

## 2018-07-13 ASSESSMENT — PAIN SCALES - GENERAL: PAINLEVEL_OUTOF10: 0

## 2018-07-13 ASSESSMENT — ACTIVITIES OF DAILY LIVING (ADL): TOILETING: INDEPENDENT

## 2018-07-13 NOTE — THERAPY
"Occupational Therapy Evaluation completed.   Functional Status:  Pt presents to skilled OT services admission to hospital for bradycardia. Pt performed bed mobility with cga with increased time, LB dressing with min a, toileting min/mod a, demonstrates poor hygiene post urination, declined to perform pericare and proceeded to don underwear, throughout the session pt required cues for safety(attempting to sit on floor, pushing FWW away), poor activity tolerance, decreased strength, impaired balance which all impact pt's ability to safely and I'ly perform ADLs. Pt's spO2 level fluctuating bed between high 80's to low 90's on supplemental O2. Pt would benefit from acute skilled services and currently requires SNF level of therapy prior to d/c home given above mentioned deficits.   Plan of Care: Will benefit from Occupational Therapy 3 times per week  Discharge Recommendations:  Equipment: Will Continue to Assess for Equipment Needs. Post-acute therapy Discharge to a transitional care facility for continued skilled therapy services.    See \"Rehab Therapy-Acute\" Patient Summary Report for complete documentation.    "

## 2018-07-13 NOTE — DISCHARGE PLANNING
Pt accepted at [facility] pending Auth. Faxed PT notes and DC summary to [phone] as requested. Telephoned pt's daughter Felicitas and advised of current plan. Felicitas confirmed she can transport her Mum after 4:30pm

## 2018-07-13 NOTE — RESPIRATORY CARE
COPD EDUCATION by COPD CLINICAL EDUCATOR  7/13/2018 at 7:06 AM by Alida Jaramillo     Patient reviewed by COPD education team. Patient does not qualify for COPD program.

## 2018-07-13 NOTE — DISCHARGE PLANNING
Care Transition Team Assessment    Information Source  Orientation : Oriented x 4  Information Given By: Patient  Who is responsible for making decisions for patient? : Patient    Readmission Evaluation  Is this a readmission?: No    Elopement Risk  Legal Hold: No  Ambulatory or Self Mobile in Wheelchair: Yes  Disoriented: No  Psychiatric Symptoms: None  History of Wandering: No  Elopement this Admit: No  Vocalizing Wanting to Leave: No  Displays Behaviors, Body Language Wanting to Leave: No-Not at Risk for Elopement    Interdisciplinary Discharge Planning  Primary Care Physician: Ric Garcia  Lives with - Patient's Self Care Capacity: Alone and Able to Care For Self  Patient or legal guardian wants to designate a caregiver (see row info): No  Support Systems: Children  Mobility Issues: Yes (uses cane)  Assistance Needed: Unknown at this Time  Durable Medical Equipment:  (cane)              Finances  Financial Barriers to Discharge: No  Prescription Coverage: Yes    Vision / Hearing Impairment  Vision Impairment : Yes  Right Eye Vision: Impaired, Wears Glasses  Left Eye Vision: Impaired, Wears Glasses  Hearing Impairment : No    Values / Beliefs / Concerns  Values / Beliefs Concerns : No         Domestic Abuse  Have you ever been the victim of abuse or violence?: No  Physical Abuse or Sexual Abuse: No  Verbal Abuse or Emotional Abuse: No  Possible Abuse Reported to:: Not Applicable              Anticipated Discharge Information  Discharge Contact Phone Number: pt reports her son in law will tranport her home

## 2018-07-13 NOTE — DISCHARGE SUMMARY
Discharge Summary    CHIEF COMPLAINT ON ADMISSION  Chief Complaint   Patient presents with   • Dizziness     x 1 hr       Reason for Admission  EMS     CODE STATUS  DNR    HPI & HOSPITAL COURSE  This is a 85 y.o. female with PMH PAF, TIA, HTN here with dizziness and balance issues.  She is on metoprolol therapy and in the emergency department she was found to be sinus bradycardia with a low rate of 49.  Her metoprolol was DC'd and this morning twelve-lead EKG shows sinus rhythm rate 64 with no acute ST elevations or depressions.  Her lab work was unremarkable.  Urinalysis did not indicate infection.  Orthostatics were done and were negative.  Therapy was ordered and patient refused it twice.   spoke with patient's daughter who states patient has had home health, however there are days where she just refuses to answer the door.  Patient wants to go live with her daughter, however daughter does not feel able to take care of her at this time.  Patient finally agreed to participate with physical therapy who reported she is not safe to DC home at this time and would benefit from postacute transitional care.    Therefore, she is discharged in fair and stable condition to skilled nursing facility.    DISCHARGE DIAGNOSES  Active Problems:    Stage 3 chronic kidney disease POA: Yes    PAF (paroxysmal atrial fibrillation) ([facility]) POA: Yes  Resolved Problems:    Dizziness POA: Yes    Bradycardia POA: Yes      FOLLOW UP  Future Appointments  Date Time Provider Department Center   8/7/2018 9:20 AM Ric Garcia M.D. [address]       MEDICATIONS ON DISCHARGE     Medication List      CONTINUE taking these medications      Instructions   amLODIPine 10 MG Tabs  Commonly known as:  NORVASC   Take 5 mg by mouth every morning.  Dose:  5 mg     apixaban 5mg Tabs  Commonly known as:  ELIQUIS   Take 1 Tab by mouth 2 Times a Day.  Dose:  5 mg     aspirin 81 MG tablet   Take 81 mg by mouth every morning.  Dose:  81 mg      atorvastatin 20 MG Tabs  Commonly known as:  LIPITOR   Take 1 Tab by mouth every bedtime.  Dose:  20 mg     clopidogrel 75 MG Tabs  Commonly known as:  PLAVIX   TAKE 1 TAB BY MOUTH EVERY DAY.  Dose:  75 mg     isosorbide mononitrate SR 30 MG Tb24  Commonly known as:  IMDUR   Take 0.5 Tabs by mouth every day.  Dose:  15 mg     Vitamin C 1000 MG Tabs   Take 1 Tab by mouth every morning.  Dose:  1 Tab        STOP taking these medications    metoprolol 25 MG Tabs  Commonly known as:  LOPRESSOR            Allergies  Allergies   Allergen Reactions   • Meperidine Vomiting and Nausea   • Tramadol Vomiting and Nausea       DIET  Orders Placed This Encounter   Procedures   • Diet Order Regular     Standing Status:   Standing     Number of Occurrences:   1     Order Specific Question:   Diet:     Answer:   Regular [1]       ACTIVITY  As tolerated and directed by skilled nursing.  Weight bearing as tolerated    LINES, DRAINS, AND WOUNDS  This is an automated list. Peripheral IVs will be removed prior to discharge.  PIV Group Right Antecubital 20g Flexible Catheter;Saline Lock (Active)   Line Secured Taped;Transparent 7/12/2018  9:00 PM   Site Condition / Description Assessed;Patent;Clean;Dry;Intact 7/12/2018  9:00 PM   Dressing Type / Description Clean;Dry;Intact;Transparent 7/12/2018  9:00 PM   Dressing Status Observed 7/12/2018  9:00 PM   Saline Locked Yes 7/12/2018  5:25 PM   Infiltration Grading Used by [facility] 0 7/12/2018  9:00 PM   Phlebitis Scale (Used by [facility]) 0 7/12/2018  9:00 PM                     MENTAL STATUS ON TRANSFER  Level of Consciousness: Alert  Orientation : Oriented x 4  Speech: Speech Clear    CONSULTATIONS  NA    PROCEDURES  NA    LABORATORY  Lab Results   Component Value Date    SODIUM 138 07/13/2018    POTASSIUM 3.6 07/13/2018    CHLORIDE 110 07/13/2018    CO2 21 07/13/2018    GLUCOSE 121 (H) 07/13/2018    BUN 17 07/13/2018    CREATININE 1.03 07/13/2018        Lab Results   Component Value  Date    WBC 5.4 07/13/2018    HEMOGLOBIN 12.1 07/13/2018    HEMATOCRIT 36.8 (L) 07/13/2018    PLATELETCT 228 07/13/2018        NATASHA Roberts.

## 2018-07-13 NOTE — DISCHARGE INSTRUCTIONS
Dizziness  Dizziness is a common problem. It makes you feel unsteady or lightheaded. You may feel like you are about to pass out (faint). Dizziness can lead to injury if you stumble or fall. Anyone can get dizzy, but dizziness is more common in older adults. This condition can be caused by a number of things, including:  · Medicines.  · Dehydration.  · Illness.  Follow these instructions at home:  Following these instructions may help with your condition:  Eating and drinking  · Drink enough fluid to keep your pee (urine) clear or pale yellow. This helps to keep you from getting dehydrated. Try to drink more clear fluids, such as water.  · Do not drink alcohol.  · Limit how much caffeine you drink or eat if told by your doctor.  · Limit how much salt you drink or eat if told by your doctor.  Activity  · Avoid making quick movements.  ¨ When you stand up from sitting in a chair, steady yourself until you feel okay.  ¨ In the morning, first sit up on the side of the bed. When you feel okay, stand slowly while you hold onto something. Do this until you know that your balance is fine.  · Move your legs often if you need to  one place for a long time. Tighten and relax your muscles in your legs while you are standing.  · Do not drive or use heavy machinery if you feel dizzy.  · Avoid bending down if you feel dizzy. Place items in your home so that they are easy for you to reach without leaning over.  Lifestyle  · Do not use any tobacco products, including cigarettes, chewing tobacco, or electronic cigarettes. If you need help quitting, ask your doctor.  · Try to lower your stress level, such as with yoga or meditation. Talk with your doctor if you need help.  General instructions  · Watch your dizziness for any changes.  · Take medicines only as told by your doctor. Talk with your doctor if you think that your dizziness is caused by a medicine that you are taking.  · Tell a friend or a family member that you are  feeling dizzy. If he or she notices any changes in your behavior, have this person call your doctor.  · Keep all follow-up visits as told by your doctor. This is important.  Contact a doctor if:  · Your dizziness does not go away.  · Your dizziness or light-headedness gets worse.  · You feel sick to your stomach (nauseous).  · You have trouble hearing.  · You have new symptoms.  · You are unsteady on your feet or you feel like the room is spinning.  Get help right away if:  · You throw up (vomit) or have diarrhea and are unable to eat or drink anything.  · You have trouble:  ¨ Talking.  ¨ Walking.  ¨ Swallowing.  ¨ Using your arms, hands, or legs.  · You feel generally weak.  · You are not thinking clearly or you have trouble forming sentences. It may take a friend or family member to notice this.  · You have:  ¨ Chest pain.  ¨ Pain in your belly (abdomen).  ¨ Shortness of breath.  ¨ Sweating.  · Your vision changes.  · You are bleeding.  · You have a headache.  · You have neck pain or a stiff neck.  · You have a fever.  This information is not intended to replace advice given to you by your health care provider. Make sure you discuss any questions you have with your health care provider.  Document Released: 12/06/2012 Document Revised: 05/25/2017 Document Reviewed: 12/14/2015  [employer] Patient Education © 2017 [employer]    Discharge Instructions    Discharged to home by ambulance with self. Discharged via wheelchair, hospital escort: Yes.  Special equipment needed: Not Applicable    Be sure to schedule a follow-up appointment with your primary care doctor or any specialists as instructed.     Discharge Plan:   Smoking Cessation Offered: Patient Refused  Influenza Vaccine Indication: Not indicated: Previously immunized this influenza season and > 8 years of age    I understand that a diet low in cholesterol, fat, and sodium is recommended for good health. Unless I have been given specific  instructions below for another diet, I accept this instruction as my diet prescription.   Other diet: Regular    Special Instructions: None    · Is patient discharged on Warfarin / Coumadin?   No     Depression / Suicide Risk    As you are discharged from this [facility] facility, it is important to learn how to keep safe from harming yourself.    Recognize the warning signs:  · Abrupt changes in personality, positive or negative- including increase in energy   · Giving away possessions  · Change in eating patterns- significant weight changes-  positive or negative  · Change in sleeping patterns- unable to sleep or sleeping all the time   · Unwillingness or inability to communicate  · Depression  · Unusual sadness, discouragement and loneliness  · Talk of wanting to die  · Neglect of personal appearance   · Rebelliousness- reckless behavior  · Withdrawal from people/activities they love  · Confusion- inability to concentrate     If you or a loved one observes any of these behaviors or has concerns about self-harm, here's what you can do:  · Talk about it- your feelings and reasons for harming yourself  · Remove any means that you might use to hurt yourself (examples: pills, rope, extension cords, firearm)  · Get professional help from the community (Mental Health, Substance Abuse, psychological counseling)  · Do not be alone:Call your Safe Contact- someone whom you trust who will be there for you.  · Call your local CRISIS HOTLINE 956-2050 or 863-851-5803  · Call your local Children's Mobile Crisis Response Team [place] (504) 557-9487 or www.[employer]izer  · Call the toll free National Suicide Prevention Hotlines   · National Suicide Prevention Lifeline 883-804-JTCS (8865)  · National Hope Line Network 800-SUICIDE (223-8744)

## 2018-07-13 NOTE — THERAPY
"Physical Therapy Evaluation completed.   Bed Mobility:  Supine to Sit: Contact Guard Assist  Transfers: Sit to Stand: Contact Guard Assist  Gait: Level Of Assist: Minimal Assist with Front-Wheel Walker       Plan of Care: Will benefit from Physical Therapy 3 times per week and Plan to complete next treatment by Tuesday 7/17  Discharge Recommendations: Equipment: Will Continue to Assess for Equipment Needs. Post-acute therapy Discharge to a transitional care facility for continued skilled therapy services.    Pt is an 85 year old female admitted to the hospital for dizziness, weakness and fatigue. Pt found to be Bradycardic. Pt has had multiple admits to the hospital this year and per daughter, was supposed to be receiving home health services, but would not answer the door when they came for services. Pt very reluctant to participate in PT evaluation stating multiple times \"This is ridiculous.\" Pt presents with decreased functional mobility, decreased functional strength, decreased balance, gait deviations, poor safety awareness and poor activity tolerance. Pt required CGA to minimal assist for all mobility. Often would push FWW out of the way despite decreased balance. Pt ambulated from EOB to commode and back to bed with FWW, minimal assist. Pt at times only requiring CGA, however, often pushes FWW too far anterior placing her at risk for falling. Pt became extremely nauseated and vomiting small amount of bile once back to bed. Pt's O2 saturations without O2 in the high 80's to low 80's, once back in bed and pt fell asleep, O2 fell to low 80's on RA. Pt would benefit from skilled PT intervention while in the acute care setting to address the listed deficits and improve mobility prior to DC home. Pt is NOT safe to DC home at this time and would benefit from post acute transitional care upon DC    See \"Rehab Therapy-Acute\" Patient Summary Report for complete documentation.     "

## 2018-07-13 NOTE — DISCHARGE PLANNING
Pt informed she has been accepted at [facility]. Pt would not sign the cobra form for this writer but did sign [employer] when asked by her RN Brook.  Concha at [facility] confirmed they will transport pt to facility at 3:30pm. Pt and daughter Felicitas Teague provided with  time. Pkt for transfer left in chart and RN Brook provided with number for RN hand off.

## 2018-07-13 NOTE — DISCHARGE PLANNING
Received Choice form at [phone]  Agency/Facility Name: All local SNF  Referral sent per Choice form @ 7842

## 2018-07-13 NOTE — PROGRESS NOTES
Assumed care at 0700 with Brook GRECO. Received report from Antoinette GRECO. A&O x4. Pt denies pain, reports fatigue. Respirations even and unlabored on 4L O2, does not wear at home, will attempt to titrate.  Updated on POC, communication board updated. Bed locked and in lowest position. Call light and belongings within reach. Non-skid socks in place. Needs met, will continue to monitor.

## 2018-07-13 NOTE — DISCHARGE PLANNING
"Met with pt at bedside. Pt is very sleepy, reluctant to talk and wants to be left alone. Advised pt of this writer's role and the need to determine a safe plan for when she is to be discharged. Pt said that she lived alone, was able to shower and dress, managed medications, cook, shopping was done by son-in-law Faisal. Pt did confirm that she had home health and when asked what services she had she said \"a bunch of people visit\". Pt also said that she had been to [facility] and liked the facility. Asked pt where she would go at discharge and she said that she wanted to go to her daughter's home.    Pt agreed this writer could call her daughter Felicitas. This writer telephoned daughter Felicitas who confirmed she would be able to have her mother stay with her. Felicitas confirmed that pt was independent for ADL's and that she did take her medications and knew why she was taking the medications prescribed. Felicitas said her mother had home health services but has been refusing to let them in. Discussed with Felicitas discharge options, Felicitas asked about Assisted Living facilities if they were covered by insurance, advised Felicitas they would not be covered by insurance.    Pt has been refusing to work with Therapy. This writer met with pt, PT and OT and advised pt on the need to work with therapy so a safe discharge plan couple be made. Pt did reluctantly agree to work with therapy.    PT/OT advised pt is not safe to go home and recommended SNF. Provided pt with choice of SNF's. Pt said that she did not know which to choose, pt agreed to \"blanket referral to all SNF's\". Pt's daughter Felicitas informed. Choice form faxed to [facility].  "

## 2018-07-13 NOTE — CARE PLAN
Problem: Knowledge Deficit  Goal: Knowledge of disease process/condition, treatment plan, diagnostic tests, and medications will improve  Pt educated regarding plan of care and medications. All questions answered.

## 2018-07-13 NOTE — DISCHARGE PLANNING
Agency/Facility Name: [facility]  Spoke To: Melyssa  Outcome: Both [facility] have declined patient as no skilled needs.

## 2018-07-13 NOTE — PROGRESS NOTES
"This nurse apprentice went to pt's room with tech to assist pt to get ready for discharge, pt asked what time it was, states \"You bitch! You couldn't just let me sleep until 7.\" Reminded patient that she is to be discharged and transportation will be arriving soon. Pt voices no other complaints at this time. Pt reluctantly allowed tech and NA to assist with dressing. Waiting for transportation to [facility]  "

## 2018-07-13 NOTE — DISCHARGE PLANNING
Transitional Care Navigator:      Chart reviewed for post acute needs. Pt is a 86 y/o female admitted for bradycardia;   Pt has utilized [facility]  in the past and has been to [facility] for SNF placement. Pt LACE+ is 70, indicating pt is high risk for readmission.

## 2018-07-13 NOTE — PROGRESS NOTES
Pt DC'd to [facility]. IV removed, discharge instructions provided to patient, pt verbalizes understanding. Copy of discharge paperwork provided to pt, signed copy in chart. Pt states all belongings in possession. Pt escorted off unit with transport from [facility] without incident.

## 2018-07-19 ENCOUNTER — TELEPHONE (OUTPATIENT)
Dept: VASCULAR LAB | Facility: MEDICAL CENTER | Age: 83
End: 2018-07-19

## 2018-07-23 NOTE — PROGRESS NOTES
Madeleine Zambrano  was originally discharged  on 06/07/2018 for Nausea  and back pain. The patient readmitted  on 06/08/2018 to [facility] for atrial fibrillation.  [facility] Patient Advocate confirmed that the patient was scheduled to see her PCP on 7/11, however, the patient rescheduled this appointment for 8/7. [facility] Patient Advocate attempted multiple outreach calls to the patient and was only able to speak to the patient on two occasions. The patient has 1 Future Primary Care Physician appointment scheduled. PPS 70%.

## 2018-07-25 ENCOUNTER — HOME HEALTH ADMISSION (OUTPATIENT)
Dept: HOME HEALTH SERVICES | Facility: HOME HEALTHCARE | Age: 83
End: 2018-07-25
Payer: MEDICARE

## 2018-07-31 ENCOUNTER — HOME CARE VISIT (OUTPATIENT)
Dept: HOME HEALTH SERVICES | Facility: HOME HEALTHCARE | Age: 83
End: 2018-07-31

## 2018-07-31 ENCOUNTER — HOME CARE VISIT (OUTPATIENT)
Dept: HOME HEALTH SERVICES | Facility: HOME HEALTHCARE | Age: 83
End: 2018-07-31
Payer: MEDICARE

## 2018-08-02 ENCOUNTER — HOME CARE VISIT (OUTPATIENT)
Dept: HOME HEALTH SERVICES | Facility: HOME HEALTHCARE | Age: 83
End: 2018-08-02

## 2018-09-06 NOTE — ADDENDUM NOTE
Encounter addended by: FARIBA Roberts on: 9/6/2018  7:33 AM<BR>    Actions taken: Delete clinical note

## 2018-09-07 ENCOUNTER — APPOINTMENT (OUTPATIENT)
Dept: RADIOLOGY | Facility: MEDICAL CENTER | Age: 83
DRG: 309 | End: 2018-09-07
Attending: EMERGENCY MEDICINE
Payer: MEDICARE

## 2018-09-07 ENCOUNTER — HOSPITAL ENCOUNTER (INPATIENT)
Facility: MEDICAL CENTER | Age: 83
LOS: 6 days | DRG: 309 | End: 2018-09-13
Attending: EMERGENCY MEDICINE | Admitting: HOSPITALIST
Payer: MEDICARE

## 2018-09-07 DIAGNOSIS — I48.0 PAROXYSMAL ATRIAL FIBRILLATION (HCC): ICD-10-CM

## 2018-09-07 DIAGNOSIS — G89.29 CHRONIC LOW BACK PAIN WITH SCIATICA, SCIATICA LATERALITY UNSPECIFIED, UNSPECIFIED BACK PAIN LATERALITY: Chronic | ICD-10-CM

## 2018-09-07 DIAGNOSIS — M79.606 PAIN OF LOWER EXTREMITY, UNSPECIFIED LATERALITY: ICD-10-CM

## 2018-09-07 DIAGNOSIS — I10 HTN (HYPERTENSION), BENIGN: ICD-10-CM

## 2018-09-07 DIAGNOSIS — R53.1 WEAK: ICD-10-CM

## 2018-09-07 DIAGNOSIS — M54.40 CHRONIC LOW BACK PAIN WITH SCIATICA, SCIATICA LATERALITY UNSPECIFIED, UNSPECIFIED BACK PAIN LATERALITY: Chronic | ICD-10-CM

## 2018-09-07 DIAGNOSIS — I48.91 ATRIAL FIBRILLATION WITH RAPID VENTRICULAR RESPONSE (HCC): ICD-10-CM

## 2018-09-07 DIAGNOSIS — Z91.81 RISK FOR FALLS: ICD-10-CM

## 2018-09-07 DIAGNOSIS — L03.119 CELLULITIS OF LOWER EXTREMITY, UNSPECIFIED LATERALITY: ICD-10-CM

## 2018-09-07 DIAGNOSIS — I48.0 PAF (PAROXYSMAL ATRIAL FIBRILLATION) (HCC): ICD-10-CM

## 2018-09-07 LAB
ALBUMIN SERPL BCP-MCNC: 3.4 G/DL (ref 3.2–4.9)
ALBUMIN/GLOB SERPL: 1.4 G/DL
ALP SERPL-CCNC: 171 U/L (ref 30–99)
ALT SERPL-CCNC: 102 U/L (ref 2–50)
ANION GAP SERPL CALC-SCNC: 12 MMOL/L (ref 0–11.9)
APTT PPP: 27 SEC (ref 24.7–36)
AST SERPL-CCNC: 56 U/L (ref 12–45)
BASOPHILS # BLD AUTO: 1 % (ref 0–1.8)
BASOPHILS # BLD: 0.05 K/UL (ref 0–0.12)
BILIRUB SERPL-MCNC: 0.8 MG/DL (ref 0.1–1.5)
BNP SERPL-MCNC: 325 PG/ML (ref 0–100)
BUN SERPL-MCNC: 41 MG/DL (ref 8–22)
CALCIUM SERPL-MCNC: 9.4 MG/DL (ref 8.5–10.5)
CHLORIDE SERPL-SCNC: 109 MMOL/L (ref 96–112)
CO2 SERPL-SCNC: 18 MMOL/L (ref 20–33)
CREAT SERPL-MCNC: 1.4 MG/DL (ref 0.5–1.4)
EKG IMPRESSION: NORMAL
EOSINOPHIL # BLD AUTO: 0.1 K/UL (ref 0–0.51)
EOSINOPHIL NFR BLD: 1.9 % (ref 0–6.9)
ERYTHROCYTE [DISTWIDTH] IN BLOOD BY AUTOMATED COUNT: 63.7 FL (ref 35.9–50)
GLOBULIN SER CALC-MCNC: 2.5 G/DL (ref 1.9–3.5)
GLUCOSE SERPL-MCNC: 93 MG/DL (ref 65–99)
HCT VFR BLD AUTO: 38.7 % (ref 37–47)
HGB BLD-MCNC: 12.3 G/DL (ref 12–16)
IMM GRANULOCYTES # BLD AUTO: 0.02 K/UL (ref 0–0.11)
IMM GRANULOCYTES NFR BLD AUTO: 0.4 % (ref 0–0.9)
INR PPP: 1.28 (ref 0.87–1.13)
LIPASE SERPL-CCNC: 32 U/L (ref 11–82)
LYMPHOCYTES # BLD AUTO: 1.69 K/UL (ref 1–4.8)
LYMPHOCYTES NFR BLD: 32.6 % (ref 22–41)
MCH RBC QN AUTO: 33.5 PG (ref 27–33)
MCHC RBC AUTO-ENTMCNC: 31.8 G/DL (ref 33.6–35)
MCV RBC AUTO: 105.4 FL (ref 81.4–97.8)
MONOCYTES # BLD AUTO: 0.38 K/UL (ref 0–0.85)
MONOCYTES NFR BLD AUTO: 7.3 % (ref 0–13.4)
NEUTROPHILS # BLD AUTO: 2.94 K/UL (ref 2–7.15)
NEUTROPHILS NFR BLD: 56.8 % (ref 44–72)
NRBC # BLD AUTO: 0.02 K/UL
NRBC BLD-RTO: 0.4 /100 WBC
PLATELET # BLD AUTO: 194 K/UL (ref 164–446)
PMV BLD AUTO: 11.5 FL (ref 9–12.9)
POTASSIUM SERPL-SCNC: 3.5 MMOL/L (ref 3.6–5.5)
PROT SERPL-MCNC: 5.9 G/DL (ref 6–8.2)
PROTHROMBIN TIME: 15.7 SEC (ref 12–14.6)
RBC # BLD AUTO: 3.67 M/UL (ref 4.2–5.4)
SODIUM SERPL-SCNC: 139 MMOL/L (ref 135–145)
T4 FREE SERPL-MCNC: 1.11 NG/DL (ref 0.53–1.43)
TROPONIN I SERPL-MCNC: 0.06 NG/ML (ref 0–0.04)
TSH SERPL DL<=0.005 MIU/L-ACNC: 3.52 UIU/ML (ref 0.38–5.33)
WBC # BLD AUTO: 5.2 K/UL (ref 4.8–10.8)

## 2018-09-07 PROCEDURE — 85610 PROTHROMBIN TIME: CPT

## 2018-09-07 PROCEDURE — 770020 HCHG ROOM/CARE - TELE (206)

## 2018-09-07 PROCEDURE — 80053 COMPREHEN METABOLIC PANEL: CPT

## 2018-09-07 PROCEDURE — 85025 COMPLETE CBC W/AUTO DIFF WBC: CPT

## 2018-09-07 PROCEDURE — 84443 ASSAY THYROID STIM HORMONE: CPT

## 2018-09-07 PROCEDURE — 93005 ELECTROCARDIOGRAM TRACING: CPT

## 2018-09-07 PROCEDURE — 93005 ELECTROCARDIOGRAM TRACING: CPT | Performed by: EMERGENCY MEDICINE

## 2018-09-07 PROCEDURE — 84484 ASSAY OF TROPONIN QUANT: CPT

## 2018-09-07 PROCEDURE — 83036 HEMOGLOBIN GLYCOSYLATED A1C: CPT

## 2018-09-07 PROCEDURE — 96374 THER/PROPH/DIAG INJ IV PUSH: CPT

## 2018-09-07 PROCEDURE — 84439 ASSAY OF FREE THYROXINE: CPT

## 2018-09-07 PROCEDURE — 700111 HCHG RX REV CODE 636 W/ 250 OVERRIDE (IP): Performed by: EMERGENCY MEDICINE

## 2018-09-07 PROCEDURE — 700102 HCHG RX REV CODE 250 W/ 637 OVERRIDE(OP): Performed by: EMERGENCY MEDICINE

## 2018-09-07 PROCEDURE — 36415 COLL VENOUS BLD VENIPUNCTURE: CPT

## 2018-09-07 PROCEDURE — 99285 EMERGENCY DEPT VISIT HI MDM: CPT

## 2018-09-07 PROCEDURE — 83880 ASSAY OF NATRIURETIC PEPTIDE: CPT

## 2018-09-07 PROCEDURE — 94760 N-INVAS EAR/PLS OXIMETRY 1: CPT

## 2018-09-07 PROCEDURE — 96376 TX/PRO/DX INJ SAME DRUG ADON: CPT

## 2018-09-07 PROCEDURE — 71045 X-RAY EXAM CHEST 1 VIEW: CPT

## 2018-09-07 PROCEDURE — A9270 NON-COVERED ITEM OR SERVICE: HCPCS | Performed by: EMERGENCY MEDICINE

## 2018-09-07 PROCEDURE — 83690 ASSAY OF LIPASE: CPT

## 2018-09-07 PROCEDURE — 85730 THROMBOPLASTIN TIME PARTIAL: CPT

## 2018-09-07 PROCEDURE — 99223 1ST HOSP IP/OBS HIGH 75: CPT | Performed by: HOSPITALIST

## 2018-09-07 RX ORDER — METOPROLOL TARTRATE 50 MG/1
50 TABLET, FILM COATED ORAL 2 TIMES DAILY
Status: ON HOLD | COMMUNITY
End: 2018-09-12

## 2018-09-07 RX ORDER — AMOXICILLIN 250 MG
2 CAPSULE ORAL 2 TIMES DAILY
Status: DISCONTINUED | OUTPATIENT
Start: 2018-09-07 | End: 2018-09-13 | Stop reason: HOSPADM

## 2018-09-07 RX ORDER — DILTIAZEM HYDROCHLORIDE 5 MG/ML
20 INJECTION INTRAVENOUS ONCE
Status: COMPLETED | OUTPATIENT
Start: 2018-09-07 | End: 2018-09-07

## 2018-09-07 RX ORDER — POLYETHYLENE GLYCOL 3350 17 G/17G
1 POWDER, FOR SOLUTION ORAL
Status: DISCONTINUED | OUTPATIENT
Start: 2018-09-07 | End: 2018-09-13 | Stop reason: HOSPADM

## 2018-09-07 RX ORDER — CLINDAMYCIN HYDROCHLORIDE 150 MG/1
300 CAPSULE ORAL ONCE
Status: COMPLETED | OUTPATIENT
Start: 2018-09-07 | End: 2018-09-07

## 2018-09-07 RX ORDER — ONDANSETRON 2 MG/ML
4 INJECTION INTRAMUSCULAR; INTRAVENOUS EVERY 4 HOURS PRN
Status: DISCONTINUED | OUTPATIENT
Start: 2018-09-07 | End: 2018-09-13 | Stop reason: HOSPADM

## 2018-09-07 RX ORDER — ASPIRIN/CALCIUM/MAG/ALUMINUM 325 MG
650 TABLET ORAL DAILY
Status: ON HOLD | COMMUNITY
End: 2018-09-12

## 2018-09-07 RX ORDER — METOPROLOL TARTRATE 50 MG/1
50 TABLET, FILM COATED ORAL TWICE DAILY
Status: DISCONTINUED | OUTPATIENT
Start: 2018-09-07 | End: 2018-09-09

## 2018-09-07 RX ORDER — ONDANSETRON 4 MG/1
4 TABLET, ORALLY DISINTEGRATING ORAL EVERY 4 HOURS PRN
Status: DISCONTINUED | OUTPATIENT
Start: 2018-09-07 | End: 2018-09-13 | Stop reason: HOSPADM

## 2018-09-07 RX ORDER — BISACODYL 10 MG
10 SUPPOSITORY, RECTAL RECTAL
Status: DISCONTINUED | OUTPATIENT
Start: 2018-09-07 | End: 2018-09-13 | Stop reason: HOSPADM

## 2018-09-07 RX ORDER — CLOPIDOGREL BISULFATE 75 MG/1
75 TABLET ORAL DAILY
Status: DISCONTINUED | OUTPATIENT
Start: 2018-09-08 | End: 2018-09-08

## 2018-09-07 RX ORDER — FUROSEMIDE 10 MG/ML
40 INJECTION INTRAMUSCULAR; INTRAVENOUS ONCE
Status: DISCONTINUED | OUTPATIENT
Start: 2018-09-07 | End: 2018-09-07

## 2018-09-07 RX ORDER — ATORVASTATIN CALCIUM 20 MG/1
20 TABLET, FILM COATED ORAL
Status: DISCONTINUED | OUTPATIENT
Start: 2018-09-07 | End: 2018-09-13 | Stop reason: HOSPADM

## 2018-09-07 RX ADMIN — CLINDAMYCIN HYDROCHLORIDE 300 MG: 150 CAPSULE ORAL at 22:34

## 2018-09-07 RX ADMIN — DILTIAZEM HYDROCHLORIDE 20 MG: 5 INJECTION INTRAVENOUS at 22:28

## 2018-09-07 RX ADMIN — DILTIAZEM HYDROCHLORIDE 20 MG: 5 INJECTION INTRAVENOUS at 20:59

## 2018-09-07 ASSESSMENT — CHA2DS2 SCORE
CHF OR LEFT VENTRICULAR DYSFUNCTION: NO
CHA2DS2 VASC SCORE: 7
AGE 65 TO 74: NO
DIABETES: NO
SEX: FEMALE
AGE 75 OR GREATER: YES
HYPERTENSION: YES
VASCULAR DISEASE: YES
PRIOR STROKE OR TIA OR THROMBOEMBOLISM: YES

## 2018-09-07 ASSESSMENT — LIFESTYLE VARIABLES: EVER_SMOKED: YES

## 2018-09-07 ASSESSMENT — COPD QUESTIONNAIRES
COPD SCREENING SCORE: 4
DURING THE PAST 4 WEEKS HOW MUCH DID YOU FEEL SHORT OF BREATH: NONE/LITTLE OF THE TIME
HAVE YOU SMOKED AT LEAST 100 CIGARETTES IN YOUR ENTIRE LIFE: YES
DO YOU EVER COUGH UP ANY MUCUS OR PHLEGM?: NO/ONLY WITH OCCASIONAL COLDS OR INFECTIONS

## 2018-09-08 PROBLEM — J81.1 PULMONARY EDEMA: Status: ACTIVE | Noted: 2018-09-08

## 2018-09-08 PROBLEM — L03.90 CELLULITIS: Status: ACTIVE | Noted: 2018-09-08

## 2018-09-08 PROBLEM — R79.89 ELEVATED TROPONIN: Status: ACTIVE | Noted: 2018-09-08

## 2018-09-08 PROBLEM — I48.91 ATRIAL FIBRILLATION WITH RAPID VENTRICULAR RESPONSE (HCC): Status: ACTIVE | Noted: 2018-09-08

## 2018-09-08 LAB
ALBUMIN SERPL BCP-MCNC: 3.2 G/DL (ref 3.2–4.9)
ALBUMIN/GLOB SERPL: 1.5 G/DL
ALP SERPL-CCNC: 151 U/L (ref 30–99)
ALT SERPL-CCNC: 87 U/L (ref 2–50)
ANION GAP SERPL CALC-SCNC: 9 MMOL/L (ref 0–11.9)
AST SERPL-CCNC: 46 U/L (ref 12–45)
BASOPHILS # BLD AUTO: 1 % (ref 0–1.8)
BASOPHILS # BLD: 0.05 K/UL (ref 0–0.12)
BILIRUB SERPL-MCNC: 0.8 MG/DL (ref 0.1–1.5)
BUN SERPL-MCNC: 39 MG/DL (ref 8–22)
CALCIUM SERPL-MCNC: 9 MG/DL (ref 8.5–10.5)
CHLORIDE SERPL-SCNC: 109 MMOL/L (ref 96–112)
CHOLEST SERPL-MCNC: 103 MG/DL (ref 100–199)
CO2 SERPL-SCNC: 20 MMOL/L (ref 20–33)
CREAT SERPL-MCNC: 1.34 MG/DL (ref 0.5–1.4)
EOSINOPHIL # BLD AUTO: 0.11 K/UL (ref 0–0.51)
EOSINOPHIL NFR BLD: 2.3 % (ref 0–6.9)
ERYTHROCYTE [DISTWIDTH] IN BLOOD BY AUTOMATED COUNT: 62.6 FL (ref 35.9–50)
EST. AVERAGE GLUCOSE BLD GHB EST-MCNC: 128 MG/DL
GLOBULIN SER CALC-MCNC: 2.1 G/DL (ref 1.9–3.5)
GLUCOSE SERPL-MCNC: 83 MG/DL (ref 65–99)
HBA1C MFR BLD: 6.1 % (ref 0–5.6)
HCT VFR BLD AUTO: 37.9 % (ref 37–47)
HDLC SERPL-MCNC: 38 MG/DL
HGB BLD-MCNC: 11.9 G/DL (ref 12–16)
IMM GRANULOCYTES # BLD AUTO: 0.02 K/UL (ref 0–0.11)
IMM GRANULOCYTES NFR BLD AUTO: 0.4 % (ref 0–0.9)
LDLC SERPL CALC-MCNC: 52 MG/DL
LV EJECT FRACT  99904: 60
LV EJECT FRACT MOD 2C 99903: 79.87
LV EJECT FRACT MOD 4C 99902: 60.05
LV EJECT FRACT MOD BP 99901: 61.5
LYMPHOCYTES # BLD AUTO: 1.54 K/UL (ref 1–4.8)
LYMPHOCYTES NFR BLD: 31.8 % (ref 22–41)
MCH RBC QN AUTO: 32.9 PG (ref 27–33)
MCHC RBC AUTO-ENTMCNC: 31.4 G/DL (ref 33.6–35)
MCV RBC AUTO: 104.7 FL (ref 81.4–97.8)
MONOCYTES # BLD AUTO: 0.54 K/UL (ref 0–0.85)
MONOCYTES NFR BLD AUTO: 11.2 % (ref 0–13.4)
NEUTROPHILS # BLD AUTO: 2.58 K/UL (ref 2–7.15)
NEUTROPHILS NFR BLD: 53.3 % (ref 44–72)
NRBC # BLD AUTO: 0 K/UL
NRBC BLD-RTO: 0 /100 WBC
PLATELET # BLD AUTO: 181 K/UL (ref 164–446)
PMV BLD AUTO: 11.2 FL (ref 9–12.9)
POTASSIUM SERPL-SCNC: 3.6 MMOL/L (ref 3.6–5.5)
PROT SERPL-MCNC: 5.3 G/DL (ref 6–8.2)
RBC # BLD AUTO: 3.62 M/UL (ref 4.2–5.4)
SODIUM SERPL-SCNC: 138 MMOL/L (ref 135–145)
TRIGL SERPL-MCNC: 66 MG/DL (ref 0–149)
TROPONIN I SERPL-MCNC: 0.05 NG/ML (ref 0–0.04)
TROPONIN I SERPL-MCNC: 0.07 NG/ML (ref 0–0.04)
WBC # BLD AUTO: 4.8 K/UL (ref 4.8–10.8)

## 2018-09-08 PROCEDURE — 700102 HCHG RX REV CODE 250 W/ 637 OVERRIDE(OP): Performed by: FAMILY MEDICINE

## 2018-09-08 PROCEDURE — 700101 HCHG RX REV CODE 250: Performed by: HOSPITALIST

## 2018-09-08 PROCEDURE — 80053 COMPREHEN METABOLIC PANEL: CPT

## 2018-09-08 PROCEDURE — 700111 HCHG RX REV CODE 636 W/ 250 OVERRIDE (IP): Performed by: HOSPITALIST

## 2018-09-08 PROCEDURE — 770020 HCHG ROOM/CARE - TELE (206)

## 2018-09-08 PROCEDURE — A9270 NON-COVERED ITEM OR SERVICE: HCPCS | Performed by: HOSPITALIST

## 2018-09-08 PROCEDURE — 36415 COLL VENOUS BLD VENIPUNCTURE: CPT

## 2018-09-08 PROCEDURE — 84484 ASSAY OF TROPONIN QUANT: CPT

## 2018-09-08 PROCEDURE — 99233 SBSQ HOSP IP/OBS HIGH 50: CPT | Performed by: HOSPITALIST

## 2018-09-08 PROCEDURE — A9270 NON-COVERED ITEM OR SERVICE: HCPCS | Performed by: FAMILY MEDICINE

## 2018-09-08 PROCEDURE — 85025 COMPLETE CBC W/AUTO DIFF WBC: CPT

## 2018-09-08 PROCEDURE — 700102 HCHG RX REV CODE 250 W/ 637 OVERRIDE(OP): Performed by: HOSPITALIST

## 2018-09-08 PROCEDURE — A9270 NON-COVERED ITEM OR SERVICE: HCPCS | Performed by: EMERGENCY MEDICINE

## 2018-09-08 PROCEDURE — 700102 HCHG RX REV CODE 250 W/ 637 OVERRIDE(OP): Performed by: EMERGENCY MEDICINE

## 2018-09-08 PROCEDURE — 93306 TTE W/DOPPLER COMPLETE: CPT

## 2018-09-08 PROCEDURE — 700105 HCHG RX REV CODE 258

## 2018-09-08 PROCEDURE — 80061 LIPID PANEL: CPT

## 2018-09-08 PROCEDURE — 700105 HCHG RX REV CODE 258: Performed by: HOSPITALIST

## 2018-09-08 PROCEDURE — 93306 TTE W/DOPPLER COMPLETE: CPT | Mod: 26 | Performed by: INTERNAL MEDICINE

## 2018-09-08 RX ORDER — ACETAMINOPHEN 500 MG
500 TABLET ORAL EVERY 6 HOURS PRN
Status: DISCONTINUED | OUTPATIENT
Start: 2018-09-08 | End: 2018-09-13 | Stop reason: HOSPADM

## 2018-09-08 RX ORDER — SODIUM CHLORIDE 9 MG/ML
INJECTION, SOLUTION INTRAVENOUS
Status: COMPLETED
Start: 2018-09-08 | End: 2018-09-08

## 2018-09-08 RX ORDER — FUROSEMIDE 10 MG/ML
20 INJECTION INTRAMUSCULAR; INTRAVENOUS
Status: DISCONTINUED | OUTPATIENT
Start: 2018-09-08 | End: 2018-09-10

## 2018-09-08 RX ADMIN — METOPROLOL TARTRATE 50 MG: 50 TABLET ORAL at 18:49

## 2018-09-08 RX ADMIN — DOXYCYCLINE 100 MG: 100 INJECTION, POWDER, LYOPHILIZED, FOR SOLUTION INTRAVENOUS at 18:48

## 2018-09-08 RX ADMIN — AMPICILLIN SODIUM AND SULBACTAM SODIUM 3 G: 2; 1 INJECTION, POWDER, FOR SOLUTION INTRAMUSCULAR; INTRAVENOUS at 02:06

## 2018-09-08 RX ADMIN — AMPICILLIN SODIUM AND SULBACTAM SODIUM 3 G: 2; 1 INJECTION, POWDER, FOR SOLUTION INTRAMUSCULAR; INTRAVENOUS at 15:03

## 2018-09-08 RX ADMIN — ACETAMINOPHEN 500 MG: 500 TABLET, FILM COATED ORAL at 20:36

## 2018-09-08 RX ADMIN — AMPICILLIN SODIUM AND SULBACTAM SODIUM 3 G: 2; 1 INJECTION, POWDER, FOR SOLUTION INTRAMUSCULAR; INTRAVENOUS at 19:59

## 2018-09-08 RX ADMIN — METOPROLOL TARTRATE 50 MG: 50 TABLET ORAL at 05:23

## 2018-09-08 RX ADMIN — AMPICILLIN SODIUM AND SULBACTAM SODIUM 3 G: 2; 1 INJECTION, POWDER, FOR SOLUTION INTRAMUSCULAR; INTRAVENOUS at 08:50

## 2018-09-08 RX ADMIN — ACETAMINOPHEN 500 MG: 500 TABLET, FILM COATED ORAL at 10:57

## 2018-09-08 RX ADMIN — DOXYCYCLINE 100 MG: 100 INJECTION, POWDER, LYOPHILIZED, FOR SOLUTION INTRAVENOUS at 05:27

## 2018-09-08 RX ADMIN — FUROSEMIDE 20 MG: 10 INJECTION, SOLUTION INTRAMUSCULAR; INTRAVENOUS at 15:03

## 2018-09-08 RX ADMIN — ASPIRIN 81 MG: 81 TABLET, DELAYED RELEASE ORAL at 05:20

## 2018-09-08 RX ADMIN — ATORVASTATIN CALCIUM 20 MG: 20 TABLET, FILM COATED ORAL at 20:01

## 2018-09-08 RX ADMIN — SODIUM CHLORIDE 500 ML: 9 INJECTION, SOLUTION INTRAVENOUS at 02:18

## 2018-09-08 RX ADMIN — APIXABAN 5 MG: 5 TABLET, FILM COATED ORAL at 18:49

## 2018-09-08 RX ADMIN — DILTIAZEM HYDROCHLORIDE 30 MG: 30 TABLET, FILM COATED ORAL at 02:08

## 2018-09-08 RX ADMIN — ACETAMINOPHEN 500 MG: 500 TABLET, FILM COATED ORAL at 02:40

## 2018-09-08 RX ADMIN — DILTIAZEM HYDROCHLORIDE 30 MG: 30 TABLET, FILM COATED ORAL at 08:50

## 2018-09-08 RX ADMIN — SENNOSIDES AND DOCUSATE SODIUM 2 TABLET: 8.6; 5 TABLET ORAL at 18:49

## 2018-09-08 RX ADMIN — APIXABAN 5 MG: 5 TABLET, FILM COATED ORAL at 05:21

## 2018-09-08 ASSESSMENT — COGNITIVE AND FUNCTIONAL STATUS - GENERAL
DAILY ACTIVITIY SCORE: 13
EATING MEALS: A LITTLE
HELP NEEDED FOR BATHING: A LOT
MOVING TO AND FROM BED TO CHAIR: A LITTLE
SUGGESTED CMS G CODE MODIFIER DAILY ACTIVITY: CL
WALKING IN HOSPITAL ROOM: A LITTLE
DRESSING REGULAR UPPER BODY CLOTHING: A LOT
SUGGESTED CMS G CODE MODIFIER MOBILITY: CK
MOVING FROM LYING ON BACK TO SITTING ON SIDE OF FLAT BED: A LITTLE
TOILETING: A LOT
MOBILITY SCORE: 18
DRESSING REGULAR LOWER BODY CLOTHING: A LOT
CLIMB 3 TO 5 STEPS WITH RAILING: A LOT
PERSONAL GROOMING: A LOT
STANDING UP FROM CHAIR USING ARMS: A LITTLE

## 2018-09-08 ASSESSMENT — ENCOUNTER SYMPTOMS
VOMITING: 0
BRUISES/BLEEDS EASILY: 0
COUGH: 0
HALLUCINATIONS: 0
SPEECH CHANGE: 0
HEADACHES: 0
HEARTBURN: 0
ABDOMINAL PAIN: 0
HEMOPTYSIS: 0
BLURRED VISION: 0
FLANK PAIN: 0
EYE DISCHARGE: 0
WEAKNESS: 0
MYALGIAS: 0
DIZZINESS: 0
FEVER: 0
PALPITATIONS: 1
CHILLS: 0
NAUSEA: 0
SENSORY CHANGE: 0
PALPITATIONS: 0
SHORTNESS OF BREATH: 1
DOUBLE VISION: 0
FOCAL WEAKNESS: 0
DEPRESSION: 0

## 2018-09-08 ASSESSMENT — PATIENT HEALTH QUESTIONNAIRE - PHQ9
1. LITTLE INTEREST OR PLEASURE IN DOING THINGS: NOT AT ALL
SUM OF ALL RESPONSES TO PHQ9 QUESTIONS 1 AND 2: 0
2. FEELING DOWN, DEPRESSED, IRRITABLE, OR HOPELESS: NOT AT ALL

## 2018-09-08 ASSESSMENT — PAIN SCALES - GENERAL
PAINLEVEL_OUTOF10: 3
PAINLEVEL_OUTOF10: 6
PAINLEVEL_OUTOF10: 3

## 2018-09-08 ASSESSMENT — LIFESTYLE VARIABLES
EVER_SMOKED: YES
SUBSTANCE_ABUSE: 0

## 2018-09-08 NOTE — ED PROVIDER NOTES
ED Provider Note    CHIEF COMPLAINT  Chief Complaint   Patient presents with   • Chest Pressure     x2 hrs, EMS reports Afib/RVR on 12 lead   • Nausea     x2 hrs       HPI  Madeleine Zambrano is a 85 y.o. female who presents with difficulty with breathing.  The patient called EMS that she started having some difficulty with breathing with associated chest pressure.  When EMS arrived she is in atrial fibrillation with a rapid ventricular rate.  She has a known history of atrial fibrillation and does take Cardizem.  She was taken off of Eliquis.  The patient is a DO NOT RESUSCITATE but in speaking with the patient she does want intravenous medication if this would be helpful for her atrial fibrillation and cardiac dysfunction.  The patient states over the last couple of days she has not felt well.  She has had some increased generalized malaise.  She has also developed significant swelling to the lower extremities with some erythema and tenderness to the right lower extremity.  She has not had any associated fevers.  She has had some nausea.  She states she occasionally does have some chest pressure which she describes as substernal in location with no known exacerbating or relieving factors.  She does have a history of cardiac disease.    REVIEW OF SYSTEMS  See HPI for further details. All other systems are negative.     PAST MEDICAL HISTORY  Past Medical History:   Diagnosis Date   • Anginal syndrome (HCC)    • Arrhythmia    • Arthritis    • Back pain 3/28/2012   • CATARACT    • Coronary artery disease    • Disorder of thyroid    • Glaucoma    • Hyperlipidemia    • Hypertension    • MEDICAL HOME 11/07/12   • Myocardial infarct (HCC)    • Pain     sacrum 1/10   • Stroke (HCC)    • TIA (transient ischemic attack) 09/2017   • Urinary incontinence        SOCIAL HISTORY  Social History     Social History   • Marital status:      Spouse name: N/A   • Number of children: N/A   • Years of education: N/A  "    Social History Main Topics   • Smoking status: Current Every Day Smoker     Packs/day: 0.25     Years: 0.10     Types: Cigarettes   • Smokeless tobacco: Never Used   • Alcohol use Yes      Comment: 1 per day   • Drug use: No   • Sexual activity: Not on file     Other Topics Concern   • Not on file     Social History Narrative   • No narrative on file           PHYSICAL EXAM  VITAL SIGNS: /86   Pulse (!) 56   Temp 36.6 °C (97.8 °F)   Resp 20   Ht 1.6 m (5' 3\")   Wt 62.6 kg (138 lb)   SpO2 98%   BMI 24.45 kg/m²   Constitutional: Ill and appearance.   HENT: Normocephalic, Atraumatic, tympanic membranes are intact and nonerythematous bilaterally, Oropharynx dry without exudates or erythema, Nose normal.   Eyes: PERRLA, EOMI, Conjunctiva normal.  Neck: Supple without meningismus  Lymphatic: No lymphadenopathy noted.   Cardiovascular: Tachycardic heart rate, irregularly irregular rhythm, No murmurs, No rubs, No gallops.   Thorax & Lungs: Symmetrically diminished throughout, rales at the bases  Abdomen: Bowel sounds normal, Soft, No tenderness, no rebound, no guarding, no distention, No masses, No pulsatile masses.   Skin: Erythema to the right lower extremity  Back: No tenderness, No CVA tenderness.   Extremities: Atraumatic with symmetric distal pulses, bilateral lower extremity edema, No tenderness, No cyanosis, No clubbing.   Neurologic: Alert & oriented x 3, cranial nerves II through XII are intact, Normal motor function, Normal sensory function, No focal deficits noted.   Psychiatric: Affect normal, Judgment normal, Mood normal.     EKG  Twelve-lead EKG interpreted by myself shows atrial fibrillation with a ventricular rate of 162, QRS is poor R-wave progression, no ST segment elevation or depression, T waves flat inferiorly.    RADIOLOGY/PROCEDURES  DX-CHEST-LIMITED (1 VIEW)   Final Result         1.  Pulmonary edema and/or infiltrates are identified, which are stable since the prior exam.   2.  " Small right pleural effusion   3.  Cardiomegaly   4.  Atherosclerosis            COURSE & MEDICAL DECISION MAKING  Pertinent Labs & Imaging studies reviewed. (See chart for details)  This an 85-year-old female who presents to the emergency department with atrial fibrillation and a rapid ventricular rate.  The patient is a DO NOT RESUSCITATE but she does want medication intravenously to help control her rate.  Therefore she received Cardizem intravenously and this was effective in decreasing her rate into the low 100s.  However on repeat examination at time of admission her heart rate is going back up and the patient will receive Cardizem per pharmacy to help decrease the rate.  I suspect atrial fibrillation with a rapid ventricular rate has caused some cardiac decompensation with heart failure.  She does have lower extremity edema due to heart failure and she also has an elevation in her BUN.  I did not want to overly hydrate the patient due to the heart failure and therefore will attempt to control the heart failure with rate control of her atrial fibrillation before she receives significant interval volume resuscitation.  The patient will receive clindamycin for the cellulitis to the right lower extremity.  I suspect this is from the edema causing skin breakdown and secondary cellulitis.  At the time of admission the patient is resting much more comfortably.  She does not have any current chest pain.  I suspect the slight elevation in her troponin is due to heart strain from the atrial fibrillation.  This will need to be followed with serial markers.  I spoke with the hospitalist regarding the case was accepted the patient and she will be admitted in stable condition.    FINAL IMPRESSION  1.  Atrial fibrillation with rapid ventricular rate  2.  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  Cellulitis to the right lower extremity  4.  Critical care time 30 minutes     Disposition  The  patient will be admitted in stable condition    Electronically signed by: Nathan Castañeda, 9/7/2018 9:00 PM

## 2018-09-08 NOTE — PROGRESS NOTES
[facility] Medicine Daily Progress Note    Date of Service  9/8/2018    Chief Complaint  85 y.o. female admitted 9/7/2018 with shortness of breath.       Interval Problem Update  Resting in bed, feels better, no fever or chills, no abdominal pain, no chest pain,no palpitation.    Consultants/Specialty  none    Disposition  Tbd.     Review of Systems  Review of Systems   Constitutional: Negative for chills and fever.   HENT: Negative for congestion.    Eyes: Negative for blurred vision.   Respiratory: Negative for cough and hemoptysis.    Cardiovascular: Negative for chest pain and palpitations.   Gastrointestinal: Negative for heartburn and nausea.   Genitourinary: Negative for dysuria and urgency.   Musculoskeletal: Negative for myalgias.   Skin: Negative for itching.   Neurological: Negative for dizziness and headaches.   Endo/Heme/Allergies: Does not bruise/bleed easily.   Psychiatric/Behavioral: Negative for depression.        Physical Exam  Blood Pressure : (!) 99/69 (RN Notified)   Temperature: 36.5 °C (97.7 °F)   Pulse: 78   Respiration: 17   Pulse Oximetry: 92 %     Physical Exam   Constitutional: She is oriented to person, place, and time. She appears well-nourished. No distress.   HENT:   Head: Normocephalic and atraumatic.   Mouth/Throat: No oropharyngeal exudate.   Eyes: Conjunctivae are normal. No scleral icterus.   Neck: Normal range of motion. Neck supple. No JVD present.   Cardiovascular: Regular rhythm and normal heart sounds.    Pulmonary/Chest: Effort normal and breath sounds normal. No respiratory distress. She has no wheezes.   Abdominal: Soft. Bowel sounds are normal. She exhibits no distension. There is no tenderness.   Musculoskeletal: Normal range of motion. She exhibits edema (mild. ). She exhibits no tenderness.   Lymphadenopathy:     She has no cervical adenopathy.   Neurological: She is alert and oriented to person, place, and time. She exhibits normal muscle tone.   Skin: No erythema.    Erythema.    Psychiatric: She has a normal mood and affect.   Nursing note and vitals reviewed.      Fluids    Intake/Output Summary (Last 24 hours) at 09/08/18 1327  Last data filed at 09/08/18 1000   Gross per 24 hour   Intake              280 ml   Output                0 ml   Net              280 ml       Laboratory  Recent Labs      09/07/18 2121 09/08/18 0325   WBC  5.2  4.8   RBC  3.67*  3.62*   HEMOGLOBIN  12.3  11.9*   HEMATOCRIT  38.7  37.9   MCV  105.4*  104.7*   MCH  33.5*  32.9   MCHC  31.8*  31.4*   RDW  63.7*  62.6*   PLATELETCT  194  181   MPV  11.5  11.2     Recent Labs      09/07/18 2121 09/08/18   0325   SODIUM  139  138   POTASSIUM  3.5*  3.6   CHLORIDE  109  109   CO2  18*  20   GLUCOSE  93  83   BUN  41*  39*   CREATININE  1.40  1.34   CALCIUM  9.4  9.0     Recent Labs      09/07/18 2121   APTT  27.0   INR  1.28*     Recent Labs      09/07/18 2121   BNPBTYPENAT  325*     Recent Labs      09/08/18 0325   TRIGLYCERIDE  66   HDL  38*   LDL  52       Imaging  ECHOCARDIOGRAM-COMP W/ CONT   Final Result      DX-CHEST-LIMITED (1 VIEW)   Final Result         1.  Pulmonary edema and/or infiltrates are identified, which are stable since the prior exam.   2.  Small right pleural effusion   3.  Cardiomegaly   4.  Atherosclerosis           Assessment/Plan  * Atrial fibrillation with rapid ventricular response (HCC)   Assessment & Plan    Rate controlled now, received iv cardizem in the ER, will continue on metoprolol for now, continue eliquis. Echo pending.         CAD (coronary artery disease)- (present on admission)   Assessment & Plan    Hx of, on statin, metoprolol. No chest pain.         Cellulitis   Assessment & Plan    B/l lower extremities, continue unasyn for now. F/u cx, will get wound care consult.         Elevated troponin   Assessment & Plan    Most likely due to afib with RVR demand ischemia, last trop 0.07 continue trending.         Pulmonary edema   Assessment & Plan    Most  like due to afib with rvr, no sob, will continue monitoring.        Hypothyroidism- (present on admission)   Assessment & Plan    tsh normal. Not on supplement         Dyslipidemia- (present on admission)   Assessment & Plan    On statin        HTN (hypertension), benign- (present on admission)   Assessment & Plan    Stable. Borderline low. Continue monitoring.         dvt prophylaxis: eliquis.

## 2018-09-08 NOTE — PROGRESS NOTES
"Pt arrived on unit from ED to room 836-1.  PT up with one assist and fww, tolerates well.  AAOx4.  Has c/o mild pain, medicated per MAR.  Skin intact.  Pt has brief on from home \"in case\".  Refused to remove.  BLE red flaky and edematous.  Pt states redness is new.  IV patent and SL. POC discussed, all questions and concerns addressed.  Call light within reach and q2 rounds in place   "

## 2018-09-08 NOTE — ED NOTES
Med rec updated and complete.  Allergies reviewed.  Met with pt at bedside and dicussed current medications  And last doses taken.  Pt antibiotic use in last 30 days.

## 2018-09-08 NOTE — CARE PLAN
Problem: Safety  Goal: Will remain free from injury  Outcome: MET Date Met: 09/08/18  Patient understands safety protocols, A+Ox4, bed alarm on for safety, in room across from nurses station

## 2018-09-08 NOTE — PROGRESS NOTES
Patient A+Ox4, satting 96% on 2L 02, on . C/O pain to neck, tylenol per EMAR. LS diminished, encouraged to use IS. Son in law at bedside, updated on plan of care. Denies chest pain, no SOB or respiratory distress. BLE red and swollen, noted to be leaking fluid. MD Gill aware. Using call bell approp, bed alarm on, whiteboard updated, bed locked and in lowest position. This RN updated her daughter on phone regarding plan of care

## 2018-09-08 NOTE — ASSESSMENT & PLAN NOTE
Improved rate control w periods of RVR   Continue w lopressor - monitor HR response.   Tele monitoring.   Eliquis renal dosed.

## 2018-09-08 NOTE — RESPIRATORY CARE
COPD EDUCATION by COPD CLINICAL EDUCATOR  9/8/2018 at 5:45 AM by Alida Jaramillo     Patient reviewed by COPD education team. Patient does not qualify for COPD program.

## 2018-09-08 NOTE — CARE PLAN
Problem: Communication  Goal: The ability to communicate needs accurately and effectively will improve  Outcome: MET Date Met: 09/08/18  Patient calls approp for needs, A+Ox4, english speaking, uses call bell

## 2018-09-08 NOTE — ED TRIAGE NOTES
"Chief Complaint   Patient presents with   • Chest Pressure     x2 hrs, EMS reports Afib/RVR on 12 lead   • Nausea     x2 hrs     Blood pressure 124/86, pulse (!) 153, temperature 36.6 °C (97.8 °F), resp. rate 20, height 1.6 m (5' 3\"), weight 62.6 kg (138 lb), SpO2 93 %.    Pt bib EMS c/o \"just not feeling well\" since 1800. EMS report on scene BP 60/30, , Afib RVR. Pt reports hx of Afib. Takes Plavix, diltiazem, stopped taking her Eloquis a couple of weeks ago. Pt received 400 NS, 4 mg Zofran pta. Pt is lethargic, AxOx4, she is a DNR.   "

## 2018-09-08 NOTE — H&P
[facility] Medicine History & Physical Note    Date of Service  9/7/2018    Primary Care Physician  Ric Garcia M.D.    Consultants  none    Code Status  full    Chief Complaint  Shortness of breath    History of Presenting Illness  85 y.o. female who presented 9/7/2018 with shortness of breath.  Patient presents with worsening shortness of breath and chest pressure.  Found to be in atrial fibrillation with RVR.  Does take Cardizem was taken off recently.  She is a DNR DNI.  She does want treatment for A. fib with RVR.  She has not felt well on the last several days she is on Eliquis she has noticed some swelling to her lower extremities erythema to the right lower extremity.  No fever no chills no sweats.  She does have some chest pressure.  She has no known alleviating or exacerbating factors to her symptoms.      Review of Systems  Review of Systems   Constitutional: Negative for chills and fever.   HENT: Negative for congestion, hearing loss and tinnitus.    Eyes: Negative for blurred vision, double vision and discharge.   Respiratory: Positive for shortness of breath. Negative for cough and hemoptysis.    Cardiovascular: Positive for palpitations and leg swelling. Negative for chest pain.   Gastrointestinal: Negative for abdominal pain, heartburn, nausea and vomiting.   Genitourinary: Negative for dysuria and flank pain.   Musculoskeletal: Negative for joint pain and myalgias.   Skin: Positive for rash.   Neurological: Negative for dizziness, sensory change, speech change, focal weakness and weakness.   Endo/Heme/Allergies: Negative for environmental allergies. Does not bruise/bleed easily.   Psychiatric/Behavioral: Negative for depression, hallucinations and substance abuse.       Past Medical History   has a past medical history of Anginal syndrome (HCC); Arrhythmia; Arthritis; Back pain (3/28/2012); CATARACT; Coronary artery disease; Disorder of thyroid; Glaucoma; Hyperlipidemia; Hypertension; MEDICAL HOME  (11/07/12); Myocardial infarct (HCC); Pain; Stroke (HCC); TIA (transient ischemic attack) (09/2017); and Urinary incontinence. She also has no past medical history of Fall.    Surgical History   has a past surgical history that includes athroplasty; other orthopedic surgery (1980); cataract phaco with iol (4/13/2011); cataract phaco with iol (4/27/2011); shante by laparoscopy (10/9/2015); vitrectomy posterior (Right, 4/13/2017); other cardiac surgery; and ercp in or (3/16/2018).     Family History  family history includes Cancer in her father; Heart Disease in her mother; Hypertension in her mother; No Known Problems in her brother and sister.     Social History   reports that she has been smoking Cigarettes.  She has a 0.02 pack-year smoking history. She has never used smokeless tobacco. She reports that she drinks alcohol. She reports that she does not use drugs.    Allergies  Allergies   Allergen Reactions   • Meperidine Vomiting and Nausea   • Tramadol Vomiting and Nausea       Medications  Prior to Admission Medications   Prescriptions Last Dose Informant Patient Reported? Taking?   Ascorbic Acid (VITAMIN C) 1000 MG Tab 7/12/2018 at 0900 Patient Yes No   Sig: Take 1 Tab by mouth every morning.   amLODIPine (NORVASC) 10 MG Tab 7/12/2018 at 0900 Patient Yes No   Sig: Take 5 mg by mouth every morning.   apixaban (ELIQUIS) 5mg Tab   No No   Sig: Take 1 Tab by mouth 2 Times a Day.   aspirin 81 MG tablet 7/12/2018 at 0900 Patient Yes No   Sig: Take 81 mg by mouth every morning.   atorvastatin (LIPITOR) 20 MG Tab   No No   Sig: Take 1 Tab by mouth every bedtime.   clopidogrel (PLAVIX) 75 MG Tab 7/12/2018 at 0900 Patient No No   Sig: TAKE 1 TAB BY MOUTH EVERY DAY.   isosorbide mononitrate SR (IMDUR) 30 MG TABLET SR 24 HR >2 days at OUT Patient No No   Sig: Take 0.5 Tabs by mouth every day.      Facility-Administered Medications: None       Physical Exam  Blood Pressure : 124/86   Temperature: 36.6 °C (97.8 °F)   Pulse:  94   Respiration: (!) 22   Pulse Oximetry: 93 %     Physical Exam   Constitutional: She is oriented to person, place, and time. She appears well-developed and well-nourished. No distress.   HENT:   Head: Normocephalic and atraumatic.   Eyes: Pupils are equal, round, and reactive to light. Conjunctivae and EOM are normal.   Neck: Normal range of motion. Neck supple. No JVD present.   Cardiovascular: Normal heart sounds and intact distal pulses.    No murmur heard.  Tachycardic Irregularly irregular   Pulmonary/Chest: Effort normal and breath sounds normal. No respiratory distress. She has no wheezes.   Abdominal: Soft. Bowel sounds are normal. She exhibits no distension. There is no tenderness.   Musculoskeletal: Normal range of motion. She exhibits edema.   Lower extremity erythema    Neurological: She is alert and oriented to person, place, and time. She exhibits normal muscle tone.   Skin: Skin is warm and dry. There is erythema.   Psychiatric: She has a normal mood and affect. Her behavior is normal. Judgment and thought content normal.   Nursing note and vitals reviewed.      Laboratory:  Recent Labs      09/07/18 2121   WBC  5.2   RBC  3.67*   HEMOGLOBIN  12.3   HEMATOCRIT  38.7   MCV  105.4*   MCH  33.5*   MCHC  31.8*   RDW  63.7*   PLATELETCT  194   MPV  11.5     Recent Labs      09/07/18 2121   SODIUM  139   POTASSIUM  3.5*   CHLORIDE  109   CO2  18*   GLUCOSE  93   BUN  41*   CREATININE  1.40   CALCIUM  9.4     Recent Labs      09/07/18 2121   ALTSGPT  102*   ASTSGOT  56*   ALKPHOSPHAT  171*   TBILIRUBIN  0.8   LIPASE  32   GLUCOSE  93     Recent Labs      09/07/18 2121   APTT  27.0   INR  1.28*     Recent Labs      09/07/18 2121   BNPBTYPENAT  325*         Lab Results   Component Value Date    TROPONINI 0.06 (H) 09/07/2018       Urinalysis:    No results found     Imaging:  DX-CHEST-LIMITED (1 VIEW)   Final Result         1.  Pulmonary edema and/or infiltrates are identified, which are stable  since the prior exam.   2.  Small right pleural effusion   3.  Cardiomegaly   4.  Atherosclerosis      Echocardiogram Comp W/O Cont    (Results Pending)         Assessment/Plan:  I anticipate this patient will require at least two midnights for appropriate medical management, necessitating inpatient admission.    * Atrial fibrillation with rapid ventricular response (HCC)   Assessment & Plan    Improved with IV cardizem in the ED, will place on 30 mg q6h   Resume eliquis   Resume home BB   Check echocardiogram  Consider cardiology consult in am        CAD (coronary artery disease)- (present on admission)   Assessment & Plan    Hx of, on BB statin will resume        Cellulitis   Assessment & Plan    Lower extremity cellulitis   Will place on IV abx for now and monitor         Elevated troponin   Assessment & Plan    Trend trop   Check echo   Likely demand from afibb withrvr        Pulmonary edema   Assessment & Plan    Noted on CXR with associated shortness of breath and peripheral edema   Seems like heart failure likely due to afibb with rvr  Consider lasix if worsening symptoms        Hypothyroidism- (present on admission)   Assessment & Plan    Recheck tsh        Dyslipidemia- (present on admission)   Assessment & Plan    Resume statin   Recheck lipid panel        HTN (hypertension), benign- (present on admission)   Assessment & Plan    Resume home medications            VTE prophylaxis: eliquis

## 2018-09-08 NOTE — ASSESSMENT & PLAN NOTE
Most likely due to afib with RVR demand ischemia, last trop 0.07 continue trending. No new chest pain  Medically manage CAD as above  Continue BB>

## 2018-09-09 ENCOUNTER — APPOINTMENT (OUTPATIENT)
Dept: RADIOLOGY | Facility: MEDICAL CENTER | Age: 83
DRG: 309 | End: 2018-09-09
Attending: HOSPITALIST
Payer: MEDICARE

## 2018-09-09 PROCEDURE — 700111 HCHG RX REV CODE 636 W/ 250 OVERRIDE (IP): Performed by: HOSPITALIST

## 2018-09-09 PROCEDURE — 700102 HCHG RX REV CODE 250 W/ 637 OVERRIDE(OP): Performed by: EMERGENCY MEDICINE

## 2018-09-09 PROCEDURE — 770020 HCHG ROOM/CARE - TELE (206)

## 2018-09-09 PROCEDURE — 700102 HCHG RX REV CODE 250 W/ 637 OVERRIDE(OP): Performed by: FAMILY MEDICINE

## 2018-09-09 PROCEDURE — A9270 NON-COVERED ITEM OR SERVICE: HCPCS | Performed by: HOSPITALIST

## 2018-09-09 PROCEDURE — A9270 NON-COVERED ITEM OR SERVICE: HCPCS | Performed by: EMERGENCY MEDICINE

## 2018-09-09 PROCEDURE — 700101 HCHG RX REV CODE 250: Performed by: HOSPITALIST

## 2018-09-09 PROCEDURE — 700105 HCHG RX REV CODE 258: Performed by: HOSPITALIST

## 2018-09-09 PROCEDURE — A9270 NON-COVERED ITEM OR SERVICE: HCPCS | Performed by: FAMILY MEDICINE

## 2018-09-09 PROCEDURE — 700102 HCHG RX REV CODE 250 W/ 637 OVERRIDE(OP): Performed by: HOSPITALIST

## 2018-09-09 PROCEDURE — 99232 SBSQ HOSP IP/OBS MODERATE 35: CPT | Performed by: HOSPITALIST

## 2018-09-09 RX ORDER — METOPROLOL TARTRATE 50 MG/1
50 TABLET, FILM COATED ORAL ONCE
Status: COMPLETED | OUTPATIENT
Start: 2018-09-09 | End: 2018-09-09

## 2018-09-09 RX ORDER — AMOXICILLIN AND CLAVULANATE POTASSIUM 500; 125 MG/1; MG/1
1 TABLET, FILM COATED ORAL EVERY 12 HOURS
Status: DISPENSED | OUTPATIENT
Start: 2018-09-09 | End: 2018-09-13

## 2018-09-09 RX ORDER — METOPROLOL TARTRATE 50 MG/1
100 TABLET, FILM COATED ORAL TWICE DAILY
Status: DISCONTINUED | OUTPATIENT
Start: 2018-09-09 | End: 2018-09-13 | Stop reason: HOSPADM

## 2018-09-09 RX ORDER — AMOXICILLIN AND CLAVULANATE POTASSIUM 875; 125 MG/1; MG/1
1 TABLET, FILM COATED ORAL EVERY 12 HOURS
Status: DISCONTINUED | OUTPATIENT
Start: 2018-09-09 | End: 2018-09-09

## 2018-09-09 RX ADMIN — ASPIRIN 81 MG: 81 TABLET, DELAYED RELEASE ORAL at 05:51

## 2018-09-09 RX ADMIN — AMOXICILLIN AND CLAVULANATE POTASSIUM 1 TABLET: 500; 125 TABLET, FILM COATED ORAL at 17:54

## 2018-09-09 RX ADMIN — ATORVASTATIN CALCIUM 20 MG: 20 TABLET, FILM COATED ORAL at 20:29

## 2018-09-09 RX ADMIN — ACETAMINOPHEN 500 MG: 500 TABLET, FILM COATED ORAL at 20:29

## 2018-09-09 RX ADMIN — AMPICILLIN SODIUM AND SULBACTAM SODIUM 3 G: 2; 1 INJECTION, POWDER, FOR SOLUTION INTRAMUSCULAR; INTRAVENOUS at 02:12

## 2018-09-09 RX ADMIN — METOPROLOL TARTRATE 100 MG: 50 TABLET ORAL at 17:21

## 2018-09-09 RX ADMIN — METOPROLOL TARTRATE 50 MG: 50 TABLET ORAL at 05:51

## 2018-09-09 RX ADMIN — DOXYCYCLINE 100 MG: 100 INJECTION, POWDER, LYOPHILIZED, FOR SOLUTION INTRAVENOUS at 05:51

## 2018-09-09 RX ADMIN — APIXABAN 5 MG: 5 TABLET, FILM COATED ORAL at 05:51

## 2018-09-09 RX ADMIN — FUROSEMIDE 20 MG: 10 INJECTION, SOLUTION INTRAMUSCULAR; INTRAVENOUS at 05:51

## 2018-09-09 RX ADMIN — AMPICILLIN SODIUM AND SULBACTAM SODIUM 3 G: 2; 1 INJECTION, POWDER, FOR SOLUTION INTRAMUSCULAR; INTRAVENOUS at 07:59

## 2018-09-09 RX ADMIN — APIXABAN 5 MG: 5 TABLET, FILM COATED ORAL at 17:21

## 2018-09-09 RX ADMIN — METOPROLOL TARTRATE 50 MG: 50 TABLET ORAL at 13:09

## 2018-09-09 ASSESSMENT — PAIN SCALES - GENERAL
PAINLEVEL_OUTOF10: 2
PAINLEVEL_OUTOF10: 0

## 2018-09-09 ASSESSMENT — ENCOUNTER SYMPTOMS
MYALGIAS: 0
BLURRED VISION: 0
NAUSEA: 0
DEPRESSION: 0
DIZZINESS: 0
HEARTBURN: 0
BRUISES/BLEEDS EASILY: 0
FEVER: 0
HEADACHES: 0
COUGH: 0

## 2018-09-09 NOTE — PROGRESS NOTES
Received report from overnight. Pt appears to be resting comfortably at this time. Denies pain. All safety measures in place.

## 2018-09-09 NOTE — PROGRESS NOTES
Received call from tele monitor pt heart rate in the 150's and then back to 120's. Updated Dr. Low and new orders received.

## 2018-09-09 NOTE — PROGRESS NOTES
Pt refusing chest xray at this time. Se denies SOB. Pt continues to place NC on forehead advised pt to keep in nares educated pt on xray pt denied getting test. Updated Dr. Gill.

## 2018-09-09 NOTE — PROGRESS NOTES
Daughter and son-in-law at bedside. Updated on pt plan of care. Per son pt may respond better to male nurse as she typically listens to him only. Pt family states that pt is non-compliant at home, does not want to engage in ADL's frequently. Per family has had home care in the past but refuses the care when they are on site. Pt was sleeping while family was here. Pt denies pain at this time. Pt frustrated that she could not speak with son in  attempted to call but no answer.

## 2018-09-09 NOTE — CARE PLAN
Problem: Safety  Goal: Will remain free from falls  Outcome: PROGRESSING AS EXPECTED  Pt bed in low and locked position call bell within reach all safety measures in place.     Problem: Pain Management  Goal: Pain level will decrease to patient's comfort goal  Outcome: PROGRESSING AS EXPECTED  Pt denies pain at this time.

## 2018-09-09 NOTE — PROGRESS NOTES
[facility] Medicine Daily Progress Note    Date of Service  9/9/2018    Chief Complaint  85 y.o. female admitted 9/7/2018 with shortness of breath.       Interval Problem Update  In bed, feels ok, no new complains, no fever or chills, her HR still elevated but not palpitation, will increase metoprolol to 100 mg bid.     Consultants/Specialty  none    Disposition  Tbd.     Review of Systems  Review of Systems   Constitutional: Negative for fever.   HENT: Negative for congestion.    Eyes: Negative for blurred vision.   Respiratory: Negative for cough.    Cardiovascular: Negative for chest pain.   Gastrointestinal: Negative for heartburn and nausea.   Genitourinary: Negative for dysuria.   Musculoskeletal: Negative for myalgias.   Skin: Negative for itching.   Neurological: Negative for dizziness and headaches.   Endo/Heme/Allergies: Does not bruise/bleed easily.   Psychiatric/Behavioral: Negative for depression.        Physical Exam  Blood Pressure : (!) 99/69 (RN Notified)   Temperature: 36.5 °C (97.7 °F)   Pulse: 78   Respiration: 17   Pulse Oximetry: 92 %     Physical Exam   Constitutional: She is oriented to person, place, and time. She appears well-nourished. No distress.   HENT:   Head: Normocephalic and atraumatic.   Mouth/Throat: No oropharyngeal exudate.   Eyes: Conjunctivae are normal.   Neck: Normal range of motion. Neck supple. No JVD present.   Cardiovascular: Regular rhythm and normal heart sounds.    Pulmonary/Chest: Effort normal and breath sounds normal. No respiratory distress. She has no rales.   Abdominal: Soft. Bowel sounds are normal. She exhibits no distension. There is no rebound.   Musculoskeletal: Normal range of motion. She exhibits edema (mild. ). She exhibits no tenderness.   Neurological: She is alert and oriented to person, place, and time. She exhibits normal muscle tone.   Skin: No erythema.   Erythema.    Psychiatric: She has a normal mood and affect.   Nursing note and vitals  reviewed.      Fluids    Intake/Output Summary (Last 24 hours) at 09/09/18 1653  Last data filed at 09/09/18 1500   Gross per 24 hour   Intake              580 ml   Output              500 ml   Net               80 ml       Laboratory  Recent Labs      09/07/18 2121 09/08/18   0325   WBC  5.2  4.8   RBC  3.67*  3.62*   HEMOGLOBIN  12.3  11.9*   HEMATOCRIT  38.7  37.9   MCV  105.4*  104.7*   MCH  33.5*  32.9   MCHC  31.8*  31.4*   RDW  63.7*  62.6*   PLATELETCT  194  181   MPV  11.5  11.2     Recent Labs      09/07/18 2121 09/08/18   0325   SODIUM  139  138   POTASSIUM  3.5*  3.6   CHLORIDE  109  109   CO2  18*  20   GLUCOSE  93  83   BUN  41*  39*   CREATININE  1.40  1.34   CALCIUM  9.4  9.0     Recent Labs      09/07/18 2121   APTT  27.0   INR  1.28*     Recent Labs      09/07/18 2121   BNPBTYPENAT  325*     Recent Labs      09/08/18 0325   TRIGLYCERIDE  66   HDL  38*   LDL  52       Imaging  ECHOCARDIOGRAM-COMP W/ CONT   Final Result      DX-CHEST-LIMITED (1 VIEW)   Final Result         1.  Pulmonary edema and/or infiltrates are identified, which are stable since the prior exam.   2.  Small right pleural effusion   3.  Cardiomegaly   4.  Atherosclerosis           Assessment/Plan  * Atrial fibrillation with rapid ventricular response (HCC)   Assessment & Plan    HR still elevated, will increase her metoprolol to 100 mb bid. Echo stable. Continue eliquis        CAD (coronary artery disease)- (present on admission)   Assessment & Plan    Hx of, on statin, metoprolol. No chest pain        Cellulitis   Assessment & Plan    B/l lower extremities, cx neg will changed to po atb.         Elevated troponin   Assessment & Plan    Most likely due to afib with RVR demand ischemia, last trop 0.07 continue trending        Pulmonary edema   Assessment & Plan    Most like due to afib with rvr, better, continue lasix for today.        Hypothyroidism- (present on admission)   Assessment & Plan    tsh normal. Not on  supplement .        Dyslipidemia- (present on admission)   Assessment & Plan    On statin.        HTN (hypertension), benign- (present on admission)   Assessment & Plan    Stable. Borderline low. Continue monitoring.        dvt prophylaxis: eliquis.

## 2018-09-10 LAB
ANION GAP SERPL CALC-SCNC: 9 MMOL/L (ref 0–11.9)
BUN SERPL-MCNC: 28 MG/DL (ref 8–22)
CALCIUM SERPL-MCNC: 8.5 MG/DL (ref 8.5–10.5)
CHLORIDE SERPL-SCNC: 108 MMOL/L (ref 96–112)
CO2 SERPL-SCNC: 24 MMOL/L (ref 20–33)
CREAT SERPL-MCNC: 1.36 MG/DL (ref 0.5–1.4)
GLUCOSE SERPL-MCNC: 141 MG/DL (ref 65–99)
POTASSIUM SERPL-SCNC: 3.9 MMOL/L (ref 3.6–5.5)
SODIUM SERPL-SCNC: 141 MMOL/L (ref 135–145)

## 2018-09-10 PROCEDURE — A9270 NON-COVERED ITEM OR SERVICE: HCPCS | Performed by: FAMILY MEDICINE

## 2018-09-10 PROCEDURE — A9270 NON-COVERED ITEM OR SERVICE: HCPCS | Performed by: HOSPITALIST

## 2018-09-10 PROCEDURE — 80048 BASIC METABOLIC PNL TOTAL CA: CPT

## 2018-09-10 PROCEDURE — 700102 HCHG RX REV CODE 250 W/ 637 OVERRIDE(OP): Performed by: HOSPITALIST

## 2018-09-10 PROCEDURE — 97162 PT EVAL MOD COMPLEX 30 MIN: CPT

## 2018-09-10 PROCEDURE — 770020 HCHG ROOM/CARE - TELE (206)

## 2018-09-10 PROCEDURE — 700102 HCHG RX REV CODE 250 W/ 637 OVERRIDE(OP): Performed by: FAMILY MEDICINE

## 2018-09-10 PROCEDURE — G8978 MOBILITY CURRENT STATUS: HCPCS | Mod: CK

## 2018-09-10 PROCEDURE — G8979 MOBILITY GOAL STATUS: HCPCS | Mod: CI

## 2018-09-10 PROCEDURE — 36415 COLL VENOUS BLD VENIPUNCTURE: CPT

## 2018-09-10 PROCEDURE — 99233 SBSQ HOSP IP/OBS HIGH 50: CPT | Performed by: HOSPITALIST

## 2018-09-10 PROCEDURE — 700111 HCHG RX REV CODE 636 W/ 250 OVERRIDE (IP): Performed by: HOSPITALIST

## 2018-09-10 RX ORDER — TRAMADOL HYDROCHLORIDE 50 MG/1
50 TABLET ORAL EVERY 6 HOURS PRN
Status: DISCONTINUED | OUTPATIENT
Start: 2018-09-10 | End: 2018-09-13 | Stop reason: HOSPADM

## 2018-09-10 RX ADMIN — ACETAMINOPHEN 500 MG: 500 TABLET, FILM COATED ORAL at 14:11

## 2018-09-10 RX ADMIN — ACETAMINOPHEN 500 MG: 500 TABLET, FILM COATED ORAL at 20:14

## 2018-09-10 RX ADMIN — ASPIRIN 81 MG: 81 TABLET, DELAYED RELEASE ORAL at 06:20

## 2018-09-10 RX ADMIN — FUROSEMIDE 20 MG: 10 INJECTION, SOLUTION INTRAMUSCULAR; INTRAVENOUS at 06:20

## 2018-09-10 RX ADMIN — APIXABAN 5 MG: 5 TABLET, FILM COATED ORAL at 06:20

## 2018-09-10 RX ADMIN — TRAMADOL HYDROCHLORIDE 50 MG: 50 TABLET, COATED ORAL at 22:14

## 2018-09-10 RX ADMIN — METOPROLOL TARTRATE 100 MG: 50 TABLET ORAL at 06:20

## 2018-09-10 RX ADMIN — AMOXICILLIN AND CLAVULANATE POTASSIUM 1 TABLET: 500; 125 TABLET, FILM COATED ORAL at 06:20

## 2018-09-10 RX ADMIN — METOPROLOL TARTRATE 100 MG: 50 TABLET ORAL at 17:59

## 2018-09-10 RX ADMIN — APIXABAN 5 MG: 5 TABLET, FILM COATED ORAL at 17:58

## 2018-09-10 RX ADMIN — AMOXICILLIN AND CLAVULANATE POTASSIUM 1 TABLET: 500; 125 TABLET, FILM COATED ORAL at 17:58

## 2018-09-10 RX ADMIN — ATORVASTATIN CALCIUM 20 MG: 20 TABLET, FILM COATED ORAL at 17:59

## 2018-09-10 ASSESSMENT — ENCOUNTER SYMPTOMS
HEADACHES: 0
FEVER: 0
COUGH: 0
BRUISES/BLEEDS EASILY: 0
BLURRED VISION: 0
DIZZINESS: 0
PALPITATIONS: 0
MYALGIAS: 0
HEARTBURN: 0
DEPRESSION: 0

## 2018-09-10 ASSESSMENT — COGNITIVE AND FUNCTIONAL STATUS - GENERAL
STANDING UP FROM CHAIR USING ARMS: A LITTLE
MOBILITY SCORE: 17
SUGGESTED CMS G CODE MODIFIER MOBILITY: CK
CLIMB 3 TO 5 STEPS WITH RAILING: TOTAL
WALKING IN HOSPITAL ROOM: TOTAL

## 2018-09-10 ASSESSMENT — GAIT ASSESSMENTS
GAIT LEVEL OF ASSIST: REFUSED
ASSISTIVE DEVICE: 4 WHEEL WALKER

## 2018-09-10 ASSESSMENT — PAIN SCALES - GENERAL
PAINLEVEL_OUTOF10: 3
PAINLEVEL_OUTOF10: 5

## 2018-09-10 NOTE — PROGRESS NOTES
Spoke with Paxton RN wound care previously advised to take photos and apply ABD pad with roll gauze to pt RLE. Pictures uploaded and dressing applied.

## 2018-09-10 NOTE — DISCHARGE PLANNING
Transitional Care Navigator:    Chart reviewed for post acute needs.  Pt is an 85 yof who lives alone locally, admitted with SOB. Co morbidities include a-fib, HTN,cellulitis, and CAD.  LACE+ of 72 with current mobility of 10 feet. PT/OT evaluations are pending.    Presently, this patient is an appropriate candidate for skilled therapies. She has been to [facility] previously.  If mobility improves enough for return home, please consider an order for home health support; the patient has been on service with [facility] previously.

## 2018-09-10 NOTE — THERAPY
"Physical Therapy Evaluation completed.   Bed Mobility:  Supine to Sit: Supervised  Transfers: Sit to Stand: Supervised  Gait: Level Of Assist: Refused with 4-Wheel Walker       Plan of Care: Will benefit from Physical Therapy 3 times per week  Discharge Recommendations: Equipment: No Equipment Needed.     Ms. Zambrano is an 86 y/o female who presents to acute secondary to a fib with RVR, CAD, cellulitis, and elevated troponin likely demand related. Pt present iwth significant lower extremity weakness, decreased activity tolerance, and dynamic balance deficits. Thes eimpairments negativley impact her ability to perform gait and transfers at her prior level of function. Pt demonstrating very little motivation for mobility. Discussed impoartance of determining if she was mobilizing at a level safe for discharge home or if she would require additional post acute rehabiliation. Pt reluctantly came EOB. Trigger point release performed to R upper trap which pt reported resolved her headache pain. Pt agreeable to stand but refused to ambulate. Based on mobility demonstrated anticipate with regular daily mobility with nursing staff, sitting up in chair for meals, and 1-2 more physical therapy sessions she will achieve a mobility level adequate for home health management. If she is unable to ambulate household distances safely during her acute stay, then recommend post acute placement prior to discharge home. Pt demonstrated understanding of this recommendations. She would benefit from acute physical therapy services to improve her mobility and decrease risk for falls.     See \"Rehab Therapy-Acute\" Patient Summary Report for complete documentation.     "

## 2018-09-10 NOTE — PROGRESS NOTES
Received report from day RN assumed care at 1915. Pt A&Ox 4 . Pt states 2 /10 pain in her shoulder blade at this time. Plan of care discussed with Pt, verbalized understanding. no family present at bedside. Assessment completed. All Pt needs met at this time. Call light within reach, bed alarm on , bed locked and in low position. Will continue to monitor.

## 2018-09-10 NOTE — WOUND TEAM
Patient seen by wound team per consult order. Removed ABD and gauze dressing from RLE. Skin is dry and cracked, through this cracked skin some drainage noted. Skin cleansed with normal saline and gauze, dried and adhesive foam applied. Wound appears to be related to edema with dry skin which then fissured. Dressing care orders written for nursing to follow. Discussed POC with staff ANGUS Mohr, no advanced wound care needs at this time.

## 2018-09-11 LAB
ANION GAP SERPL CALC-SCNC: 10 MMOL/L (ref 0–11.9)
BUN SERPL-MCNC: 34 MG/DL (ref 8–22)
CALCIUM SERPL-MCNC: 9.1 MG/DL (ref 8.5–10.5)
CHLORIDE SERPL-SCNC: 107 MMOL/L (ref 96–112)
CO2 SERPL-SCNC: 23 MMOL/L (ref 20–33)
CREAT SERPL-MCNC: 1.67 MG/DL (ref 0.5–1.4)
EKG IMPRESSION: NORMAL
GLUCOSE SERPL-MCNC: 126 MG/DL (ref 65–99)
POTASSIUM SERPL-SCNC: 4.1 MMOL/L (ref 3.6–5.5)
SODIUM SERPL-SCNC: 140 MMOL/L (ref 135–145)

## 2018-09-11 PROCEDURE — 97165 OT EVAL LOW COMPLEX 30 MIN: CPT

## 2018-09-11 PROCEDURE — 700102 HCHG RX REV CODE 250 W/ 637 OVERRIDE(OP): Performed by: FAMILY MEDICINE

## 2018-09-11 PROCEDURE — 97530 THERAPEUTIC ACTIVITIES: CPT

## 2018-09-11 PROCEDURE — 97116 GAIT TRAINING THERAPY: CPT

## 2018-09-11 PROCEDURE — 93010 ELECTROCARDIOGRAM REPORT: CPT | Performed by: INTERNAL MEDICINE

## 2018-09-11 PROCEDURE — A9270 NON-COVERED ITEM OR SERVICE: HCPCS | Performed by: HOSPITALIST

## 2018-09-11 PROCEDURE — 36415 COLL VENOUS BLD VENIPUNCTURE: CPT

## 2018-09-11 PROCEDURE — 80048 BASIC METABOLIC PNL TOTAL CA: CPT

## 2018-09-11 PROCEDURE — 770020 HCHG ROOM/CARE - TELE (206)

## 2018-09-11 PROCEDURE — 93005 ELECTROCARDIOGRAM TRACING: CPT | Performed by: HOSPITALIST

## 2018-09-11 PROCEDURE — A9270 NON-COVERED ITEM OR SERVICE: HCPCS | Performed by: FAMILY MEDICINE

## 2018-09-11 PROCEDURE — G8988 SELF CARE GOAL STATUS: HCPCS | Mod: CI

## 2018-09-11 PROCEDURE — 700111 HCHG RX REV CODE 636 W/ 250 OVERRIDE (IP): Performed by: HOSPITALIST

## 2018-09-11 PROCEDURE — 99232 SBSQ HOSP IP/OBS MODERATE 35: CPT | Performed by: HOSPITALIST

## 2018-09-11 PROCEDURE — G8987 SELF CARE CURRENT STATUS: HCPCS | Mod: CJ

## 2018-09-11 PROCEDURE — 700102 HCHG RX REV CODE 250 W/ 637 OVERRIDE(OP): Performed by: HOSPITALIST

## 2018-09-11 RX ADMIN — AMOXICILLIN AND CLAVULANATE POTASSIUM 1 TABLET: 500; 125 TABLET, FILM COATED ORAL at 06:03

## 2018-09-11 RX ADMIN — ACETAMINOPHEN 500 MG: 500 TABLET, FILM COATED ORAL at 21:54

## 2018-09-11 RX ADMIN — APIXABAN 5 MG: 5 TABLET, FILM COATED ORAL at 06:02

## 2018-09-11 RX ADMIN — ONDANSETRON 4 MG: 2 INJECTION, SOLUTION INTRAMUSCULAR; INTRAVENOUS at 02:34

## 2018-09-11 RX ADMIN — ASPIRIN 81 MG: 81 TABLET, DELAYED RELEASE ORAL at 06:03

## 2018-09-11 RX ADMIN — METOPROLOL TARTRATE 100 MG: 50 TABLET ORAL at 06:02

## 2018-09-11 ASSESSMENT — GAIT ASSESSMENTS
GAIT LEVEL OF ASSIST: CONTACT GUARD ASSIST
DISTANCE (FEET): 20
ASSISTIVE DEVICE: 4 WHEEL WALKER

## 2018-09-11 ASSESSMENT — COGNITIVE AND FUNCTIONAL STATUS - GENERAL
TOILETING: A LITTLE
SUGGESTED CMS G CODE MODIFIER DAILY ACTIVITY: CK
DRESSING REGULAR LOWER BODY CLOTHING: A LOT
PERSONAL GROOMING: A LITTLE
CLIMB 3 TO 5 STEPS WITH RAILING: A LOT
DAILY ACTIVITIY SCORE: 16
DRESSING REGULAR UPPER BODY CLOTHING: A LITTLE
HELP NEEDED FOR BATHING: A LOT
SUGGESTED CMS G CODE MODIFIER MOBILITY: CJ
EATING MEALS: A LITTLE
MOBILITY SCORE: 21
WALKING IN HOSPITAL ROOM: A LITTLE

## 2018-09-11 ASSESSMENT — ENCOUNTER SYMPTOMS
NERVOUS/ANXIOUS: 1
SHORTNESS OF BREATH: 1
VOMITING: 0
DIAPHORESIS: 0
SENSORY CHANGE: 0
NECK PAIN: 0
COUGH: 0
BACK PAIN: 0
TREMORS: 0
WEAKNESS: 1
FEVER: 0
CHILLS: 0
DIARRHEA: 0
FLANK PAIN: 0
SPEECH CHANGE: 0
HALLUCINATIONS: 0
WHEEZING: 0
ABDOMINAL PAIN: 0

## 2018-09-11 ASSESSMENT — LIFESTYLE VARIABLES: SUBSTANCE_ABUSE: 0

## 2018-09-11 ASSESSMENT — PAIN SCALES - GENERAL: PAINLEVEL_OUTOF10: 0

## 2018-09-11 ASSESSMENT — ACTIVITIES OF DAILY LIVING (ADL): TOILETING: INDEPENDENT

## 2018-09-11 NOTE — PROGRESS NOTES
Dr. Urbano paged regarding patient refusal to wear monitor and pulling out IV, wanting to leave AMA.

## 2018-09-11 NOTE — PROGRESS NOTES
Patient refusing telemetry monitoring, ripped out IV, took off gown. Patient is alert and oriented x 4, remains reluctant to participate in care. States she is leaving today. Does not wish to plan a safe discharge with LSW. Refusing to discuss discharge planning at bedside with LSW.

## 2018-09-11 NOTE — PROGRESS NOTES
Patient reluctant to participate in care, refuses to ambulate. Not motivated to attempt to mobilize.

## 2018-09-11 NOTE — THERAPY
"Occupational Therapy Evaluation completed.   Functional Status: Pt is an 84 y/o female admitted with SOB, found to be in Afib RVR, now stable. She also had elevated troponins that are now trended down. She has a hx of BLE cellulitis. She is irritable throughout the session with poor initiation for self care tasks, requiring max encouragement. She was able to demo bed mobility with supv. CGA for functional mobility with 4ww. Supv grooming in stance at the sink. Supv to don slippers. She declined any further ADLs this date. She is limited by impaired cognition and decreased endurance during ADLs.   Plan of Care: Will benefit from Occupational Therapy 2 times per week  Discharge Recommendations:  Equipment: Will Continue to Assess for Equipment Needs. At this point she would need 24/7 supv, as she may fail to perform her ADLs due to lack of volition. Otherwise, she may benefit from SNF placement to maximize strength and endurance for ADLs as she lives alone.    See \"Rehab Therapy-Acute\" Patient Summary Report for complete documentation.    "

## 2018-09-11 NOTE — PROGRESS NOTES
Received report from day RN assumed care at 1915. Pt A&Ox 4 . Pt states 3 /10 pain at this time. Plan of care discussed with Pt, verbalized understanding. no family present at bedside. Assessment completed. All Pt needs met at this time. Call light within reach, bed alarm on , bed locked and in low position. Will continue to monitor.

## 2018-09-11 NOTE — PROGRESS NOTES
MD updated regarding pt 7sec pause. Pt had only complained of sob,  Placed on 02, morning labs added, ekg ordered. Will continue to monitor

## 2018-09-11 NOTE — DISCHARGE PLANNING
"Anticipated Discharge Disposition: TBD    Action: LSW spoke with patient at bedside. Patient appeared upset and did not want to talk about HH or SNF. Patient reported \"I am leaving today if my daughter can take me.\" LSW told patient she would follow up later.     Barriers to Discharge: Medical clearance, HH or SNF acceptance    Plan: LSW to follow up with patient and medical team about safe d/c plan.      Care Transition Team Assessment    Information Source  Orientation : Oriented x 4  Information Given By: Patient  Informant's Name:  (Madeleine Zambrano)  Who is responsible for making decisions for patient? : Patient    Readmission Evaluation  Is this a readmission?: No    Elopement Risk  Legal Hold: No  Ambulatory or Self Mobile in Wheelchair: Yes  Disoriented: No  Psychiatric Symptoms: None  History of Wandering: No  Elopement this Admit: No  Vocalizing Wanting to Leave: No  Displays Behaviors, Body Language Wanting to Leave: No-Not at Risk for Elopement  Elopement Risk: Not at Risk for Elopement    Interdisciplinary Discharge Planning  Lives with - Patient's Self Care Capacity: Alone and Unable to Care For Self  Patient or legal guardian wants to designate a caregiver (see row info): No  Housing / Facility: 1 Story Apartment / Condo  Prior Services: Intermittent Physical Support for ADL Per Family    Discharge Preparedness  What is your plan after discharge?: Uncertain - pending medical team collaboration  What are your discharge supports?: Child  Prior Functional Level: Independent with Activities of Daily Living    Functional Assesment  Prior Functional Level: Independent with Activities of Daily Living    Finances  Financial Barriers to Discharge: No  Prescription Coverage: Yes    Vision / Hearing Impairment  Vision Impairment : Yes  Hearing Impairment : No    Values / Beliefs / Concerns  Values / Beliefs Concerns : No    Advance Directive  Advance Directive?: POLST    Domestic Abuse  Have you ever been the " victim of abuse or violence?: No  Physical Abuse or Sexual Abuse: No  Verbal Abuse or Emotional Abuse: No  Possible Abuse Reported to:: Not Applicable    Psychological Assessment  History of Substance Abuse: None  History of Psychiatric Problems: No  Non-compliant with Treatment: No         Anticipated Discharge Information  Anticipated discharge disposition: Discharge needs currently unknown

## 2018-09-11 NOTE — PROGRESS NOTES
[facility] Medicine Daily Progress Note    Date of Service  9/10/2018    Chief Complaint  85 y.o. female admitted 9/7/2018 with shortness of breath.       Interval Problem Update  Resting in bed, no new complains, pt evaluated patient and recommended snf, she is off o2 now, no chest pain.     Consultants/Specialty  none    Disposition  snf    Review of Systems  Review of Systems   Constitutional: Negative for fever.   HENT: Negative for congestion.    Eyes: Negative for blurred vision.   Respiratory: Negative for cough.    Cardiovascular: Negative for chest pain and palpitations.   Gastrointestinal: Negative for heartburn.   Genitourinary: Negative for dysuria.   Musculoskeletal: Negative for myalgias.   Skin: Negative for itching.   Neurological: Negative for dizziness and headaches.   Endo/Heme/Allergies: Does not bruise/bleed easily.   Psychiatric/Behavioral: Negative for depression.        Physical Exam  Blood Pressure : (!) 99/69 (RN Notified)   Temperature: 36.5 °C (97.7 °F)   Pulse: 78   Respiration: 17   Pulse Oximetry: 92 %     Physical Exam   Constitutional: She is oriented to person, place, and time. She appears well-nourished. No distress.   HENT:   Head: Normocephalic and atraumatic.   Mouth/Throat: No oropharyngeal exudate.   Eyes: Conjunctivae are normal.   Neck: Normal range of motion. Neck supple. No JVD present.   Cardiovascular: Regular rhythm and normal heart sounds.    Pulmonary/Chest: Effort normal and breath sounds normal. No respiratory distress. She has no wheezes. She has no rales.   Abdominal: Soft. Bowel sounds are normal. She exhibits no distension. There is no tenderness. There is no rebound.   Musculoskeletal: Normal range of motion. She exhibits edema (mild. improved, redness is improved.). She exhibits no tenderness.   Neurological: She is alert and oriented to person, place, and time. She exhibits normal muscle tone.   Skin: No erythema.   Erythema.    Psychiatric: She has a normal  mood and affect.   Nursing note and vitals reviewed.      Fluids    Intake/Output Summary (Last 24 hours) at 09/10/18 1817  Last data filed at 09/10/18 1543   Gross per 24 hour   Intake                0 ml   Output             1150 ml   Net            -1150 ml       Laboratory  Recent Labs      09/07/18 2121 09/08/18 0325   WBC  5.2  4.8   RBC  3.67*  3.62*   HEMOGLOBIN  12.3  11.9*   HEMATOCRIT  38.7  37.9   MCV  105.4*  104.7*   MCH  33.5*  32.9   MCHC  31.8*  31.4*   RDW  63.7*  62.6*   PLATELETCT  194  181   MPV  11.5  11.2     Recent Labs      09/07/18   2121  09/08/18   0325  09/10/18   1351   SODIUM  139  138  141   POTASSIUM  3.5*  3.6  3.9   CHLORIDE  109  109  108   CO2  18*  20  24   GLUCOSE  93  83  141*   BUN  41*  39*  28*   CREATININE  1.40  1.34  1.36   CALCIUM  9.4  9.0  8.5     Recent Labs      09/07/18 2121   APTT  27.0   INR  1.28*     Recent Labs      09/07/18 2121   BNPBTYPENAT  325*     Recent Labs      09/08/18 0325   TRIGLYCERIDE  66   HDL  38*   LDL  52       Imaging  ECHOCARDIOGRAM-COMP W/ CONT   Final Result      DX-CHEST-LIMITED (1 VIEW)   Final Result         1.  Pulmonary edema and/or infiltrates are identified, which are stable since the prior exam.   2.  Small right pleural effusion   3.  Cardiomegaly   4.  Atherosclerosis           Assessment/Plan  * Atrial fibrillation with rapid ventricular response (HCC)   Assessment & Plan    Continue metoprolol 100 mg bid, hr stable.  Echo stable. Continue eliquis        CAD (coronary artery disease)- (present on admission)   Assessment & Plan    Hx of, on statin, metoprolol. No chest pain        Cellulitis   Assessment & Plan    B/l lower extremities, cx neg  changed to po atb, to complete 5 days total.        Elevated troponin   Assessment & Plan    Most likely due to afib with RVR demand ischemia, last trop 0.07 continue trending. No chest pain.         Pulmonary edema   Assessment & Plan    Most like due to afib with rvr, better,  off o2, stopped lasix.         Hypothyroidism- (present on admission)   Assessment & Plan    tsh normal. Not on supplement .        Dyslipidemia- (present on admission)   Assessment & Plan    On statin.        HTN (hypertension), benign- (present on admission)   Assessment & Plan    Stable. Borderline low. Continue monitoring.        dvt prophylaxis: eliquis.

## 2018-09-11 NOTE — DISCHARGE PLANNING
"Anticipated Discharge Disposition: TBD    Action: LSW spoke with patient regarding discharge to SNF. Patient is not agreeable to go. Patient reports she wants to discharge to her son/daughter-in-law's home. LSW asked if patient would have enough supervision at home and if her son/daughter work. Patient said \"they work, but I'll have help.\" Patient is agreeable to home health.     Barriers to Discharge: Medical clearance, safe discharge plan in place    Plan: LSW to talk with medical team in ID rounds, follow up with patient and family regarding discharge plan.   "

## 2018-09-11 NOTE — THERAPY
"Physical Therapy Evaluation completed.   Bed Mobility:  Supine to Sit:  (up with OT)  Transfers: Sit to Stand: Supervised  Gait: Level Of Assist: Contact Guard Assist with 4-Wheel Walker       Plan of Care: Plan to complete next treatment by 9/14  Discharge Recommendations: Equipment: No Equipment Needed.  Patient remains self-limiting today, appears frustrated with being in hospital. When asked, pt is not able to express to reason for her frustration. Patient refused longer than 20 feet of ambulation and did not answer when asked how much walking she normally does at home. Pt reports that she is thinking of moving in with her son and DIL. Patient remains weak, not tolerating ambulation at household distances. Patient will need a transitional care stay before home vs moving in with family where she would have 24/7 assist. Pt is too fragile to live on her own right now.   See \"Rehab Therapy-Acute\" Patient Summary Report for complete documentation.     "

## 2018-09-12 LAB
MAGNESIUM SERPL-MCNC: 2.2 MG/DL (ref 1.5–2.5)
PHOSPHATE SERPL-MCNC: 3.5 MG/DL (ref 2.5–4.5)

## 2018-09-12 PROCEDURE — A9270 NON-COVERED ITEM OR SERVICE: HCPCS | Performed by: HOSPITALIST

## 2018-09-12 PROCEDURE — 700102 HCHG RX REV CODE 250 W/ 637 OVERRIDE(OP): Performed by: HOSPITALIST

## 2018-09-12 PROCEDURE — 36415 COLL VENOUS BLD VENIPUNCTURE: CPT

## 2018-09-12 PROCEDURE — 83735 ASSAY OF MAGNESIUM: CPT

## 2018-09-12 PROCEDURE — 99232 SBSQ HOSP IP/OBS MODERATE 35: CPT | Performed by: HOSPITALIST

## 2018-09-12 PROCEDURE — 770020 HCHG ROOM/CARE - TELE (206)

## 2018-09-12 PROCEDURE — 84100 ASSAY OF PHOSPHORUS: CPT

## 2018-09-12 RX ORDER — AMOXICILLIN 250 MG
2 CAPSULE ORAL 2 TIMES DAILY
Qty: 30 TAB | Refills: 0
Start: 2018-09-12

## 2018-09-12 RX ORDER — METOPROLOL TARTRATE 100 MG/1
100 TABLET ORAL 2 TIMES DAILY
Qty: 60 TAB
Start: 2018-09-12

## 2018-09-12 RX ORDER — AMOXICILLIN AND CLAVULANATE POTASSIUM 500; 125 MG/1; MG/1
1 TABLET, FILM COATED ORAL EVERY 12 HOURS
Qty: 8 TAB | Refills: 0
Start: 2018-09-12 | End: 2018-09-16

## 2018-09-12 RX ORDER — TRAMADOL HYDROCHLORIDE 50 MG/1
50 TABLET ORAL EVERY 6 HOURS PRN
Qty: 20 TAB | Refills: 0 | Status: SHIPPED | OUTPATIENT
Start: 2018-09-12 | End: 2018-09-17

## 2018-09-12 RX ADMIN — ASPIRIN 81 MG: 81 TABLET, DELAYED RELEASE ORAL at 05:56

## 2018-09-12 RX ADMIN — APIXABAN 2.5 MG: 2.5 TABLET, FILM COATED ORAL at 05:56

## 2018-09-12 RX ADMIN — AMOXICILLIN AND CLAVULANATE POTASSIUM 1 TABLET: 500; 125 TABLET, FILM COATED ORAL at 17:29

## 2018-09-12 RX ADMIN — APIXABAN 2.5 MG: 2.5 TABLET, FILM COATED ORAL at 17:29

## 2018-09-12 RX ADMIN — AMOXICILLIN AND CLAVULANATE POTASSIUM 1 TABLET: 500; 125 TABLET, FILM COATED ORAL at 05:56

## 2018-09-12 RX ADMIN — METOPROLOL TARTRATE 100 MG: 50 TABLET ORAL at 17:29

## 2018-09-12 RX ADMIN — ATORVASTATIN CALCIUM 20 MG: 20 TABLET, FILM COATED ORAL at 17:29

## 2018-09-12 RX ADMIN — METOPROLOL TARTRATE 100 MG: 50 TABLET ORAL at 05:56

## 2018-09-12 ASSESSMENT — PAIN SCALES - GENERAL
PAINLEVEL_OUTOF10: 0

## 2018-09-12 ASSESSMENT — ENCOUNTER SYMPTOMS
ABDOMINAL PAIN: 0
FEVER: 0
SENSORY CHANGE: 0
DIARRHEA: 0
DIAPHORESIS: 0
COUGH: 0
SHORTNESS OF BREATH: 0
WEAKNESS: 1
NECK PAIN: 0
SPEECH CHANGE: 0
BACK PAIN: 0
WHEEZING: 0
FLANK PAIN: 0
VOMITING: 0
CHILLS: 0
HALLUCINATIONS: 0

## 2018-09-12 ASSESSMENT — LIFESTYLE VARIABLES: SUBSTANCE_ABUSE: 0

## 2018-09-12 NOTE — DISCHARGE PLANNING
"Anticipated Discharge Disposition: SNF    Action: LSW spoke with patient at bedside regarding d/c to SNF. Patient unwilling to sign choice and reported \"I can not make that decision at this time.\"  LSW called daughter Felicitas, left voicemail to call back.     Barriers to Discharge: SNF choice    Plan: LSW to follow up with patient and daughter.   "

## 2018-09-12 NOTE — PROGRESS NOTES
Report received from ANGUS Anders. Assumed care of patient. Updated patient on plan of care. Fall precautions in place, call light within reach.

## 2018-09-12 NOTE — PROGRESS NOTES
Received report from day RN assumed care at 1915. Pt A&Ox 3-4, disoriented to time occassionally . Pt states 0 /10 pain at this time. Plan of care discussed with Pt, verbalized understanding. no family present at bedside. Assessment completed. All Pt needs met at this time. Call light within reach, bed alarm on , bed locked and in low position. Will continue to monitor.

## 2018-09-12 NOTE — DISCHARGE PLANNING
Agency/Facility Name: [facility]  Spoke To: Tiff  Outcome: Patient declined due to no open beds and behavior.

## 2018-09-12 NOTE — DISCHARGE PLANNING
Received Choice form at [id number]  Agency/Facility Name: [facility]) [facility]  Referral sent per Choice form @ [id number]

## 2018-09-12 NOTE — PROGRESS NOTES
[facility]ist Progress Note    Date of Service: 2018    Chief Complaint    86 yo female admitted 18  with shortness of breath  Found to be in AF RVR w leg cellulitis.     Interval Problem Update    afib better controlled - low 100s- 120s . Lower ext redness, swelling improved. Debilitated- lives alone- recommended for post acute SNF for rehab. She is declining.     Consultants/Specialty  None     Disposition  TBD- anticipate will dc to SNF        Review of Systems   Constitutional: Negative for chills, diaphoresis, fever and malaise/fatigue.   Respiratory: Positive for shortness of breath (improved ). Negative for cough and wheezing.    Cardiovascular: Positive for leg swelling. Negative for chest pain.   Gastrointestinal: Negative for abdominal pain, diarrhea and vomiting.   Genitourinary: Negative for flank pain and hematuria.   Musculoskeletal: Negative for back pain, joint pain and neck pain.   Neurological: Positive for weakness (generalized ). Negative for tremors, sensory change and speech change.   Psychiatric/Behavioral: Negative for hallucinations and substance abuse. The patient is nervous/anxious.       Physical Exam  Laboratory/Imaging   Hemodynamics  Temp (24hrs), Av.3 °C (97.3 °F), Min:36.1 °C (96.9 °F), Max:36.7 °C (98.1 °F)   Temperature: 36.4 °C (97.5 °F)  Pulse  Av.5  Min: 56  Max: 153   Blood Pressure : 115/84      Respiratory      Respiration: 16, Pulse Oximetry: 95 %        RUL Breath Sounds: Clear, RML Breath Sounds: Diminished, RLL Breath Sounds: Diminished, RYAN Breath Sounds: Clear, LLL Breath Sounds: Diminished    Fluids    Intake/Output Summary (Last 24 hours) at 18 1757  Last data filed at 18 1300   Gross per 24 hour   Intake                0 ml   Output              400 ml   Net             -400 ml       Nutrition  Orders Placed This Encounter   Procedures   • Diet Order Cardiac     Standing Status:   Standing     Number of Occurrences:   1     Order  Specific Question:   Diet:     Answer:   Cardiac [6]     Physical Exam   Constitutional: She is oriented to person, place, and time. No distress.   HENT:   Head: Normocephalic and atraumatic.   Right Ear: External ear normal.   Left Ear: External ear normal.   Nose: Nose normal.   Eyes: EOM are normal. Right eye exhibits no discharge. Left eye exhibits no discharge. No scleral icterus.   Neck: Neck supple. No JVD present.   Cardiovascular:   No murmur heard.  irreg irreg    Pulmonary/Chest: Effort normal. No stridor. She has no wheezes. She has no rales.   Abdominal: Soft. Bowel sounds are normal. She exhibits no distension. There is no tenderness.   Musculoskeletal: She exhibits edema (mild erythema lower exts). She exhibits no tenderness.   Neurological: She is alert and oriented to person, place, and time. No cranial nerve deficit.   Skin: Skin is warm and dry. She is not diaphoretic. No pallor.   Vitals reviewed.          Recent Labs      09/10/18   1351  09/11/18   0033   SODIUM  141  140   POTASSIUM  3.9  4.1   CHLORIDE  108  107   CO2  24  23   GLUCOSE  141*  126*   BUN  28*  34*   CREATININE  1.36  1.67*   CALCIUM  8.5  9.1                      Assessment/Plan     * Atrial fibrillation with rapid ventricular response (HCC)   Assessment & Plan    Improved rate control w periods of RVR  Continue w lopressor - consider increase dose if frequent rapid rate.  Tele monitoring.         CAD (coronary artery disease)- (present on admission)   Assessment & Plan    Hx of- asympt  Continue statin, metoprolol.         Cellulitis   Assessment & Plan    B/l lower extremities, cx neg  - improved  cw po atb, to complete 5 days total.        Elevated troponin   Assessment & Plan    Most likely due to afib with RVR demand ischemia, last trop 0.07 continue trending. No new chest pain  Medically manage CAD as above  Control heart rate-- increase BB        Pulmonary edema   Assessment & Plan    Clinically improved- weaned off  o2  Lasix stopped         Hypothyroidism- (present on admission)   Assessment & Plan    tsh normal. Not on supplement .        Dyslipidemia- (present on admission)   Assessment & Plan    On statin.        HTN (hypertension), benign- (present on admission)   Assessment & Plan    Stable. Borderline low. Continue monitoring.          Quality-Core Measures   Reviewed items::  Medications reviewed, Labs reviewed and Radiology images reviewed  Henning catheter::  No Henning  DVT: eliquis   Antibiotics:  Treating active infection/contamination beyond 24 hours perioperative coverage      Debility - PT, mobilize and re eval .  Sw for discharge planning. She lives alone and children work.  Anticipate will need rehab at SNF-- will continue to discuss benefits with patient.

## 2018-09-13 VITALS
RESPIRATION RATE: 16 BRPM | HEART RATE: 97 BPM | WEIGHT: 145.72 LBS | SYSTOLIC BLOOD PRESSURE: 111 MMHG | TEMPERATURE: 97.4 F | HEIGHT: 63 IN | BODY MASS INDEX: 25.82 KG/M2 | OXYGEN SATURATION: 90 % | DIASTOLIC BLOOD PRESSURE: 85 MMHG

## 2018-09-13 LAB
ANION GAP SERPL CALC-SCNC: 9 MMOL/L (ref 0–11.9)
BUN SERPL-MCNC: 38 MG/DL (ref 8–22)
CALCIUM SERPL-MCNC: 9.1 MG/DL (ref 8.5–10.5)
CHLORIDE SERPL-SCNC: 106 MMOL/L (ref 96–112)
CO2 SERPL-SCNC: 22 MMOL/L (ref 20–33)
CREAT SERPL-MCNC: 1.48 MG/DL (ref 0.5–1.4)
GLUCOSE SERPL-MCNC: 110 MG/DL (ref 65–99)
POTASSIUM SERPL-SCNC: 4.1 MMOL/L (ref 3.6–5.5)
SODIUM SERPL-SCNC: 137 MMOL/L (ref 135–145)

## 2018-09-13 PROCEDURE — 80048 BASIC METABOLIC PNL TOTAL CA: CPT

## 2018-09-13 PROCEDURE — 99239 HOSP IP/OBS DSCHRG MGMT >30: CPT | Performed by: HOSPITALIST

## 2018-09-13 PROCEDURE — A9270 NON-COVERED ITEM OR SERVICE: HCPCS | Performed by: HOSPITALIST

## 2018-09-13 PROCEDURE — 36415 COLL VENOUS BLD VENIPUNCTURE: CPT

## 2018-09-13 PROCEDURE — 700102 HCHG RX REV CODE 250 W/ 637 OVERRIDE(OP): Performed by: HOSPITALIST

## 2018-09-13 RX ORDER — ASPIRIN 81 MG/1
81 TABLET ORAL EVERY MORNING
Qty: 30 TAB
Start: 2018-09-14

## 2018-09-13 RX ADMIN — METOPROLOL TARTRATE 100 MG: 50 TABLET ORAL at 05:43

## 2018-09-13 RX ADMIN — ASPIRIN 81 MG: 81 TABLET, DELAYED RELEASE ORAL at 05:43

## 2018-09-13 RX ADMIN — APIXABAN 2.5 MG: 2.5 TABLET, FILM COATED ORAL at 05:43

## 2018-09-13 ASSESSMENT — PAIN SCALES - GENERAL
PAINLEVEL_OUTOF10: 0
PAINLEVEL_OUTOF10: 0

## 2018-09-13 NOTE — CARE PLAN
Problem: Safety  Goal: Will remain free from falls  Outcome: PROGRESSING AS EXPECTED  Bedside table and call light are within reach. Bed is locked and in the lowest position. Treaded socks are on. Patient educated to call for assistance.     Problem: Knowledge Deficit  Goal: Knowledge of disease process/condition, treatment plan, diagnostic tests, and medications will improve  Outcome: PROGRESSING SLOWER THAN EXPECTED  Verbal education provided to pt. about disease process/condition and corresponding treatment. All questions and concerns have been addressed at this time

## 2018-09-13 NOTE — PROGRESS NOTES
Assumed care of patient bedside report received from ANGUS Fuller updated on POC, call light within reach and fall precautions in place. Bed locked and in lowest position. Patient instructed to call for assistance before getting out of bed. All questions answered, no other needs at this time.

## 2018-09-13 NOTE — PROGRESS NOTES
[facility]ist Progress Note    Date of Service: 2018    Chief Complaint    86 yo female admitted 18  with shortness of breath  Found to be in AF RVR w leg cellulitis.     Interval Problem Update    Afebrile , decreased lower extremity redness.  Had at length discussion w patient and daughter. She cannot be cared for at home and now is accepting of SNF for rehab per PT recommendations.     Consultants/Specialty  None     Disposition  TBD- anticipate will dc to SNF        Review of Systems   Constitutional: Negative for chills, diaphoresis, fever and malaise/fatigue.   Respiratory: Negative for cough, shortness of breath and wheezing.    Cardiovascular: Positive for leg swelling. Negative for chest pain.   Gastrointestinal: Negative for abdominal pain, diarrhea and vomiting.   Genitourinary: Negative for flank pain and hematuria.   Musculoskeletal: Negative for back pain, joint pain and neck pain.   Skin: Positive for rash (improved. ).   Neurological: Positive for weakness (generalized ). Negative for sensory change and speech change.   Psychiatric/Behavioral: Negative for hallucinations and substance abuse.      Physical Exam  Laboratory/Imaging   Hemodynamics  Temp (24hrs), Av.5 °C (97.7 °F), Min:36.1 °C (96.9 °F), Max:37 °C (98.6 °F)   Temperature: 36.6 °C (97.8 °F)  Pulse  Av.1  Min: 55  Max: 153   Blood Pressure : 124/95      Respiratory      Respiration: 18, Pulse Oximetry: 95 %        RUL Breath Sounds: Clear, RML Breath Sounds: Diminished, RLL Breath Sounds: Diminished, RYAN Breath Sounds: Clear, LLL Breath Sounds: Diminished    Fluids    Intake/Output Summary (Last 24 hours) at 18 1833  Last data filed at 18 0700   Gross per 24 hour   Intake                0 ml   Output              150 ml   Net             -150 ml       Nutrition  Orders Placed This Encounter   Procedures   • Diet Order Cardiac     Standing Status:   Standing     Number of Occurrences:   1     Order  Specific Question:   Diet:     Answer:   Cardiac [6]     Physical Exam   Constitutional: She is oriented to person, place, and time. No distress.   Anxious    HENT:   Head: Normocephalic and atraumatic.   Right Ear: External ear normal.   Left Ear: External ear normal.   Nose: Nose normal.   Eyes: EOM are normal. Right eye exhibits no discharge. Left eye exhibits no discharge. No scleral icterus.   Neck: Neck supple. No JVD present.   Cardiovascular:   No murmur heard.  irreg irreg    Pulmonary/Chest: Effort normal. No stridor. She has no wheezes. She has no rales.   Abdominal: Soft. Bowel sounds are normal. She exhibits no distension. There is no tenderness.   Musculoskeletal: She exhibits edema (mild erythema lower exts with right lower extremity ulcer- dressed. . no increased warmth.  redness decreased. ). She exhibits no tenderness.   Neurological: She is alert and oriented to person, place, and time. No cranial nerve deficit.   Skin: Skin is warm and dry. She is not diaphoretic. No pallor.   Vitals reviewed.          Recent Labs      09/10/18   1351  09/11/18   0033   SODIUM  141  140   POTASSIUM  3.9  4.1   CHLORIDE  108  107   CO2  24  23   GLUCOSE  141*  126*   BUN  28*  34*   CREATININE  1.36  1.67*   CALCIUM  8.5  9.1                      Assessment/Plan     * Atrial fibrillation with rapid ventricular response (HCC)   Assessment & Plan    Improved rate control w periods of RVR   Continue w lopressor - monitor HR response.   Tele monitoring.   Eliquis renal dosed.         CAD (coronary artery disease)- (present on admission)   Assessment & Plan    Hx of- asympt  Continue statin, metoprolol.         Cellulitis   Assessment & Plan    B/l lower extremities, cx neg  - improved redness.   cw po atb, to complete 5 days total.        Elevated troponin   Assessment & Plan    Most likely due to afib with RVR demand ischemia, last trop 0.07 continue trending. No new chest pain  Medically manage CAD as  above  Continue BB>         Pulmonary edema   Assessment & Plan    Clinically improved- weaned off o2  Lasix stopped         Hypothyroidism- (present on admission)   Assessment & Plan    tsh normal. Not on supplement .        Dyslipidemia- (present on admission)   Assessment & Plan    On statin.        HTN (hypertension), benign- (present on admission)   Assessment & Plan    Stable. Borderline low. Continue monitoring.          Quality-Core Measures   Reviewed items::  Medications reviewed, Labs reviewed and Radiology images reviewed  Henning catheter::  No Henning  DVT: eliquis   Antibiotics:  Treating active infection/contamination beyond 24 hours perioperative coverage      Debility - PT, mobilize and re eval .  Sw for dc planning to SNF

## 2018-09-13 NOTE — DISCHARGE INSTRUCTIONS
Discharge Instructions    Discharged to other by medical transportation with escort. Discharged via wheelchair, hospital escort: Yes.  Special equipment needed: Not Applicable    Be sure to schedule a follow-up appointment with your primary care doctor or any specialists as instructed.     Discharge Plan:   Smoking Cessation Offered: Patient Refused  Influenza Vaccine Indication: Patient Refuses    I understand that a diet low in cholesterol, fat, and sodium is recommended for good health. Unless I have been given specific instructions below for another diet, I accept this instruction as my diet prescription.   Other diet: Heart Healthy    Special Instructions: None    · Is patient discharged on Warfarin / Coumadin?   No     Depression / Suicide Risk    As you are discharged from this [facility] facility, it is important to learn how to keep safe from harming yourself.    Recognize the warning signs:  · Abrupt changes in personality, positive or negative- including increase in energy   · Giving away possessions  · Change in eating patterns- significant weight changes-  positive or negative  · Change in sleeping patterns- unable to sleep or sleeping all the time   · Unwillingness or inability to communicate  · Depression  · Unusual sadness, discouragement and loneliness  · Talk of wanting to die  · Neglect of personal appearance   · Rebelliousness- reckless behavior  · Withdrawal from people/activities they love  · Confusion- inability to concentrate     If you or a loved one observes any of these behaviors or has concerns about self-harm, here's what you can do:  · Talk about it- your feelings and reasons for harming yourself  · Remove any means that you might use to hurt yourself (examples: pills, rope, extension cords, firearm)  · Get professional help from the community (Mental Health, Substance Abuse, psychological counseling)  · Do not be alone:Call your Safe Contact- someone whom you trust who will be there  for you.  · Call your local CRISIS HOTLINE 632-2393 or 341-800-8377  · Call your local Children's Mobile Crisis Response Team [place] (147) 627-6302 or www.Weroom  · Call the toll free National Suicide Prevention Hotlines   · National Suicide Prevention Lifeline 635-055-SGDH (7418)  · [facility] Line Network 800-SUICIDE (910-3279)    Atrial Fibrillation  Introduction  Atrial fibrillation is a type of heartbeat that is irregular or fast (rapid). If you have this condition, your heart keeps quivering in a weird (chaotic) way. This condition can make it so your heart cannot pump blood normally. Having this condition gives a person more risk for stroke, heart failure, and other heart problems. There are different types of atrial fibrillation. Talk with your doctor to learn about the type that you have.  Follow these instructions at home:  · Take over-the-counter and prescription medicines only as told by your doctor.  · If your doctor prescribed a blood-thinning medicine, take it exactly as told. Taking too much of it can cause bleeding. If you do not take enough of it, you will not have the protection that you need against stroke and other problems.  · Do not use any tobacco products. These include cigarettes, chewing tobacco, and e-cigarettes. If you need help quitting, ask your doctor.  · If you have apnea (obstructive sleep apnea), manage it as told by your doctor.  · Do not drink alcohol.  · Do not drink beverages that have caffeine. These include coffee, soda, and tea.  · Maintain a healthy weight. Do not use diet pills unless your doctor says they are safe for you. Diet pills may make heart problems worse.  · Follow diet instructions as told by your doctor.  · Exercise regularly as told by your doctor.  · Keep all follow-up visits as told by your doctor. This is important.  Contact a doctor if:  · You notice a change in the speed, rhythm, or strength of your heartbeat.  · You are taking a  blood-thinning medicine and you notice more bruising.  · You get tired more easily when you move or exercise.  Get help right away if:  · You have pain in your chest or your belly (abdomen).  · You have sweating or weakness.  · You feel sick to your stomach (nauseous).  · You notice blood in your throw up (vomit), poop (stool), or pee (urine).  · You are short of breath.  · You suddenly have swollen feet and ankles.  · You feel dizzy.  · Your suddenly get weak or numb in your face, arms, or legs, especially if it happens on one side of your body.  · You have trouble talking, trouble understanding, or both.  · Your face or your eyelid droops on one side.  These symptoms may be an emergency. Do not wait to see if the symptoms will go away. Get medical help right away. Call your local emergency services (911 in the [place] Do not drive yourself to the hospital.   This information is not intended to replace advice given to you by your health care provider. Make sure you discuss any questions you have with your health care provider.  Document Released: 09/26/2009 Document Revised: 05/25/2017 Document Reviewed: 04/13/2016  © 2017 Elsevier  Amoxicillin; Clavulanic Acid tablets  What is this medicine?  AMOXICILLIN; CLAVULANIC ACID (a mox i MARTINE in; LUL thurston AS id) is a penicillin antibiotic. It is used to treat certain kinds of bacterial infections. It will not work for colds, flu, or other viral infections.  This medicine may be used for other purposes; ask your health care provider or pharmacist if you have questions.  COMMON BRAND NAME(S): Augmentin  What should I tell my health care provider before I take this medicine?  They need to know if you have any of these conditions:  -bowel disease, like colitis  -kidney disease  -liver disease  -mononucleosis  -an unusual or allergic reaction to amoxicillin, penicillin, cephalosporin, other antibiotics, clavulanic acid, other medicines, foods, dyes, or  preservatives  -pregnant or trying to get pregnant  -breast-feeding  How should I use this medicine?  Take this medicine by mouth with a full glass of water. Follow the directions on the prescription label. Take at the start of a meal. Do not crush or chew. If the tablet has a score line, you may cut it in half at the score line for easier swallowing. Take your medicine at regular intervals. Do not take your medicine more often than directed. Take all of your medicine as directed even if you think you are better. Do not skip doses or stop your medicine early.  Talk to your pediatrician regarding the use of this medicine in children. Special care may be needed.  Overdosage: If you think you have taken too much of this medicine contact a poison control center or emergency room at once.  NOTE: This medicine is only for you. Do not share this medicine with others.  What if I miss a dose?  If you miss a dose, take it as soon as you can. If it is almost time for your next dose, take only that dose. Do not take double or extra doses.  What may interact with this medicine?  -allopurinol  -anticoagulants  -birth control pills  -methotrexate  -probenecid  This list may not describe all possible interactions. Give your health care provider a list of all the medicines, herbs, non-prescription drugs, or dietary supplements you use. Also tell them if you smoke, drink alcohol, or use illegal drugs. Some items may interact with your medicine.  What should I watch for while using this medicine?  Tell your doctor or health care professional if your symptoms do not improve.  Do not treat diarrhea with over the counter products. Contact your doctor if you have diarrhea that lasts more than 2 days or if it is severe and watery.  If you have diabetes, you may get a false-positive result for sugar in your urine. Check with your doctor or health care professional.  Birth control pills may not work properly while you are taking this  medicine. Talk to your doctor about using an extra method of birth control.  What side effects may I notice from receiving this medicine?  Side effects that you should report to your doctor or health care professional as soon as possible:  -allergic reactions like skin rash, itching or hives, swelling of the face, lips, or tongue  -breathing problems  -dark urine  -fever or chills, sore throat  -redness, blistering, peeling or loosening of the skin, including inside the mouth  -seizures  -trouble passing urine or change in the amount of urine  -unusual bleeding, bruising  -unusually weak or tired  -white patches or sores in the mouth or throat  Side effects that usually do not require medical attention (report to your doctor or health care professional if they continue or are bothersome):  -diarrhea  -dizziness  -headache  -nausea, vomiting  -stomach upset  -vaginal or anal irritation  This list may not describe all possible side effects. Call your doctor for medical advice about side effects. You may report side effects to FDA at 1-807-FDA-2109.  Where should I keep my medicine?  Keep out of the reach of children.  Store at room temperature below 25 degrees C (77 degrees F). Keep container tightly closed. Throw away any unused medicine after the expiration date.  NOTE: This sheet is a summary. It may not cover all possible information. If you have questions about this medicine, talk to your doctor, pharmacist, or health care provider.  © 2018 Elsevier/Gold Standard (2009-03-12 12:04:30)      Tramadol tablets  What is this medicine?  TRAMADOL (TRA ma dole) is a pain reliever. It is used to treat moderate to severe pain in adults.  This medicine may be used for other purposes; ask your health care provider or pharmacist if you have questions.  COMMON BRAND NAME(S): Ultram  What should I tell my health care provider before I take this medicine?  They need to know if you have any of these conditions:  -brain  tumor  -depression  -drug abuse or addiction  -head injury  -if you frequently drink alcohol containing drinks  -kidney disease or trouble passing urine  -liver disease  -lung disease, asthma, or breathing problems  -seizures or epilepsy  -suicidal thoughts, plans, or attempt; a previous suicide attempt by you or a family member  -an unusual or allergic reaction to tramadol, codeine, other medicines, foods, dyes, or preservatives  -pregnant or trying to get pregnant  -breast-feeding  How should I use this medicine?  Take this medicine by mouth with a full glass of water. Follow the directions on the prescription label. You can take it with or without food. If it upsets your stomach, take it with food. Do not take your medicine more often than directed.  A special MedGuide will be given to you by the pharmacist with each prescription and refill. Be sure to read this information carefully each time.  Talk to your pediatrician regarding the use of this medicine in children. Special care may be needed.  Overdosage: If you think you have taken too much of this medicine contact a poison control center or emergency room at once.  NOTE: This medicine is only for you. Do not share this medicine with others.  What if I miss a dose?  If you miss a dose, take it as soon as you can. If it is almost time for your next dose, take only that dose. Do not take double or extra doses.  What may interact with this medicine?  Do not take this medication with any of the following medicines:  -MAOIs like Carbex, Eldepryl, Marplan, Nardil, and Parnate  This medicine may also interact with the following medications:  -alcohol  -antihistamines for allergy, cough and cold  -certain medicines for anxiety or sleep  -certain medicines for depression like amitriptyline, fluoxetine, sertraline  -certain medicines for migraine headache like almotriptan, eletriptan, frovatriptan, naratriptan, rizatriptan, sumatriptan, zolmitriptan  -certain medicines  for seizures like carbamazepine, oxcarbazepine, phenobarbital, primidone  -certain medicines that treat or prevent blood clots like warfarin  -digoxin  -furazolidone  -general anesthetics like halothane, isoflurane, methoxyflurane, propofol  -linezolid  -local anesthetics like lidocaine, pramoxine, tetracaine  -medicines that relax muscles for surgery  -other narcotic medicines for pain or cough  -phenothiazines like chlorpromazine, mesoridazine, prochlorperazine, thioridazine  -procarbazine  This list may not describe all possible interactions. Give your health care provider a list of all the medicines, herbs, non-prescription drugs, or dietary supplements you use. Also tell them if you smoke, drink alcohol, or use illegal drugs. Some items may interact with your medicine.  What should I watch for while using this medicine?  Tell your doctor or health care professional if your pain does not go away, if it gets worse, or if you have new or a different type of pain. You may develop tolerance to the medicine. Tolerance means that you will need a higher dose of the medicine for pain relief. Tolerance is normal and is expected if you take this medicine for a long time.  Do not suddenly stop taking your medicine because you may develop a severe reaction. Your body becomes used to the medicine. This does NOT mean you are addicted. Addiction is a behavior related to getting and using a drug for a non-medical reason. If you have pain, you have a medical reason to take pain medicine. Your doctor will tell you how much medicine to take. If your doctor wants you to stop the medicine, the dose will be slowly lowered over time to avoid any side effects.  There are different types of narcotic medicines (opiates). If you take more than one type at the same time or if you are taking another medicine that also causes drowsiness, you may have more side effects. Give your health care provider a list of all medicines you use. Your  doctor will tell you how much medicine to take. Do not take more medicine than directed. Call emergency for help if you have problems breathing or unusual sleepiness.  You may get drowsy or dizzy. Do not drive, use machinery, or do anything that needs mental alertness until you know how this medicine affects you. Do not stand or sit up quickly, especially if you are an older patient. This reduces the risk of dizzy or fainting spells. Alcohol can increase or decrease the effects of this medicine. Avoid alcoholic drinks.  You may have constipation. Try to have a bowel movement at least every 2 to 3 days. If you do not have a bowel movement for 3 days, call your doctor or health care professional.  Your mouth may get dry. Chewing sugarless gum or sucking hard candy, and drinking plenty of water may help. Contact your doctor if the problem does not go away or is severe.  What side effects may I notice from receiving this medicine?  Side effects that you should report to your doctor or health care professional as soon as possible:  -allergic reactions like skin rash, itching or hives, swelling of the face, lips, or tongue  -breathing problems  -confusion  -seizures  -signs and symptoms of low blood pressure like dizziness; feeling faint or lightheaded, falls; unusually weak or tired  -trouble passing urine or change in the amount of urine  Side effects that usually do not require medical attention (report to your doctor or health care professional if they continue or are bothersome):  -constipation  -dry mouth  -nausea, vomiting  -tiredness  This list may not describe all possible side effects. Call your doctor for medical advice about side effects. You may report side effects to FDA at 8-789-FDA-5835.  Where should I keep my medicine?  Keep out of the reach of children.  This medicine may cause accidental overdose and death if it taken by other adults, children, or pets. Mix any unused medicine with a substance like cat  litter or coffee grounds. Then throw the medicine away in a sealed container like a sealed bag or a coffee can with a lid. Do not use the medicine after the expiration date.  Store at room temperature between 15 and 30 degrees C (59 and 86 degrees F).  NOTE: This sheet is a summary. It may not cover all possible information. If you have questions about this medicine, talk to your doctor, pharmacist, or health care provider.  © 2018 Elsevier/Gold Standard (2016-09-11 09:00:04)

## 2018-09-13 NOTE — DISCHARGE SUMMARY
Hospital Medicine Discharge Note     Admit Date:  9/7/2018       Discharge Date:   9/13/2018    Attending Physician:  J Carlos Urbano M.D.      Diagnoses (includes active and resolved):     Principal Problem:    Atrial fibrillation with rapid ventricular response (HCC) POA: Unknown  Active Problems:    CAD (coronary artery disease) POA: Yes    HTN (hypertension), benign POA: Yes    Dyslipidemia POA: Yes    Hypothyroidism POA: Yes    Pulmonary edema POA: Unknown    Elevated troponin POA: Unknown    Cellulitis lower extremity POA: Unknown      Hospital Summary (Brief Narrative):         84 yo female hx of hypertension, dyslipidemia , CVA, CAD was admitted 9-7-18 with shortness of breath.  Patient had findings of Afib with RVR.  Elevated indeterminant troponin of .07 that did not trend up. Likely from afib with RVR,  demand ischemia.  She recently was taken off of Cardizem. She was placed on oral lopressor and Eliquis. Her atrial fibrillation was better rate controlled and shortness of breath resolved.  There was lower extremity redness and warmth.  She had findings of cellulitis with right shin wound.  With wound care, IV antibiotics, here lower extremity redness and swelling much improved and will plan to continue Augmentin for additional 4 days outpatient. Patient with continued debility and felt too unsafe for discharge home.   I discussed at length with the daughter who works and could not care for her. Following discussions with family and patient she agreed to rehab at SNF .  Patient on re evaluation has been clinically stable with no co shortness of breath will be discharged to SNF      Consultants:        None     Imaging/ Testing:      ECHOCARDIOGRAM-COMP W/ CONT   Final Result   CONCLUSIONS  Compared to the images of the prior study done on 02/28/18 unchanged.  Normal left ventricular size and systolic function.  Mild mitral regurgitation.  Right atrial pressure is estimated to be 15 mmHg.  Estimated right  ventricular systolic pressure  is 35 mmHg.  Mitral annular calcification.  Aortic sclerosis without stenosis.   DX-CHEST-LIMITED (1 VIEW)   Final Result         1.  Pulmonary edema and/or infiltrates are identified, which are stable since the prior exam.   2.  Small right pleural effusion   3.  Cardiomegaly   4.  Atherosclerosis            Procedures:          None     Discharge Medications:             Medication List      START taking these medications      Instructions   amoxicillin-clavulanate 500-125 MG Tabs  Commonly known as:  AUGMENTIN   Take 1 Tab by mouth every 12 hours for 4 days.  Dose:  1 Tab     aspirin 81 MG EC tablet  Replaces:  aspirin 81 MG tablet   Take 1 Tab by mouth every morning.  Dose:  81 mg     senna-docusate 8.6-50 MG Tabs  Commonly known as:  PERICOLACE or SENOKOT S   Take 2 Tabs by mouth 2 Times a Day.  Dose:  2 Tab     tramadol 50 MG Tabs  Commonly known as:  ULTRAM   Take 1 Tab by mouth every 6 hours as needed for Moderate Pain for up to 5 days.  Dose:  50 mg        CHANGE how you take these medications      Instructions   apixaban 2.5mg Tabs  What changed:  · medication strength  · how much to take  Commonly known as:  ELIQUIS   Take 1 Tab by mouth 2 Times a Day.  Dose:  2.5 mg     metoprolol 100 MG Tabs  What changed:  · medication strength  · how much to take  · when to take this  · additional instructions  Commonly known as:  LOPRESSOR   Take 1 Tab by mouth 2 Times a Day. Hold hr less 60 or sbp less 100  Dose:  100 mg        CONTINUE taking these medications      Instructions   atorvastatin 20 MG Tabs  Commonly known as:  LIPITOR   Take 1 Tab by mouth every bedtime.  Dose:  20 mg     Vitamin C 1000 MG Tabs   Take 1 Tab by mouth every morning.  Dose:  1 Tab        STOP taking these medications    amLODIPine 10 MG Tabs  Commonly known as:  NORVASC     aspirin 81 MG tablet  Replaced by:  aspirin 81 MG EC tablet     aspirin buffered 325 MG Tabs     clopidogrel 75 MG Tabs  Commonly known  as:  PLAVIX               Diet:       DIET ORDERS (Through next 24h)    Start     Ordered    09/07/18 2222  Diet Order Cardiac  ALL MEALS     Question:  Diet:  Answer:  Cardiac    09/07/18 2221            Activity:   As tolerated.      Code status:   DNR    Primary Care Provider:    Ric Garcia M.D.    Follow up appointment details :      .  Ric Garcia M.D.  [address] [facility] [address]  [facility] [address]  [phone]    In 2 weeks      [facility]  [address] [address]  [phone]                Time spent on discharge day patient visit: 40 minutes    #################################################

## 2018-09-13 NOTE — PROGRESS NOTES
Pt discharged to [facility] being picked up and transported by medical transport. Pt verbally acknowledges all discharge instructions, medications, and medications regimen. Pt gathered all personal belongings, Tele box and IV have been removed. All questions and needs have been met at this time.     Report called to Yadira GRECO at [facility]

## 2018-09-13 NOTE — CARE PLAN
Problem: Infection  Goal: Will remain free from infection  Outcome: PROGRESSING AS EXPECTED  Pt educated on the importance of hand washing to reduce the risk of infection. Pt verbally understands and performs hand hygiene to reduce to risks of infection.     Problem: Bowel/Gastric:  Goal: Normal bowel function is maintained or improved  Outcome: PROGRESSING AS EXPECTED  Pt educated on the use of stool softeners to aide in bowel maintenance and the importance of regular bowel movements     Problem: Fluid Volume:  Goal: Will maintain balanced intake and output  Outcome: PROGRESSING AS EXPECTED  Pt consuming % of meals throughout the course to the day with proper amounts of water to maintain hydration.

## 2018-09-13 NOTE — DISCHARGE PLANNING
Received Transport Form @ 1537 9/12  Spoke to Angela @ [facility]    Transport is scheduled for 9/13 @1130 going to [address] [place], [place] [address] [employer] [place].

## 2018-09-13 NOTE — PROGRESS NOTES
Bedside report received from [facility] RN Nancy, pt is resting in bed, awake and alert with no reports of pain or discomfort. Pt refused tele monitor overnight but is agreeable this morning. Bed is locked in lowest position with call light, belongings within reach, white board updated, and POC discussed. All needs met at this time.

## 2019-04-17 NOTE — PROCEDURES
Pre-procedure Diagnoses:   Obstructive jaundice [K83.8]   Acute epigastric pain [R10.13]   Abnormal contrast radiography of biliary tree [R93.2]   Abnormal CT of the abdomen [R93.5]   Abnormal LFTs (liver function tests) [R79.89]   Post-procedure Diagnoses:   Calculus of bile duct without cholecystitis with obstruction [K80.51]   Procedures:   ENDOSCOPIC RETROGRADE CHOLANGIOPANCREATOGRAPHY (ERCP) WITH REMOVAL OF STONES FROM BILIARY DUCTS [75946 (CPT®)]   ERCP BILIARY SPHINCTEROTOMY [93207 (CPT®)]   CHOLANGIOPANCREATOGRAPHY X-RAYS OF BILIARY AND PANCREAS DUCT BY ENDOSCOPY [57056 (CPT®)]     Endoscopist: Orlin Caldera MD, [facility], [facility] [place]    Anesthesiologist: Dr. Artie Vazquez    Premedications: already on Ceftriaxone    Consent:  Risks, benefits, and alternatives were discussed with patient.  Consenting person was given an opportunity to ask questions and discuss other options.  Risks including but not limited to pancreatitis, contrast reaction, radiation exposure, retained choledocholithiasis, possible need for temporary stent placement, perforation, infection, bleeding, missed lesion(s), cardiac and/or pulmonary event, aspiration, stroke, possible need for surgery and/or interventional radiology, hospitalization possibly prolonged, discomfort, unsuccessful and/or incomplete procedure, indefinite diagnosis, ineffective therapy and/or persistent symptoms, possible need for repeat procedures and/or additional testings, damage to adjacent organs and/or vascular structures, medication reaction, disability, death, and other adverse events possibly life-threatening.  Discussion was undertaken with Layman's terms.  Consenting person stated understanding and acceptance of these risks, and wished to proceed.  Informed consent was given in clear state of mind.    Endoscopic procedures in detail: ERCP scope was inserted from mouth to 2nd portion of the duodenum.  Pancreatic duct was initially cannulated and pancreatogram was completed,  Reviewed chart. Patient [facility]SC PCP appointment to 04/22 so [facility] appointment also RSC.    "then biliary duct was selectively cannulated on the 3rd attempt, successful free cannulation was achieved.  Cholangiogram was injected and completed.  Biliary sphincterotomy was performed with a bow papillotome with subsequent balloon common bile duct stones extraction, clearance was complete without evidence of any residual common bile duct stones on completion cholangiogram. The C-arm was moved and rotated on rainbow axis to optimize imaging of intrahepatic biliary systems in different angles to avoid missing lesions/strictures with ductal overlap and/or image angulation. The balloon was inflated within the right and left intrahepatic ducts and dragged through the entire length of the bile duct out the biliary os in order to minimize risk of retained stones. The ERCP scope was removed from duodenum to also image the common bile duct \"behind\" the ERCP scope.  Of note, no radiologist was present to help obtain nor read ERCP images.  I was the sole physician who obtained and performed interpretation of static and dynamic ERCP fluoroscopic x-ray images, no over-read was requested from the radiologist nor was it necessary.  There was suction of insufflated air and stomach fluid contents upon removal.    Procedure times:  - In-room 10:36  - Start 10:48  - Completed 11:02  - Out of room 11:12    Endoscopic Retrograde Cholangiopancreatography Findings:  - Ampulla of Vater: unremarkable except that it was hidden under a redundant mucosal fold and was dimunitive, located in 2nd portion of duodenum.  - Cholangiogram:  Mild dilated with small common bile duct stones, no evidence of biliary stricture nor primary sclerosing cholangitis.  - Pancreatogram: Unremarkable, non-dilated, no evidence of double-duct kartik, no evidence of divisum.  - Therapy: biliary sphincterotomy with balloon common bile duct stones extraction, clearance complete.    Impression:  1. Choledocholithiasis with obstruction, extracted  2. Biliary " sphincterotomy performed  3. No evidence of double-duct sign.    Recommendations:   1.  Routine post-endoscopy anesthesia recovery care.  Transfer patient back to prior hospital bed when she is awake, alert and comfortable, when recovery riteria are met.  Aspiration and fall precautions x 24 hours.   2.  Clear liquid diet tonight, advance to full liquid tomorrow if doing well  3.  Lactated ringers IV fluids for better hydration  4.  Please note that Dr. Rena Sharma is taking over GI care for this patient.  5.  I spoke to patient and recovery nurse about impression, diagnosis and recommendations.

## 2024-03-06 NOTE — PROGRESS NOTES
I would recommend repeat blood pressure check in 1-2 weeks. If it remains elevated would not recommend she continue with the phentermine. Continue to stay well hydrated, limit salt and caffeine intake. If BP remains elevated could try contrave instead as it does not contain stimulant. Would wait to refill med until repeat BP   Spoke to Dr. Archibald, new orders received

## 2025-01-30 NOTE — PROGRESS NOTES
Received pt from ER in [facility].  Pt ambulated with walker and one assist to bed.  PT alert and oriented x4, no complaints of pain, nausea or vomiting.  Oriented pt to room, call bell system, and hospital policies.     warm

## 2025-04-07 NOTE — CARE PLAN
Problem: Nutritional:  Goal: Achieve adequate nutritional intake  Patient will consume 50 % of meals  Outcome: NOT MET         What Type Of Note Output Would You Prefer (Optional)?: Standard Output How Severe Is Your Skin Lesion?: mild Has Your Skin Lesion Been Treated?: not been treated Is This A New Presentation, Or A Follow-Up?: Skin Lesion

## (undated) DEVICE — NEPTUNE 4 PORT MANIFOLD - (20/PK)

## (undated) DEVICE — CANISTER SUCTION RIGID RED 1500CC (40EA/CA)

## (undated) DEVICE — HEAD HOLDER JUNIOR/ADULT

## (undated) DEVICE — SHIELD OPTH AL GRTR CVR FOX (50EA/BX)

## (undated) DEVICE — KIT CUSTOM PROCEDURE SINGLE FOR ENDO  (15/CA)

## (undated) DEVICE — KIT ANESTHESIA W/CIRCUIT & 3/LT BAG W/FILTER (20EA/CA)

## (undated) DEVICE — KIT  I.V. START (100EA/CA)

## (undated) DEVICE — SLEEVE, VASO, THIGH, MED

## (undated) DEVICE — CANNULA SUB-TENONS ANESTH. 1.1X25MM 19GAX1IN (10EA/SP)

## (undated) DEVICE — BACKFLUSH SOFT TIP 23GA - (6/BX)

## (undated) DEVICE — SODIUM CHL IRRIGATION 0.9% 1000ML (12EA/CA)

## (undated) DEVICE — PAD EYE GAUZE COVERED OVAL 1 5/8 X 2 5/8" STERILE"

## (undated) DEVICE — PROTECTOR ULNA NERVE - (36PR/CA)

## (undated) DEVICE — LACTATED RINGERS INJ 1000 ML - (14EA/CA 60CA/PF)

## (undated) DEVICE — SPONGE GAUZE NON-STERILE 4X4 - (2000/CA 10PK/CA)

## (undated) DEVICE — TUBE SHILEY ENDOTRACHEAL ORAL RAE CUFFED 7.0MM WITH TAPERGUARD (10EA/PK)

## (undated) DEVICE — TUBING CLEARLINK DUO-VENT - C-FLO (48EA/CA)

## (undated) DEVICE — SUTURE EYE

## (undated) DEVICE — SET LEADWIRE 5 LEAD BEDSIDE DISPOSABLE ECG (1SET OF 5/EA)

## (undated) DEVICE — PROBE 23 GA ILLUM FLEX CURVED - LASER(6/BX)

## (undated) DEVICE — WATER IRRIGATION STERILE 1000ML (12EA/CA)

## (undated) DEVICE — GLOVE BIOGEL SZ 7 SURGICAL PF LTX - (50PR/BX 4BX/CA)

## (undated) DEVICE — NEEDLE FILTER ASPIRATION 18 GA X 1 1/2 IN (100EA/BX)

## (undated) DEVICE — ELECTRODE 850 FOAM ADHESIVE - HYDROGEL RADIOTRNSPRNT (50/PK)

## (undated) DEVICE — SUTURE 7-0 VICRYL TG140-8 (12PK/BX)

## (undated) DEVICE — CANNULA 23G VALVED VITRECTOMY PACK WIDE ANGLE

## (undated) DEVICE — CANNULA INJECTION 27G (EYE) - 10/BX ALCON

## (undated) DEVICE — CANISTER SUCTION 3000ML MECHANICAL FILTER AUTO SHUTOFF MEDI-VAC NONSTERILE LF DISP  (40EA/CA)

## (undated) DEVICE — MASK ANESTHESIA ADULT  - (100/CA)

## (undated) DEVICE — SUCTION INSTRUMENT YANKAUER BULBOUS TIP W/O VENT (50EA/CA)

## (undated) DEVICE — GLOVE BIOGEL SZ 7.5 SURGICAL PF LTX - (50PR/BX 4BX/CA)

## (undated) DEVICE — CONTAINER, SPECIMEN, STERILE

## (undated) DEVICE — PACK VITRECTOMY (1EA/CA)

## (undated) DEVICE — SENSOR SPO2 NEO LNCS ADHESIVE (20/BX) SEE USER NOTES

## (undated) DEVICE — CATHETER IV 20 GA X 1-1/4 ---SURG.& SDS ONLY--- (50EA/BX)

## (undated) DEVICE — CANNULA DIVIDED ADULT CO2 - SAMPLE W/FEMALE CONNCT (25/CA)

## (undated) DEVICE — LEAD SET 6 DISP. EKG NIHON KOHDEN

## (undated) DEVICE — BITE BLOCK ADULT 60FR (100EA/CA)

## (undated) DEVICE — SYRINGE SAFETY 10 ML 18 GA X 1 1/2 BLUNT LL (100/BX 4BX/CA)

## (undated) DEVICE — TUBE E-T HI-LO CUFF 6.5MM (10EA/BX)